# Patient Record
Sex: FEMALE | Race: WHITE | NOT HISPANIC OR LATINO | Employment: FULL TIME | URBAN - METROPOLITAN AREA
[De-identification: names, ages, dates, MRNs, and addresses within clinical notes are randomized per-mention and may not be internally consistent; named-entity substitution may affect disease eponyms.]

---

## 2017-01-28 ENCOUNTER — GENERIC CONVERSION - ENCOUNTER (OUTPATIENT)
Dept: OTHER | Facility: OTHER | Age: 57
End: 2017-01-28

## 2017-03-20 ENCOUNTER — TRANSCRIBE ORDERS (OUTPATIENT)
Dept: ADMINISTRATIVE | Age: 57
End: 2017-03-20

## 2017-03-20 ENCOUNTER — APPOINTMENT (OUTPATIENT)
Dept: LAB | Age: 57
End: 2017-03-20
Attending: PREVENTIVE MEDICINE

## 2017-03-20 DIAGNOSIS — Z00.00 ROUTINE GENERAL MEDICAL EXAMINATION AT A HEALTH CARE FACILITY: ICD-10-CM

## 2017-03-20 DIAGNOSIS — Z00.00 ROUTINE GENERAL MEDICAL EXAMINATION AT A HEALTH CARE FACILITY: Primary | ICD-10-CM

## 2017-03-20 PROCEDURE — 86765 RUBEOLA ANTIBODY: CPT

## 2017-03-20 PROCEDURE — 86735 MUMPS ANTIBODY: CPT

## 2017-03-20 PROCEDURE — 86480 TB TEST CELL IMMUN MEASURE: CPT

## 2017-03-20 PROCEDURE — 36415 COLL VENOUS BLD VENIPUNCTURE: CPT

## 2017-03-20 PROCEDURE — 86762 RUBELLA ANTIBODY: CPT

## 2017-03-20 PROCEDURE — 86787 VARICELLA-ZOSTER ANTIBODY: CPT

## 2017-03-21 LAB
MEV IGG SER QL: NORMAL
MUV IGG SER QL: NORMAL
RUBV IGG SERPL IA-ACNC: >175 IU/ML
VZV IGG SER IA-ACNC: NORMAL

## 2017-03-22 LAB
ANNOTATION COMMENT IMP: NORMAL
GAMMA INTERFERON BACKGROUND BLD IA-ACNC: 0.05 IU/ML
M TB IFN-G BLD-IMP: NEGATIVE
M TB IFN-G CD4+ BCKGRND COR BLD-ACNC: <0.01 IU/ML
M TB IFN-G CD4+ T-CELLS BLD-ACNC: 0.04 IU/ML
MITOGEN IGNF BLD-ACNC: 6.3 IU/ML
QUANTIFERON-TB GOLD IN TUBE: NORMAL
SERVICE CMNT-IMP: NORMAL

## 2018-01-09 NOTE — MISCELLANEOUS
Message  GI Reminder Recall Jj Lowers:   Date: 01/28/2017   Dear Iliana Cobb:     Review of our records shows you are due for the following: Colonoscopy  Please call the following office to schedule your appointment:   [facility], [address] [place] [address], [address] [place] [address] [phone]  We look forward to hearing from you!        Sincerely,         [facility]         Signatures   Electronically signed by : Dennie Bart, ; Jan 28 2017 11:13AM EST                       (Author)

## 2018-01-12 NOTE — RESULT NOTES
Verified Results  (1) URINE CULTURE [id number] 11:33AM Deisy Miller     Test Name Result Flag Reference   Urine Culture,Comprehensive Final report     Result 1      Lactobacillus species   10,000-25,000 colony forming units per mL  Susceptibility not normally performed on this organism

## 2018-01-12 NOTE — RESULT NOTES
Verified Results  (1) URINE CULTURE [id number] 12:00AM Washington Miller     Test Name Result Flag Reference   Urine Culture,Comprehensive Final report     Result 1 Comment     No growth in 36 - 48 hours

## 2018-01-15 NOTE — MISCELLANEOUS
Message  Called patient with recent blood work and MRI results  I informed her everything was normal  No signs of active IBD on MRI  She reports having crampy abdominal pain, so I advised her to take Bentyl 20 mg PO every 6 hours as needed for the pain  She expressed understanding and all questions were answered        Signatures   Electronically signed by : TEQUILA Walden; Feb 2 2016  5:15PM EST                       (Author)

## 2018-01-16 NOTE — MISCELLANEOUS
Message  patient went to emergency room over the weekend for severe abdominal pain  I reviewed his CT scan which showed distal terminal ileum thickening  Her pain is due to Crohn's flare and underlying irritable bowel syndrome /colon spasm  Continue prednisone 40 mg for one week then taper by 10 mg every week  I sent her a prescription for tramadol to be taken for pain  Continue Apriso        Plan  Abdominal pain    · TraMADol HCl - 50 MG Oral Tablet; TAKE 1 TABLET BY MOUTH TWICE DAILY AS  NEEDED    Signatures   Electronically signed by : Lane Metcalf MD; Jun 6 2016  6:30PM EST                       (Author)

## 2018-09-25 ENCOUNTER — TRANSCRIBE ORDERS (OUTPATIENT)
Dept: ADMINISTRATIVE | Facility: HOSPITAL | Age: 58
End: 2018-09-25

## 2018-09-25 DIAGNOSIS — K50.919 CROHN'S DISEASE WITH COMPLICATION, UNSPECIFIED GASTROINTESTINAL TRACT LOCATION (HCC): Primary | ICD-10-CM

## 2018-09-25 DIAGNOSIS — K92.2 ACUTE GASTROINTESTINAL HEMORRHAGE: ICD-10-CM

## 2018-09-25 DIAGNOSIS — R10.13 ABDOMINAL PAIN, EPIGASTRIC: ICD-10-CM

## 2018-12-03 ENCOUNTER — LAB REQUISITION (OUTPATIENT)
Dept: LAB | Facility: HOSPITAL | Age: 58
End: 2018-12-03
Payer: COMMERCIAL

## 2018-12-03 DIAGNOSIS — R10.13 EPIGASTRIC PAIN: ICD-10-CM

## 2018-12-03 DIAGNOSIS — K63.5 POLYP OF COLON: ICD-10-CM

## 2018-12-03 PROCEDURE — 88305 TISSUE EXAM BY PATHOLOGIST: CPT | Performed by: PATHOLOGY

## 2019-04-24 ENCOUNTER — TELEPHONE (OUTPATIENT)
Dept: FAMILY MEDICINE CLINIC | Facility: CLINIC | Age: 59
End: 2019-04-24

## 2019-05-07 ENCOUNTER — OFFICE VISIT (OUTPATIENT)
Dept: URGENT CARE | Facility: CLINIC | Age: 59
End: 2019-05-07
Payer: COMMERCIAL

## 2019-05-07 VITALS
WEIGHT: 233.8 LBS | DIASTOLIC BLOOD PRESSURE: 96 MMHG | TEMPERATURE: 97.9 F | BODY MASS INDEX: 35.43 KG/M2 | HEIGHT: 68 IN | OXYGEN SATURATION: 97 % | RESPIRATION RATE: 20 BRPM | SYSTOLIC BLOOD PRESSURE: 174 MMHG | HEART RATE: 88 BPM

## 2019-05-07 DIAGNOSIS — H57.11 EYE PAIN, RIGHT: Primary | ICD-10-CM

## 2019-05-07 PROCEDURE — 99213 OFFICE O/P EST LOW 20 MIN: CPT | Performed by: PHYSICIAN ASSISTANT

## 2019-05-07 PROCEDURE — S9088 SERVICES PROVIDED IN URGENT: HCPCS | Performed by: PHYSICIAN ASSISTANT

## 2019-05-07 RX ORDER — IBUPROFEN 200 MG
200 TABLET ORAL EVERY 6 HOURS PRN
COMMUNITY
End: 2021-02-28 | Stop reason: HOSPADM

## 2019-12-19 ENCOUNTER — OFFICE VISIT (OUTPATIENT)
Dept: URGENT CARE | Facility: CLINIC | Age: 59
End: 2019-12-19
Payer: COMMERCIAL

## 2019-12-19 VITALS
HEIGHT: 67 IN | OXYGEN SATURATION: 96 % | TEMPERATURE: 97.3 F | BODY MASS INDEX: 32.96 KG/M2 | HEART RATE: 106 BPM | WEIGHT: 210 LBS | SYSTOLIC BLOOD PRESSURE: 143 MMHG | DIASTOLIC BLOOD PRESSURE: 94 MMHG | RESPIRATION RATE: 20 BRPM

## 2019-12-19 DIAGNOSIS — J20.9 ACUTE BRONCHITIS, UNSPECIFIED ORGANISM: Primary | ICD-10-CM

## 2019-12-19 DIAGNOSIS — L02.91 ABSCESS: ICD-10-CM

## 2019-12-19 PROCEDURE — 99213 OFFICE O/P EST LOW 20 MIN: CPT | Performed by: PHYSICIAN ASSISTANT

## 2019-12-19 PROCEDURE — S9088 SERVICES PROVIDED IN URGENT: HCPCS | Performed by: PHYSICIAN ASSISTANT

## 2019-12-19 RX ORDER — PREDNISONE 50 MG/1
50 TABLET ORAL DAILY
Qty: 5 TABLET | Refills: 0 | Status: SHIPPED | OUTPATIENT
Start: 2019-12-19 | End: 2019-12-24

## 2019-12-19 RX ORDER — CLINDAMYCIN HYDROCHLORIDE 300 MG/1
300 CAPSULE ORAL 3 TIMES DAILY
Qty: 21 CAPSULE | Refills: 0 | Status: SHIPPED | OUTPATIENT
Start: 2019-12-19 | End: 2019-12-26

## 2019-12-19 NOTE — PATIENT INSTRUCTIONS
Take medications as directed  Apply warm compresses under the right arm  If symptoms worsen you may need to be re-evaluated for possible incision and drainage  Use inhaler as directed  Increase fluid intake  You should quit smoking  Follow up with your family doctor 3-5 days if symptoms are not improving

## 2019-12-19 NOTE — PROGRESS NOTES
[facility]        NAME: Rogers Rick is a 64 y o  female  : 1960    MRN: [id number]  DATE: 2019  TIME: 1:16 PM    Assessment and Plan   Acute bronchitis, unspecified organism [J20 9]  1  Acute bronchitis, unspecified organism  predniSONE 50 mg tablet   2  Abscess  clindamycin (CLEOCIN) 300 MG capsule     Patient states that when she has these flares of hidradenitis she typically takes an antibiotic which makes the area more fluctuant and drains on its own  She was taking doxycycline but then had a rash so stopped it  With her allergies to penicillins and sulfa as well we discussed using clindamycin which she states she has taken in the past without difficulty  Will start on clindamycin and she will continue warm compresses  If symptoms worsen, she develops fever, more pain or swelling in that area she may have to have the area lanced  Recommend follow up with General surgery he for continued symptoms  Patient is a smoker with a harsh cough and some wheezing in the bases  Instructed patient to start using her albuterol inhaler every 6 hours for the next few days will also start on prednisone  Patient Instructions   Patient Instructions   Take medications as directed  Apply warm compresses under the right arm  If symptoms worsen you may need to be re-evaluated for possible incision and drainage  Use inhaler as directed  Increase fluid intake  You should quit smoking  Follow up with your family doctor 3-5 days if symptoms are not improving  Proceed to  ER if symptoms worsen  Chief Complaint     Chief Complaint   Patient presents with    Mass     Reports lump on right armpit   Cough     Reprts cough, chest congestion, sore throat, and ear pain x 2 days          History of Present Illness       Patient presents for evaluation of 2 complaints today  She has a history of hidradenitis suppurative in reports she has the painful area under her right arm    She states that she was given doxycycline by her family doctor but by day 4 she had a rash so stopped taking it  She reports she has taken doxycycline in the past without an issue  She states that she has allergies to penicillins and sulfa as well  She states the area under her arm is getting larger and is causing more discomfort  She has had surgery for similar condition in the groin area in the past   She denies any fevers or chills  Her 2nd complaint is she has developed postnasal drainage, sinus congestion, and persistent cough for the past 2-3 days  She is a cigarette smoker  She reports that she does have an inhaler, Proventil, at home but is only supposed to use it when she is really sick  She states that she has some pain in her chest with cough and feels a little short of breath and dizzy at times  Review of Systems   Review of Systems   Constitutional: Negative for chills and fever  HENT: Positive for congestion, postnasal drip and sore throat  Eyes: Negative  Respiratory: Positive for cough and shortness of breath  Cardiovascular: Negative  Gastrointestinal: Negative  Musculoskeletal: Negative  Skin: Negative  Allergic/Immunologic: Negative  Neurological: Negative  Current Medications       Current Outpatient Medications:     ibuprofen (MOTRIN) 200 mg tablet, Take 200 mg by mouth every 6 (six) hours as needed, Disp: , Rfl:     BUDESONIDE PO, Take 3 tablets by mouth daily  , Disp: , Rfl:     clindamycin (CLEOCIN) 300 MG capsule, Take 1 capsule (300 mg total) by mouth 3 (three) times a day for 7 days, Disp: 21 capsule, Rfl: 0    Mesalamine (APRISO PO), Take 4 tablets by mouth daily  , Disp: , Rfl:     ondansetron (ZOFRAN-ODT) 4 mg disintegrating tablet, Take 1 tablet (4 mg total) by mouth every 8 (eight) hours as needed for nausea or vomiting   (Patient not taking: Reported on 5/7/2019), Disp: 12 tablet, Rfl: 0    oxyCODONE (OXY-IR) 5 MG capsule, Take 1 capsule (5 mg total) by mouth every 6 (six) hours as needed for moderate pain  Max Daily Amount: 20 mg (Patient not taking: Reported on 5/7/2019), Disp: 10 capsule, Rfl: 0    predniSONE 50 mg tablet, Take 1 tablet (50 mg total) by mouth daily for 5 days, Disp: 5 tablet, Rfl: 0    Current Allergies     Allergies as of 12/19/2019 - Reviewed 12/19/2019   Allergen Reaction Noted    Margo Phlegm [cephalexin]  06/04/2016    Penicillins  06/04/2016            The following portions of the patient's history were reviewed and updated as appropriate: allergies, current medications, past family history, past medical history, past social history, past surgical history and problem list      Past Medical History:   Diagnosis Date    Crohn's colitis ([facility] )     Diabetes mellitus ([facility] )     Migraine        History reviewed  No pertinent surgical history  History reviewed  No pertinent family history  Medications have been verified  Objective   /94 (BP Location: Right arm, Patient Position: Sitting)   Pulse (!) 106   Temp (!) 97 3 °F (36 3 °C) (Tympanic)   Resp 20   Ht 5' 7" (1 702 m)   Wt 95 3 kg (210 lb)   SpO2 96%   BMI 32 89 kg/m²        Physical Exam     Physical Exam   Constitutional: She is oriented to person, place, and time  She appears well-developed  No distress  HENT:   Right Ear: Tympanic membrane, external ear and ear canal normal    Left Ear: Tympanic membrane, external ear and ear canal normal    Nose: Nose normal    Mouth/Throat: Uvula is midline, oropharynx is clear and moist and mucous membranes are normal    Cardiovascular: Normal rate and regular rhythm  Pulmonary/Chest: Effort normal  She has wheezes in the right lower field and the left lower field  Scattered rhonchi that clear with cough   Lymphadenopathy:     She has no cervical adenopathy  Neurological: She is alert and oriented to person, place, and time  Skin: Skin is warm and dry     Area of swelling and induration in right axilla  3 pustules noted with mild erythema  No significant fluctuance

## 2020-01-02 ENCOUNTER — OFFICE VISIT (OUTPATIENT)
Dept: DERMATOLOGY | Facility: CLINIC | Age: 60
End: 2020-01-02
Payer: COMMERCIAL

## 2020-01-02 ENCOUNTER — APPOINTMENT (OUTPATIENT)
Dept: LAB | Facility: CLINIC | Age: 60
End: 2020-01-02
Payer: COMMERCIAL

## 2020-01-02 VITALS — HEIGHT: 67 IN | WEIGHT: 228.5 LBS | TEMPERATURE: 98 F | BODY MASS INDEX: 35.87 KG/M2

## 2020-01-02 DIAGNOSIS — L72.0 EPIDERMAL INCLUSION CYST: Primary | ICD-10-CM

## 2020-01-02 DIAGNOSIS — L73.2 HIDRADENITIS SUPPURATIVA: ICD-10-CM

## 2020-01-02 DIAGNOSIS — D23.9 DERMATOFIBROMA: ICD-10-CM

## 2020-01-02 DIAGNOSIS — H02.60 XANTHELASMA: ICD-10-CM

## 2020-01-02 LAB
CHOLEST SERPL-MCNC: 264 MG/DL (ref 50–200)
HDLC SERPL-MCNC: 36 MG/DL
LDLC SERPL CALC-MCNC: 186 MG/DL (ref 0–100)
NONHDLC SERPL-MCNC: 228 MG/DL
TRIGL SERPL-MCNC: 208 MG/DL

## 2020-01-02 PROCEDURE — 36415 COLL VENOUS BLD VENIPUNCTURE: CPT | Performed by: STUDENT IN AN ORGANIZED HEALTH CARE EDUCATION/TRAINING PROGRAM

## 2020-01-02 PROCEDURE — 17110 DESTRUCTION B9 LES UP TO 14: CPT | Performed by: STUDENT IN AN ORGANIZED HEALTH CARE EDUCATION/TRAINING PROGRAM

## 2020-01-02 PROCEDURE — 80061 LIPID PANEL: CPT | Performed by: STUDENT IN AN ORGANIZED HEALTH CARE EDUCATION/TRAINING PROGRAM

## 2020-01-02 NOTE — PROGRESS NOTES
[facility] Note     Patient Name: Tamra Fields  Encounter Date: 1/2/20    Today's Chief Concerns:  Trev Sheehan Concern #1:  Lesion under right arm    Concern #2:  Lesion on right calf    Past Medical History:  Have you ever had or currently have any of the following medical conditions or treatments? · HIV/AIDS: No  · Hepatitis B: No  · Hepatitis C: No   · Diabetes: YES  · Tuberculosis: No  · Biologic Therapy/Chemotherapy: No  · Organ or Bone Marrow Transplantation: No  · Radiation Treatment: No  · Cancer (If Yes, which types)- No      Have you ever had any of the following skin conditions? · Melanoma? (If Yes, please provide more detail)- No  · Basal Cell Carcinoma: No  · Squamous Cell Carcinoma: No  · Sebaceous Cell Carcinoma: No  · Merkel Cell Carcinoma: No  · Angiosarcoma: No  · Blistering Sunburns: No  · Eczema: No  · Psoriasis: No    Social History:    What is your current Smoking Status? 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 Reading    Family history:  Do any of your "first degree relatives" (parent, brother, sister, or child) have any of the following conditions? · Melanoma? (If Yes, which relatives?) No  · Eczema: No  · Asthma: No  · Hay Fever/Seasonal Allergies: No  · Psoriasis: No  · Arthritis: No  · Thyroid Problems: No  · Lupus/Connective Tissue Disease: No  · Diabetes: No  · Stroke: No  · Blood Clots: No  · IBD/Crohn's/Ulcerative Colitis: No  · Vitiligo: No  · Scarring/Keloids: No  · Severe Acne: No  · Pancreatic Cancer: No  · Other known Skin Condition? If Yes, what condition and which relatives? No    Current Medications:    Current Outpatient Medications:     BUDESONIDE PO, Take 3 tablets by mouth daily  , Disp: , Rfl:     ibuprofen (MOTRIN) 200 mg tablet, Take 200 mg by mouth every 6 (six) hours as needed, Disp: , Rfl:     Mesalamine (APRISO PO), Take 4 tablets by mouth daily  , Disp: , Rfl:     ondansetron (ZOFRAN-ODT) 4 mg disintegrating tablet, Take 1 tablet (4 mg total) by mouth every 8 (eight) hours as needed for nausea or vomiting , Disp: 12 tablet, Rfl: 0    oxyCODONE (OXY-IR) 5 MG capsule, Take 1 capsule (5 mg total) by mouth every 6 (six) hours as needed for moderate pain  Max Daily Amount: 20 mg (Patient not taking: Reported on 5/7/2019), Disp: 10 capsule, Rfl: 0    Specific Alerts:    Have you been seen by a [facility] Dermatologist in the last 3 years? No    Are you pregnant or planning to become pregnant? No    Are you currently or planning to be nursing or breast feeding? No    Allergies   Allergen Reactions    Keflet [Cephalexin]     Penicillins     Sulfa Antibiotics        May we call your Preferred Phone number to discuss your specific medical information? YES    May we leave a detailed message that includes your specific medical information? YES    Have you traveled outside of the [facility] in the past 3 months? No    Do you currently have a pacemaker or defibrillator? No    Do you have any artificial heart valves, joints, plates, screws, rods, stents, pins, etc? No   - If Yes, were any placed within the last 2 years? Do you require any medications prior to a surgical procedure? No   - If Yes, for which procedure? n/a   - If Yes, what medications to you require? n/a    Are you taking any medications that cause you to bleed more easily ("blood thinners") YES    Have you ever experienced a rapid heartbeat with epinephrine? No    Have you ever been treated with "gold" (gold sodium thiomalate) therapy? No    [facility] can help with wrinkles, "laugh lines," facial volume loss, "double chin," "love handles," age spots, and more  Are you interested in learning today about some of the skin enhancement procedures that we offer? (If Yes, please provide more detail) No    Review of Systems:  Have you recently had or currently have any of the following?     · Fever or chills: No  · Night Sweats: YES  · Headaches: YES  · Weight Gain: YES  · Weight Loss: No  · Blurry Vision: No  · Nausea: No  · Vomiting: No  · Diarrhea: YES  · Blood in Stool: No  · Abdominal Pain: No  · Itchy Skin: YES  · Painful Joints: YES  · Swollen Joints: YES  · Muscle Pain: YES  · Irregular Mole: No  · Sun Burn: No  · Dry Skin: YES  · Skin Color Changes: No  · Scar or Keloid: No  · Cold Sores/Fever Blisters: No  · Bacterial Infections/MRSA: No  · Anxiety: No  · Depression: No  · Suicidal or Homicidal Thoughts: No      PHYSICAL EXAM:      Was a chaperone (Derm Clinical Assistant) present for the entirety of the Physical Exam? YES    Did the Dermatology Team specifically ask and  the patient on the importance of a Full Skin Exam to be sure that nothing is missed clinically?  YES    Did the patient request or accept a Full Skin Exam?  No    Did the patient specifically refuse to have the areas "under-the-bra" examined by the Dermatologist? [facility]    Did the patient specifically refuse to have the areas "under-the-underwear" examined by the Dermatologist? YES      CONSTITUTIONAL:   Vitals:    01/02/20 0945   Temp: 98 °F (36 7 °C)   TempSrc: Tympanic   Weight: 104 kg (228 lb 8 oz)   Height: 5' 7" (1 702 m)       PSYCH: Normal mood and affect  EYES: Normal conjunctiva  ENT: Normal lips and oral mucosa  CARDIOVASCULAR: No edema  RESPIRATORY: Normal respirations  HEME/LYMPH/IMMUNO:  No regional lymphadenopathy except as noted below in [address] (SKIN)  Hair, Scalp, Ears, Face Normal except as noted below in Assessment   Neck Normal except as noted below in Assessment   Right Arm/Hand/Fingers Normal except as noted below in Assessment   Left Arm/Hand/Fingers Normal except as noted below in Assessment   Chest/Breasts/Axillae Viewed areas Normal except as noted below in Assessment   Abdomen, Umbilicus Normal except as noted below in Assessment   Back/Spine Normal except as noted below in Assessment    Viewed areas Normal except as noted below in Assessment   Right Leg Normal except as noted below in Assessment   Left Leg Normal except as noted below in Assessment        ASSESSMENT AND PLAN BY DIAGNOSIS:    History of Present Condition:     Duration:  How long has this been an issue for you?    o  Months    Location Affected:  Where on the body is this affecting you?    o  Under right arm   Quality:  Is there any bleeding, pain, itch, burning/irritation, or redness associated with the skin lesion? o  Bleeding, pain, itch, burning, irritation, and redness    Severity:  Describe any bleeding, pain, itch, burning/irritation, or redness on a scale of 1 to 10 (with 10 being the worst)  o  5   Timing:  Does this condition seem to be there pretty constantly or do you notice it more at specific times throughout the day?    o  Constant    Context:  Have you ever noticed that this condition seems to be associated with specific activities you do?    o  Denies    Modifying Factors:    o Anything that seems to make the condition worse?    -  Denies   o What have you tried to do to make the condition better?    -  Clindamycin    Associated Signs and Symptoms:  Does this skin lesion seem to be associated with any of the following:  o  DERM ASSOCIATED SIGNS AND SYMPTOMS: Redness, Itching and Scratching and Bleeding     1  EPIDERMAL INCLUSION CYST    Physical Exam:   Anatomic Location Affected:  Right axilla   Morphological Description:  5 cm x 3 cm nodule with central punctum, draining yellow clear fluid    Pertinent Positives:   Pertinent Negatives: No lymphadenopathy     Additional History of Present Condition:  Located under right arm  Present for months  Patient states burning, pain, itch  Lesion is improving with clindamycin  Pt had doxycycline before; not much improvement and caused itching      Assessment and Plan:  Based on a thorough discussion of this condition and the management approach to it (including a comprehensive discussion of the known risks, side effects and potential benefits of treatment), the patient (family) agrees to implement the following specific plan:   Start Chlorhexidine, wash 3 times a week in shower in inner thighs and armpits    Start Panoxyl 10 %, wash 3-5x/week in shower in inner thighs and armpits; and if cannot get panoxyl 10% wash, can use neutrogena clear pore wash in shower 5x/week   If gets infected again, will consider surgery in future(pt does not want surgery now)     What are epidermal inclusion cysts? Epidermal inclusion cysts are the most common, benign cutaneous cysts  There are many different names for epidermal inclusion cysts, including epidermoid cyst, epidermal cyst, infundibular cyst, inclusion cyst, and keratin cyst  These cysts can occur anywhere on the body and typically present as nodules directly underneath the skin  There is often a visible pore or opening in the center  The cysts are freely moveable and can range from a few millimeters to several centimeters in diameter  The center of epidermoid cysts almost always contains keratin, which has a cheesy appearance, and not sebum  They also do not originate from sebaceous glands  Therefore, epidermal inclusion cysts are not the same as sebaceous cysts  Cysts may remain stable or progressively enlarge over time  There are no reliable predictive factors to tell if an epidermal inclusion cyst will enlarge, become inflamed, or remain quiescent  Infected cysts tend to become larger, turn red, and are more noticeable to the patient  There may be accompanying pain and discomfort  What causes epidermal inclusion cysts? Epidermal inclusion cysts often appear out of the blue and are not contagious  They are due to a proliferation of epidermal cells within the dermis and are more common in men than women  They occur more frequently in patients in their 20s to 45s   Epidermal inclusion cysts by themselves are usually not inherited, but they can be hereditary in rare syndromes such as Rivers syndrome, nodular elastosis with cysts and comedones (Favre-Racouchot syndrome), and basal cell nevus syndrome (Gorlin syndrome)  Elderly patients with chronic sun-damaged skin areas have a higher likelihood of developing epidermoid cysts  They often occur in areas where hair follicles have been inflamed or repeatedly irritated are more frequent in patients with acne vulgaris  In the  period, they are called milia  Patients on BRAF inhibitors such as imiquimod and cyclosporine have a higher incidence of epidermoid cysts of the face  How do we diagnose an epidermal inclusion cyst?  Epidermoid inclusion cysts are often diagnosed by history and physical exam  There is usually no need for biopsy prior to removal   Radiographic and laboratory exams, such as ultrasound studies, are unnecessary and not typically ordered unless the practitioner suspects a genetic condition  What is the treatment for an epidermal inclusion cyst?  Inflamed, uninfected epidermal inclusion cysts rarely resolve spontaneously without therapy or surgical intervention  Treatment is not emergent unless desired by the patient  Definitive treatment is via surgical excision with walls intact  This method will prevent recurrence  This is best done when the cyst is not inflamed, to decrease the probability of rupture during surgery  - A local anesthetic will be injected around the cyst  - A small incision is made in the skin overlying the cyst, and contents are expressed  - The incision is repaired with sutures    Another option is to use a 4mm punch biopsy with cyst extraction through the defect  Incision and drainage is often needed if the cyst is infected or inflamed  If there is surrounding cellulitis, oral antibiotic therapy may be necessary   The common agents used target methicillin sensitive Staphylococcal aureus and methicillin resistant S aureus in areas of high prevalence  2  HIDRADENITIS SUPPURATIVA    Physical Exam:   Anatomic Location Affected:  Right axilla   Morphological Description:  5 cm x 3 cm nodule with central punctum, draining yellow clear fluid    Pertinent Positives:   Pertinent Negatives: no lymphadenopathy     Additional History of Present Condition:  Used to get lesions in inner thigh and armpits but now less frequent, no active disease except for R axilla cyst    Assessment and Plan:  Based on a thorough discussion of this condition and the management approach to it (including a comprehensive discussion of the known risks, side effects and potential benefits of treatment), the patient (family) agrees to implement the following specific plan:   Start Chlorhexidine, wash 3 times a week in shower in inner thighs and armpits    Start Panoxyl 10 %, wash 3-5x/week in shower in inner thighs and armpits; and if cannot get panoxyl 10% wash, can use neutrogena clear pore wash in shower 5x/week           What is hidradenitis suppurativa? Hidradenitis suppurativa is an inflammatory skin disease that affects apocrine gland-bearing skin in the axillae, in the groin, and under the breasts  It is characterised by recurrent boil-like nodules and abscesses that culminate in pus-like discharge, difficult-to-heal open wounds (sinuses) and scarring  Hidradenitis suppurativa also has significant psychological impact and many patients suffer from impairment of body image, depression and anxiety  The term hidradenitis implies it starts as an inflammatory disorder of sweat glands, which is now known to be incorrect  Hidradenitis suppurativa is also known as acne inversa  Who gets hidradenitis suppurativa? Hidradenitis often starts at puberty, and is most active between the ages of 21 and 36 years, and in women, can resolve at menopause   It is three times more common in females than in males  Risk factors include:   Other family members with hidradenitis suppurativa   Obesity and insulin resistance/metabolic syndrome   Cigarette smoking   Follicular occlusion disorders: acne conglobata, dissecting cellulitis, pilonidal sinus   Inflammatory bowel disease (Crohn disease)   Rare autoinflammatory syndromes associated with abnormalities of PSTPIP1 gene  *    * PAPA syndrome (pyogenic arthritis, pyoderma gangrenosum and acne), PASH syndrome (pyoderma gangrenosum, acne, suppurative hidradenitis) and PAPASH syndrome (pyogenic arthritis, pyoderma gangrenosum, acne, suppurative hidradenitis)  What causes hidradenitis suppurativa? Hidradenitis suppurativa is an autoinflammatory disorder  Although the exact cause is not yet understood, contributing factors include:   Friction from clothing and body folds   Aberrant immune response to commensal bacteria   Abnormal cutaneous or follicular microbiome   Follicular occlusion   Release of pro-inflammatory cytokines   Inflammation causing rupture of the follicular wall and destroying apocrine glands and ducts   Secondary bacterial infection   Certain drugs  What are the clinical features of hidradenitis suppurativa? Hidradenitis can affect a single or multiple areas in the armpits, neck, sub mammary area, and inner thighs  Anogenital involvement most commonly affects the groin, mons pubis, vulva (in females), sides of the scrotum (in males), perineum, buttocks and perianal folds  Signs include:   Open and closed comedones   Painful firm papules, larger nodules and pleated ridges   Pustules, fluctuant pseudocysts and abscesses   Pyogenic granulomas   Draining sinuses linking inflammatory lesions   Hypertrophic and atrophic scars  Many patients with hidradenitis suppurativa also suffer from other skin disorders, including acne, hirsutism and psoriasis      The severity and extent of hidradenitis suppurativa is recorded at assessment and when determining the impact of a treatment  The [facility] system describes three distinct clinical stages:  1  Solitary or multiple, isolated abscess formation without scarring or sinus tracts  2  Recurrent abscesses, single or multiple widely  lesions, with sinus tract formation  3  Diffuse or broad involvement, with multiple interconnected sinus tracts and abscesses  Severe hidradenitis (Tracy Stage 3) has been associated with:   Male sex   Axillary and perianal involvement   Obesity   Smoking   Higher risk of stroke, coronary artery disease, heart failure, and peripheral artery disease   Disease duration  What is the treatment for hidradenitis suppurativa? General measures   Weight loss; follow an anti-inflammatory, low-sugar, low-grain, low-dairy diet (mainly plants)   Smoking cessation: this can lead to improvement within a few months   Loose fitting clothing   Daily unfragranced antiperspirants   If prone to secondary infection, wash with antiseptics or take bleach baths   Apply hydrogen peroxide solution or medical grade honey to reduce malodour   Use peeling agents such as resorcinol 15% cream to de-roof nodules   Apply simple dressings to draining sinuses   Analgesics, such as paracetamol (acetaminophen), for pain control   Seek help to manage anxiety and depression  Medical management of hidradenitis suppurativa  Medical management of hidradenitis suppurativa is difficult  Treatment is required long term  Effective options are listed below      Antibiotics   Topical clindamycin, with benzoyl peroxide to reduce bacterial resistance   Short course of oral antibiotics for acute staphylococcal abscesses, eg flucloxacillin   Prolonged courses (minimum 3 months) of tetracycline, metronidazole, trimethoprim + sulphamethoxazole, fluoroquinolones, ertapenem or dapsone for their anti-inflammatory action   6-12 week courses of the combination of clindamycin (or doxycycline) and rifampicin for severe disease  Antiandrogens   Long-term oral contraceptive pill; antiandrogenic progesterones drospirenone or cyproterone acetate may be more effective than standard combined pills  These are more suitable than progesterone-only pills or devices   Spironolactone and finasteride   Response takes 6 months or longer  Immunomodulatory treatments for severe disease   Intralesional corticosteroids into nodules   Systemic corticosteroids short-term for flares   Methotrexate, ciclosporin, and azathioprine   TNF-? inhibitors adalimumab and infliximab, used in higher dose than required for psoriasis, are the most successful treatments to date  Note that paradoxically, they may sometimes induce new-onset hidradenitis suppurativa   Other biologics are under investigation, such as the IL-1? antagonist, canakinumab    Other medical treatments   Metformin in patients with insulin resistance   Acitretin (unsuitable for females of childbearing potential)   Isotretinoin -- effective for acne but appears unhelpful for most cases of hidradenitis   Colchicine   Medical management of anxiety and depression    Surgical management of hidradenitis suppurativa   Incision and drainage of acute abscesses   Curettage and deroofing of nodules, abscesses and sinuses   Laser ablation of nodules, abscesses and sinuses   Wide local excision of persistent nodules   Radical excisional surgery of entire affected area   Nd:YAG laser hair removal    3   DERMATOFIBROMA    Physical Exam:   Anatomic Location Affected:  Right lower leg   Morphological Description:  4 mm x 3 mm brown indurated papule     Assessment and Plan:  Based on a thorough discussion of this condition and the management approach to it (including a comprehensive discussion of the known risks, side effects and potential benefits of treatment), the patient (family) agrees to implement the following specific plan:   Reassurance benign Assessment and Plan:  A dermatofibroma is a common benign fibrous nodule that most often arises on the skin of the lower legs  A dermatofibroma is also called a "cutaneous fibrous histiocytoma "  Dermatofibromas occur at all ages and in people of every ethnicity  They are more common in women than in men  It is not clear if dermatofibroma is a reactive process or if it is a neoplasm  The lesions are made up of proliferating fibroblasts  Histiocytes may also be involved  They are sometimes attributed to an insect bite or ingrownhair or local trauma, but not consistently  They may be more numerous in patients with altered immunity  Dermatofibromas most often occur on the legs and arms, but may also arise on the trunk or any site of the body  Typical clinical features include the following:   People may have 1 or up to 15 lesions   Size varies from 0 5-1 5 cm diameter; most lesions are 7-10 mm diameter   They are firm nodules tethered to the skin surface and mobile over subcutaneous tissue   The skin "dimples" on pinching the lesion   Color may be pink to light brown in white skin, and dark brown to black in dark skin; some appear paler in the center   They do not usually cause symptoms, but they are sometimes painful or itchy   Because they are often raised lesions, they may be traumatized, for example by a razor   Occasionally dozens may erupt within a few months, usually in the setting of immunosuppression (for example autoimmune disease, cancer or certain medications)   Dermatofibroma does not give rise to cancer  However, occasionally, it may be mistaken for dermatofibrosarcoma or desmoplastic melanoma  A dermatofibroma is harmless and seldom causes any symptoms  Usually, only reassurance is needed   If it is nuisance or causing concern, the lesion can be removed surgically, resulting in a scar that is, by definition, usually longer in diameter than the widest portion of the dermatofibroma  Cryotherapy, shave biopsy and laser surgery are rarely completely successful  Skin punch biopsy or incisional biopsy may be undertaken if there is an atypical feature such as recent enlargement, ulceration, or asymmetrical structures and colours on dermatoscopy  4  XANTHELASMA   Physical Exam:   Anatomic Location Affected:  Bilateral lower and upper eyelids   Morphological Description:  Yellow verucose papules       Assessment and Plan:  Based on a thorough discussion of this condition and the management approach to it (including a comprehensive discussion of the known risks, side effects and potential benefits of treatment), the patient (family) agrees to implement the following specific plan:   Get lipid panel done while fasting    LN2 to test spot on left upper eyelid (had retinal tear in right eye)   Pictures taken   Consider laser therapy in the feature     PROCEDURE:  DESTRUCTION OF BENIGN LESIONS  After a thorough discussion of treatment options and risk/benefits/alternatives (including but not limited to local pain, scarring, dyspigmentation, blistering, and possible superinfection), verbal and written consent were obtained and the aforementioned lesions were treated on with cryotherapy using liquid nitrogen x 1 cycle for 5-10 seconds   TOTAL NUMBER of 1 lesions were treated today on the ANATOMIC LOCATION: Left upper eyelid  The patient tolerated the procedure well, and after-care instructions were provided      Scribe Attestation    I,:   Javed Pandey MA am acting as a scribe while in the presence of the attending physician :        I,:   Albina Roberts MD personally performed the services described in this documentation    as scribed in my presence :

## 2020-01-02 NOTE — PATIENT INSTRUCTIONS
Assessment and Plan:  Based on a thorough discussion of this condition and the management approach to it (including a comprehensive discussion of the known risks, side effects and potential benefits of treatment), the patient (family) agrees to implement the following specific plan:   Start Chlorhexidine, wash 3 times a week in shower in inner thighs and armpits    Start Panoxyl 10 %, wash 3-5x/week in shower in inner thighs and armpits; and if cannot get panoxyl 10% wash, can use neutrogena clear pore wash in shower 5x/week   If gets infected again, will consider surgery in future(pt does not want surgery now)     What are epidermal inclusion cysts? Epidermal inclusion cysts are the most common, benign cutaneous cysts  There are many different names for epidermal inclusion cysts, including epidermoid cyst, epidermal cyst, infundibular cyst, inclusion cyst, and keratin cyst  These cysts can occur anywhere on the body and typically present as nodules directly underneath the skin  There is often a visible pore or opening in the center  The cysts are freely moveable and can range from a few millimeters to several centimeters in diameter  The center of epidermoid cysts almost always contains keratin, which has a cheesy appearance, and not sebum  They also do not originate from sebaceous glands  Therefore, epidermal inclusion cysts are not the same as sebaceous cysts  Cysts may remain stable or progressively enlarge over time  There are no reliable predictive factors to tell if an epidermal inclusion cyst will enlarge, become inflamed, or remain quiescent  Infected cysts tend to become larger, turn red, and are more noticeable to the patient  There may be accompanying pain and discomfort  What causes epidermal inclusion cysts? Epidermal inclusion cysts often appear out of the blue and are not contagious   They are due to a proliferation of epidermal cells within the dermis and are more common in men than women  They occur more frequently in patients in their 25s to 45s  Epidermal inclusion cysts by themselves are usually not inherited, but they can be hereditary in rare syndromes such as Rivers syndrome, nodular elastosis with cysts and comedones (Favre-Racouchot syndrome), and basal cell nevus syndrome (Gorlin syndrome)  Elderly patients with chronic sun-damaged skin areas have a higher likelihood of developing epidermoid cysts  They often occur in areas where hair follicles have been inflamed or repeatedly irritated are more frequent in patients with acne vulgaris  In the  period, they are called milia  Patients on BRAF inhibitors such as imiquimod and cyclosporine have a higher incidence of epidermoid cysts of the face  How do we diagnose an epidermal inclusion cyst?  Epidermoid inclusion cysts are often diagnosed by history and physical exam  There is usually no need for biopsy prior to removal   Radiographic and laboratory exams, such as ultrasound studies, are unnecessary and not typically ordered unless the practitioner suspects a genetic condition  What is the treatment for an epidermal inclusion cyst?  Inflamed, uninfected epidermal inclusion cysts rarely resolve spontaneously without therapy or surgical intervention  Treatment is not emergent unless desired by the patient  Definitive treatment is via surgical excision with walls intact  This method will prevent recurrence  This is best done when the cyst is not inflamed, to decrease the probability of rupture during surgery  - A local anesthetic will be injected around the cyst  - A small incision is made in the skin overlying the cyst, and contents are expressed  - The incision is repaired with sutures    Another option is to use a 4mm punch biopsy with cyst extraction through the defect  Incision and drainage is often needed if the cyst is infected or inflamed   If there is surrounding cellulitis, oral antibiotic therapy may be necessary  The common agents used target methicillin sensitive Staphylococcal aureus and methicillin resistant S aureus in areas of high prevalence  Assessment and Plan:  Based on a thorough discussion of this condition and the management approach to it (including a comprehensive discussion of the known risks, side effects and potential benefits of treatment), the patient (family) agrees to implement the following specific plan:   Start Chlorhexidine, wash 3 times a week in shower in inner thighs and armpits    Start Panoxyl 10 %, wash 3-5x/week in shower in inner thighs and armpits; and if cannot get panoxyl 10% wash, can use neutrogena clear pore wash in shower 5x/week           What is hidradenitis suppurativa? Hidradenitis suppurativa is an inflammatory skin disease that affects apocrine gland-bearing skin in the axillae, in the groin, and under the breasts  It is characterised by recurrent boil-like nodules and abscesses that culminate in pus-like discharge, difficult-to-heal open wounds (sinuses) and scarring  Hidradenitis suppurativa also has significant psychological impact and many patients suffer from impairment of body image, depression and anxiety  The term hidradenitis implies it starts as an inflammatory disorder of sweat glands, which is now known to be incorrect  Hidradenitis suppurativa is also known as acne inversa  Who gets hidradenitis suppurativa? Hidradenitis often starts at puberty, and is most active between the ages of 21 and 36 years, and in women, can resolve at menopause  It is three times more common in females than in males   Risk factors include:   Other family members with hidradenitis suppurativa   Obesity and insulin resistance/metabolic syndrome   Cigarette smoking   Follicular occlusion disorders: acne conglobata, dissecting cellulitis, pilonidal sinus   Inflammatory bowel disease (Crohn disease)   Rare autoinflammatory syndromes associated with abnormalities of PSTPIP1 gene  *    * PAPA syndrome (pyogenic arthritis, pyoderma gangrenosum and acne), PASH syndrome (pyoderma gangrenosum, acne, suppurative hidradenitis) and PAPASH syndrome (pyogenic arthritis, pyoderma gangrenosum, acne, suppurative hidradenitis)  What causes hidradenitis suppurativa? Hidradenitis suppurativa is an autoinflammatory disorder  Although the exact cause is not yet understood, contributing factors include:   Friction from clothing and body folds   Aberrant immune response to commensal bacteria   Abnormal cutaneous or follicular microbiome   Follicular occlusion   Release of pro-inflammatory cytokines   Inflammation causing rupture of the follicular wall and destroying apocrine glands and ducts   Secondary bacterial infection   Certain drugs  What are the clinical features of hidradenitis suppurativa? Hidradenitis can affect a single or multiple areas in the armpits, neck, sub mammary area, and inner thighs  Anogenital involvement most commonly affects the groin, mons pubis, vulva (in females), sides of the scrotum (in males), perineum, buttocks and perianal folds  Signs include:   Open and closed comedones   Painful firm papules, larger nodules and pleated ridges   Pustules, fluctuant pseudocysts and abscesses   Pyogenic granulomas   Draining sinuses linking inflammatory lesions   Hypertrophic and atrophic scars  Many patients with hidradenitis suppurativa also suffer from other skin disorders, including acne, hirsutism and psoriasis  The severity and extent of hidradenitis suppurativa is recorded at assessment and when determining the impact of a treatment  The [place] system describes three distinct clinical stages:  1  Solitary or multiple, isolated abscess formation without scarring or sinus tracts  2  Recurrent abscesses, single or multiple widely  lesions, with sinus tract formation  3   Diffuse or broad involvement, with multiple interconnected sinus tracts and abscesses  Severe hidradenitis ([place] Carlita Stage 3) has been associated with:   Male sex   Axillary and perianal involvement   Obesity   Smoking   Higher risk of stroke, coronary artery disease, heart failure, and peripheral artery disease   Disease duration  What is the treatment for hidradenitis suppurativa? General measures   Weight loss; follow an anti-inflammatory, low-sugar, low-grain, low-dairy diet (mainly plants)   Smoking cessation: this can lead to improvement within a few months   Loose fitting clothing   Daily unfragranced antiperspirants   If prone to secondary infection, wash with antiseptics or take bleach baths   Apply hydrogen peroxide solution or medical grade honey to reduce malodour   Use peeling agents such as resorcinol 15% cream to de-roof nodules   Apply simple dressings to draining sinuses   Analgesics, such as paracetamol (acetaminophen), for pain control   Seek help to manage anxiety and depression  Medical management of hidradenitis suppurativa  Medical management of hidradenitis suppurativa is difficult  Treatment is required long term  Effective options are listed below  Antibiotics   Topical clindamycin, with benzoyl peroxide to reduce bacterial resistance   Short course of oral antibiotics for acute staphylococcal abscesses, eg flucloxacillin   Prolonged courses (minimum 3 months) of tetracycline, metronidazole, trimethoprim + sulphamethoxazole, fluoroquinolones, ertapenem or dapsone for their anti-inflammatory action   6-12 week courses of the combination of clindamycin (or doxycycline) and rifampicin for severe disease  Antiandrogens   Long-term oral contraceptive pill; antiandrogenic progesterones drospirenone or cyproterone acetate may be more effective than standard combined pills  These are more suitable than progesterone-only pills or devices   Spironolactone and finasteride   Response takes 6 months or longer      Immunomodulatory treatments for severe disease   Intralesional corticosteroids into nodules   Systemic corticosteroids short-term for flares   Methotrexate, ciclosporin, and azathioprine   TNF-? inhibitors adalimumab and infliximab, used in higher dose than required for psoriasis, are the most successful treatments to date  Note that paradoxically, they may sometimes induce new-onset hidradenitis suppurativa   Other biologics are under investigation, such as the IL-1? antagonist, canakinumab    Other medical treatments   Metformin in patients with insulin resistance   Acitretin (unsuitable for females of childbearing potential)   Isotretinoin -- effective for acne but appears unhelpful for most cases of hidradenitis   Colchicine   Medical management of anxiety and depression    Surgical management of hidradenitis suppurativa   Incision and drainage of acute abscesses   Curettage and deroofing of nodules, abscesses and sinuses   Laser ablation of nodules, abscesses and sinuses   Wide local excision of persistent nodules   Radical excisional surgery of entire affected area   Nd:YAG laser hair removal    Assessment and Plan:  Based on a thorough discussion of this condition and the management approach to it (including a comprehensive discussion of the known risks, side effects and potential benefits of treatment), the patient (family) agrees to implement the following specific plan:   Reassurance benign     Assessment and Plan:  A dermatofibroma is a common benign fibrous nodule that most often arises on the skin of the lower legs  A dermatofibroma is also called a "cutaneous fibrous histiocytoma "  Dermatofibromas occur at all ages and in people of every ethnicity  They are more common in women than in men  It is not clear if dermatofibroma is a reactive process or if it is a neoplasm  The lesions are made up of proliferating fibroblasts  Histiocytes may also be involved    They are sometimes attributed to an insect bite or ingrownhair or local trauma, but not consistently  They may be more numerous in patients with altered immunity  Dermatofibromas most often occur on the legs and arms, but may also arise on the trunk or any site of the body  Typical clinical features include the following:   People may have 1 or up to 15 lesions   Size varies from 0 5-1 5 cm diameter; most lesions are 7-10 mm diameter   They are firm nodules tethered to the skin surface and mobile over subcutaneous tissue   The skin "dimples" on pinching the lesion   Color may be pink to light brown in white skin, and dark brown to black in dark skin; some appear paler in the center   They do not usually cause symptoms, but they are sometimes painful or itchy   Because they are often raised lesions, they may be traumatized, for example by a razor   Occasionally dozens may erupt within a few months, usually in the setting of immunosuppression (for example autoimmune disease, cancer or certain medications)   Dermatofibroma does not give rise to cancer  However, occasionally, it may be mistaken for dermatofibrosarcoma or desmoplastic melanoma  A dermatofibroma is harmless and seldom causes any symptoms  Usually, only reassurance is needed  If it is nuisance or causing concern, the lesion can be removed surgically, resulting in a scar that is, by definition, usually longer in diameter than the widest portion of the dermatofibroma  Cryotherapy, shave biopsy and laser surgery are rarely completely successful  Skin punch biopsy or incisional biopsy may be undertaken if there is an atypical feature such as recent enlargement, ulceration, or asymmetrical structures and colours on dermatoscopy      Assessment and Plan:  Based on a thorough discussion of this condition and the management approach to it (including a comprehensive discussion of the known risks, side effects and potential benefits of treatment), the patient (family) agrees to implement the following specific plan:   Get lipid panel done while fasting    LN2 to test spot on left upper eyelid (had retinal tear in right eye)   Pictures taken   Consider laser therapy in the feature

## 2020-01-06 NOTE — RESULT ENCOUNTER NOTE
Called patient and informed her of elevated lipids  Pt was instructed to visit primary care doctor for recommendations for pharmacologic therapy; pt agrees and will find a PCP and make appt  All questions answered

## 2020-04-15 ENCOUNTER — TELEPHONE (OUTPATIENT)
Dept: DERMATOLOGY | Facility: CLINIC | Age: 60
End: 2020-04-15

## 2020-04-28 ENCOUNTER — TELEPHONE (OUTPATIENT)
Dept: DERMATOLOGY | Facility: CLINIC | Age: 60
End: 2020-04-28

## 2020-11-03 ENCOUNTER — OFFICE VISIT (OUTPATIENT)
Dept: FAMILY MEDICINE CLINIC | Facility: CLINIC | Age: 60
End: 2020-11-03
Payer: COMMERCIAL

## 2020-11-03 ENCOUNTER — TELEPHONE (OUTPATIENT)
Dept: FAMILY MEDICINE CLINIC | Facility: CLINIC | Age: 60
End: 2020-11-03

## 2020-11-03 VITALS
HEIGHT: 67 IN | DIASTOLIC BLOOD PRESSURE: 70 MMHG | WEIGHT: 217.4 LBS | HEART RATE: 74 BPM | TEMPERATURE: 97.7 F | SYSTOLIC BLOOD PRESSURE: 122 MMHG | BODY MASS INDEX: 34.12 KG/M2

## 2020-11-03 DIAGNOSIS — R07.89 LEFT-SIDED CHEST WALL PAIN: ICD-10-CM

## 2020-11-03 DIAGNOSIS — K21.9 GASTROESOPHAGEAL REFLUX DISEASE WITHOUT ESOPHAGITIS: ICD-10-CM

## 2020-11-03 DIAGNOSIS — F17.200 SMOKER: ICD-10-CM

## 2020-11-03 DIAGNOSIS — Z00.00 ROUTINE ADULT HEALTH MAINTENANCE: ICD-10-CM

## 2020-11-03 DIAGNOSIS — R05.9 COUGH: ICD-10-CM

## 2020-11-03 DIAGNOSIS — K50.919 CROHN'S DISEASE WITH COMPLICATION, UNSPECIFIED GASTROINTESTINAL TRACT LOCATION (HCC): ICD-10-CM

## 2020-11-03 DIAGNOSIS — L84 CORN OF FOOT: Primary | ICD-10-CM

## 2020-11-03 DIAGNOSIS — K50.919 CROHN'S DISEASE WITH COMPLICATION, UNSPECIFIED GASTROINTESTINAL TRACT LOCATION (HCC): Primary | ICD-10-CM

## 2020-11-03 DIAGNOSIS — L73.2 HIDRADENITIS SUPPURATIVA: ICD-10-CM

## 2020-11-03 DIAGNOSIS — Z12.31 ENCOUNTER FOR SCREENING MAMMOGRAM FOR MALIGNANT NEOPLASM OF BREAST: ICD-10-CM

## 2020-11-03 DIAGNOSIS — F17.200 SMOKES TOBACCO DAILY: ICD-10-CM

## 2020-11-03 DIAGNOSIS — G43.009 MIGRAINE WITHOUT AURA AND WITHOUT STATUS MIGRAINOSUS, NOT INTRACTABLE: ICD-10-CM

## 2020-11-03 DIAGNOSIS — Z23 NEED FOR VACCINATION: ICD-10-CM

## 2020-11-03 DIAGNOSIS — E78.5 HYPERLIPIDEMIA, UNSPECIFIED HYPERLIPIDEMIA TYPE: ICD-10-CM

## 2020-11-03 DIAGNOSIS — M79.7 FIBROMYALGIA: ICD-10-CM

## 2020-11-03 PROBLEM — E11.9 TYPE 2 DIABETES MELLITUS WITHOUT COMPLICATION, WITHOUT LONG-TERM CURRENT USE OF INSULIN (HCC): Status: ACTIVE | Noted: 2017-08-27

## 2020-11-03 PROBLEM — E11.9 TYPE 2 DIABETES MELLITUS WITHOUT COMPLICATION, WITHOUT LONG-TERM CURRENT USE OF INSULIN (HCC): Status: RESOLVED | Noted: 2017-08-27 | Resolved: 2020-11-03

## 2020-11-03 PROCEDURE — 90471 IMMUNIZATION ADMIN: CPT

## 2020-11-03 PROCEDURE — 99204 OFFICE O/P NEW MOD 45 MIN: CPT | Performed by: PHYSICIAN ASSISTANT

## 2020-11-03 PROCEDURE — 90686 IIV4 VACC NO PRSV 0.5 ML IM: CPT

## 2020-11-03 RX ORDER — PREDNISONE 10 MG/1
TABLET ORAL
COMMUNITY
Start: 2020-05-08 | End: 2020-11-03 | Stop reason: SDUPTHER

## 2020-11-03 RX ORDER — PREDNISONE 10 MG/1
TABLET ORAL
Qty: 30 TABLET | Refills: 0 | Status: SHIPPED | OUTPATIENT
Start: 2020-11-03 | End: 2020-12-08 | Stop reason: ALTCHOICE

## 2020-11-03 RX ORDER — ALBUTEROL SULFATE 90 UG/1
2 AEROSOL, METERED RESPIRATORY (INHALATION) EVERY 6 HOURS PRN
COMMUNITY
Start: 2020-03-31 | End: 2021-03-31

## 2020-11-03 RX ORDER — ONDANSETRON 4 MG/1
4 TABLET, ORALLY DISINTEGRATING ORAL EVERY 8 HOURS PRN
Qty: 12 TABLET | Refills: 0 | Status: SHIPPED | OUTPATIENT
Start: 2020-11-03

## 2020-11-03 RX ORDER — SUMATRIPTAN 100 MG/1
100 TABLET, FILM COATED ORAL ONCE AS NEEDED
Qty: 10 TABLET | Refills: 0 | Status: SHIPPED | OUTPATIENT
Start: 2020-11-03 | End: 2021-03-26 | Stop reason: HOSPADM

## 2020-11-03 RX ORDER — SUMATRIPTAN 100 MG/1
100 TABLET, FILM COATED ORAL
COMMUNITY
Start: 2020-03-31 | End: 2020-11-03 | Stop reason: SDUPTHER

## 2020-11-04 ENCOUNTER — TELEPHONE (OUTPATIENT)
Dept: ADMINISTRATIVE | Facility: OTHER | Age: 60
End: 2020-11-04

## 2020-11-05 ENCOUNTER — TELEPHONE (OUTPATIENT)
Dept: FAMILY MEDICINE CLINIC | Facility: CLINIC | Age: 60
End: 2020-11-05

## 2020-11-13 ENCOUNTER — LAB (OUTPATIENT)
Dept: LAB | Facility: CLINIC | Age: 60
End: 2020-11-13
Payer: COMMERCIAL

## 2020-11-13 ENCOUNTER — APPOINTMENT (OUTPATIENT)
Dept: RADIOLOGY | Facility: CLINIC | Age: 60
End: 2020-11-13
Payer: COMMERCIAL

## 2020-11-13 DIAGNOSIS — Z00.00 ROUTINE ADULT HEALTH MAINTENANCE: ICD-10-CM

## 2020-11-13 DIAGNOSIS — R07.89 LEFT-SIDED CHEST WALL PAIN: ICD-10-CM

## 2020-11-13 DIAGNOSIS — F17.200 SMOKER: ICD-10-CM

## 2020-11-13 DIAGNOSIS — R05.9 COUGH: ICD-10-CM

## 2020-11-13 LAB
ALBUMIN SERPL BCP-MCNC: 3.7 G/DL (ref 3.5–5)
ALP SERPL-CCNC: 136 U/L (ref 46–116)
ALT SERPL W P-5'-P-CCNC: 16 U/L (ref 12–78)
ANION GAP SERPL CALCULATED.3IONS-SCNC: 7 MMOL/L (ref 4–13)
AST SERPL W P-5'-P-CCNC: 10 U/L (ref 5–45)
BASOPHILS # BLD AUTO: 0.09 THOUSANDS/ΜL (ref 0–0.1)
BASOPHILS NFR BLD AUTO: 1 % (ref 0–1)
BILIRUB SERPL-MCNC: 0.4 MG/DL (ref 0.2–1)
BUN SERPL-MCNC: 13 MG/DL (ref 5–25)
CALCIUM SERPL-MCNC: 9.9 MG/DL (ref 8.3–10.1)
CHLORIDE SERPL-SCNC: 103 MMOL/L (ref 100–108)
CHOLEST SERPL-MCNC: 261 MG/DL (ref 50–200)
CO2 SERPL-SCNC: 24 MMOL/L (ref 21–32)
CREAT SERPL-MCNC: 0.91 MG/DL (ref 0.6–1.3)
EOSINOPHIL # BLD AUTO: 0.29 THOUSAND/ΜL (ref 0–0.61)
EOSINOPHIL NFR BLD AUTO: 2 % (ref 0–6)
ERYTHROCYTE [DISTWIDTH] IN BLOOD BY AUTOMATED COUNT: 13.1 % (ref 11.6–15.1)
GFR SERPL CREATININE-BSD FRML MDRD: 70 ML/MIN/1.73SQ M
GLUCOSE P FAST SERPL-MCNC: 355 MG/DL (ref 65–99)
HCT VFR BLD AUTO: 49.8 % (ref 34.8–46.1)
HDLC SERPL-MCNC: 44 MG/DL
HGB BLD-MCNC: 16.6 G/DL (ref 11.5–15.4)
IMM GRANULOCYTES # BLD AUTO: 0.07 THOUSAND/UL (ref 0–0.2)
IMM GRANULOCYTES NFR BLD AUTO: 0 % (ref 0–2)
LDLC SERPL CALC-MCNC: 168 MG/DL (ref 0–100)
LYMPHOCYTES # BLD AUTO: 4.45 THOUSANDS/ΜL (ref 0.6–4.47)
LYMPHOCYTES NFR BLD AUTO: 28 % (ref 14–44)
MCH RBC QN AUTO: 29.3 PG (ref 26.8–34.3)
MCHC RBC AUTO-ENTMCNC: 33.3 G/DL (ref 31.4–37.4)
MCV RBC AUTO: 88 FL (ref 82–98)
MONOCYTES # BLD AUTO: 0.97 THOUSAND/ΜL (ref 0.17–1.22)
MONOCYTES NFR BLD AUTO: 6 % (ref 4–12)
NEUTROPHILS # BLD AUTO: 9.92 THOUSANDS/ΜL (ref 1.85–7.62)
NEUTS SEG NFR BLD AUTO: 63 % (ref 43–75)
NRBC BLD AUTO-RTO: 0 /100 WBCS
PLATELET # BLD AUTO: 363 THOUSANDS/UL (ref 149–390)
PMV BLD AUTO: 10.5 FL (ref 8.9–12.7)
POTASSIUM SERPL-SCNC: 4 MMOL/L (ref 3.5–5.3)
PROT SERPL-MCNC: 7.9 G/DL (ref 6.4–8.2)
RBC # BLD AUTO: 5.66 MILLION/UL (ref 3.81–5.12)
SODIUM SERPL-SCNC: 134 MMOL/L (ref 136–145)
TRIGL SERPL-MCNC: 244 MG/DL
TSH SERPL DL<=0.05 MIU/L-ACNC: 1.62 UIU/ML (ref 0.36–3.74)
WBC # BLD AUTO: 15.79 THOUSAND/UL (ref 4.31–10.16)

## 2020-11-13 PROCEDURE — 36415 COLL VENOUS BLD VENIPUNCTURE: CPT

## 2020-11-13 PROCEDURE — 80061 LIPID PANEL: CPT

## 2020-11-13 PROCEDURE — 71046 X-RAY EXAM CHEST 2 VIEWS: CPT

## 2020-11-13 PROCEDURE — 80053 COMPREHEN METABOLIC PANEL: CPT

## 2020-11-13 PROCEDURE — 85025 COMPLETE CBC W/AUTO DIFF WBC: CPT

## 2020-11-13 PROCEDURE — 84443 ASSAY THYROID STIM HORMONE: CPT

## 2020-11-16 ENCOUNTER — TELEPHONE (OUTPATIENT)
Dept: FAMILY MEDICINE CLINIC | Facility: CLINIC | Age: 60
End: 2020-11-16

## 2020-11-17 ENCOUNTER — OFFICE VISIT (OUTPATIENT)
Dept: PODIATRY | Facility: CLINIC | Age: 60
End: 2020-11-17
Payer: COMMERCIAL

## 2020-11-17 ENCOUNTER — TELEPHONE (OUTPATIENT)
Dept: FAMILY MEDICINE CLINIC | Facility: CLINIC | Age: 60
End: 2020-11-17

## 2020-11-17 VITALS — HEIGHT: 67 IN | BODY MASS INDEX: 33.84 KG/M2 | WEIGHT: 215.6 LBS | TEMPERATURE: 96.6 F

## 2020-11-17 DIAGNOSIS — L84 CALLUS OF FOOT: ICD-10-CM

## 2020-11-17 DIAGNOSIS — G62.9 PERIPHERAL POLYNEUROPATHY: Primary | ICD-10-CM

## 2020-11-17 PROCEDURE — 99243 OFF/OP CNSLTJ NEW/EST LOW 30: CPT | Performed by: PODIATRIST

## 2020-12-08 ENCOUNTER — OFFICE VISIT (OUTPATIENT)
Dept: FAMILY MEDICINE CLINIC | Facility: CLINIC | Age: 60
End: 2020-12-08
Payer: COMMERCIAL

## 2020-12-08 VITALS
HEIGHT: 67 IN | DIASTOLIC BLOOD PRESSURE: 80 MMHG | TEMPERATURE: 98.1 F | BODY MASS INDEX: 33.59 KG/M2 | HEART RATE: 84 BPM | WEIGHT: 214 LBS | SYSTOLIC BLOOD PRESSURE: 136 MMHG

## 2020-12-08 DIAGNOSIS — R53.83 FATIGUE, UNSPECIFIED TYPE: ICD-10-CM

## 2020-12-08 DIAGNOSIS — R73.9 HYPERGLYCEMIA: ICD-10-CM

## 2020-12-08 DIAGNOSIS — E78.5 HYPERLIPIDEMIA, UNSPECIFIED HYPERLIPIDEMIA TYPE: ICD-10-CM

## 2020-12-08 DIAGNOSIS — M79.10 MYALGIA: ICD-10-CM

## 2020-12-08 DIAGNOSIS — Z12.11 ENCOUNTER FOR SCREENING FOR MALIGNANT NEOPLASM OF COLON: Primary | ICD-10-CM

## 2020-12-08 DIAGNOSIS — Z12.31 ENCOUNTER FOR SCREENING MAMMOGRAM FOR MALIGNANT NEOPLASM OF BREAST: ICD-10-CM

## 2020-12-08 DIAGNOSIS — R79.89 ABNORMAL CBC: ICD-10-CM

## 2020-12-08 PROCEDURE — 99213 OFFICE O/P EST LOW 20 MIN: CPT | Performed by: PHYSICIAN ASSISTANT

## 2020-12-29 ENCOUNTER — TELEPHONE (OUTPATIENT)
Dept: FAMILY MEDICINE CLINIC | Facility: CLINIC | Age: 60
End: 2020-12-29

## 2020-12-29 NOTE — TELEPHONE ENCOUNTER
Patient lives with her mom and she has someone coming in to help take care of her mom  The healthcare worker came down with typhoid fever  Patient is asking should she continue to allow the healthcare worker to come in her home to take care of her mom?

## 2020-12-29 NOTE — TELEPHONE ENCOUNTER
No until the care giver has been seen, evaluated and treated appropriately by her PCP  When the care givers PCP has cleared her to return to work then she can come help again

## 2021-01-22 ENCOUNTER — TELEPHONE (OUTPATIENT)
Dept: FAMILY MEDICINE CLINIC | Facility: CLINIC | Age: 61
End: 2021-01-22

## 2021-01-22 NOTE — TELEPHONE ENCOUNTER
Yes it is safe  The only contraindication is if she has had a previous reaction to an mRNA type vaccine which the flu is not

## 2021-01-22 NOTE — TELEPHONE ENCOUNTER
Pt would like to know if it's safe for her to have a covid vaccine  She has a history of allergic reaction to flu vaccine  She also has many other allergies (documented in chart)  Therefore she wants to know if it's OK to get the vaccine as she would like to get one

## 2021-02-14 ENCOUNTER — IMMUNIZATIONS (OUTPATIENT)
Dept: FAMILY MEDICINE CLINIC | Facility: HOSPITAL | Age: 61
End: 2021-02-14

## 2021-02-14 DIAGNOSIS — Z23 ENCOUNTER FOR IMMUNIZATION: Primary | ICD-10-CM

## 2021-02-14 PROCEDURE — 91300 SARS-COV-2 / COVID-19 MRNA VACCINE (PFIZER-BIONTECH) 30 MCG: CPT

## 2021-02-14 PROCEDURE — 0001A SARS-COV-2 / COVID-19 MRNA VACCINE (PFIZER-BIONTECH) 30 MCG: CPT

## 2021-02-26 ENCOUNTER — APPOINTMENT (INPATIENT)
Dept: NON INVASIVE DIAGNOSTICS | Facility: HOSPITAL | Age: 61
DRG: 247 | End: 2021-02-26
Payer: COMMERCIAL

## 2021-02-26 ENCOUNTER — TELEPHONE (OUTPATIENT)
Dept: RADIOLOGY | Facility: HOSPITAL | Age: 61
End: 2021-02-26

## 2021-02-26 ENCOUNTER — APPOINTMENT (EMERGENCY)
Dept: NON INVASIVE DIAGNOSTICS | Facility: HOSPITAL | Age: 61
DRG: 247 | End: 2021-02-26
Attending: INTERNAL MEDICINE
Payer: COMMERCIAL

## 2021-02-26 ENCOUNTER — APPOINTMENT (EMERGENCY)
Dept: RADIOLOGY | Facility: HOSPITAL | Age: 61
DRG: 247 | End: 2021-02-26
Payer: COMMERCIAL

## 2021-02-26 ENCOUNTER — HOSPITAL ENCOUNTER (INPATIENT)
Facility: HOSPITAL | Age: 61
LOS: 2 days | Discharge: HOME | DRG: 247 | End: 2021-02-28
Attending: EMERGENCY MEDICINE | Admitting: INTERNAL MEDICINE
Payer: COMMERCIAL

## 2021-02-26 DIAGNOSIS — E55.9 VITAMIN D DEFICIENCY: ICD-10-CM

## 2021-02-26 DIAGNOSIS — I21.9 MI (MYOCARDIAL INFARCTION) (HCC): ICD-10-CM

## 2021-02-26 DIAGNOSIS — I25.10 CORONARY ARTERY DISEASE: ICD-10-CM

## 2021-02-26 DIAGNOSIS — E11.65 TYPE 2 DIABETES MELLITUS WITH HYPERGLYCEMIA, WITHOUT LONG-TERM CURRENT USE OF INSULIN (HCC): ICD-10-CM

## 2021-02-26 DIAGNOSIS — E11.9 NEWLY DIAGNOSED DIABETES (HCC): ICD-10-CM

## 2021-02-26 DIAGNOSIS — I10 ESSENTIAL HYPERTENSION: ICD-10-CM

## 2021-02-26 DIAGNOSIS — I21.11 ACUTE ST ELEVATION MYOCARDIAL INFARCTION (STEMI) DUE TO OCCLUSION OF DISTAL PORTION OF RIGHT CORONARY ARTERY (HCC): Primary | ICD-10-CM

## 2021-02-26 DIAGNOSIS — E78.2 MODERATE MIXED HYPERLIPIDEMIA NOT REQUIRING STATIN THERAPY: ICD-10-CM

## 2021-02-26 DIAGNOSIS — I21.11 ACUTE ST ELEVATION MYOCARDIAL INFARCTION (STEMI) DUE TO OCCLUSION OF MID PORTION OF RIGHT CORONARY ARTERY (HCC): ICD-10-CM

## 2021-02-26 DIAGNOSIS — I21.19 INFERIOR MI (HCC): ICD-10-CM

## 2021-02-26 LAB
ALBUMIN SERPL BCP-MCNC: 3.8 G/DL (ref 3.5–5)
ALP SERPL-CCNC: 134 U/L (ref 46–116)
ALT SERPL W P-5'-P-CCNC: 15 U/L (ref 12–78)
ANION GAP SERPL CALCULATED.3IONS-SCNC: 8 MMOL/L (ref 4–13)
APTT PPP: 29 SECONDS (ref 23–37)
AST SERPL W P-5'-P-CCNC: 12 U/L (ref 5–45)
ATRIAL RATE: 46 BPM
ATRIAL RATE: 53 BPM
BASOPHILS # BLD AUTO: 0.05 THOUSANDS/ΜL (ref 0–0.1)
BASOPHILS NFR BLD AUTO: 0 % (ref 0–1)
BILIRUB SERPL-MCNC: 0.62 MG/DL (ref 0.2–1)
BUN SERPL-MCNC: 11 MG/DL (ref 5–25)
CALCIUM SERPL-MCNC: 10 MG/DL (ref 8.3–10.1)
CHLORIDE SERPL-SCNC: 104 MMOL/L (ref 100–108)
CHOLEST SERPL-MCNC: 271 MG/DL (ref 50–200)
CO2 SERPL-SCNC: 23 MMOL/L (ref 21–32)
CREAT SERPL-MCNC: 0.96 MG/DL (ref 0.6–1.3)
CREAT UR-MCNC: 45.8 MG/DL
EOSINOPHIL # BLD AUTO: 0.23 THOUSAND/ΜL (ref 0–0.61)
EOSINOPHIL NFR BLD AUTO: 2 % (ref 0–6)
ERYTHROCYTE [DISTWIDTH] IN BLOOD BY AUTOMATED COUNT: 13.8 % (ref 11.6–15.1)
EST. AVERAGE GLUCOSE BLD GHB EST-MCNC: 306 MG/DL
GFR SERPL CREATININE-BSD FRML MDRD: 65 ML/MIN/1.73SQ M
GLUCOSE SERPL-MCNC: 103 MG/DL (ref 65–140)
GLUCOSE SERPL-MCNC: 256 MG/DL (ref 65–140)
GLUCOSE SERPL-MCNC: 299 MG/DL (ref 65–140)
GLUCOSE SERPL-MCNC: 335 MG/DL (ref 65–140)
HBA1C MFR BLD: 12.3 %
HCT VFR BLD AUTO: 48.5 % (ref 34.8–46.1)
HDLC SERPL-MCNC: 40 MG/DL
HGB BLD-MCNC: 16.2 G/DL (ref 11.5–15.4)
IMM GRANULOCYTES # BLD AUTO: 0.04 THOUSAND/UL (ref 0–0.2)
IMM GRANULOCYTES NFR BLD AUTO: 0 % (ref 0–2)
INR PPP: 0.98 (ref 0.84–1.19)
KCT BLD-ACNC: 249 SEC (ref 89–137)
LDLC SERPL CALC-MCNC: 181 MG/DL (ref 0–100)
LDLC SERPL DIRECT ASSAY-MCNC: 188 MG/DL (ref 0–100)
LYMPHOCYTES # BLD AUTO: 3.01 THOUSANDS/ΜL (ref 0.6–4.47)
LYMPHOCYTES NFR BLD AUTO: 27 % (ref 14–44)
MAGNESIUM SERPL-MCNC: 1.9 MG/DL (ref 1.6–2.6)
MCH RBC QN AUTO: 28.9 PG (ref 26.8–34.3)
MCHC RBC AUTO-ENTMCNC: 33.4 G/DL (ref 31.4–37.4)
MCV RBC AUTO: 87 FL (ref 82–98)
MICROALBUMIN UR-MCNC: 16.9 MG/L (ref 0–20)
MICROALBUMIN/CREAT 24H UR: 37 MG/G CREATININE (ref 0–30)
MONOCYTES # BLD AUTO: 0.65 THOUSAND/ΜL (ref 0.17–1.22)
MONOCYTES NFR BLD AUTO: 6 % (ref 4–12)
NEUTROPHILS # BLD AUTO: 7.31 THOUSANDS/ΜL (ref 1.85–7.62)
NEUTS SEG NFR BLD AUTO: 65 % (ref 43–75)
NONHDLC SERPL-MCNC: 231 MG/DL
NRBC BLD AUTO-RTO: 0 /100 WBCS
P AXIS: 41 DEGREES
P AXIS: 56 DEGREES
PLATELET # BLD AUTO: 284 THOUSANDS/UL (ref 149–390)
PMV BLD AUTO: 10.3 FL (ref 8.9–12.7)
POTASSIUM SERPL-SCNC: 3.9 MMOL/L (ref 3.5–5.3)
PR INTERVAL: 130 MS
PR INTERVAL: 154 MS
PROT SERPL-MCNC: 7.8 G/DL (ref 6.4–8.2)
PROTHROMBIN TIME: 13 SECONDS (ref 11.6–14.5)
QRS AXIS: 43 DEGREES
QRS AXIS: 51 DEGREES
QRSD INTERVAL: 86 MS
QRSD INTERVAL: 88 MS
QT INTERVAL: 450 MS
QT INTERVAL: 490 MS
QTC INTERVAL: 423 MS
QTC INTERVAL: 428 MS
RBC # BLD AUTO: 5.61 MILLION/UL (ref 3.81–5.12)
SODIUM SERPL-SCNC: 135 MMOL/L (ref 136–145)
SPECIMEN SOURCE: ABNORMAL
T WAVE AXIS: 57 DEGREES
T WAVE AXIS: 80 DEGREES
TRIGL SERPL-MCNC: 250 MG/DL
TROPONIN I SERPL-MCNC: 0.76 NG/ML
TROPONIN I SERPL-MCNC: 1.28 NG/ML
TROPONIN I SERPL-MCNC: 2.45 NG/ML
TROPONIN I SERPL-MCNC: <0.02 NG/ML
VENTRICULAR RATE: 46 BPM
VENTRICULAR RATE: 53 BPM
WBC # BLD AUTO: 11.29 THOUSAND/UL (ref 4.31–10.16)

## 2021-02-26 PROCEDURE — 93010 ELECTROCARDIOGRAM REPORT: CPT | Performed by: INTERNAL MEDICINE

## 2021-02-26 PROCEDURE — C1769 GUIDE WIRE: HCPCS | Performed by: INTERNAL MEDICINE

## 2021-02-26 PROCEDURE — 82948 REAGENT STRIP/BLOOD GLUCOSE: CPT

## 2021-02-26 PROCEDURE — 82570 ASSAY OF URINE CREATININE: CPT | Performed by: INTERNAL MEDICINE

## 2021-02-26 PROCEDURE — 80061 LIPID PANEL: CPT | Performed by: EMERGENCY MEDICINE

## 2021-02-26 PROCEDURE — 85610 PROTHROMBIN TIME: CPT | Performed by: EMERGENCY MEDICINE

## 2021-02-26 PROCEDURE — 99291 CRITICAL CARE FIRST HOUR: CPT

## 2021-02-26 PROCEDURE — 99285 EMERGENCY DEPT VISIT HI MDM: CPT | Performed by: EMERGENCY MEDICINE

## 2021-02-26 PROCEDURE — C1874 STENT, COATED/COV W/DEL SYS: HCPCS

## 2021-02-26 PROCEDURE — C1894 INTRO/SHEATH, NON-LASER: HCPCS | Performed by: INTERNAL MEDICINE

## 2021-02-26 PROCEDURE — 36415 COLL VENOUS BLD VENIPUNCTURE: CPT | Performed by: EMERGENCY MEDICINE

## 2021-02-26 PROCEDURE — C9606 PERC D-E COR REVASC W AMI S: HCPCS | Performed by: INTERNAL MEDICINE

## 2021-02-26 PROCEDURE — C1760 CLOSURE DEV, VASC: HCPCS | Performed by: INTERNAL MEDICINE

## 2021-02-26 PROCEDURE — 93306 TTE W/DOPPLER COMPLETE: CPT | Performed by: INTERNAL MEDICINE

## 2021-02-26 PROCEDURE — 83721 ASSAY OF BLOOD LIPOPROTEIN: CPT | Performed by: STUDENT IN AN ORGANIZED HEALTH CARE EDUCATION/TRAINING PROGRAM

## 2021-02-26 PROCEDURE — 99152 MOD SED SAME PHYS/QHP 5/>YRS: CPT | Performed by: INTERNAL MEDICINE

## 2021-02-26 PROCEDURE — 85025 COMPLETE CBC W/AUTO DIFF WBC: CPT | Performed by: EMERGENCY MEDICINE

## 2021-02-26 PROCEDURE — C1725 CATH, TRANSLUMIN NON-LASER: HCPCS | Performed by: INTERNAL MEDICINE

## 2021-02-26 PROCEDURE — 84484 ASSAY OF TROPONIN QUANT: CPT | Performed by: INTERNAL MEDICINE

## 2021-02-26 PROCEDURE — 82043 UR ALBUMIN QUANTITATIVE: CPT | Performed by: INTERNAL MEDICINE

## 2021-02-26 PROCEDURE — 99255 IP/OBS CONSLTJ NEW/EST HI 80: CPT | Performed by: INTERNAL MEDICINE

## 2021-02-26 PROCEDURE — NC001 PR NO CHARGE: Performed by: INTERNAL MEDICINE

## 2021-02-26 PROCEDURE — 83735 ASSAY OF MAGNESIUM: CPT | Performed by: EMERGENCY MEDICINE

## 2021-02-26 PROCEDURE — 80053 COMPREHEN METABOLIC PANEL: CPT | Performed by: EMERGENCY MEDICINE

## 2021-02-26 PROCEDURE — 84484 ASSAY OF TROPONIN QUANT: CPT | Performed by: EMERGENCY MEDICINE

## 2021-02-26 PROCEDURE — B2111ZZ FLUOROSCOPY OF MULTIPLE CORONARY ARTERIES USING LOW OSMOLAR CONTRAST: ICD-10-PCS | Performed by: INTERNAL MEDICINE

## 2021-02-26 PROCEDURE — 96375 TX/PRO/DX INJ NEW DRUG ADDON: CPT

## 2021-02-26 PROCEDURE — 93306 TTE W/DOPPLER COMPLETE: CPT

## 2021-02-26 PROCEDURE — 99153 MOD SED SAME PHYS/QHP EA: CPT | Performed by: INTERNAL MEDICINE

## 2021-02-26 PROCEDURE — 93454 CORONARY ARTERY ANGIO S&I: CPT | Performed by: INTERNAL MEDICINE

## 2021-02-26 PROCEDURE — 93005 ELECTROCARDIOGRAM TRACING: CPT

## 2021-02-26 PROCEDURE — 92941 PRQ TRLML REVSC TOT OCCL AMI: CPT | Performed by: INTERNAL MEDICINE

## 2021-02-26 PROCEDURE — 027035Z DILATION OF CORONARY ARTERY, ONE ARTERY WITH TWO DRUG-ELUTING INTRALUMINAL DEVICES, PERCUTANEOUS APPROACH: ICD-10-PCS | Performed by: INTERNAL MEDICINE

## 2021-02-26 PROCEDURE — C1887 CATHETER, GUIDING: HCPCS | Performed by: INTERNAL MEDICINE

## 2021-02-26 PROCEDURE — 83036 HEMOGLOBIN GLYCOSYLATED A1C: CPT | Performed by: STUDENT IN AN ORGANIZED HEALTH CARE EDUCATION/TRAINING PROGRAM

## 2021-02-26 PROCEDURE — 84484 ASSAY OF TROPONIN QUANT: CPT | Performed by: STUDENT IN AN ORGANIZED HEALTH CARE EDUCATION/TRAINING PROGRAM

## 2021-02-26 PROCEDURE — 85347 COAGULATION TIME ACTIVATED: CPT

## 2021-02-26 PROCEDURE — 85730 THROMBOPLASTIN TIME PARTIAL: CPT | Performed by: EMERGENCY MEDICINE

## 2021-02-26 PROCEDURE — 99221 1ST HOSP IP/OBS SF/LOW 40: CPT | Performed by: INTERNAL MEDICINE

## 2021-02-26 PROCEDURE — 96374 THER/PROPH/DIAG INJ IV PUSH: CPT

## 2021-02-26 RX ORDER — ACETAMINOPHEN 325 MG/1
650 TABLET ORAL EVERY 4 HOURS PRN
Status: DISCONTINUED | OUTPATIENT
Start: 2021-02-26 | End: 2021-02-28 | Stop reason: HOSPADM

## 2021-02-26 RX ORDER — FENTANYL CITRATE 50 UG/ML
INJECTION, SOLUTION INTRAMUSCULAR; INTRAVENOUS CODE/TRAUMA/SEDATION MEDICATION
Status: COMPLETED | OUTPATIENT
Start: 2021-02-26 | End: 2021-02-26

## 2021-02-26 RX ORDER — PANTOPRAZOLE SODIUM 40 MG/1
40 TABLET, DELAYED RELEASE ORAL
Status: DISCONTINUED | OUTPATIENT
Start: 2021-02-26 | End: 2021-02-28 | Stop reason: HOSPADM

## 2021-02-26 RX ORDER — ALBUTEROL SULFATE 90 UG/1
2 AEROSOL, METERED RESPIRATORY (INHALATION) EVERY 6 HOURS PRN
Status: DISCONTINUED | OUTPATIENT
Start: 2021-02-26 | End: 2021-02-28 | Stop reason: HOSPADM

## 2021-02-26 RX ORDER — LIDOCAINE HYDROCHLORIDE 10 MG/ML
INJECTION, SOLUTION EPIDURAL; INFILTRATION; INTRACAUDAL; PERINEURAL CODE/TRAUMA/SEDATION MEDICATION
Status: COMPLETED | OUTPATIENT
Start: 2021-02-26 | End: 2021-02-26

## 2021-02-26 RX ORDER — AMLODIPINE BESYLATE 5 MG/1
5 TABLET ORAL ONCE
Status: COMPLETED | OUTPATIENT
Start: 2021-02-26 | End: 2021-02-26

## 2021-02-26 RX ORDER — SODIUM CHLORIDE 9 MG/ML
3 INJECTION INTRAVENOUS
Status: DISCONTINUED | OUTPATIENT
Start: 2021-02-26 | End: 2021-02-28 | Stop reason: HOSPADM

## 2021-02-26 RX ORDER — AMLODIPINE BESYLATE 5 MG/1
5 TABLET ORAL DAILY
Status: DISCONTINUED | OUTPATIENT
Start: 2021-02-26 | End: 2021-02-26

## 2021-02-26 RX ORDER — NITROGLYCERIN 20 MG/100ML
INJECTION INTRAVENOUS CODE/TRAUMA/SEDATION MEDICATION
Status: COMPLETED | OUTPATIENT
Start: 2021-02-26 | End: 2021-02-26

## 2021-02-26 RX ORDER — MIDAZOLAM HYDROCHLORIDE 2 MG/2ML
INJECTION, SOLUTION INTRAMUSCULAR; INTRAVENOUS CODE/TRAUMA/SEDATION MEDICATION
Status: COMPLETED | OUTPATIENT
Start: 2021-02-26 | End: 2021-02-26

## 2021-02-26 RX ORDER — ATORVASTATIN CALCIUM 40 MG/1
40 TABLET, FILM COATED ORAL EVERY EVENING
Status: DISCONTINUED | OUTPATIENT
Start: 2021-02-26 | End: 2021-02-28 | Stop reason: HOSPADM

## 2021-02-26 RX ORDER — CALCIUM CARBONATE 200(500)MG
500 TABLET,CHEWABLE ORAL 2 TIMES DAILY PRN
Status: DISCONTINUED | OUTPATIENT
Start: 2021-02-26 | End: 2021-02-28 | Stop reason: HOSPADM

## 2021-02-26 RX ORDER — ASPIRIN 81 MG/1
81 TABLET, CHEWABLE ORAL DAILY
Status: DISCONTINUED | OUTPATIENT
Start: 2021-02-27 | End: 2021-02-28 | Stop reason: HOSPADM

## 2021-02-26 RX ORDER — MORPHINE SULFATE 4 MG/ML
4 INJECTION, SOLUTION INTRAMUSCULAR; INTRAVENOUS ONCE
Status: DISCONTINUED | OUTPATIENT
Start: 2021-02-26 | End: 2021-02-26

## 2021-02-26 RX ORDER — CARVEDILOL 12.5 MG/1
12.5 TABLET ORAL 2 TIMES DAILY WITH MEALS
Status: DISCONTINUED | OUTPATIENT
Start: 2021-02-27 | End: 2021-02-26

## 2021-02-26 RX ORDER — SODIUM CHLORIDE 9 MG/ML
200 INJECTION, SOLUTION INTRAVENOUS CONTINUOUS
Status: DISPENSED | OUTPATIENT
Start: 2021-02-26 | End: 2021-02-26

## 2021-02-26 RX ORDER — ONDANSETRON 2 MG/ML
4 INJECTION INTRAMUSCULAR; INTRAVENOUS ONCE
Status: COMPLETED | OUTPATIENT
Start: 2021-02-26 | End: 2021-02-26

## 2021-02-26 RX ORDER — ONDANSETRON 2 MG/ML
4 INJECTION INTRAMUSCULAR; INTRAVENOUS EVERY 6 HOURS PRN
Status: DISCONTINUED | OUTPATIENT
Start: 2021-02-26 | End: 2021-02-28 | Stop reason: HOSPADM

## 2021-02-26 RX ORDER — INSULIN GLARGINE 100 [IU]/ML
30 INJECTION, SOLUTION SUBCUTANEOUS
Status: DISCONTINUED | OUTPATIENT
Start: 2021-02-26 | End: 2021-02-28 | Stop reason: HOSPADM

## 2021-02-26 RX ORDER — SODIUM CHLORIDE, SODIUM GLUCONATE, SODIUM ACETATE, POTASSIUM CHLORIDE, MAGNESIUM CHLORIDE, SODIUM PHOSPHATE, DIBASIC, AND POTASSIUM PHOSPHATE .53; .5; .37; .037; .03; .012; .00082 G/100ML; G/100ML; G/100ML; G/100ML; G/100ML; G/100ML; G/100ML
1000 INJECTION, SOLUTION INTRAVENOUS CONTINUOUS
Status: DISCONTINUED | OUTPATIENT
Start: 2021-02-26 | End: 2021-02-26

## 2021-02-26 RX ORDER — HEPARIN SODIUM 1000 [USP'U]/ML
INJECTION, SOLUTION INTRAVENOUS; SUBCUTANEOUS CODE/TRAUMA/SEDATION MEDICATION
Status: COMPLETED | OUTPATIENT
Start: 2021-02-26 | End: 2021-02-26

## 2021-02-26 RX ORDER — CARVEDILOL 12.5 MG/1
12.5 TABLET ORAL 2 TIMES DAILY WITH MEALS
Status: DISCONTINUED | OUTPATIENT
Start: 2021-02-26 | End: 2021-02-27

## 2021-02-26 RX ADMIN — FENTANYL CITRATE 50 MCG: 50 INJECTION INTRAMUSCULAR; INTRAVENOUS at 11:54

## 2021-02-26 RX ADMIN — INSULIN LISPRO 10 UNITS: 100 INJECTION, SOLUTION INTRAVENOUS; SUBCUTANEOUS at 16:22

## 2021-02-26 RX ADMIN — INSULIN GLARGINE 25 UNITS: 100 INJECTION, SOLUTION SUBCUTANEOUS at 22:23

## 2021-02-26 RX ADMIN — HEPARIN SODIUM 6000 UNITS: 1000 INJECTION INTRAVENOUS; SUBCUTANEOUS at 11:58

## 2021-02-26 RX ADMIN — ANTACID TABLETS 500 MG: 500 TABLET, CHEWABLE ORAL at 14:28

## 2021-02-26 RX ADMIN — SODIUM CHLORIDE 200 ML/HR: 0.9 INJECTION, SOLUTION INTRAVENOUS at 16:49

## 2021-02-26 RX ADMIN — TICAGRELOR 180 MG: 90 TABLET ORAL at 11:38

## 2021-02-26 RX ADMIN — FENTANYL CITRATE 50 MCG: 50 INJECTION INTRAMUSCULAR; INTRAVENOUS at 12:28

## 2021-02-26 RX ADMIN — INSULIN LISPRO 4 UNITS: 100 INJECTION, SOLUTION INTRAVENOUS; SUBCUTANEOUS at 15:21

## 2021-02-26 RX ADMIN — INSULIN LISPRO 6 UNITS: 100 INJECTION, SOLUTION INTRAVENOUS; SUBCUTANEOUS at 16:21

## 2021-02-26 RX ADMIN — MIDAZOLAM 2 MG: 1 INJECTION INTRAMUSCULAR; INTRAVENOUS at 11:54

## 2021-02-26 RX ADMIN — TICAGRELOR 90 MG: 90 TABLET ORAL at 21:10

## 2021-02-26 RX ADMIN — CARVEDILOL 12.5 MG: 12.5 TABLET, FILM COATED ORAL at 17:08

## 2021-02-26 RX ADMIN — LIDOCAINE HYDROCHLORIDE 1 ML: 10 INJECTION, SOLUTION EPIDURAL; INFILTRATION; INTRACAUDAL; PERINEURAL at 11:56

## 2021-02-26 RX ADMIN — Medication 200 MCG: at 12:16

## 2021-02-26 RX ADMIN — HEPARIN SODIUM 4000 UNITS: 1000 INJECTION INTRAVENOUS; SUBCUTANEOUS at 12:17

## 2021-02-26 RX ADMIN — MIDAZOLAM 1 MG: 1 INJECTION INTRAMUSCULAR; INTRAVENOUS at 12:29

## 2021-02-26 RX ADMIN — IOHEXOL 80 ML: 350 INJECTION, SOLUTION INTRAVENOUS at 12:40

## 2021-02-26 RX ADMIN — ATORVASTATIN CALCIUM 40 MG: 20 TABLET, FILM COATED ORAL at 17:07

## 2021-02-26 RX ADMIN — AMLODIPINE BESYLATE 5 MG: 5 TABLET ORAL at 13:31

## 2021-02-26 RX ADMIN — SODIUM CHLORIDE 200 ML/HR: 0.9 INJECTION, SOLUTION INTRAVENOUS at 13:23

## 2021-02-26 RX ADMIN — Medication 200 MCG: at 11:58

## 2021-02-26 RX ADMIN — ACETAMINOPHEN 650 MG: 325 TABLET, FILM COATED ORAL at 21:09

## 2021-02-26 RX ADMIN — PANTOPRAZOLE SODIUM 40 MG: 40 TABLET, DELAYED RELEASE ORAL at 17:10

## 2021-02-26 RX ADMIN — LIDOCAINE HYDROCHLORIDE 10 ML: 10 INJECTION, SOLUTION EPIDURAL; INFILTRATION; INTRACAUDAL; PERINEURAL at 12:07

## 2021-02-26 RX ADMIN — HEPARIN SODIUM 4000 UNITS: 1000 INJECTION INTRAVENOUS; SUBCUTANEOUS at 11:53

## 2021-02-26 RX ADMIN — ONDANSETRON 4 MG: 2 INJECTION INTRAMUSCULAR; INTRAVENOUS at 11:37

## 2021-02-26 RX ADMIN — FENTANYL CITRATE 50 MCG: 50 INJECTION INTRAMUSCULAR; INTRAVENOUS at 12:08

## 2021-02-26 NOTE — H&P
Cardiology Inpatient - History & Physical  Alvaro Davis 63 y o  female MRN: [id number]  Unit/Bed#: ED 24 Encounter: [id number]      PCP: Hayes Mcallister PA-C    Outpatient Cardiologist:  None    Risk factors:  -HTN  -HLD  -hyperglycemia/?DM2  -active tobacco use    History of Present Illness     HPI:  Chasity Willis is a 63 y o  female with history of limited medical follow-up, dyslipidemia, class 1 obesity, active tobacco use disorder, irritable bowel syndrome, Crohn's colitis and fibromyalgia  She presented to [facility] ED with 1 day history of worsening midsternal chest pressure  She describes her symptoms as midsternal, pressure-like, nonradiating, with associated diaphoresis, dyspnea, nausea but no vomiting and occurring at rest today  This is a new development for her but she has been experiencing midsternal discomfort for the past 3 days with exertion  She attributed them to gas/heartburn until today when they were unremitting  As a result, she contacted EMS who brought her to the hospital   On route, she received 4 baby aspirins  Her pre-hospital ECG revealed inferior ST elevations with reciprocal changes in the high lateral limb leads  A pre-hospital MI alert was triggered  On arrival, patient was hypertensive 178/91, bradycardic 49, breathing and saturating 99% on 2 L NC  Lab work revealed troponin negative x1, hyperglycemia 335, normal electrolytes, mild leukocytosis 11 3, polycythemia 16 normal coagulation profile  Patient was administered ticagrelor load and started on IV fluids  ECG was concerning for type 1 inferior STEMI and confirming pre-hospital study  At the time of my assessment, patient was still endorsing midsternal chest discomfort 8-9/10 which is mildly improved from 10/10 on presentation  She was still nauseous but never vomited  She only had coffee and crackers for breakfast about an hour prior to presentation  Zofran was administered in the ED    She relates the story as highlighted above with no changes  Patient was immediately taken to the cardiac catheterization lab for invasive evaluation  Index procedure was initiated via right radial access however was switched to right femoral arterial puncture due to subclavian/innominate artery obstruction  Historical Information   Past Medical History:   Diagnosis Date    [facility] colitis ([facility] [address] [facility]     [facility]     Fibromyalgia     Migraine      Past Surgical History:   Procedure Laterality Date    CHOLECYSTECTOMY      DENTAL SURGERY      Nenzel teeth extraction    HYSTERECTOMY      TUBAL LIGATION      Bilateral     Social History   Social History     Substance and Sexual Activity   Alcohol Use Yes    Comment: Rarely     Social History     Substance and Sexual Activity   Drug Use No     Social History     Tobacco Use   Smoking Status Current Every Day Smoker   Smokeless Tobacco Never Used     Family History:   Family History   Problem Relation Age of Onset    Coronary artery disease Mother     Dementia Mother     Heart disease Brother     Alcohol abuse Neg Hx     Substance Abuse Neg Hx        Meds/Allergies   PTA meds:   Prior to Admission Medications   Prescriptions Last Dose Informant Patient Reported? Taking?    SUMAtriptan (IMITREX) 100 mg tablet   No No   Sig: Take 1 tablet (100 mg total) by mouth once as needed for migraine for up to 1 dose   albuterol (PROVENTIL HFA,VENTOLIN HFA) 90 mcg/act inhaler   Yes No   Sig: Inhale 2 puffs every 6 (six) hours as needed   ibuprofen (MOTRIN) 200 mg tablet   Yes No   Sig: Take 200 mg by mouth every 6 (six) hours as needed   ondansetron (ZOFRAN-ODT) 4 mg disintegrating tablet   No No   Sig: Take 1 tablet (4 mg total) by mouth every 8 (eight) hours as needed for nausea or vomiting      Facility-Administered Medications: None     Allergies   Allergen Reactions    Penicillins Anaphylaxis    Cefuroxime     Metaxalone     Influenza Vaccines Rash  Keflet [Cephalexin] Rash    Nuts Itching    Sulfa Antibiotics Rash    Sulfamethoxazole-Trimethoprim Hives and Rash    Topiramate Rash       Objective   Vitals: Blood pressure (!) 178/91, pulse (!) 49, resp  rate 20, SpO2 99 %, not currently breastfeeding  No intake or output data in the 24 hours ending 02/26/21 1146    Invasive Devices     None                 Review of Systems:  Review of Systems    Physical Exam    GEN: Kendra Low appears uncomfortable in moderate distress, alert and oriented x 3, cooperative   HEENT:  Normocephalic, atraumatic, anicteric, significant bilateral xanthelasma around medial palpebral commissures  NECK: No JVD or carotid bruits   HEART: Regular rhythm, bradycardic rate, normal S1 and S2, no murmurs, clicks, gallops or rubs   LUNGS: Clear to auscultation bilaterally; respiration mildly labored and satting 99% on 2 L NC  ABDOMEN:  Normoactive bowel sounds, soft, no tenderness, no distention  EXTREMITIES: peripheral pulses palpable; no edema  NEURO: no gross focal findings; cranial nerves grossly intact   SKIN:  Dry, intact, warm to touch      Lab Results: I have personally reviewed pertinent lab results      No results found for: CKTOTAL, CKMB, CKMBINDEX, TROPONINI    No results found for: CHOL  Lab Results   Component Value Date    HDL 44 11/13/2020     Lab Results   Component Value Date    LDLCALC 168 (H) 11/13/2020     Lab Results   Component Value Date    TRIG 244 (H) 11/13/2020       Lab Results   Component Value Date    GLUCOSE 78 01/22/2016    CALCIUM 9 9 11/13/2020     01/22/2016    K 4 0 11/13/2020    CO2 24 11/13/2020     11/13/2020    BUN 13 11/13/2020    CREATININE 0 91 11/13/2020     Lab Results   Component Value Date    ALT 16 11/13/2020    AST 10 11/13/2020     Lab Results   Component Value Date    WBC 15 79 (H) 11/13/2020    HGB 16 6 (H) 11/13/2020    HCT 49 8 (H) 11/13/2020    MCV 88 11/13/2020     11/13/2020           Imaging: No cardiac images to be reviewed      ECHO: No results found for this or any previous visit  Stress Test:   No results found for this or any previous visit  EKG:   Date:  02/26/2021  10:54 a m  pre-Hospital      11:36 a m  in ED      Cardiac catheterization:   Ongoing              Tele:  Sinus bradycardia    Code Status:  Full code      Assessment/Plan     Assessment:    1  Type 1 inferior STEMI  -initial troponin < 0 02  -2/26 index LHC:  100% distal RCA culprit lesion tx w/3 0 x 26 mm resolute jose, 80-90% mid RCA lesion tx w/ 3 0 x 18 mm resolute jose; residual 80-90% mid LAD, diffuse nonobstructive D1 disease, 50% mid LCX  -2/26 lipids: Total 271, , HDL 40, calculated  (direct pending);   -s/p aspirin/ticagrelor load  - s/p 06084 units of heparin, additional 4000 units given post results    2  Newly diagnosed diabetes mellitus type 2  -historical hyperglycemia on BMPs  -A1c measured at 12 3% today    3  Mild leukocytosis  -WBC 11 3  -likely leukemoid reaction in setting of STEMI  -CXR pending    4  Possible newly diagnosed hypertension  -elevated 178/91 on presentation  -193/104 at the end of the case     5  Bradycardia  -likely RCA lesion related  -70's at the end of the case      Plan:  1  Continue aspirin and ticagrelor for at least a year then aspirin indefinitely  2  Initiate high-intensity atorvastatin  3  Newly diagnosed diabetes wanting endocrinology consultation with was placed  Started on correctional scale algorithm 3     4  Given x1 dose of amlodipine for elevated BP  May be switched to ACE inhibitor, ARB or ARNi (dependent on LVEF)  Echo ordered  5  Would initiate beta-blockers in 12-24 hours unless significant runs of ectopy  6  Plan for staged PCI to the LAD with interrogation of other lesions possibly in 2-4 weeks per Dr Bee Robledo  7  Admitted as level 1 step-down  Sign-out given to Cardiology team 2           Case discussed and reviewed with Dr Bee Robledo who agrees with my assessment and plan  Epic/ [employer]/Care Everywhere records reviewed:  Yes    ** Please Note: Fluency DirectDictation voice to text software may have been used in the creation of this document   **

## 2021-02-26 NOTE — LETTER
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

## 2021-02-26 NOTE — ED ATTENDING ATTESTATION
Amelia Crisostomo DO, saw and evaluated the patient  I have discussed the patient with the resident/non-physician practitioner and agree with the resident's/non-physician practitioner's findings, Plan of Care, and MDM as documented in the resident's/non-physician practitioner's note, except where noted  All available labs and Radiology studies were reviewed  At this point I agree with the current assessment done in the Emergency Department  I have conducted an independent evaluation of this patient including a focused history and a physical exam     ED Note - Sugey Davis 63 y o  female MRN: [id number]  Unit/Bed#: ED 24 Encounter: [id number]    History of Present Illness   HPI  Kendra Low is a 63 y o  female who presents for evaluation of chest pressure, indigestion and EKG findings concerning for an inferior STEMI  Patient has had symptoms for approximately 24 to 48 hours  Patient's self medicates daily with ibuprofen for chronic pain  Patient history of Crohn's disease  Denies any acute or active bleeding  Mild nausea without vomiting  Subjective dyspnea  No lower extremity edema or swelling  Patient feels lightheaded/dizzy  No ripping or tearing type pain  REVIEW OF SYSTEMS  See HPI for further details  12 systems reviewed and otherwise negative except as noted     Historical Information     PAST MEDICAL HISTORY  Past Medical History:   Diagnosis Date    [facility]     Diabetes mellitus [facility]     Fibromyalgia     Migraine        FAMILY HISTORY  Family History   Problem Relation Age of Onset    Coronary artery disease Mother     Dementia Mother     Heart disease Brother     Alcohol abuse Neg Hx     Substance Abuse Neg Hx        SOCIAL HISTORY  Social History     Socioeconomic History    Marital status: /Civil Union     Spouse name: None    Number of children: None    Years of education: None    Highest education level: None   Occupational History    None Social Needs    Financial resource strain: None    Food insecurity     Worry: None     Inability: None    Transportation needs     Medical: None     Non-medical: None   Tobacco Use    Smoking status: Current Every Day Smoker    Smokeless tobacco: Never Used   Substance and Sexual Activity    Alcohol use: Yes     Comment: Rarely    Drug use: No    Sexual activity: None   Lifestyle    Physical activity     Days per week: None     Minutes per session: None    Stress: None   Relationships    Social connections     Talks on phone: None     Gets together: None     Attends Restorationism service: None     Active member of club or organization: None     Attends meetings of clubs or organizations: None     Relationship status: None    Intimate partner violence     Fear of current or ex partner: None     Emotionally abused: None     Physically abused: None     Forced sexual activity: None   Other Topics Concern    None   Social History Narrative    None       SURGICAL HISTORY  Past Surgical History:   Procedure Laterality Date    CHOLECYSTECTOMY      DENTAL SURGERY      [place] teeth extraction    HYSTERECTOMY      TUBAL LIGATION      Bilateral     Meds/Allergies     CURRENT MEDICATIONS    Current Facility-Administered Medications:     alteplase (ACTIVASE) 100 mg injection **ADS Override Pull**, , , ,     morphine (PF) 4 mg/mL injection 4 mg, 4 mg, Intravenous, Once, Yasir Jeong MD    multi-electrolyte (PLASMALYTE-A/ISOLYTE-S PH 7 4) IV solution 1,000 mL, 1,000 mL, Intravenous, Continuous, [facility] Trager, DO    sodium chloride (PF) 0 9 % injection 3 mL, 3 mL, Intravenous, Q1H PRN, Yasir Jeong MD    Current Outpatient Medications:     albuterol (PROVENTIL HFA,VENTOLIN HFA) 90 mcg/act inhaler, Inhale 2 puffs every 6 (six) hours as needed, Disp: , Rfl:     ibuprofen (MOTRIN) 200 mg tablet, Take 200 mg by mouth every 6 (six) hours as needed, Disp: , Rfl:     ondansetron (ZOFRAN-ODT) 4 mg disintegrating tablet, Take 1 tablet (4 mg total) by mouth every 8 (eight) hours as needed for nausea or vomiting, Disp: 12 tablet, Rfl: 0    SUMAtriptan (IMITREX) 100 mg tablet, Take 1 tablet (100 mg total) by mouth once as needed for migraine for up to 1 dose, Disp: 10 tablet, Rfl: 0  (Not in a hospital admission)      ALLERGIES  Allergies   Allergen Reactions    Penicillins Anaphylaxis    Cefuroxime     Metaxalone     Influenza Vaccines Rash    Keflet [Cephalexin] Rash    Nuts Itching    Sulfa Antibiotics Rash    Sulfamethoxazole-Trimethoprim Hives and Rash    Topiramate Rash     Objective     PHYSICAL EXAM    VITAL SIGNS: Blood pressure (!) 178/91, pulse (!) 49, resp  rate 20, SpO2 99 %, not currently breastfeeding  Constitutional:  Appears well developed and well nourished, anxious, non-toxic appearance   Eyes:  PERRL, EOMI, conjunctivae pink, sclerae non-icteric    HENT:  Normocephalic/Atraumatic, no rhinorrhea, mucous membranes moist   Neck: normal range of motion, no tenderness, supple   Respiratory:  No respiratory distress, normal breath sounds   Cardiovascular:  Bradycardia, normal rhythm, peripheral pulses intact all extremities    GI:  Soft, non-tender, non-distended  :  No CVAT, no flank ecchymosis  Musculoskeletal:  No swelling or edema, no tenderness, no deformities  Integument:  Pink, warm, dry, Well hydrated, no rash, no erythema, no bullae   Lymphatic:  No cervical/ tonsillar/ submandibular lymphadenopathy noted   Neurologic:  Awake, Alert & oriented x 3, CN 2-12 intact, no focal neurological deficits, motor function intact  Psychiatric:  Speech and behavior appropriate       ED COURSE and MDM:    Assessment/Plan   Assessment:  Jacinto Mercer is a 63 y o  female presents for evaluation of symptoms and EKG findings concerning for inferior STEMI  Plan:  STEMI Alert: Labs, EKG, CXR, Heparin bolus 60 mg/kg (max  4000U), Brilinta 180 mg, aspirin 324 mg, Symptom management  Cath lab        CRITICAL CARE TIME: 0 minutes      Portions of the record may have been created with voice recognition software  Occasional wrong word or "sound a like" substitutions may have occurred due to the inherent limitations of voice recognition software       ED Provider  Electronically Signed by

## 2021-02-26 NOTE — ED PROVIDER NOTES
History  Chief Complaint   Patient presents with    Chest Pain     Chest pain started yesterday, MI alert called prehospital, 324mg ASA given  HPI  Patient is a 63year old female with Crohn's and Diabetes presenting with chest pain  States that the symptom started 3 days ago with a feeling of mild indigestion  Slowly evolved to left sided chest pain that radiates down to her left arm  She felt nauseas but had no episode of vomiting  The pain is intermittent and it appears to be exertional/positional  Arrived to the ED as a prehospital STEMI alert with EKG showing inferior lead ST elevation  Rest of ROS negative       Prior to Admission Medications   Prescriptions Last Dose Informant Patient Reported? Taking? SUMAtriptan (IMITREX) 100 mg tablet   No No   Sig: Take 1 tablet (100 mg total) by mouth once as needed for migraine for up to 1 dose   albuterol (PROVENTIL HFA,VENTOLIN HFA) 90 mcg/act inhaler   Yes No   Sig: Inhale 2 puffs every 6 (six) hours as needed   ibuprofen (MOTRIN) 200 mg tablet   Yes No   Sig: Take 200 mg by mouth every 6 (six) hours as needed   ondansetron (ZOFRAN-ODT) 4 mg disintegrating tablet   No No   Sig: Take 1 tablet (4 mg total) by mouth every 8 (eight) hours as needed for nausea or vomiting      Facility-Administered Medications: None       Past Medical History:   Diagnosis Date    [facility]     [facility]     Fibromyalgia     Migraine        Past Surgical History:   Procedure Laterality Date    CHOLECYSTECTOMY      DENTAL SURGERY      [place] teeth extraction    HYSTERECTOMY      TUBAL LIGATION      Bilateral       Family History   Problem Relation Age of Onset    Coronary artery disease Mother     Dementia Mother     Heart disease Brother     Alcohol abuse Neg Hx     Substance Abuse Neg Hx      I have reviewed and agree with the history as documented      E-Cigarette/Vaping    E-Cigarette Use Never User      E-Cigarette/Vaping Substances Social History     Tobacco Use    Smoking status: Current Every Day Smoker     Packs/day: 1 00    Smokeless tobacco: Never Used   Substance Use Topics    Alcohol use: Yes     Frequency: Monthly or less     Drinks per session: 1 or 2     Comment: Rarely    Drug use: No        Review of Systems   Constitutional: Negative for chills, diaphoresis, fever and unexpected weight change  HENT: Negative for ear pain and sore throat  Eyes: Negative for visual disturbance  Respiratory: Negative for cough, chest tightness and shortness of breath  Cardiovascular: Positive for chest pain  Negative for leg swelling  Gastrointestinal: Positive for nausea  Negative for abdominal distention, abdominal pain, constipation, diarrhea and vomiting  Endocrine: Negative  Genitourinary: Negative for difficulty urinating and dysuria  Musculoskeletal: Negative  Skin: Negative  Allergic/Immunologic: Negative  Neurological: Negative  Hematological: Negative  Psychiatric/Behavioral: Negative  All other systems reviewed and are negative  Physical Exam  ED Triage Vitals   Temperature Pulse Respirations Blood Pressure SpO2   02/26/21 1330 02/26/21 1132 02/26/21 1132 02/26/21 1132 02/26/21 1132   98 °F (36 7 °C) (!) 49 20 (!) 178/91 99 %      Temp Source Heart Rate Source Patient Position - Orthostatic VS BP Location FiO2 (%)   02/26/21 1330 -- -- -- --   Oral          Pain Score       02/26/21 1132       8             Orthostatic Vital Signs  Vitals:    02/26/21 1415 02/26/21 1430 02/26/21 1708 02/26/21 1715   BP: (!) 171/82 161/82 140/74 140/74   Pulse: 58 62 60 60       Physical Exam  Vitals signs and nursing note reviewed  Constitutional:       General: She is not in acute distress  Appearance: Normal appearance  She is not ill-appearing  HENT:      Head: Normocephalic and atraumatic        Right Ear: External ear normal       Left Ear: External ear normal       Nose: Nose normal  Mouth/Throat:      Mouth: Mucous membranes are moist       Pharynx: Oropharynx is clear  Eyes:      General: No scleral icterus  Right eye: No discharge  Left eye: No discharge  Extraocular Movements: Extraocular movements intact  Conjunctiva/sclera: Conjunctivae normal       Pupils: Pupils are equal, round, and reactive to light  Neck:      Musculoskeletal: Normal range of motion and neck supple  Cardiovascular:      Rate and Rhythm: Normal rate and regular rhythm  Pulses: Normal pulses  Heart sounds: Normal heart sounds  Pulmonary:      Effort: Pulmonary effort is normal       Breath sounds: Normal breath sounds  Abdominal:      General: Abdomen is flat  Bowel sounds are normal  There is no distension  Palpations: Abdomen is soft  Tenderness: There is no abdominal tenderness  There is no guarding or rebound  Musculoskeletal: Normal range of motion  Skin:     General: Skin is warm and dry  Capillary Refill: Capillary refill takes less than 2 seconds  Neurological:      General: No focal deficit present  Mental Status: She is alert and oriented to person, place, and time  Mental status is at baseline  Psychiatric:         Mood and Affect: Mood normal          Behavior: Behavior normal          Thought Content:  Thought content normal          Judgment: Judgment normal          ED Medications  Medications   sodium chloride (PF) 0 9 % injection 3 mL (has no administration in time range)   sodium chloride 0 9 % infusion (200 mL/hr Intravenous New Bag 2/26/21 1649)   ondansetron (ZOFRAN) injection 4 mg (has no administration in time range)   acetaminophen (TYLENOL) tablet 650 mg (has no administration in time range)   aspirin chewable tablet 81 mg (has no administration in time range)   ticagrelor (BRILINTA) tablet 90 mg (has no administration in time range)   atorvastatin (LIPITOR) tablet 40 mg (40 mg Oral Given 2/26/21 1707)   albuterol (PROVENTIL HFA,VENTOLIN HFA) inhaler 2 puff (has no administration in time range)   calcium carbonate (TUMS) chewable tablet 500 mg (500 mg Oral Given 2/26/21 1428)   insulin lispro (HumaLOG) 100 units/mL subcutaneous injection 2-12 Units (6 Units Subcutaneous Given 2/26/21 1621)   insulin lispro (HumaLOG) 100 units/mL subcutaneous injection 2-12 Units (has no administration in time range)   insulin glargine (LANTUS) subcutaneous injection 30 Units 0 3 mL (has no administration in time range)   insulin lispro (HumaLOG) 100 units/mL subcutaneous injection 10 Units (10 Units Subcutaneous Given 2/26/21 1622)   carvedilol (COREG) tablet 12 5 mg (12 5 mg Oral Given 2/26/21 1708)   enoxaparin (LOVENOX) subcutaneous injection 40 mg (has no administration in time range)   pantoprazole (PROTONIX) EC tablet 40 mg (40 mg Oral Given 2/26/21 1710)   ondansetron (ZOFRAN) injection 4 mg (4 mg Intravenous Given 2/26/21 1137)   ticagrelor (BRILINTA) tablet 180 mg (180 mg Oral Given 2/26/21 1138)   fentanyl citrate (PF) 100 MCG/2ML (50 mcg Intravenous Given 2/26/21 1228)   midazolam (VERSED) injection (1 mg Intravenous Given 2/26/21 1229)   heparin (porcine) injection (4,000 Units Intravenous Given 2/26/21 1217)   lidocaine (PF) (XYLOCAINE-MPF) 1 % injection (10 mL Infiltration Given 2/26/21 1207)   nitroGLYcerin (TRIDIL) 50 mg in 250 mL infusion (premix) (200 mcg Intra-arterial Given 2/26/21 1158)   nitroGLYcerin (TRIDIL) 50 mg in 250 mL infusion (premix) (200 mcg Other Given 2/26/21 1216)   iohexol (OMNIPAQUE) 350 MG/ML injection (SINGLE-DOSE) (80 mL Intravenous Given 2/26/21 1240)   amLODIPine (NORVASC) tablet 5 mg (5 mg Oral Given 2/26/21 1331)       Diagnostic Studies  Results Reviewed     Procedure Component Value Units Date/Time    Troponin I [[id number]]  (Abnormal) Collected: 02/26/21 1714    Lab Status: Final result Specimen: Blood from Arm, Right Updated: 02/26/21 1806     Troponin I 1 28 ng/mL     Troponin I [[id number]]  (Abnormal) Collected: 02/26/21 1409    Lab Status: Final result Specimen: Blood from Arm, Left Updated: 02/26/21 1446     Troponin I 0 76 ng/mL     LDL cholesterol, direct [[id number]]  (Abnormal) Collected: 02/26/21 1139    Lab Status: Final result Specimen: Blood from Arm, Right Updated: 02/26/21 1408     LDL Direct 188 mg/dl     Narrative:      LDL Cholesterol:        Optimal           <100 mg/dl      Near Optimal      100-129 mg/dl      Above Optimal       Borderline High  130-159 mg/dl       High             160-189 mg/dl       Very High        >189 mg/dl    POCT activated clotting time [[id number]]  (Abnormal) Collected: 02/26/21 1213    Lab Status: Final result Specimen: Venous Updated: 02/26/21 1242     Activated Clotting Time, i-STAT 249 sec      Specimen Type VENOUS    Hemoglobin A1C w/ EAG Estimation [[id number]]  (Abnormal) Collected: 02/26/21 1139    Lab Status: Final result Specimen: Blood from Arm, Right Updated: 02/26/21 1238     Hemoglobin A1C 12 3 %       mg/dl     Comprehensive metabolic panel [[id number]]  (Abnormal) Collected: 02/26/21 1139    Lab Status: Final result Specimen: Blood from Arm, Right Updated: 02/26/21 1226     Sodium 135 mmol/L      Potassium 3 9 mmol/L      Chloride 104 mmol/L      CO2 23 mmol/L      ANION GAP 8 mmol/L      BUN 11 mg/dL      Creatinine 0 96 mg/dL      Glucose 335 mg/dL      Calcium 10 0 mg/dL      AST 12 U/L      ALT 15 U/L      Alkaline Phosphatase 134 U/L      Total Protein 7 8 g/dL      Albumin 3 8 g/dL      Total Bilirubin 0 62 mg/dL      eGFR 65 ml/min/1 73sq m     Narrative:      Teresita guidelines for Chronic Kidney Disease (CKD):     Stage 1 with normal or high GFR (GFR > 90 mL/min/1 73 square meters)    Stage 2 Mild CKD (GFR = 60-89 mL/min/1 73 square meters)    Stage 3A Moderate CKD (GFR = 45-59 mL/min/1 73 square meters)    Stage 3B Moderate CKD (GFR = 30-44 mL/min/1 73 square meters)    Stage 4 Severe CKD (GFR = 15-29 mL/min/1 73 square meters)    Stage 5 End Stage CKD (GFR <15 mL/min/1 73 square meters)  Note: GFR calculation is accurate only with a steady state creatinine    Troponin I [[id number]]  (Normal) Collected: 02/26/21 1139    Lab Status: Final result Specimen: Blood from Arm, Right Updated: 02/26/21 1219     Troponin I <0 02 ng/mL     Lipid panel [[id number]]  (Abnormal) Collected: 02/26/21 1138    Lab Status: Final result Specimen: Blood from Arm, Right Updated: 02/26/21 1219     Cholesterol 271 mg/dL      Triglycerides 250 mg/dL      HDL, Direct 40 mg/dL      LDL Calculated 181 mg/dL      Non-HDL-Chol (CHOL-HDL) 231 mg/dl     Magnesium [[id number]]  (Normal) Collected: 02/26/21 1138    Lab Status: Final result Specimen: Blood from Arm, Right Updated: 02/26/21 1219     Magnesium 1 9 mg/dL     Protime-INR [[id number]]  (Normal) Collected: 02/26/21 1139    Lab Status: Final result Specimen: Blood from Arm, Right Updated: 02/26/21 1216     Protime 13 0 seconds      INR 0 98    APTT [[id number]]  (Normal) Collected: 02/26/21 1139    Lab Status: Final result Specimen: Blood from Arm, Right Updated: 02/26/21 1216     PTT 29 seconds     CBC and differential [[id number]]  (Abnormal) Collected: 02/26/21 1139    Lab Status: Final result Specimen: Blood from Arm, Right Updated: 02/26/21 1150     WBC 11 29 Thousand/uL      RBC 5 61 Million/uL      Hemoglobin 16 2 g/dL      Hematocrit 48 5 %      MCV 87 fL      MCH 28 9 pg      MCHC 33 4 g/dL      RDW 13 8 %      MPV 10 3 fL      Platelets 309 Thousands/uL      nRBC 0 /100 WBCs      Neutrophils Relative 65 %      Immat GRANS % 0 %      Lymphocytes Relative 27 %      Monocytes Relative 6 %      Eosinophils Relative 2 %      Basophils Relative 0 %      Neutrophils Absolute 7 31 Thousands/µL      Immature Grans Absolute 0 04 Thousand/uL      Lymphocytes Absolute 3 01 Thousands/µL      Monocytes Absolute 0 65 Thousand/µL      Eosinophils Absolute 0 23 Thousand/µL      Basophils Absolute 0 05 Thousands/µL                  No orders to display         Procedures  Procedures      ED Course        Procedure Note: EKG  Date/Time: 02/26/21 7:10 PM   Interpreted by: Luis Guillaume  Indications / Diagnosis: CP  ECG reviewed by me, the ED Provider: yes   The EKG demonstrates:  Rhythm: normal sinus  Intervals: normal intervals  Axis: normal axis  QRS/Blocks: normal QRS  ST Changes: GEORGE                                 MDM  Number of Diagnoses or Management Options  Inferior MI [facility]):   Diagnosis management comments:    Patient 63year old female presenting to ED as prehospital STEMi alert  EKG with ST segment elevation in the inferior leads  Patient was given aspirin enroute to the ED  In the ED will order cardiac cath workup along with ticagrelor   Zofran given for nausea      Disposition  Final diagnoses:   Inferior [facility] ([facility] )     Time reflects when diagnosis was documented in both MDM as applicable and the Disposition within this note     Time User Action Codes Description Comment    2/26/2021 11:32 AM Roberto Munoz Add [I21 9] MI (myocardial infarction) ([facility] )     2/26/2021 12:41 PM Vester Everts Add [E11 9] Newly diagnosed diabetes ([facility] )     2/26/2021  1:26 PM Vester Everts Modify [E11 9] Newly diagnosed diabetes ([facility] )     2/26/2021  1:26 PM Vester Everts Add [I21 11] Acute ST elevation myocardial infarction (STEMI) due to occlusion of distal portion of right coronary artery ([facility] )     2/26/2021  1:26 PM Vester Everts Add [I25 10] Coronary artery disease     2/26/2021  1:26 PM Vester Everts Add [E78 2] Moderate mixed hyperlipidemia not requiring statin therapy     2/26/2021  3:50 PM Margie Cockayne [E11 65] Type 2 diabetes mellitus with hyperglycemia, without long-term current use of insulin ([facility] )     2/26/2021  7:07 PM Jacques Khan Add [I21 19] Inferior MI [facility])       ED Disposition     None      Follow-up Information    None         Current Discharge Medication List      START taking these medications Details   ticagrelor (BRILINTA) 90 MG Take 1 tablet (90 mg total) by mouth every 12 (twelve) hours  Qty: 60 tablet, Refills: 5    Associated Diagnoses: Acute ST elevation myocardial infarction (STEMI) due to occlusion of distal portion of right coronary artery ([address] ); Coronary artery disease; Moderate mixed hyperlipidemia not requiring statin therapy         CONTINUE these medications which have NOT CHANGED    Details   albuterol (PROVENTIL HFA,VENTOLIN HFA) 90 mcg/act inhaler Inhale 2 puffs every 6 (six) hours as needed    Comments: Substitution to a formulary equivalent within the same pharmaceutical class is authorized  ibuprofen (MOTRIN) 200 mg tablet Take 200 mg by mouth every 6 (six) hours as needed      ondansetron (ZOFRAN-ODT) 4 mg disintegrating tablet Take 1 tablet (4 mg total) by mouth every 8 (eight) hours as needed for nausea or vomiting  Qty: 12 tablet, Refills: 0    Associated Diagnoses: Migraine without aura and without status migrainosus, not intractable; Routine adult health maintenance      SUMAtriptan (IMITREX) 100 mg tablet Take 1 tablet (100 mg total) by mouth once as needed for migraine for up to 1 dose  Qty: 10 tablet, Refills: 0    Associated Diagnoses: Migraine without aura and without status migrainosus, not intractable; Routine adult health maintenance           No discharge procedures on file  PDMP Review     None           ED Provider  Attending physically available and evaluated Tao Carrizales I managed the patient along with the ED Attending      Electronically Signed by         Nereida Lorenzo MD  02/26/21 7491

## 2021-02-26 NOTE — DISCHARGE INSTRUCTIONS
1  Please see the post cardiac catheterization dishcarge instructions  No heavy lifting, greater than 10 lbs  or strenuous  activity for 48 hrs  2 Remove band aid tomorrow  Shower and wash area- wrist and groin gently with soap and water- beginning tomorrow  Rinse and pat dry  Apply new water seal band aid  Repeat this process for 5 days  No powders, creams lotions or antibiotic ointments  for 5 days  No tub baths, hot tubs or swimming for 5 days  3  Please call our office ([phone]) if you have any fever, redness, swelling, discharge from your wrist and groin access site  4 No driving for 2 days    5  Angioseal Booklet & stent booklets      Coronary Intravascular Stent Placement   WHAT YOU SHOULD KNOW:   Coronary intravascular stent placement is a procedure to place a stent in an artery of your heart that has plaque buildup  Plaque is a mixture of fat and cholesterol  A stent is a small mesh tube made of metal that helps keep your artery open  Your caregiver may place a bare metal stent or a drug-eluting stent (ZACHARY) in your artery  A ZACHARY is coated with medicine that is slowly released and helps prevent more plaque buildup in the area where the stent is placed  The stent remains in your artery for life  You may need more than one stent  AFTER YOU LEAVE:   Medicines: You will be given any of the following:  · Antiplatelets  prevent blood clots from forming  You will need to take aspirin and another type of platelet medicine  Take this medicine daily as directed  Do not stop taking aspirin or other type of antiplatelet medicine  · Nitrates , such as nitroglycerin, relax the arteries of your heart so it gets more oxygen  This medicine helps to relieve chest pain  · Cholesterol medicine  helps decrease the amount of cholesterol in your blood  · Blood pressure medicine  lowers your blood pressure  · Take your medicine as directed    Call your healthcare provider if you think your medicine is not helping or if you have side effects  Tell him if you are allergic to any medicine  Keep a list of the medicines, vitamins, and herbs you take  Include the amounts, and when and why you take them  Bring the list or the pill bottles to follow-up visits  Carry your medicine list with you in case of an emergency  Follow up with your cardiologist as directed:  Write down your questions so you remember to ask them during your visits  Activity:   · Avoid unnecessary stair climbing for 48 hours, if a catheter was put in your groin  · Do not place pressure on your arm, hand, or wrist, if the catheter was placed in your wrist  Avoid pushing, pulling, or heavy lifting with that arm  · If you need to cough, support the area where the catheter was inserted with your hand  · Ask your cardiologist how long you need to limit movement and avoid certain activities  · You may feel like resting more after your procedure  Slowly start to do more each day  Rest when you feel it is needed  Wound care:  Ask your cardiologist about how to care for your incision wound  Ask when you can get into a tub, shower, or pool  Do not smoke: If you smoke, it is never too late to quit  Smoking increases your risk for heart disease and stroke  Ask your cardiologist for information if you need help quitting  Cardiac rehab:  Your cardiologist may recommend that you attend cardiac rehabilitation (rehab)  This is a program run by specialists who will help you safely strengthen your heart and reduce the risk of more heart disease  The plan includes exercise, relaxation, stress management, and heart-healthy nutrition  Caregivers will also check to make sure any medicines you are taking are working  Contact your cardiologist if:   · You have a fever or chills  · You have questions or concerns about your condition or care    Seek care immediately or call 911 if:   · Your leg or arm, used for the procedure, becomes numb or turns white or blue  · The area where the catheter was placed is swollen, red, or has pus or foul-smelling fluid coming from it  · Your arm or leg feels warm, tender, and painful  It may look swollen and red  · You start to bleed from your catheter site again  · You have any of the following signs of a heart attack:     ¨ Squeezing, pressure, fullness, or pain in your chest that lasts longer than a few minutes or returns     ¨ Discomfort or pain in your back, neck, jaw, stomach, or arm    ¨ Shortness of breath or breathing problems    ¨ A sudden cold sweat, lightheadedness, dizziness, or nausea, especially with chest pain or trouble breathing    · You have any of the following signs of a stroke:     ¨ Part of your face droops or is numb    ¨ Weakness in an arm or leg    ¨ Confusion or difficulty speaking    ¨ Dizziness, a severe headache, or vision loss  © 2014 [address] is for End User's use only and may not be sold, redistributed or otherwise used for commercial purposes  All illustrations and images included in CareNotes® are the copyrighted property of A D A M [employer] Inc  or Manny Shah  The above information is an  only  It is not intended as medical advice for individual conditions or treatments  Talk to your doctor, nurse or pharmacist before following any medical regimen to see if it is safe and effective for you  Hypoglycemia instructions   Luis Florian [facility]Maria [facility]  2/28/2021  [id number]    Low Blood Sugar    Steps to treat low blood sugar  1  Test blood sugar if you have symptoms of low blood sugar:   Low Blood Sugar Symptoms:  o Sweaty  o Dizzy  o Rapid heartbeat  o Shaky    o Bad mood  o Hungry      2   Treat blood sugar less than 70 with 15 grams of fast-acting carbohydrate:   Examples of 15 grams Fast-Acting Carbohydrate:  o 4 oz juice  o 4 oz regular soda  o 3-4 glucose tablets (chew)  o 3-4 hard candies (chew)              3    Wait 15 minutes and test your blood sugar again           4   If blood sugar is less than 100, repeat steps 2-3       5  When your blood sugar is 100 or more, eat a snack if it will be longer than one hour until your next meal  The snack should be 15 grams of carbohydrate and a protein:   Examples of snacks:  o ½ sandwich  o 6 crackers with cheese  o Piece of fruit with cheese or peanut butter  o 6 crackers with peanut butter

## 2021-02-26 NOTE — CONSULTS
Consultation - Amy Davis 63 y o  female MRN: [id number]    Unit/Bed#: [facility] 515-01 Encounter: [id number]      Assessment/Plan   1  Type 2 diabetes mellitus with hyperglycemia - complicated by CAD  Hgb A1c of 12 3  No outpatient endocrinologist or diabetic home regimen    -Will initiate Lantus 30u qHS, Lispro 10u TIDAC + correctional insulin regimen (algorithm 4)  Monitor for hypoglycemic episodes  Follow blood glucose levels and adjust as necessary    -Inpatient consult for nutritional services placed for appropriate dietary education  Will also require insulin administration and blood glucose monitoring education prior to discharge as she will require insulin regimen on discharge    -Already on high intensity statin given recent MI    -Ordered urine albumin/Cr ratio  Follow results  -Recommend outpatient ophthalmology and appropriate foot examination  Patient verbalized understanding  2  Chron's disease - 25 Vit D/B12/Folate levels ordered, follow results  Consider gastroenterology evaluation if diarrhea or other symptoms consistent with active Chron's develops  Requires outpatient DEXA scan  3  Suspected obstructive sleep apnea - daytime sleepiness and snoring  Recommend outpatient polysomnography testing  Verbalized understanding  4  STEMI s/p PCI with stent placement (2/26/21) - management as per primary service    CC: Diabetes Consult    History of Present Illness     HPI: Jered Huizar is a 63y o  year old female with a history of type 2 DM presenting with signs and symptoms consistent with a STEMI earlier today s/p cardiac catheterization with stent placement  On admission, her hemoglobin A1c was noted to be at 12 3 for which endocrinology has been consulted    Upon further review, she states she had been diagnosed with type 2 diabetes mellitus about 4 years ago and was referred to an endocrinologist but was never started on therapy according to her; she did have a hemoglobin A1c measured in November 2019 which is 8 2 at the time  Patient does not complain of polyuria, polydipsia, nocturia or polyphagia but does mention a history of pins and needle sensation which are intermittent and mainly occur in both of her feet as well as poor vision but no blurriness  Her past medical history is also significant for Crohn's disease which appears to be uncontrolled, she does not follow-up with Gastroenterology or tale maintenance therapy; states she has been having episodes nonbloody diarrhea over the past several weeks  Does not follow a specific diet  Denies history of hypoglycemic events  Does admit to snoring and feeling tired most of her days  Review of systems was also significant for intermittent heartburn during past 2 days  Inpatient consult to Endocrinology     Performed by  Rad Zamudio MD     Authorized by Damari Quiñones MD              Review of Systems   Constitutional: Positive for fatigue  Negative for activity change, appetite change, diaphoresis, fever and unexpected weight change  Eyes: Negative for visual disturbance  Worsening vision over years   Respiratory: Negative for chest tightness and shortness of breath  Cardiovascular: Positive for chest pain  Negative for palpitations and leg swelling  Gastrointestinal: Positive for diarrhea  Negative for abdominal distention, abdominal pain, blood in stool, constipation, nausea and vomiting  Endocrine: Negative for cold intolerance, heat intolerance, polydipsia, polyphagia and polyuria  Genitourinary: Negative for difficulty urinating, dysuria and frequency  Musculoskeletal: Negative for myalgias  Skin: Negative for rash  Neurological: Positive for headaches  Negative for dizziness, tremors, syncope, weakness, light-headedness and numbness  Psychiatric/Behavioral: Positive for sleep disturbance  All other systems reviewed and are negative        Historical Information   Past Medical History: Diagnosis Date    Crohn's colitis ([facility] [address] )     Diabetes mellitus ([facility] )     Fibromyalgia     Migraine      Past Surgical History:   Procedure Laterality Date    CHOLECYSTECTOMY      DENTAL SURGERY      [place] teeth extraction    HYSTERECTOMY      TUBAL LIGATION      Bilateral     Social History   Social History     Substance and Sexual Activity   Alcohol Use Yes    Frequency: Monthly or less    Drinks per session: 1 or 2    Comment: Rarely     Social History     Substance and Sexual Activity   Drug Use No     Social History     Tobacco Use   Smoking Status Current Every Day Smoker    Packs/day: 1 00   Smokeless Tobacco Never Used     Family History:   Family History   Problem Relation Age of Onset    Coronary artery disease Mother     Dementia Mother     Heart disease Brother     Alcohol abuse Neg Hx     Substance Abuse Neg Hx        Meds/Allergies   Current Facility-Administered Medications   Medication Dose Route Frequency Provider Last Rate Last Admin    acetaminophen (TYLENOL) tablet 650 mg  650 mg Oral Q4H PRN Melonie Hayden MD        albuterol (PROVENTIL HFA,VENTOLIN HFA) inhaler 2 puff  2 puff Inhalation Q6H PRN Melonie Hayden MD        [START ON 2/27/2021] aspirin chewable tablet 81 mg  81 mg Oral Daily Melonie Hayden MD        atorvastatin (LIPITOR) tablet 40 mg  40 mg Oral QPM Melonie Hayden MD        calcium carbonate (TUMS) chewable tablet 500 mg  500 mg Oral BID PRN Melonie Hayden MD        insulin lispro (HumaLOG) 100 units/mL subcutaneous injection 1-6 Units  1-6 Units Subcutaneous TID AC Melonie Hayden MD        insulin lispro (HumaLOG) 100 units/mL subcutaneous injection 1-6 Units  1-6 Units Subcutaneous HS Melonie Hayden MD        morphine (PF) 4 mg/mL injection 4 mg  4 mg Intravenous Once Tom Asher MD        ondansetron [facility]) injection 4 mg  4 mg Intravenous Q6H PRN Melonie Hayden MD        sodium chloride (PF) 0 9 % injection 3 mL  3 mL Intravenous Q1H PRN Nereida Lorenzo MD        sodium chloride 0 9 % infusion  200 mL/hr Intravenous Continuous Doris Badillo  mL/hr at 02/26/21 1323 200 mL/hr at 02/26/21 1323    ticagrelor (BRILINTA) tablet 90 mg  90 mg Oral Q12H [address] [facility], MD         Allergies   Allergen Reactions    Penicillins Anaphylaxis    Cefuroxime     Metaxalone     Influenza Vaccines Rash    Keflet [Cephalexin] Rash    Nuts Itching    Sulfa Antibiotics Rash    Sulfamethoxazole-Trimethoprim Hives and Rash    Topiramate Rash       Objective   Vitals: Blood pressure 161/81, pulse 56, temperature 98 °F (36 7 °C), temperature source Oral, resp  rate 16, SpO2 94 %, not currently breastfeeding  No intake or output data in the 24 hours ending 02/26/21 1400  Invasive Devices     Peripheral Intravenous Line            Peripheral IV Right Antecubital -- days    Peripheral IV 02/26/21 Left Antecubital less than 1 day                Physical Exam  Vitals signs reviewed  Constitutional:       General: She is not in acute distress  Appearance: She is obese  She is not ill-appearing or diaphoretic  HENT:      Head: Normocephalic and atraumatic  Mouth/Throat:      Mouth: Mucous membranes are dry  Pharynx: Oropharynx is clear  Eyes:      General: No scleral icterus  Pupils: Pupils are equal, round, and reactive to light  Neck:      Musculoskeletal: Neck supple  Thyroid: No thyroid mass, thyromegaly or thyroid tenderness  Vascular: No carotid bruit  Trachea: No tracheal deviation  Cardiovascular:      Rate and Rhythm: Normal rate and regular rhythm  Heart sounds: Normal heart sounds  Pulmonary:      Breath sounds: Normal breath sounds  Abdominal:      General: Abdomen is flat  Bowel sounds are normal       Palpations: Abdomen is soft  Tenderness: There is no abdominal tenderness  Musculoskeletal:         General: No swelling  Skin:     General: Skin is warm and dry     Neurological:      Mental Status: She is alert and oriented to person, place, and time  Comments: Drowsy at times    Psychiatric:         Mood and Affect: Mood normal          The history was obtained from the review of the chart, patient  Lab Results:   Results from last 7 days   Lab Units 02/26/21  1139   HEMOGLOBIN A1C % 12 3*     Lab Results   Component Value Date    WBC 11 29 (H) 02/26/2021    HGB 16 2 (H) 02/26/2021    HCT 48 5 (H) 02/26/2021    MCV 87 02/26/2021     02/26/2021     Lab Results   Component Value Date/Time    BUN 11 02/26/2021 11:39 AM    BUN 17 01/22/2016 08:00 AM     01/22/2016 08:00 AM    K 3 9 02/26/2021 11:39 AM    K 4 0 01/22/2016 08:00 AM     02/26/2021 11:39 AM     01/22/2016 08:00 AM    CO2 23 02/26/2021 11:39 AM    CO2 21 01/22/2016 08:00 AM    CREATININE 0 96 02/26/2021 11:39 AM    CREATININE 0 82 01/22/2016 08:00 AM    AST 12 02/26/2021 11:39 AM    AST 22 01/22/2016 08:00 AM    ALT 15 02/26/2021 11:39 AM    ALT 18 01/22/2016 08:00 AM    ALB 3 8 02/26/2021 11:39 AM    ALB 4 3 01/22/2016 08:00 AM     Recent Labs     02/26/21  1138   HDL 40   TRIG 250*     No results found for: Caitalexi Yanickar  POC Glucose (mg/dl)   Date Value   02/26/2021 299 (H)       Imaging Studies: I have personally reviewed pertinent reports  Portions of the record may have been created with voice recognition software

## 2021-02-26 NOTE — TELEPHONE ENCOUNTER
Xray ordered in ED, called the RN and she is checking with Dr if still needed  They will call if needed or Dr  will cancel

## 2021-02-27 PROBLEM — IMO0002 UNCONTROLLED TYPE 2 DIABETES MELLITUS: Chronic | Status: ACTIVE | Noted: 2021-02-27

## 2021-02-27 PROBLEM — F17.209 TOBACCO USE DISORDER, CONTINUOUS: Chronic | Status: ACTIVE | Noted: 2020-11-03

## 2021-02-27 LAB
25(OH)D3 SERPL-MCNC: 12.5 NG/ML (ref 30–100)
ALBUMIN SERPL BCP-MCNC: 2.9 G/DL (ref 3.5–5)
ALP SERPL-CCNC: 107 U/L (ref 46–116)
ALT SERPL W P-5'-P-CCNC: 11 U/L (ref 12–78)
ANION GAP SERPL CALCULATED.3IONS-SCNC: 5 MMOL/L (ref 4–13)
AST SERPL W P-5'-P-CCNC: 18 U/L (ref 5–45)
ATRIAL RATE: 56 BPM
BILIRUB DIRECT SERPL-MCNC: 0.13 MG/DL (ref 0–0.2)
BILIRUB SERPL-MCNC: 0.61 MG/DL (ref 0.2–1)
BUN SERPL-MCNC: 7 MG/DL (ref 5–25)
CALCIUM SERPL-MCNC: 9.1 MG/DL (ref 8.3–10.1)
CHLORIDE SERPL-SCNC: 113 MMOL/L (ref 100–108)
CO2 SERPL-SCNC: 25 MMOL/L (ref 21–32)
CREAT SERPL-MCNC: 0.67 MG/DL (ref 0.6–1.3)
CREAT UR-MCNC: 143 MG/DL
ERYTHROCYTE [DISTWIDTH] IN BLOOD BY AUTOMATED COUNT: 14 % (ref 11.6–15.1)
FOLATE SERPL-MCNC: 18.2 NG/ML (ref 3.1–17.5)
GFR SERPL CREATININE-BSD FRML MDRD: 97 ML/MIN/1.73SQ M
GLUCOSE SERPL-MCNC: 102 MG/DL (ref 65–140)
GLUCOSE SERPL-MCNC: 119 MG/DL (ref 65–140)
GLUCOSE SERPL-MCNC: 127 MG/DL (ref 65–140)
GLUCOSE SERPL-MCNC: 129 MG/DL (ref 65–140)
GLUCOSE SERPL-MCNC: 149 MG/DL (ref 65–140)
GLUCOSE SERPL-MCNC: 202 MG/DL (ref 65–140)
HCT VFR BLD AUTO: 40.9 % (ref 34.8–46.1)
HGB BLD-MCNC: 13.4 G/DL (ref 11.5–15.4)
MAGNESIUM SERPL-MCNC: 1.7 MG/DL (ref 1.6–2.6)
MCH RBC QN AUTO: 29 PG (ref 26.8–34.3)
MCHC RBC AUTO-ENTMCNC: 32.8 G/DL (ref 31.4–37.4)
MCV RBC AUTO: 89 FL (ref 82–98)
MICROALBUMIN UR-MCNC: 10.7 MG/L (ref 0–20)
MICROALBUMIN/CREAT 24H UR: 7 MG/G CREATININE (ref 0–30)
P AXIS: 68 DEGREES
PLATELET # BLD AUTO: 232 THOUSANDS/UL (ref 149–390)
PMV BLD AUTO: 10.5 FL (ref 8.9–12.7)
POTASSIUM SERPL-SCNC: 3.6 MMOL/L (ref 3.5–5.3)
PR INTERVAL: 150 MS
PROT SERPL-MCNC: 6 G/DL (ref 6.4–8.2)
QRS AXIS: 50 DEGREES
QRSD INTERVAL: 86 MS
QT INTERVAL: 456 MS
QTC INTERVAL: 440 MS
RBC # BLD AUTO: 4.62 MILLION/UL (ref 3.81–5.12)
SODIUM SERPL-SCNC: 143 MMOL/L (ref 136–145)
T WAVE AXIS: -12 DEGREES
TROPONIN I SERPL-MCNC: 2.46 NG/ML
TROPONIN I SERPL-MCNC: 2.47 NG/ML
VENTRICULAR RATE: 56 BPM
VIT B12 SERPL-MCNC: 253 PG/ML (ref 100–900)
WBC # BLD AUTO: 10.08 THOUSAND/UL (ref 4.31–10.16)

## 2021-02-27 PROCEDURE — 82043 UR ALBUMIN QUANTITATIVE: CPT | Performed by: STUDENT IN AN ORGANIZED HEALTH CARE EDUCATION/TRAINING PROGRAM

## 2021-02-27 PROCEDURE — 80048 BASIC METABOLIC PNL TOTAL CA: CPT | Performed by: INTERNAL MEDICINE

## 2021-02-27 PROCEDURE — 83735 ASSAY OF MAGNESIUM: CPT | Performed by: INTERNAL MEDICINE

## 2021-02-27 PROCEDURE — 80076 HEPATIC FUNCTION PANEL: CPT | Performed by: STUDENT IN AN ORGANIZED HEALTH CARE EDUCATION/TRAINING PROGRAM

## 2021-02-27 PROCEDURE — 85027 COMPLETE CBC AUTOMATED: CPT | Performed by: INTERNAL MEDICINE

## 2021-02-27 PROCEDURE — 84484 ASSAY OF TROPONIN QUANT: CPT | Performed by: INTERNAL MEDICINE

## 2021-02-27 PROCEDURE — 93010 ELECTROCARDIOGRAM REPORT: CPT | Performed by: INTERNAL MEDICINE

## 2021-02-27 PROCEDURE — 82570 ASSAY OF URINE CREATININE: CPT | Performed by: STUDENT IN AN ORGANIZED HEALTH CARE EDUCATION/TRAINING PROGRAM

## 2021-02-27 PROCEDURE — 82746 ASSAY OF FOLIC ACID SERUM: CPT | Performed by: INTERNAL MEDICINE

## 2021-02-27 PROCEDURE — 82948 REAGENT STRIP/BLOOD GLUCOSE: CPT

## 2021-02-27 PROCEDURE — 99232 SBSQ HOSP IP/OBS MODERATE 35: CPT | Performed by: INTERNAL MEDICINE

## 2021-02-27 PROCEDURE — 82306 VITAMIN D 25 HYDROXY: CPT | Performed by: INTERNAL MEDICINE

## 2021-02-27 PROCEDURE — 93005 ELECTROCARDIOGRAM TRACING: CPT

## 2021-02-27 PROCEDURE — 82607 VITAMIN B-12: CPT | Performed by: INTERNAL MEDICINE

## 2021-02-27 RX ORDER — NICOTINE 21 MG/24HR
1 PATCH, TRANSDERMAL 24 HOURS TRANSDERMAL DAILY
Qty: 28 PATCH | Refills: 3 | Status: SHIPPED | OUTPATIENT
Start: 2021-02-28 | End: 2021-04-28 | Stop reason: ALTCHOICE

## 2021-02-27 RX ORDER — ROSUVASTATIN CALCIUM 20 MG/1
20 TABLET, COATED ORAL DAILY
Qty: 90 TABLET | Refills: 3 | Status: SHIPPED | OUTPATIENT
Start: 2021-02-27 | End: 2021-03-26

## 2021-02-27 RX ORDER — METOPROLOL SUCCINATE 25 MG/1
12.5 TABLET, EXTENDED RELEASE ORAL DAILY
Status: DISCONTINUED | OUTPATIENT
Start: 2021-02-27 | End: 2021-02-27

## 2021-02-27 RX ORDER — LOSARTAN POTASSIUM 25 MG/1
25 TABLET ORAL DAILY
Qty: 30 TABLET | Refills: 5 | Status: SHIPPED | OUTPATIENT
Start: 2021-02-28 | End: 2021-03-21 | Stop reason: SDUPTHER

## 2021-02-27 RX ORDER — CARVEDILOL 12.5 MG/1
12.5 TABLET ORAL EVERY 12 HOURS SCHEDULED
Status: DISCONTINUED | OUTPATIENT
Start: 2021-02-27 | End: 2021-02-28 | Stop reason: HOSPADM

## 2021-02-27 RX ORDER — LOSARTAN POTASSIUM 25 MG/1
25 TABLET ORAL DAILY
Status: DISCONTINUED | OUTPATIENT
Start: 2021-02-27 | End: 2021-02-28 | Stop reason: HOSPADM

## 2021-02-27 RX ORDER — ASPIRIN 81 MG/1
81 TABLET, CHEWABLE ORAL DAILY
Qty: 90 TABLET | Refills: 3 | Status: SHIPPED | OUTPATIENT
Start: 2021-02-28 | End: 2021-08-13 | Stop reason: SDUPTHER

## 2021-02-27 RX ORDER — CARVEDILOL 12.5 MG/1
12.5 TABLET ORAL EVERY 12 HOURS SCHEDULED
Qty: 30 TABLET | Refills: 5 | Status: SHIPPED | OUTPATIENT
Start: 2021-02-27 | End: 2021-06-02 | Stop reason: SDUPTHER

## 2021-02-27 RX ORDER — NICOTINE 21 MG/24HR
21 PATCH, TRANSDERMAL 24 HOURS TRANSDERMAL DAILY
Status: DISCONTINUED | OUTPATIENT
Start: 2021-02-27 | End: 2021-02-28 | Stop reason: HOSPADM

## 2021-02-27 RX ADMIN — ENOXAPARIN SODIUM 40 MG: 40 INJECTION SUBCUTANEOUS at 09:40

## 2021-02-27 RX ADMIN — INSULIN GLARGINE 30 UNITS: 100 INJECTION, SOLUTION SUBCUTANEOUS at 21:02

## 2021-02-27 RX ADMIN — PANTOPRAZOLE SODIUM 40 MG: 40 TABLET, DELAYED RELEASE ORAL at 06:12

## 2021-02-27 RX ADMIN — INSULIN LISPRO 4 UNITS: 100 INJECTION, SOLUTION INTRAVENOUS; SUBCUTANEOUS at 13:27

## 2021-02-27 RX ADMIN — LOSARTAN POTASSIUM 25 MG: 25 TABLET, FILM COATED ORAL at 18:10

## 2021-02-27 RX ADMIN — INSULIN LISPRO 10 UNITS: 100 INJECTION, SOLUTION INTRAVENOUS; SUBCUTANEOUS at 13:28

## 2021-02-27 RX ADMIN — NICOTINE 21 MG: 21 PATCH, EXTENDED RELEASE TRANSDERMAL at 18:10

## 2021-02-27 RX ADMIN — TICAGRELOR 90 MG: 90 TABLET ORAL at 09:40

## 2021-02-27 RX ADMIN — ACETAMINOPHEN 650 MG: 325 TABLET, FILM COATED ORAL at 18:10

## 2021-02-27 RX ADMIN — TICAGRELOR 90 MG: 90 TABLET ORAL at 20:55

## 2021-02-27 RX ADMIN — CARVEDILOL 12.5 MG: 12.5 TABLET, FILM COATED ORAL at 20:55

## 2021-02-27 RX ADMIN — CARVEDILOL 12.5 MG: 12.5 TABLET, FILM COATED ORAL at 09:40

## 2021-02-27 RX ADMIN — ATORVASTATIN CALCIUM 40 MG: 20 TABLET, FILM COATED ORAL at 18:10

## 2021-02-27 RX ADMIN — ASPIRIN 81 MG: 81 TABLET, CHEWABLE ORAL at 09:40

## 2021-02-27 RX ADMIN — INSULIN LISPRO 10 UNITS: 100 INJECTION, SOLUTION INTRAVENOUS; SUBCUTANEOUS at 18:10

## 2021-02-27 RX ADMIN — ACETAMINOPHEN 650 MG: 325 TABLET, FILM COATED ORAL at 01:32

## 2021-02-27 NOTE — NURSING NOTE
Hematoma fomring on pt's R wrist radial access site  Dr Ricky Vasquez aware and at bedside  Armboard applied

## 2021-02-27 NOTE — PROGRESS NOTES
Cardiology Team 2 - STEMI Progress Note - Sriram Davis 63 y o  female MRN: [id number]    Unit/Bed#: [facility] 402-01 Encounter: [id number]      Subjective:   Patient seen and examined  No significant telemetry events overnight  Patient with no further symptoms  She is comfortable and chest pain-free  She has some ecchymosis in the right radial access site with associated soreness/tenderness but no paresthesias or loss of sensation distally  Hospital Course:   Doug Santizo is a 63y o  year old female with a history of limited medical follow-up, dyslipidemia, diabetes mellitus type 2, class 1 obesity, active tobacco use disorder, irritable bowel syndrome, Crohn's colitis and fibromyalgia who initially presented to [facility] in [place] from home as a pre-hospital MI with 3 days of escalating chest discomfort  ECGs were concerning for type 1 inferior STEMI  Patient was emergently take for invasive coronary evaluation  Left heart catheterization was initiated via right radial access however was switched to right femoral arterial puncture due to subclavian/innominate artery obstruction  A 99% distal RCA culprit lesion with FABRICE 1 flow was identified and treated with 3 0 x 26 mm resolute jose ZACHARY; 90% mid RCA lesion was concurrently addressed with 3 0 x 18 mm resolute jose ZACHARY  There was residual 80% mid LAD, 40% D1, 60% D2 and 50% OM1 disease  Transthoracic echo showed preserved biventricular function with LVEF 60% and akinesis of the basal-mid inferior wall  Patient found to have significantly uncontrolled diabetes mellitus with A1c of 12 3% for which endocrinology recommendations were appreciated  Vitals: Blood pressure 110/70, pulse 64, temperature 97 7 °F (36 5 °C), temperature source Oral, resp   rate 15, height 5' 8" (1 727 m), weight 97 6 kg (215 lb 2 7 oz), SpO2 94 %, not currently breastfeeding , Body mass index is 32 72 kg/m² ,   Orthostatic Blood Pressures      Most Recent Value   Blood Pressure 110/70 filed at 02/27/2021 7088   Patient Position - Orthostatic VS  Lying filed at 02/27/2021 0251            Intake/Output Summary (Last 24 hours) at 2/27/2021 6328  Last data filed at 2/27/2021 0123  Gross per 24 hour   Intake 1270 01 ml   Output 1500 ml   Net -229 99 ml         Physical Exam:    GEN: Anirudh Ramirez appears uncomfortable in moderate distress, alert and oriented x 3, cooperative   HEENT:  Normocephalic, atraumatic, anicteric, significant bilateral xanthelasma around medial palpebral commissures  NECK: No JVD or carotid bruits   HEART: Regular rhythm, normal rate, normal S1 and S2, no murmurs, clicks, gallops or rubs   LUNGS: Clear to auscultation bilaterally; respiration mildly labored and satting 99% on room air  ABDOMEN:  Normoactive bowel sounds, soft, no tenderness, no distention  EXTREMITIES: peripheral pulses palpable; no edema  NEURO: no gross focal findings; cranial nerves grossly intact   SKIN:  Dry, intact, warm to touch    ACCESS:  -  Right Radial artery has  + 2 pulse with brisk capillary refill  Neurovascular intact to  Right hand  There is mildly tender ecchymosis surrounding the puncture site but no hematoma  Pressure dressing in place; TR band removed  -Right groin no hematoma, ecchymosis or bruit  Dorsalis pedis and posterior tibialis intact distally          Current Facility-Administered Medications:     acetaminophen (TYLENOL) tablet 650 mg, 650 mg, Oral, Q4H PRN, Meredith Miller MD, 650 mg at 02/27/21 0132    albuterol (PROVENTIL HFA,VENTOLIN HFA) inhaler 2 puff, 2 puff, Inhalation, Q6H PRN, Meredith Miller MD    aspirin chewable tablet 81 mg, 81 mg, Oral, Daily, Meredith Miller MD    atorvastatin (LIPITOR) tablet 40 mg, 40 mg, Oral, QPM, Meredith Miller MD, 40 mg at 02/26/21 1707    calcium carbonate (TUMS) chewable tablet 500 mg, 500 mg, Oral, BID PRN, Meredith Miller MD, 500 mg at 02/26/21 1428    carvedilol (COREG) tablet 12 5 mg, 12 5 mg, Oral, BID With MealsCecy Law Munoz MD, 12 5 mg at 02/26/21 1708    enoxaparin (LOVENOX) subcutaneous injection 40 mg, 40 mg, Subcutaneous, Daily, Soraya Fabian MD    insulin glargine (LANTUS) subcutaneous injection 30 Units 0 3 mL, 30 Units, Subcutaneous, HS, Soraya Fabian MD, 25 Units at 02/26/21 2223    insulin lispro (HumaLOG) 100 units/mL subcutaneous injection 10 Units, 10 Units, Subcutaneous, TID With Meals, Soraya Fabian MD, 10 Units at 02/26/21 1622    insulin lispro (HumaLOG) 100 units/mL subcutaneous injection 2-12 Units, 2-12 Units, Subcutaneous, TID AC, 6 Units at 02/26/21 1621 **AND** Fingerstick Glucose (POCT), , , TID AC, Cecy Quinonez MD    insulin lispro (HumaLOG) 100 units/mL subcutaneous injection 2-12 Units, 2-12 Units, Subcutaneous, HS, Cecy Quinonez MD    ondansetron (ZOFRAN) injection 4 mg, 4 mg, Intravenous, Q6H PRN, Soraya Fabian MD    pantoprazole (PROTONIX) EC tablet 40 mg, 40 mg, Oral, Early Morning, Cecy Quinonez MD, 40 mg at 02/27/21 0612    sodium chloride (PF) 0 9 % injection 3 mL, 3 mL, Intravenous, Q1H PRN, Soraya Fabian MD    ticagrelor (BRILINTA) tablet 90 mg, 90 mg, Oral, Q12H [address], Soraya Fabian MD, 90 mg at 02/26/21 2110    Labs & Results:  Lab Results   Component Value Date    TROPONINI 2 47 (H) 02/26/2021    TROPONINI 2 45 (H) 02/26/2021    TROPONINI 1 28 (H) 02/26/2021     Lab Results   Component Value Date    TRIG 250 (H) 02/26/2021    HDL 40 02/26/2021    LDLDIRECT 188 (H) 02/26/2021     Lab Results   Component Value Date     01/22/2016    K 3 9 02/26/2021    CO2 23 02/26/2021     02/26/2021    BUN 11 02/26/2021    BUN 13 11/13/2020    BUN 15 06/04/2016    CREATININE 0 96 02/26/2021    CREATININE 0 91 11/13/2020    CREATININE 0 92 06/04/2016    ALT 15 02/26/2021    AST 12 02/26/2021     Lab Results   Component Value Date    WBC 11 29 (H) 02/26/2021    HGB 16 2 (H) 02/26/2021    HGB 16 6 (H) 11/13/2020    HGB 15 3 06/04/2016    HCT 48 5 (H) 02/26/2021    HCT 49 8 (H) 11/13/2020 HCT 44 5 2016     2021     2020     2016       EC hours post intervention      Telemetry:   Personally reviewed by Jada Stoner MD:  Sinus rhythm with heart rate ranging mid 50s to 70s with no ectopy    Imaging:  Cath films and transthoracic echo cardiogram films reviewed personally    VTE Prophylaxis: Enoxaparin (Lovenox) prophylaxis      Assessment:  Principal Problem:    Acute ST elevation myocardial infarction (STEMI) due to occlusion of mid portion of right coronary artery (HCC)  Active Problems:    Tobacco use disorder, continuous    Uncontrolled type 2 diabetes mellitus ([facility] [address] )    Mixed hyperlipidemia      1  Type 1 inferior STEMI  -initial troponin < 0   - index LHC:  100% distal RCA culprit lesion tx w/3 0 x 26 mm resolute jose, 80-90% mid RCA lesion tx w/ 3 0 x 18 mm resolute jose; residual 80-90% mid LAD, diffuse nonobstructive D1 disease, 50% mid LCX  -Syntax I 20, Syntax II 40 3 vs 25   - TTE: LVEF 60%, basal-mid inferior RWMA, normal RV, no significant valve disease  - lipids: Total 271, , HDL 40, direct ; A1c 12 3%  -aspirin, ticagrelor ($5), carvedilol, atorvastatin    2  Uncontrolled diabetes mellitus type 2  -initially presumed new but A1c of 8 2% in care everywhere as of 2019  -current A1c 12 3%  -glargine 30u HS & lispro 10u AC per endocrinology    3  Elevated BP  -currently 122/73  -amlodipine 5 mg x1 on presentation  -carvedilol 12 5 mg b i d     4  Bradycardia  -likely RCA lesion related  -sinus rhythm with heart rate 56-70s on telemetry overnight      Plan:  1  Continue dual antiplatelet therapy with aspirin and ticagrelor which is affordable for at least 1 year  Then aspirin indefinitely  2  Given patient's diabetes, would add low-dose losartan 25 mg q d  to her regimen      3  If no significant issues, patient possibly appropriate for discharge tomorrow plan for staged PCI to the LAD per Dr Simona Izquierdo in 2-4 weeks  Case discussed and reviewed with Dr Yaw Stubbs who agrees with my assessment and plan  Karoline Ha MD  Cardiology Fellow PGY-5      Epic/ Allscripts/Care Everywhere records reviewed:  Yes    ** Please Note: Fluency DirectDictation voice to text software may have been used in the creation of this document   **

## 2021-02-27 NOTE — UTILIZATION REVIEW
Initial Clinical Review    Admission: Date/Time/Statement:   Admission Orders (From admission, onward)     Ordered        02/26/21 1216  [address] [place]  Once                   Orders Placed This Encounter   Procedures    INPATIENT ADMISSION     Standing Status:   Standing     Number of Occurrences:   1     Order Specific Question:   Level of Care     Answer:   Level 1 Stepdown [13]     Order Specific Question:   Estimated length of stay     Answer:   More than 2 Midnights     Order Specific Question:   Certification     Answer:   I certify that inpatient services are medically necessary for this patient for a duration of greater than two midnights  See H&P and MD Progress Notes for additional information about the patient's course of treatment  ED Arrival Information     Expected Arrival Acuity Means of Arrival Escorted By Service Admission Type    - 2/26/2021 11:27 Immediate Ambulance SLETS DEPARTMENT [facility] [place]) Cardiology Emergency    Arrival Complaint    MI Alert        Chief Complaint   Patient presents with    Chest Pain     Chest pain started yesterday, MI alert called prehospital, 324mg ASA given  Assessment/Plan: 62 yo female PMHX  limited medical follow-up, dyslipidemia, class 1 obesity, active tobacco use disorder, irritable bowel syndrome, Crohn's colitis and fibromyalgia to ED by EMS admitted ICU stepdown Inpatient due to STEMI  Reports 1 day worsening midsternal chest pressure, non radiating w diaphoresis, dyspnea and nausea occurring at rest  Reports mid sternal discomfort for past 3 days with exertion  Today gas/heartburn unremitting & she summoned EMS  IN EMS EKG reveals inferior ST elevation w reciprocal changes in high at limbs  She received 4 baby ASA  In ED: Hypertensive, bradycardia elevated trops, EKG concerning for type 1 inferior STEMI  She received Ticagrelor load & IVF  Immediately taken to Cardiac cath w right femoral access due to subclavian/innominate artery obstruction   Cont ASA & ticagrelor for 1 yr then ASA indefinitely, high intensity statin, newly DX diabetes ; consult endocrinology  Obtain echo; received x1 amlodipine for elevated BP, may switch to ACEi, ARB or ARNi ( dep on LVEF onecho)  Initiate B Blockers in 12- 24 HR unless significant runs of ectopy  PLAN for staged PCI to the LAD w interrogation of other lesions in 2-3 weeks  2/26/2021 Endocrinology  1  Type 2 diabetes with hyperglycemia-her diabetes is uncontrolled based on hemoglobin A1c  Start Lantus 30 units at bedtime and Humalog 10 units with meals  Check urine for microalbumin  She will require insulin at discharge, At discharge, the patient should be given a prescription for jardiance 25 mg per day in order to decrease the cardiovascular mortality by 40%  req OP Opthalmology recs  2/26/2021 Critical care RN  Pt bedtime blood sugar was 103  Spoke with  Endocrine Fellow  Per MD give only 25u of Lantus and check BG at 0200  2/26/2021 Cardiology  Inferior STEMI s/p PCI of RCA- EF 60% still w residual 80% LAD stenosis, DM  Cont current meds, will need LAD PCI in 2-4 weeks ater DC  Follow endocrine recs for DM       ED Triage Vitals   Temperature Pulse Respirations Blood Pressure SpO2   02/26/21 1330 02/26/21 1132 02/26/21 1132 02/26/21 1132 02/26/21 1132   98 °F (36 7 °C) (!) 49 20 (!) 178/91 99 %      Temp Source Heart Rate Source Patient Position - Orthostatic VS BP Location FiO2 (%)   02/26/21 1330 02/26/21 1915 02/26/21 1915 02/26/21 1915 --   Oral Monitor Lying Right arm       Pain Score       02/26/21 1132       8          Wt Readings from Last 1 Encounters:   02/26/21 97 6 kg (215 lb 2 7 oz)     Additional Vital Signs:   Date/Time  Temp  Pulse  Resp  BP  MAP (mmHg)  SpO2  Calculated FIO2 (%) - Nasal Cannula  Nasal Cannula O2 Flow Rate (L/min)  O2 Device  Patient Position - Orthostatic VS   02/27/21 1500  98 2 °F (36 8 °C)  --  17  99/54  71  --  --  --  None (Room air)  Lying   02/27/21 0940  98 4 °F (36 9 °C)  73  17  102/59  76  96 %  --  --  None (Room air)  Lying   02/27/21 0800  --  --  --  --  --  --  --  --  None (Room air)  --   02/27/21 0700  98 2 °F (36 8 °C)  65  17  122/73  92  94 %  --  --  None (Room air)  Lying   02/27/21 0251  97 7 °F (36 5 °C)  64  15  110/70  85  94 %  --  --  None (Room air)  Lying   02/27/21 0000  --  --  --  --  --  --  --  --  None (Room air)  --   02/26/21 2300  --  56  15  109/64  82  93 %  --  --  --  Lying   02/26/21 2239  98 1 °F (36 7 °C)  --  --  --  --  --  --  --  --  --   02/26/21 2200  --  72  25Abnormal   --  --  93 %  --  --  --  --   02/26/21 1915  --  70  18  118/62  86  93 %  --  --  --  Lying   02/26/21 1715  --  60  24Abnormal   140/74  105  97 %  --  --  --  --   02/26/21 1708  --  60  --  140/74  --  --  --  --  --  --   02/26/21 1500  --  66  --  161/80  125  --  --  --  --  --   02/26/21 1430  --  62  17  161/82  108  94 %  --  --  --  --   02/26/21 1415  --  58  18  171/82Abnormal   120  95 %  --  --  --  --   02/26/21 1400  --  62  22  173/85Abnormal   111  96 %  --  --  --  --   02/26/21 1345  --  60  16  174/79Abnormal   109  95 %  --  --  --  --   02/26/21 1331  --  --  --  161/81  --  --  --  --  --  --   02/26/21 1330  98 °F (36 7 °C)  56  16  161/81  119  94 %  --  --  --  --   02/26/21 1315  --  64  16  172/82Abnormal   120  94 %  --  --  --  --   02/26/21 1132  --  49Abnormal   20  178/91Abnormal   --  99 %  28  2 L/min  Nasal cannula  --      Weights (last 14 days)    Date/Time  Weight  Weight Method  Height   02/26/21 2300  97 6 kg (215 lb 2 7 oz)  Bed scale  5' 8" (1 727 m)          Pertinent Labs/Diagnostic Test Results:       Results from last 7 days   Lab Units 02/27/21  0558 02/26/21  1139   WBC Thousand/uL 10 08 11 29*   HEMOGLOBIN g/dL 13 4 16 2*   HEMATOCRIT % 40 9 48 5*   PLATELETS Thousands/uL 232 284   NEUTROS ABS Thousands/µL  --  7 31         Results from last 7 days   Lab Units 02/27/21  0558 02/26/21  1139 02/26/21  1138   SODIUM mmol/L 143 135*  --    POTASSIUM mmol/L 3 6 3 9  --    CHLORIDE mmol/L 113* 104  --    CO2 mmol/L 25 23  --    ANION GAP mmol/L 5 8  --    BUN mg/dL 7 11  --    CREATININE mg/dL 0 67 0 96  --    EGFR ml/min/1 73sq m 97 65  --    CALCIUM mg/dL 9 1 10 0  --    MAGNESIUM mg/dL 1 7  --  1 9     Results from last 7 days   Lab Units 02/27/21  0558 02/26/21  1139   AST U/L 18 12   ALT U/L 11* 15   ALK PHOS U/L 107 134*   TOTAL PROTEIN g/dL 6 0* 7 8   ALBUMIN g/dL 2 9* 3 8   TOTAL BILIRUBIN mg/dL 0 61 0 62   BILIRUBIN DIRECT mg/dL 0 13  --      Results from last 7 days   Lab Units 02/27/21  1612 02/27/21  1026 02/27/21  0555 02/27/21  0204 02/26/21  2037 02/26/21  1620 02/26/21  1323   POC GLUCOSE mg/dl 149* 202* 129 119 103 256* 299*     Results from last 7 days   Lab Units 02/27/21  0558 02/26/21  1139   GLUCOSE RANDOM mg/dL 127 335*         Results from last 7 days   Lab Units 02/26/21  1139   HEMOGLOBIN A1C % 12 3*   EAG mg/dl 306     Results from last 7 days   Lab Units 02/27/21  0558 02/26/21  2359 02/26/21  2043 02/26/21  1718 02/26/21  1409 02/26/21  1139   TROPONIN I ng/mL 2 46* 2 47* 2 45* 1 28* 0 76* <0 02         Results from last 7 days   Lab Units 02/26/21  1139   PROTIME seconds 13 0   INR  0 98   PTT seconds 29           Results from last 7 days   Lab Units 02/27/21  1235 02/26/21  1627   CREATININE UR mg/dL 143 0 45 8        2/26 ekg Marked sinus bradycardia  Nonspecific ST abnormality  Abnormal ECG  2/26 Tele:  Sinus bradycardia  2/26 Cardiac cath   CORONARY VESSELS:   --  The coronary circulation is right dominant  --  Left main: The vessel was medium sized  Angiography showed mild atherosclerosis  --  Mid LAD: There was a discrete 80 % stenosis  It appears amenable to percutaneous intervention  --  1st diagonal: There was a 40 % stenosis  --  2nd diagonal: There was a tubular 60 % stenosis  --  1st obtuse marginal: There was a tubular 50 % stenosis    --  RCA: The vessel was medium sized and moderately tortuous  Angiography showed moderate atherosclerosis  --  Mid RCA: There was a discrete 90 % stenosis  An intervention was performed  --  Distal RCA: There was a tubular 99 % stenosis  This lesion is a likely culprit for the patient's recent myocardial infarction  An intervention was performed  IMPRESSIONS:  Angulated innominate/aorta angle , unable to access ascending aorta from right RA  There is significant triple vessel coronary artery disease  RECOMMENDATIONS  Patient management should include adding lipid lowering therapy  Patient management to include dual antiplatelet therapy  Patient management should include aggressive medical therapy, smoking cessation, and weight reduction  Patient instructed to return for staged percutaneous intervention in three weeks  2/26 echo  SUMMARY  LEFT VENTRICLE:  Systolic function was normal  Ejection fraction was estimated to be 60 %  There was akinesis of the basal-mid inferior wall(s)    RIGHT VENTRICLE:  The size was normal   Systolic function was normal  ED Treatment:   Medication Administration from 02/26/2021 1127 to 02/26/2021 1305       Date/Time Order Dose Route Action     02/26/2021 1137 ondansetron (ZOFRAN) injection 4 mg 4 mg Intravenous Given     02/26/2021 1138 ticagrelor (BRILINTA) tablet 180 mg 180 mg Oral Given     02/26/2021 1228 fentanyl citrate (PF) 100 MCG/2ML 50 mcg Intravenous Given     02/26/2021 1208 fentanyl citrate (PF) 100 MCG/2ML 50 mcg Intravenous Given     02/26/2021 1154 fentanyl citrate (PF) 100 MCG/2ML 50 mcg Intravenous Given     02/26/2021 1229 midazolam (VERSED) injection 1 mg Intravenous Given     02/26/2021 1154 midazolam (VERSED) injection 2 mg Intravenous Given     02/26/2021 1217 heparin (porcine) injection 4,000 Units Intravenous Given     02/26/2021 1158 heparin (porcine) injection 6,000 Units Intravenous Given     02/26/2021 1153 heparin (porcine) injection 4,000 Units Intravenous Given     02/26/2021 1207 lidocaine (PF) (XYLOCAINE-MPF) 1 % injection 10 mL Infiltration Given     02/26/2021 1156 lidocaine (PF) (XYLOCAINE-MPF) 1 % injection 1 mL Infiltration Given     02/26/2021 1158 nitroGLYcerin (TRIDIL) 50 mg in 250 mL infusion (premix) 200 mcg Intra-arterial Given     02/26/2021 1216 nitroGLYcerin (TRIDIL) 50 mg in 250 mL infusion (premix) 200 mcg Other Given        Past Medical History:   Diagnosis Date    Crohn's colitis ([place] [address] )     Diabetes mellitus ([place] [address] )     Fibromyalgia     Migraine      Present on Admission:   Acute ST elevation myocardial infarction (STEMI) due to occlusion of mid portion of right coronary artery (HCC)   Mixed hyperlipidemia   Tobacco use disorder, continuous    Admitting Diagnosis: Chest pain [R07 9]  MI (myocardial infarction) ([place] [address] ) [I21 9]  Newly diagnosed diabetes ([place] [address] ) [E11 9]  Age/Sex: 63 y o  female  Admission Orders:  Telemetry  Neurovascular checks q4hr  nc  Echo  scd  Scheduled Medications:  aspirin, 81 mg, Oral, Daily  atorvastatin, 40 mg, Oral, QPM  carvedilol, 12 5 mg, Oral, BID With Meals  enoxaparin, 40 mg, Subcutaneous, Daily  insulin glargine, 30 Units, Subcutaneous, HS  insulin lispro, 10 Units, Subcutaneous, TID With Meals  insulin lispro, 2-12 Units, Subcutaneous, TID AC  insulin lispro, 2-12 Units, Subcutaneous, HS  nicotine, 21 mg, Transdermal, Daily  pantoprazole, 40 mg, Oral, Early Morning  ticagrelor, 90 mg, Oral, Q12H [address]    Continuous IV Infusions:     PRN Meds:  acetaminophen, 650 mg, Oral, Q4H PRN  albuterol, 2 puff, Inhalation, Q6H PRN  calcium carbonate, 500 mg, Oral, BID PRN  ondansetron, 4 mg, Intravenous, Q6H PRN  sodium chloride (PF), 3 mL, Intravenous, Q1H PRN        IP CONSULT TO CARDIOLOGY  IP CONSULT TO ENDOCRINOLOGY  IP CONSULT TO CASE MANAGEMENT  IP CONSULT TO NUTRITION SERVICES    Network Utilization Review Department  ATTENTION: Please call with any questions or concerns to [phone] and carefully listen to the prompts so that you are directed to the right person  All voicemails are confidential   Inna Matt all requests for admission clinical reviews, approved or denied determinations and any other requests to dedicated fax number below belonging to the campus where the patient is receiving treatment   List of dedicated fax numbers for the Facilities:  [address] [place] [address] DENIALS (Administrative/Medical Necessity) [phone]   [address] (Maternity/NICU/Pediatrics) [phone] [address] Donna Garner 8344 (  Janusz Cottrell "N[address]ira" 103) [address] [place] [address] [address] [place] [address] [phone]

## 2021-02-27 NOTE — NURSING NOTE
Pt bedtime blood sugar was 103  Spoke with Rafael Soriano Endocrine Fellow  Per MD give only 25u of Lantus and check BG at 0200

## 2021-02-27 NOTE — PLAN OF CARE
Problem: Potential for Falls  Goal: Patient will remain free of falls  Description: INTERVENTIONS:  - Assess patient frequently for physical needs  -  Identify cognitive and physical deficits and behaviors that affect risk of falls    -  [place] fall precautions as indicated by assessment   - Educate patient/family on patient safety including physical limitations  - Instruct patient to call for assistance with activity based on assessment  - Modify environment to reduce risk of injury  - Consider OT/PT consult to assist with strengthening/mobility  Outcome: Progressing     Problem: PAIN - ADULT  Goal: Verbalizes/displays adequate comfort level or baseline comfort level  Description: Interventions:  - Encourage patient to monitor pain and request assistance  - Assess pain using appropriate pain scale  - Administer analgesics based on type and severity of pain and evaluate response  - Implement non-pharmacological measures as appropriate and evaluate response  - Consider cultural and social influences on pain and pain management  - Notify physician/advanced practitioner if interventions unsuccessful or patient reports new pain  Outcome: Progressing     Problem: INFECTION - ADULT  Goal: Absence or prevention of progression during hospitalization  Description: INTERVENTIONS:  - Assess and monitor for signs and symptoms of infection  - Monitor lab/diagnostic results  - Monitor all insertion sites, i e  indwelling lines, tubes, and drains  - Monitor endotracheal if appropriate and nasal secretions for changes in amount and color  - [place] appropriate cooling/warming therapies per order  - Administer medications as ordered  - Instruct and encourage patient and family to use good hand hygiene technique  - Identify and instruct in appropriate isolation precautions for identified infection/condition  Outcome: Progressing  Goal: Absence of fever/infection during neutropenic period  Description: INTERVENTIONS:  - Monitor WBC    Outcome: Progressing     Problem: SAFETY ADULT  Goal: Patient will remain free of falls  Description: INTERVENTIONS:  - Assess patient frequently for physical needs  -  Identify cognitive and physical deficits and behaviors that affect risk of falls    -  [place] fall precautions as indicated by assessment   - Educate patient/family on patient safety including physical limitations  - Instruct patient to call for assistance with activity based on assessment  - Modify environment to reduce risk of injury  - Consider OT/PT consult to assist with strengthening/mobility  Outcome: Progressing  Goal: Maintain or return to baseline ADL function  Description: INTERVENTIONS:  -  Assess patient's ability to carry out ADLs; assess patient's baseline for ADL function and identify physical deficits which impact ability to perform ADLs (bathing, care of mouth/teeth, toileting, grooming, dressing, etc )  - Assess/evaluate cause of self-care deficits   - Assess range of motion  - Assess patient's mobility; develop plan if impaired  - Assess patient's need for assistive devices and provide as appropriate  - Encourage maximum independence but intervene and supervise when necessary  - Involve family in performance of ADLs  - Assess for home care needs following discharge   - Consider OT consult to assist with ADL evaluation and planning for discharge  - Provide patient education as appropriate  Outcome: Progressing  Goal: Maintain or return mobility status to optimal level  Description: INTERVENTIONS:  - Assess patient's baseline mobility status (ambulation, transfers, stairs, etc )    - Identify cognitive and physical deficits and behaviors that affect mobility  - Identify mobility aids required to assist with transfers and/or ambulation (gait belt, sit-to-stand, lift, walker, cane, etc )  - [place] fall precautions as indicated by assessment  - Record patient progress and toleration of activity level on Mobility SBAR; progress patient to next Phase/Stage  - Instruct patient to call for assistance with activity based on assessment  - Consider rehabilitation consult to assist with strengthening/weightbearing, etc   Outcome: Progressing     Problem: DISCHARGE PLANNING  Goal: Discharge to home or other facility with appropriate resources  Description: INTERVENTIONS:  - Identify barriers to discharge w/patient and caregiver  - Arrange for needed discharge resources and transportation as appropriate  - Identify discharge learning needs (meds, wound care, etc )  - Arrange for interpretive services to assist at discharge as needed  - Refer to Case Management Department for coordinating discharge planning if the patient needs post-hospital services based on physician/advanced practitioner order or complex needs related to functional status, cognitive ability, or social support system  Outcome: Progressing     Problem: Knowledge Deficit  Goal: Patient/family/caregiver demonstrates understanding of disease process, treatment plan, medications, and discharge instructions  Description: Complete learning assessment and assess knowledge base    Interventions:  - Provide teaching at level of understanding  - Provide teaching via preferred learning methods  Outcome: Progressing

## 2021-02-28 VITALS
HEIGHT: 68 IN | BODY MASS INDEX: 32.61 KG/M2 | TEMPERATURE: 97.3 F | RESPIRATION RATE: 18 BRPM | OXYGEN SATURATION: 97 % | DIASTOLIC BLOOD PRESSURE: 76 MMHG | WEIGHT: 215.17 LBS | HEART RATE: 63 BPM | SYSTOLIC BLOOD PRESSURE: 147 MMHG

## 2021-02-28 PROBLEM — I10 ESSENTIAL HYPERTENSION: Status: ACTIVE | Noted: 2021-02-28

## 2021-02-28 LAB
ALBUMIN SERPL BCP-MCNC: 3.1 G/DL (ref 3.5–5)
ALP SERPL-CCNC: 119 U/L (ref 46–116)
ALT SERPL W P-5'-P-CCNC: 12 U/L (ref 12–78)
ANION GAP SERPL CALCULATED.3IONS-SCNC: 5 MMOL/L (ref 4–13)
AST SERPL W P-5'-P-CCNC: 19 U/L (ref 5–45)
BASOPHILS # BLD AUTO: 0.04 THOUSANDS/ΜL (ref 0–0.1)
BASOPHILS NFR BLD AUTO: 0 % (ref 0–1)
BILIRUB SERPL-MCNC: 0.64 MG/DL (ref 0.2–1)
BUN SERPL-MCNC: 9 MG/DL (ref 5–25)
CALCIUM ALBUM COR SERPL-MCNC: 10.1 MG/DL (ref 8.3–10.1)
CALCIUM SERPL-MCNC: 9.4 MG/DL (ref 8.3–10.1)
CHLORIDE SERPL-SCNC: 112 MMOL/L (ref 100–108)
CO2 SERPL-SCNC: 25 MMOL/L (ref 21–32)
CREAT SERPL-MCNC: 0.7 MG/DL (ref 0.6–1.3)
EOSINOPHIL # BLD AUTO: 0.21 THOUSAND/ΜL (ref 0–0.61)
EOSINOPHIL NFR BLD AUTO: 2 % (ref 0–6)
ERYTHROCYTE [DISTWIDTH] IN BLOOD BY AUTOMATED COUNT: 14 % (ref 11.6–15.1)
GFR SERPL CREATININE-BSD FRML MDRD: 95 ML/MIN/1.73SQ M
GLUCOSE SERPL-MCNC: 101 MG/DL (ref 65–140)
GLUCOSE SERPL-MCNC: 118 MG/DL (ref 65–140)
GLUCOSE SERPL-MCNC: 98 MG/DL (ref 65–140)
HCT VFR BLD AUTO: 42 % (ref 34.8–46.1)
HGB BLD-MCNC: 13.8 G/DL (ref 11.5–15.4)
IMM GRANULOCYTES # BLD AUTO: 0.03 THOUSAND/UL (ref 0–0.2)
IMM GRANULOCYTES NFR BLD AUTO: 0 % (ref 0–2)
LYMPHOCYTES # BLD AUTO: 2.74 THOUSANDS/ΜL (ref 0.6–4.47)
LYMPHOCYTES NFR BLD AUTO: 30 % (ref 14–44)
MAGNESIUM SERPL-MCNC: 1.8 MG/DL (ref 1.6–2.6)
MCH RBC QN AUTO: 29.2 PG (ref 26.8–34.3)
MCHC RBC AUTO-ENTMCNC: 32.9 G/DL (ref 31.4–37.4)
MCV RBC AUTO: 89 FL (ref 82–98)
MONOCYTES # BLD AUTO: 0.57 THOUSAND/ΜL (ref 0.17–1.22)
MONOCYTES NFR BLD AUTO: 6 % (ref 4–12)
NEUTROPHILS # BLD AUTO: 5.63 THOUSANDS/ΜL (ref 1.85–7.62)
NEUTS SEG NFR BLD AUTO: 62 % (ref 43–75)
NRBC BLD AUTO-RTO: 0 /100 WBCS
PLATELET # BLD AUTO: 218 THOUSANDS/UL (ref 149–390)
PMV BLD AUTO: 10.3 FL (ref 8.9–12.7)
POTASSIUM SERPL-SCNC: 3.5 MMOL/L (ref 3.5–5.3)
PROT SERPL-MCNC: 6.7 G/DL (ref 6.4–8.2)
RBC # BLD AUTO: 4.72 MILLION/UL (ref 3.81–5.12)
SODIUM SERPL-SCNC: 142 MMOL/L (ref 136–145)
WBC # BLD AUTO: 9.22 THOUSAND/UL (ref 4.31–10.16)

## 2021-02-28 PROCEDURE — 80053 COMPREHEN METABOLIC PANEL: CPT | Performed by: STUDENT IN AN ORGANIZED HEALTH CARE EDUCATION/TRAINING PROGRAM

## 2021-02-28 PROCEDURE — 85025 COMPLETE CBC W/AUTO DIFF WBC: CPT | Performed by: STUDENT IN AN ORGANIZED HEALTH CARE EDUCATION/TRAINING PROGRAM

## 2021-02-28 PROCEDURE — 83735 ASSAY OF MAGNESIUM: CPT | Performed by: STUDENT IN AN ORGANIZED HEALTH CARE EDUCATION/TRAINING PROGRAM

## 2021-02-28 PROCEDURE — 82948 REAGENT STRIP/BLOOD GLUCOSE: CPT

## 2021-02-28 PROCEDURE — 99232 SBSQ HOSP IP/OBS MODERATE 35: CPT | Performed by: INTERNAL MEDICINE

## 2021-02-28 PROCEDURE — 99238 HOSP IP/OBS DSCHRG MGMT 30/<: CPT | Performed by: INTERNAL MEDICINE

## 2021-02-28 PROCEDURE — NC001 PR NO CHARGE: Performed by: INTERNAL MEDICINE

## 2021-02-28 RX ORDER — MELATONIN
1000 DAILY
Qty: 90 TABLET | Refills: 3 | Status: SHIPPED | OUTPATIENT
Start: 2021-02-28

## 2021-02-28 RX ORDER — INSULIN GLARGINE 100 [IU]/ML
30 INJECTION, SOLUTION SUBCUTANEOUS
Qty: 10 ML | Refills: 0 | Status: SHIPPED | OUTPATIENT
Start: 2021-02-28 | End: 2021-03-05 | Stop reason: SDUPTHER

## 2021-02-28 RX ADMIN — NICOTINE 21 MG: 21 PATCH, EXTENDED RELEASE TRANSDERMAL at 09:06

## 2021-02-28 RX ADMIN — TICAGRELOR 90 MG: 90 TABLET ORAL at 09:05

## 2021-02-28 RX ADMIN — INSULIN LISPRO 10 UNITS: 100 INJECTION, SOLUTION INTRAVENOUS; SUBCUTANEOUS at 09:03

## 2021-02-28 RX ADMIN — LOSARTAN POTASSIUM 25 MG: 25 TABLET, FILM COATED ORAL at 09:03

## 2021-02-28 RX ADMIN — ASPIRIN 81 MG: 81 TABLET, CHEWABLE ORAL at 09:03

## 2021-02-28 RX ADMIN — PANTOPRAZOLE SODIUM 40 MG: 40 TABLET, DELAYED RELEASE ORAL at 06:12

## 2021-02-28 RX ADMIN — INSULIN LISPRO 10 UNITS: 100 INJECTION, SOLUTION INTRAVENOUS; SUBCUTANEOUS at 12:30

## 2021-02-28 RX ADMIN — CARVEDILOL 12.5 MG: 12.5 TABLET, FILM COATED ORAL at 09:03

## 2021-02-28 NOTE — PROGRESS NOTES
Cardiology Progress Note - Arcelia Davis 63 y o  female MRN: [id number]    Unit/Bed#: [facility] 402-01 Encounter: [id number]        Subjective:    No significant events overnight  Review of Systems   Cardiovascular: Negative for chest pain, leg swelling and palpitations  Respiratory: Negative for shortness of breath  Objective:   Vitals: Blood pressure 149/72, pulse 63, temperature 98 °F (36 7 °C), temperature source Oral, resp  rate 18, height 5' 8" (1 727 m), weight 97 6 kg (215 lb 2 7 oz), SpO2 98 %, not currently breastfeeding , Body mass index is 32 72 kg/m² ,   Orthostatic Blood Pressures      Most Recent Value   Blood Pressure  149/72 filed at 02/28/2021 0841   Patient Position - Orthostatic VS  Lying filed at 02/28/2021 1980         Systolic (11WXX), RVV:734 , Min:99 , RPD:150     Diastolic (17NZW), JAYME:54, Min:54, Max:74      Intake/Output Summary (Last 24 hours) at 2/28/2021 0852  Last data filed at 2/28/2021 0842  Gross per 24 hour   Intake 2100 ml   Output 3000 ml   Net -900 ml     Weight (last 2 days)     Date/Time   Weight    02/26/21 2300   97 6 (215 17)                  Telemetry Review: NSR    Physical Exam  Cardiovascular:      Rate and Rhythm: Normal rate and regular rhythm  Heart sounds: Normal heart sounds  No murmur  No friction rub  No gallop  Pulmonary:      Breath sounds: Normal breath sounds  No wheezing or rales             Laboratory Results:  Results from last 7 days   Lab Units 02/27/21  0558 02/26/21  2359 02/26/21  2043   TROPONIN I ng/mL 2 46* 2 47* 2 45*       CBC with diff:   Results from last 7 days   Lab Units 02/28/21  0459 02/27/21  0558 02/26/21  1139   WBC Thousand/uL 9 22 10 08 11 29*   HEMOGLOBIN g/dL 13 8 13 4 16 2*   HEMATOCRIT % 42 0 40 9 48 5*   MCV fL 89 89 87   PLATELETS Thousands/uL 218 232 284   MCH pg 29 2 29 0 28 9   MCHC g/dL 32 9 32 8 33 4   RDW % 14 0 14 0 13 8   MPV fL 10 3 10 5 10 3   NRBC AUTO /100 WBCs 0  --  0         CMP:  Results from last 7 days   Lab Units 21  0459 21  0558 21  1139   POTASSIUM mmol/L 3 5 3 6 3 9   CHLORIDE mmol/L 112* 113* 104   CO2 mmol/L 25 25 23   BUN mg/dL 9 7 11   CREATININE mg/dL 0 70 0 67 0 96   CALCIUM mg/dL 9 4 9 1 10 0   AST U/L 19 18 12   ALT U/L 12 11* 15   ALK PHOS U/L 119* 107 134*   EGFR ml/min/1 73sq m 95 97 65         BMP:  Results from last 7 days   Lab Units 21  0459 21  0558 21  1139   POTASSIUM mmol/L 3 5 3 6 3 9   CHLORIDE mmol/L 112* 113* 104   CO2 mmol/L 25 25 23   BUN mg/dL 9 7 11   CREATININE mg/dL 0 70 0 67 0 96   CALCIUM mg/dL 9 4 9 1 10 0       BNP:     Magnesium:   Results from last 7 days   Lab Units 21  0459 21  0558 21  1138   MAGNESIUM mg/dL 1 8 1 7 1 9       Coags:   Results from last 7 days   Lab Units 21  1139   PTT seconds 29   INR  0 98       TSH:       Lipid Profile:   Results from last 7 days   Lab Units 21  1138   TRIGLYCERIDES mg/dL 250*   HDL mg/dL 40           Cardiac testing:   Results for orders placed during the hospital encounter of 21   Echo Complete with Contrast if Indicated    Narrative [facility]  [address]  [phone]    Transthoracic Echocardiogram  2D, M-mode, Doppler, and Color Doppler    Study date:  2021    Patient: Barby Mccord  MR number: [id number]  Account number: [de-identified]  : 1960  Age: 63 years  Gender: Female  Status: Inpatient  Location: Bedside  Height: 66 in  Weight: 213 6 lb  BP: 161/ 81 mmHg    Indications: Assess left ventricular function  Diagnoses: I21 3 - ST elevation (STEMI) myocardial infarction of unspecified site    Sonographer:  TODD Harris  Primary Physician:  John Salazar [facility]  Referring Physician:  Ras Armas MD  Group:  [facility]  Interpreting Physician:  Jason Wu MD    SUMMARY    LEFT VENTRICLE:  Systolic function was normal  Ejection fraction was estimated to be 60 %  There was akinesis of the basal-mid inferior wall(s)  RIGHT VENTRICLE:  The size was normal   Systolic function was normal     HISTORY: PRIOR HISTORY: Myocardial infarction, Dyslipidemia, Current everyday smoker, Obesity    PROCEDURE: The procedure was performed at the bedside  This was a routine study  The transthoracic approach was used  The study included complete 2D imaging, M-mode, complete spectral Doppler, and color Doppler  The heart rate was 55 bpm,  at the start of the study  Images were obtained from the parasternal, apical, subcostal, and suprasternal notch acoustic windows  Image quality was adequate  LEFT VENTRICLE: Size was normal  Systolic function was normal  Ejection fraction was estimated to be 60 %  There was akinesis of the basal-mid inferior wall(s)  Wall thickness was normal  DOPPLER: Left ventricular diastolic function  parameters were normal     RIGHT VENTRICLE: The size was normal  Systolic function was normal  Wall thickness was normal     LEFT ATRIUM: Size was normal     RIGHT ATRIUM: Size was normal     MITRAL VALVE: Valve structure was normal  There was normal leaflet separation  DOPPLER: The transmitral velocity was within the normal range  There was no evidence for stenosis  There was trace regurgitation  AORTIC VALVE: The valve was trileaflet  Leaflets exhibited normal thickness and normal cuspal separation  DOPPLER: Transaortic velocity was within the normal range  There was no evidence for stenosis  There was no significant  regurgitation  TRICUSPID VALVE: The valve structure was normal  There was normal leaflet separation  DOPPLER: The transtricuspid velocity was within the normal range  There was no evidence for stenosis  There was trace regurgitation  PULMONIC VALVE: Leaflets exhibited normal thickness, no calcification, and normal cuspal separation  DOPPLER: The transpulmonic velocity was within the normal range   There was trace regurgitation  PERICARDIUM: There was no pericardial effusion  The pericardium was normal in appearance  AORTA: The root exhibited normal size  SYSTEMIC VEINS: IVC: The inferior vena cava was normal in size  Respirophasic changes were normal     SYSTEM MEASUREMENT TABLES    2D  %FS: 35 61 %  Ao Diam: 3 35 cm  EDV(Teich): 111 59 ml  EF Biplane: 54 33 %  EF(Teich): 64 94 %  ESV(Teich): 39 13 ml  IVSd: 0 99 cm  LA Area: 16 16 cm2  LA Diam: 3 69 cm  LVEDV MOD A2C: 97 35 ml  LVEDV MOD A4C: 86 07 ml  LVEDV MOD BP: 94 47 ml  LVEF MOD A2C: 49 73 %  LVEF MOD A4C: 59 76 %  LVESV MOD A2C: 48 93 ml  LVESV MOD A4C: 34 63 ml  LVESV MOD BP: 43 14 ml  LVIDd: 4 88 cm  LVIDs: 3 14 cm  LVLd A2C: 8 07 cm  LVLd A4C: 7 51 cm  LVLs A2C: 7 03 cm  LVLs A4C: 6 32 cm  LVPWd: 0 93 cm  RA Area: 11 08 cm2  RVIDd: 3 16 cm  SV MOD A2C: 48 42 ml  SV MOD A4C: 51 43 ml  SV(Teich): 72 46 ml    MM  TAPSE: 1 72 cm    PW  E' Sept: 0 06 m/s  E/E' Sept: 11 78  MV A Adis: 0 82 m/s  MV Dec [place]: 2 94 m/s2  MV DecT: 260 15 ms  MV E Adis: 0 77 m/s  MV E/A Ratio: 0 93  MV PHT: 75 44 ms  MVA By PHT: 2 92 cm2    [facility] Accredited Echocardiography Laboratory    Prepared and electronically signed by    Petey Burton MD  Signed [id number] 96:75:43       No results found for this or any previous visit  No results found for this or any previous visit  No results found for this or any previous visit      Meds/Allergies   Current Facility-Administered Medications   Medication Dose Route Frequency Provider Last Rate    acetaminophen  650 mg Oral Q4H PRN Elizabeth Rousseau MD      albuterol  2 puff Inhalation Q6H PRN Elizabeth Rousseau MD      aspirin  81 mg Oral Daily Elizabeth Rousseau MD      atorvastatin  40 mg Oral QPM Elizabeth Rousseau MD      calcium carbonate  500 mg Oral BID PRN Elizabeth Rousseau MD      carvedilol  12 5 mg Oral Q12H [facility], MD      enoxaparin  40 mg Subcutaneous Daily Elizabeth Rousseau MD      insulin glargine  30 Units Subcutaneous HS Home Borrero MD      insulin lispro  10 Units Subcutaneous TID With Meals Home Borrero MD      insulin lispro  2-12 Units Subcutaneous TID AC Home Borrero MD      insulin lispro  2-12 Units Subcutaneous HS Home Borrero MD      losartan  25 mg Oral Daily Rashmi Guevara MD      nicotine  21 mg Transdermal Daily Rashmi Guevara MD      ondansetron  4 mg Intravenous Q6H PRN Home Borrero MD      pantoprazole  40 mg Oral Early Morning Home Borrero MD      sodium chloride (PF)  3 mL Intravenous Q1H PRN Home Borrero MD      ticagrelor  90 mg Oral Q12H [address] Home Borrero MD          Medications Prior to Admission   Medication    albuterol (PROVENTIL HFA,VENTOLIN HFA) 90 mcg/act inhaler    ibuprofen (MOTRIN) 200 mg tablet    ondansetron (ZOFRAN-ODT) 4 mg disintegrating tablet    SUMAtriptan (IMITREX) 100 mg tablet       Assessment:  Principal Problem:    Acute ST elevation myocardial infarction (STEMI) due to occlusion of mid portion of right coronary artery (HCC)  Active Problems:    Tobacco use disorder, continuous    Mixed hyperlipidemia    Uncontrolled type 2 diabetes mellitus ([facility] [address] )    Essential hypertension      Impression:  1  Inferior STEMI s/p PCI of RCA - doing well  EF 60%  Still with residual 80% LAD stenosis  2  DM - new start on insulin       Recommendations:  1  Continue current medications on discharge  2  Appreciate endocrine input for DM management  3  Stable for discharge on current regimen  4  Will need LAD PCI in 2-4 weeks after discharge

## 2021-02-28 NOTE — PROGRESS NOTES
Cardiology Team 2 - STEMI Progress Note - Amy Davis 63 y o  female MRN: [id number]    Unit/Bed#: [facility] 402-01 Encounter: [id number]      Subjective:   Patient seen and examined  No significant telemetry events overnight  She remains in sinus rhythm with no ventricular or atrial ectopy nor evidence of high-grade AV block  She has some soreness at the right radial and right femoral access site but no significant issues  She has been ambulating around the unit without any recurrence of her symptoms  She has received some education regarding insulin Pen utilization by her bedside nurse  She inquired about continuous glucose monitoring with Uriel Weaver but I recommended discussion with either PCP or endocrinology in the outpatient setting  Hospital Course:   Jered Huizar is a 63y o  year old female with a history of limited medical follow-up, dyslipidemia, diabetes mellitus type 2, class 1 obesity, active tobacco use disorder, irritable bowel syndrome, Crohn's colitis and fibromyalgia who initially presented to [facility] from home as a pre-hospital MI with 3 days of escalating chest discomfort  ECGs were concerning for type 1 inferior STEMI  Index left heart catheterization was initiated via right radial access however was switched to right femoral arterial puncture due to subclavian/innominate artery obstruction  A 99% distal RCA culprit lesion with FABRICE 1 flow was identified and treated with 3 0 x 26 mm resolute jose ZACHARY; 90% mid RCA lesion was concurrently addressed with 3 0 x 18 mm resolute jose ZACHARY  There was residual 80% mid LAD, 40% D1, 60% D2 and 50% OM1 disease  Transthoracic echocardiogram showed preserved biventricular function with LVEF 60% and akinesis of the basal-mid inferior wall  Patient found to have significantly uncontrolled diabetes mellitus with A1c of 12 3% for which endocrinology recommendations were appreciated   Antihypertensive regiment was adjusted to include losartan given patient's uncontrolled diabetes  Vitals: Blood pressure 149/72, pulse 63, temperature 98 °F (36 7 °C), temperature source Oral, resp  rate 18, height 5' 8" (1 727 m), weight 97 6 kg (215 lb 2 7 oz), SpO2 98 %, not currently breastfeeding , Body mass index is 32 72 kg/m² ,   Orthostatic Blood Pressures      Most Recent Value   Blood Pressure  149/72 filed at 02/28/2021 3498   Patient Position - Orthostatic VS  Lying filed at 02/28/2021 9819            Intake/Output Summary (Last 24 hours) at 2/28/2021 0834  Last data filed at 2/28/2021 0458  Gross per 24 hour   Intake 2100 ml   Output 3000 ml   Net -900 ml         Physical Exam:    GEN: Indra Walsh appears uncomfortable in moderate distress, alert and oriented x 3, cooperative   HEENT:  Normocephalic, atraumatic, anicteric, significant bilateral xanthelasma around medial palpebral commissures  NECK: No JVD or carotid bruits   HEART: Regular rhythm, bradycardic rate, normal S1 and S2, no murmurs, clicks, gallops or rubs   LUNGS: Clear to auscultation bilaterally; respiration mildly labored and satting 99% on 2 L NC  ABDOMEN:  Normoactive bowel sounds, soft, no tenderness, no distention  EXTREMITIES: peripheral pulses palpable; no edema  NEURO: no gross focal findings; cranial nerves grossly intact   SKIN:  Dry, intact, warm to touch    ACCESS:   - Right Radial artery has  + 2 pulse with brisk capillary refill  Neurovascular intact to  Right and  Left  hand  There is slight tender ecchymosis but no hematoma  -Right groin no hematoma or bruit  There is ecchymosis along inguinal crease medially that is slightly tender to palpation        Current Facility-Administered Medications:     acetaminophen (TYLENOL) tablet 650 mg, 650 mg, Oral, Q4H PRN, Baby Goltz, MD, 650 mg at 02/27/21 1810    albuterol (PROVENTIL HFA,VENTOLIN HFA) inhaler 2 puff, 2 puff, Inhalation, Q6H PRN, Baby Goltz, MD    aspirin chewable tablet 81 mg, 81 mg, Oral, Daily, Cecy Erika Pulliam MD, 81 mg at 02/27/21 0940    atorvastatin (LIPITOR) tablet 40 mg, 40 mg, Oral, QPM, Lazaro Espinoza MD, 40 mg at 02/27/21 1810    calcium carbonate (TUMS) chewable tablet 500 mg, 500 mg, Oral, BID PRN, Lazaro Espinoza MD, 500 mg at 02/26/21 1428    carvedilol (COREG) tablet 12 5 mg, 12 5 mg, Oral, Q12H [address], Enma Greenfield MD, 12 5 mg at 02/27/21 2055    enoxaparin (LOVENOX) subcutaneous injection 40 mg, 40 mg, Subcutaneous, Daily, Lazaro Espinoza MD, 40 mg at 02/27/21 0940    insulin glargine (LANTUS) subcutaneous injection 30 Units 0 3 mL, 30 Units, Subcutaneous, HS, Lazaro Espinoza MD, 30 Units at 02/27/21 2102    insulin lispro (HumaLOG) 100 units/mL subcutaneous injection 10 Units, 10 Units, Subcutaneous, TID With Meals, Lazaro Espinoza MD, 10 Units at 02/27/21 1810    insulin lispro (HumaLOG) 100 units/mL subcutaneous injection 2-12 Units, 2-12 Units, Subcutaneous, TID AC, 4 Units at 02/27/21 1327 **AND** Fingerstick Glucose (POCT), , , TID AC, Lazaro Espinoza MD    insulin lispro (HumaLOG) 100 units/mL subcutaneous injection 2-12 Units, 2-12 Units, Subcutaneous, HS, Lazaro Espinoza MD    losartan (COZAAR) tablet 25 mg, 25 mg, Oral, Daily, Enma Greenfield MD, 25 mg at 02/27/21 1810    nicotine (NICODERM CQ) 21 mg/24 hr TD 24 hr patch 21 mg, 21 mg, Transdermal, Daily, Enma Greenfield MD, 21 mg at 02/27/21 1810    ondansetron (ZOFRAN) injection 4 mg, 4 mg, Intravenous, Q6H PRN, Lazaro Espinoza MD    pantoprazole (PROTONIX) EC tablet 40 mg, 40 mg, Oral, Early Morning, Cecy Quinonez MD, 40 mg at 02/28/21 0612    sodium chloride (PF) 0 9 % injection 3 mL, 3 mL, Intravenous, Q1H PRN, Lazaro Espinoza MD    ticagrelor (BRILINTA) tablet 90 mg, 90 mg, Oral, Q12H [address], Lazaro Espinoza MD, 90 mg at 02/27/21 2055    Labs & Results:  Lab Results   Component Value Date    TROPONINI 2 46 (H) 02/27/2021    TROPONINI 2 47 (H) 02/26/2021    TROPONINI 2 45 (H) 02/26/2021     Lab Results   Component Value Date    TRIG 250 (H) 02/26/2021    HDL 40 02/26/2021    LDLDIRECT 188 (H) 02/26/2021     Lab Results   Component Value Date     01/22/2016    K 3 5 02/28/2021    CO2 25 02/28/2021     (H) 02/28/2021    BUN 9 02/28/2021    BUN 7 02/27/2021    BUN 11 02/26/2021    CREATININE 0 70 02/28/2021    CREATININE 0 67 02/27/2021    CREATININE 0 96 02/26/2021    ALT 12 02/28/2021    AST 19 02/28/2021     Lab Results   Component Value Date    WBC 9 22 02/28/2021    HGB 13 8 02/28/2021    HGB 13 4 02/27/2021    HGB 16 2 (H) 02/26/2021    HCT 42 0 02/28/2021    HCT 40 9 02/27/2021    HCT 48 5 (H) 02/26/2021     02/28/2021     02/27/2021     02/26/2021     Telemetry:   Personally reviewed by Carson Walker MD:  Sinus rhythm heart rate ranging mid 50s to 80s following a diurnal pattern with no evidence of high-grade AV block or atrial/ventricular ectopy    Imaging: Cath films and transthoracic echo cardiogram films reviewed personally    VTE Prophylaxis: Enoxaparin (Lovenox) prophylaxis      Assessment:  Principal Problem:    Acute ST elevation myocardial infarction (STEMI) due to occlusion of mid portion of right coronary artery (HCC)  Active Problems:    Tobacco use disorder, continuous    Uncontrolled type 2 diabetes mellitus ([address] )    Essential hypertension    Mixed hyperlipidemia    1  Type 1 inferior STEMI  -initial troponin < 0 02, peak 2 47  -2/26 index LHC:  100% distal RCA culprit lesion tx w/3 0 x 26 mm resolute jose, 80-90% mid RCA lesion tx w/ 3 0 x 18 mm resolute jose; residual 80-90% mid LAD, diffuse nonobstructive D1 disease, 50% mid LCX  -Syntax I 20, Syntax II 40 3 vs 25 6  -2/26 TTE: LVEF 60%, basal-mid inferior RWMA, normal RV, no significant valve disease  -2/26 lipids:  Total 271, , HDL 40, direct ; A1c 12 3%  -aspirin, ticagrelor ($5), carvedilol, losartan, atorvastatin     2   Uncontrolled diabetes mellitus type 2  -initially presumed new but A1c of 8 2% in care everywhere as of 11/2019  -current A1c 12 3%  -urine microalbumin/creatinine ratio 7  -glargine 30u HS & lispro 10u AC per endocrinology     3  Elevated BP  -currently  149/72  -carvedilol 12 5 mg b i d , losartan 25 mg q d      4  Bradycardia  -likely RCA lesion related, now resolved  -sinus rhythm with heart rate mid 50s-80      Plan:  1  Patient doing well overall with no recurrence of her symptoms  Reviewed medication regiment with her this morning in great detail and she expressed understanding  2  She understand there is planned staged PCI to the LAD in about 2-4 weeks time with Dr Mally Crisostomo  3  Endocrinology recommendations appreciated regarding subcutaneous insulin regimen to which patient's blood glucose levels I responded appropriately  Would appreciate further assistance regarding appropriate discharge order set  Patient inquired about continuous glucose monitoring with Aislinn Haney but I deferred further education/instruction to either PCP or Endocrinology  4  Given her underlying comorbidities of diabetes and multivessel coronary artery disease, patient would likely be an appropriate candidate for SGLT 2 inhibitor once A1c permits transition to oral regiment  5  She is otherwise appropriate for discharge  Orders to be placed once insulin equipment/prescriptions are arranged  6  Patient is to follow-up with Nakul Lenz in about a week's time and eventually myself in 2 months at her request   A staff message was sent to the Cardiology [place] clerical pool and patient will be contacted with the date and times of her appointments  7  One week of rest at home is recommended until the assessment in the cardiology office  A work letter was provided along with cardiac rehabilitation referral        Case discussed and reviewed with Dr Klaudia Patton who agrees with my assessment and plan        Yenny Tarango MD  Cardiology Fellow PGY-5      Epic/ Allscripts/Care Everywhere records reviewed:  Yes    ** Please Note: Fluency DirectDictation voice to text software may have been used in the creation of this document   **

## 2021-02-28 NOTE — DISCHARGE SUMMARY
Discharge Summary - Charly Davis 63 y o  female MRN: [id number]    Unit/Bed#: [facility] 402-01 Encounter: [id number]    Admission Date: 2/26/2021   Discharge Date: 2/28/2021    Disposition: Home      PCP: Benjamín Carlson PA-C  OP Cardiologist: Jada Stoner MD (at patient's request, pending appointment)  Interventional cardiologist:  Naseem Farmer MD    Discharge Diagnosis:  Type 1 inferior STEMI  Secondary Diagnoses:  3-vessel coronary artery disease, uncontrolled diabetes mellitus type 2, essential hypertension, mixed dyslipidemia, resolved STEMI related leukemoid reaction, vitamin-D deficiency    Condition at Discharge: good   Consultants:  Endocrinology, Case Management  Procedures:  Left heart catheterization, transthoracic echocardiogram     /74 (BP Location: Right arm)   Pulse 62   Temp 98 °F (36 7 °C) (Oral)   Resp 17   Ht 5' 8" (1 727 m)   Wt 97 6 kg (215 lb 2 7 oz)   SpO2 97%   BMI 32 72 kg/m²     ROS  Physical Exam    See review of system and physical exam from 02/28/21 progress note    HPI and Hospital Course:   Martita Rollins is a 63y o  year old female with a history of limited medical follow-up, dyslipidemia, diabetes mellitus type 2, class 1 obesity, active tobacco use disorder, irritable bowel syndrome, Crohn's colitis and fibromyalgia who initially presented to [facility] from home as a pre-hospital MI with 3 days of escalating chest discomfort  ECGs were concerning for type 1 inferior STEMI  Index left heart catheterization was initiated via right radial access however was switched to right femoral arterial puncture due to subclavian/innominate artery obstruction  A 99% distal RCA culprit lesion with FABRICE 1 flow was identified and treated with 3 0 x 26 mm resolute jose ZACHARY; 90% mid RCA lesion was concurrently addressed with 3 0 x 18 mm resolute jose ZACHARY  There was residual 80% mid LAD, 40% D1, 60% D2 and 50% OM1 disease   Transthoracic echocardiogram showed preserved biventricular function with LVEF 60% and akinesis of the basal-mid inferior wall  Patient found to have significantly uncontrolled diabetes mellitus with A1c of 12 3% for which endocrinology recommendations were appreciated; she was started on prandial + bedtime subcutaneous insulin  Antihypertensive regiment was adjusted to include losartan given patient's uncontrolled diabetes  Smoking cessation and lipid suppression was counseled along with significant life style modifications  Cardiac rehabilitation was recommended and a referral was placed  Patient was deemed appropriate for discharge with plan for staged PCI to the LAD by Dr Berna Cedillo in 2-4 weeks  Follow-up appointment with Shena Cheatham in a week and eventually myself Jessica Dodd MD) in 2 months time; clerical pool message sent to the Cardiology office at [facility]  Patient to be contacted with date and time of appointments  Discharge Medications:  See after visit summary for reconciled discharge medications provided to patient and family        Pertinent Labs/diagnostics:  Results from last 7 days   Lab Units 02/27/21  0558 02/26/21  2359 02/26/21  2043   TROPONIN I ng/mL 2 46* 2 47* 2 45*     CBC with diff:   Results from last 7 days   Lab Units 02/28/21  0459 02/27/21  0558 02/26/21  1139   WBC Thousand/uL 9 22 10 08 11 29*   HEMOGLOBIN g/dL 13 8 13 4 16 2*   HEMATOCRIT % 42 0 40 9 48 5*   MCV fL 89 89 87   PLATELETS Thousands/uL 218 232 284   MCH pg 29 2 29 0 28 9   MCHC g/dL 32 9 32 8 33 4   RDW % 14 0 14 0 13 8   MPV fL 10 3 10 5 10 3   NRBC AUTO /100 WBCs 0  --  0     CMP:  Results from last 7 days   Lab Units 02/28/21  0459 02/27/21  0558 02/26/21  1139   POTASSIUM mmol/L 3 5 3 6 3 9   CHLORIDE mmol/L 112* 113* 104   CO2 mmol/L 25 25 23   BUN mg/dL 9 7 11   CREATININE mg/dL 0 70 0 67 0 96   CALCIUM mg/dL 9 4 9 1 10 0   AST U/L 19 18 12   ALT U/L 12 11* 15   ALK PHOS U/L 119* 107 134*   EGFR ml/min/1 73sq m 95 97 65     Lipid Profile:   Results from last 7 days   Lab Units 21  1139 21  1138   TRIGLYCERIDES mg/dL  --  250*   LDL CALC mg/dL  --  181*   LDL DIRECT mg/dl 188*  --      Cardiac testing:   Results for orders placed during the hospital encounter of 21   Echo Complete with Contrast if Indicated    N[address]ative [address]  [address], [address]  [phone]    Transthoracic Echocardiogram  2D, M-mode, Doppler, and Color Doppler    Study date:  2021    Patient: Akash Larsen  MR number: [id number]  Account number: [de-identified]  : 1960  Age: 63 years  Gender: Female  Status: Inpatient  Location: Bedside  Height: 66 in  Weight: 213 6 lb  BP: 161/ 81 mmHg    Indications: Assess left ventricular function  Diagnoses: I21 3 - ST elevation (STEMI) myocardial infarction of unspecified site    Sonographer:  [address]  [facility]  [facility]  Referring Physician:  Robert Bird MD  Group:  [id number]  Interpreting Physician:  Daphne Sweet MD    SUMMARY    LEFT VENTRICLE:  Systolic function was normal  Ejection fraction was estimated to be 60 %  There was akinesis of the basal-mid inferior wall(s)  RIGHT VENTRICLE:  The size was normal   Systolic function was normal     HISTORY: PRIOR HISTORY: Myocardial infarction, Dyslipidemia, Current everyday smoker, Obesity    PROCEDURE: The procedure was performed at the bedside  This was a routine study  The transthoracic approach was used  The study included complete 2D imaging, M-mode, complete spectral Doppler, and color Doppler  The heart rate was 55 bpm,  at the start of the study  Images were obtained from the parasternal, apical, subcostal, and suprasternal notch acoustic windows  Image quality was adequate  LEFT VENTRICLE: Size was normal  Systolic function was normal  Ejection fraction was estimated to be 60 %   There was akinesis of the basal-mid inferior wall(s)  Wall thickness was normal  DOPPLER: Left ventricular diastolic function  parameters were normal     RIGHT VENTRICLE: The size was normal  Systolic function was normal  Wall thickness was normal     LEFT ATRIUM: Size was normal     RIGHT ATRIUM: Size was normal     MITRAL VALVE: Valve structure was normal  There was normal leaflet separation  DOPPLER: The transmitral velocity was within the normal range  There was no evidence for stenosis  There was trace regurgitation  AORTIC VALVE: The valve was trileaflet  Leaflets exhibited normal thickness and normal cuspal separation  DOPPLER: Transaortic velocity was within the normal range  There was no evidence for stenosis  There was no significant  regurgitation  TRICUSPID VALVE: The valve structure was normal  There was normal leaflet separation  DOPPLER: The transtricuspid velocity was within the normal range  There was no evidence for stenosis  There was trace regurgitation  PULMONIC VALVE: Leaflets exhibited normal thickness, no calcification, and normal cuspal separation  DOPPLER: The transpulmonic velocity was within the normal range  There was trace regurgitation  PERICARDIUM: There was no pericardial effusion  The pericardium was normal in appearance  AORTA: The root exhibited normal size  SYSTEMIC VEINS: IVC: The inferior vena cava was normal in size   Respirophasic changes were normal     [facility]    Prepared and electronically signed by  Luisa Jesus MD  Signed [id number] 76:43:17       [address]  [phone]     Invasive Cardiovascular Lab Complete Report     Patient: Abraham Rivera  MR number: [id number]  Account number: [de-identified]  Study date: 2021  Gender: Female  : 1960  Height: 66 9 in  Weight: 213 6 lb  BSA: 2 08 mï¾²     Diagnostic Cardiologist:  Debra Joseph MD  Interventional Cardiologist:  Mario Reinoso MD  Primary Physician:  Amrita Bahena [facility]     SUMMARY   CORONARY VESSELS:   --  The coronary circulation is right dominant  --  Left main: The vessel was medium sized  Angiography showed mild atherosclerosis  --  Mid LAD: There was a discrete 80 % stenosis  It appears amenable to percutaneous intervention  --  1st diagonal: There was a 40 % stenosis  --  2nd diagonal: There was a tubular 60 % stenosis  --  1st obtuse marginal: There was a tubular 50 % stenosis  --  RCA: The vessel was medium sized and moderately tortuous  Angiography showed moderate atherosclerosis  --  Mid RCA: There was a discrete 90 % stenosis  An intervention was performed  --  Distal RCA: There was a tubular 99 % stenosis  This lesion is a likely culprit for the patient's recent myocardial infarction  An intervention was performed  1ST LESION INTERVENTIONS:  A successful balloon angioplasty with stent procedure was performed on the 99 % lesion in the distal RCA  Following intervention there was an excellent angiographic appearance with a 0 % residual stenosis  A Resolute Ismael Rx 3 0 x 26mm drug-eluting stent was placed across the lesion and deployed at a maximum inflation pressure of 14 zeferino      2ND LESION INTERVENTIONS:  A successful balloon angioplasty with stent procedure was performed on the 90 % lesion in the mid RCA  Following intervention there was an excellent angiographic appearance with a 0 % residual stenosis  A Resolute [place] Rx 3 0 x 18mm drug-eluting stent was placed across the lesion and deployed at a maximum inflation pressure of 16 zeferino      IMPRESSIONS:  Angulated innominate/aorta angle , unable to access ascending aorta from right RA  There is significant triple vessel coronary artery disease      RECOMMENDATIONS  Patient management should include adding lipid lowering therapy  Patient management to include dual antiplatelet therapy    Patient management should include aggressive medical therapy, smoking cessation, and weight reduction  Patient instructed to return for staged percutaneous intervention in three weeks      DISPOSITION:  The patient left the catheterization laboratory in stable condition      Prepared and signed by  Anand Campos MD  Signed 02/26/2021 12:51:36      Discharge instructions/Information to patient and family:   See after visit summary for information provided to patient and family  Provisions for Follow-Up Care:  See after visit summary for information related to follow-up care and any pertinent home health orders  Planned Readmission: Yes for planned PCI in 2-4 weeks with Dr Ti Rene  Staff message was sent to [place] scheduling office staff  Discharge Statement   I spent 30 minutes discharging the patient  This time was spent on the day of discharge  I had direct contact with the patient on the day of discharge  Additional documentation is required if more than 30 minutes were spent on discharge  ** Please Note: Dragon 360 Dictation voice to text software may have been used in the creation of this document   **

## 2021-02-28 NOTE — CASE MANAGEMENT
Chart reviewed  Met with pt  Pt reports she resides  with her spouse Ameya Olivas ([phone]) in a bi level home  Pt has been also caring for her mother  PTA, pt has been independent with all adls  No hx of DME or HHC  She is new to insulin  Discussed HHC follow up; pt declines @ this time  I spoke with nursing and pt has injected herself with insulin  Scripts have been sent down to [facility]  Awaiting outcome re: smith  CM reviewed d/c planning process including the following: identifying help at home, patient preference for d/c planning needs, Discharge Lounge, [facility] Meds to Bed program, availability of treatment team to discuss questions or concerns patient and/or family may have regarding understanding medications and recognizing signs and symptoms once discharged  CM also encouraged patient to follow up with all recommended appointments after discharge  Patient advised of importance for patient and family to participate in managing patients medical well being

## 2021-02-28 NOTE — CASE MANAGEMENT
Spoke with [facility]  She indicated Humalog not covered  I have informed [facility]era, Cardiology CRNP

## 2021-03-01 ENCOUNTER — TELEPHONE (OUTPATIENT)
Dept: ENDOCRINOLOGY | Facility: CLINIC | Age: 61
End: 2021-03-01

## 2021-03-01 ENCOUNTER — TRANSITIONAL CARE MANAGEMENT (OUTPATIENT)
Dept: FAMILY MEDICINE CLINIC | Facility: CLINIC | Age: 61
End: 2021-03-01

## 2021-03-01 DIAGNOSIS — Z71.89 COMPLEX CARE COORDINATION: Primary | ICD-10-CM

## 2021-03-01 NOTE — TELEPHONE ENCOUNTER
Patient stating was seen in hospital   I do not see a note    Did you see her in the hospital?  Thank you

## 2021-03-01 NOTE — UTILIZATION REVIEW
Notification of Inpatient Admission/Inpatient Authorization Request   This is a Notification of Inpatient Admission for [address]  Be advised that this patient was admitted to our facility under Inpatient Status  Contact Josey Escalona at [phone] for additional admission information  [place] Form UR DEPT  DEDICATED [phone]  Patient Name:   Estefany Graves   YOB: 1960       [address]   [address]:   Enedina Gallegos  Tax ID: [id number]  NPI: [id number] Attending Provider/NPI:  Phone:  Address: Logan Jones [[id number]]  [phone]  Same as KAYA/Shereen Paz 1106 of Service Code: 24 Place of Service Name:  [address] [place] [address]   Start Date: 2/26/21 1216 Discharge Date & Time: 2/28/2021  2:40 PM    Type of Admission: Inpatient Status Discharge Disposition (if discharged): Home/Self Care   Patient Diagnoses: Chest pain [R07 9]  MI (myocardial infarction) ([facility] [address] ) [I21 9]  Newly diagnosed diabetes ([facility] [address] ) [E11 9]     Orders: Admission Orders (From admission, onward)     Ordered        02/26/21 1216  INPATIENT ADMISSION  Once                    Assigned Utilization Review Contact: Josey Escalona  Utilization ,   Network Utilization Review Department  Phone: [phone]; Fax [phone]   Email: [email or web address]   ATTENTION PAYERS: Please call the assigned Utilization  directly with any questions or concerns ALL voicemails in the department are confidential  Send all requests for admission clinical reviews, approved or denied determinations and any other requests to dedicated fax number belonging to the campus where the patient is receiving treatment    Initial Clinical Review    Admission: Date/Time/Statement:   Admission Orders (From admission, onward)     Ordered        02/26/21 1216  1 [facility],5[facility] [place]  Once                   Orders Placed This Encounter   Procedures    INPATIENT ADMISSION     Standing Status:   Standing     Number of Occurrences:   1     Order Specific Question:   Level of Care     Answer:   Level 1 Stepdown [13]     Order Specific Question:   Estimated length of stay     Answer:   More than 2 Midnights     Order Specific Question:   Certification     Answer:   I certify that inpatient services are medically necessary for this patient for a duration of greater than two midnights  See H&P and MD Progress Notes for additional information about the patient's course of treatment  ED Arrival Information     Expected Arrival Acuity Means of Arrival Escorted By Service Admission Type    - 2/26/2021 11:27 Immediate Ambulance SLETS DEPARTMENT [facility] Cardiology Emergency    Arrival Complaint    MI Alert        Chief Complaint   Patient presents with    Chest Pain     Chest pain started yesterday, MI alert called prehospital, 324mg ASA given  Assessment/Plan: 62 yo female PMHX  limited medical follow-up, dyslipidemia, class 1 obesity, active tobacco use disorder, irritable bowel syndrome, Crohn's colitis and fibromyalgia to ED by EMS admitted ICU stepdown Inpatient due to STEMI  Reports 1 day worsening midsternal chest pressure, non radiating w diaphoresis, dyspnea and nausea occurring at rest  Reports mid sternal discomfort for past 3 days with exertion  Today gas/heartburn unremitting & she summoned EMS  IN EMS EKG reveals inferior ST elevation w reciprocal changes in high at limbs  She received 4 baby ASA  In ED: Hypertensive, bradycardia elevated trops, EKG concerning for type 1 inferior STEMI  She received Ticagrelor load & IVF  Immediately taken to Cardiac cath w right femoral access due to subclavian/innominate artery obstruction  Cont ASA & ticagrelor for 1 yr then ASA indefinitely, high intensity statin, newly DX diabetes ; consult endocrinology  Obtain echo; received x1 amlodipine for elevated BP, may switch to ACEi, ARB or ARNi ( dep on LVEF onecho)  Initiate B Blockers in 12- 24 HR unless significant runs of ectopy  PLAN for staged PCI to the LAD w interrogation of other lesions in 2-3 weeks  2/26/2021 Endocrinology  1  Type 2 diabetes with hyperglycemia-her diabetes is uncontrolled based on hemoglobin A1c  Start Lantus 30 units at bedtime and Humalog 10 units with meals  Check urine for microalbumin  She will require insulin at discharge, At discharge, the patient should be given a prescription for jardiance 25 mg per day in order to decrease the cardiovascular mortality by 40%  req OP Opthalmology recs  2/26/2021 Critical care RN  Pt bedtime blood sugar was 103  Spoke with  Endocrine Fellow  Per MD give only 25u of Lantus and check BG at 0200  2/26/2021 Cardiology  Inferior STEMI s/p PCI of RCA- EF 60% still w residual 80% LAD stenosis, DM  Cont current meds, will need LAD PCI in 2-4 weeks ater DC  Follow endocrine recs for DM       ED Triage Vitals   Temperature Pulse Respirations Blood Pressure SpO2   02/26/21 1330 02/26/21 1132 02/26/21 1132 02/26/21 1132 02/26/21 1132   98 °F (36 7 °C) (!) 49 20 (!) 178/91 99 %      Temp Source Heart Rate Source Patient Position - Orthostatic VS BP Location FiO2 (%)   02/26/21 1330 02/26/21 1915 02/26/21 1915 02/26/21 1915 --   Oral Monitor Lying Right arm       Pain Score       02/26/21 1132       8          Wt Readings from Last 1 Encounters:   02/26/21 97 6 kg (215 lb 2 7 oz)     Additional Vital Signs:   Date/Time  Temp  Pulse  Resp  BP  MAP (mmHg)  SpO2  Calculated FIO2 (%) - Nasal Cannula  Nasal Cannula O2 Flow Rate (L/min)  O2 Device  Patient Position - Orthostatic VS   02/27/21 1500  98 2 °F (36 8 °C)  --  17  99/54  71  --  --  --  None (Room air)  Lying   02/27/21 0940  98 4 °F (36 9 °C)  73  17  102/59  76  96 %  --  --  None (Room air)  Lying   02/27/21 0800  --  --  --  --  --  --  --  --  None (Room air)  --   02/27/21 0700  98 2 °F (36 8 °C)  65  17  122/73  92  94 %  --  --  None (Room air)  Lying 02/27/21 0251  97 7 °F (36 5 °C)  64  15  110/70  85  94 %  --  --  None (Room air)  Lying   02/27/21 0000  --  --  --  --  --  --  --  --  None (Room air)  --   02/26/21 2300  --  56  15  109/64  82  93 %  --  --  --  Lying   02/26/21 2239  98 1 °F (36 7 °C)  --  --  --  --  --  --  --  --  --   02/26/21 2200  --  72  25Abnormal   --  --  93 %  --  --  --  --   02/26/21 1915  --  70  18  118/62  86  93 %  --  --  --  Lying   02/26/21 1715  --  60  24Abnormal   140/74  105  97 %  --  --  --  --   02/26/21 1708  --  60  --  140/74  --  --  --  --  --  --   02/26/21 1500  --  66  --  161/80  125  --  --  --  --  --   02/26/21 1430  --  62  17  161/82  108  94 %  --  --  --  --   02/26/21 1415  --  58  18  171/82Abnormal   120  95 %  --  --  --  --   02/26/21 1400  --  62  22  173/85Abnormal   111  96 %  --  --  --  --   02/26/21 1345  --  60  16  174/79Abnormal   109  95 %  --  --  --  --   02/26/21 1331  --  --  --  161/81  --  --  --  --  --  --   02/26/21 1330  98 °F (36 7 °C)  56  16  161/81  119  94 %  --  --  --  --   02/26/21 1315  --  64  16  172/82Abnormal   120  94 %  --  --  --  --   02/26/21 1132  --  49Abnormal   20  178/91Abnormal   --  99 %  28  2 L/min  Nasal cannula  --      Weights (last 14 days)    Date/Time  Weight  Weight Method  Height   02/26/21 2300  97 6 kg (215 lb 2 7 oz)  Bed scale  5' 8" (1 727 m)          Pertinent Labs/Diagnostic Test Results:       Results from last 7 days   Lab Units 02/28/21  0459 02/27/21  0558 02/26/21  1139   WBC Thousand/uL 9 22 10 08 11 29*   HEMOGLOBIN g/dL 13 8 13 4 16 2*   HEMATOCRIT % 42 0 40 9 48 5*   PLATELETS Thousands/uL 218 232 284   NEUTROS ABS Thousands/µL 5 63  --  7 31         Results from last 7 days   Lab Units 02/28/21  0459 02/27/21  0558 02/26/21  1139 02/26/21  1138   SODIUM mmol/L 142 143 135*  --    POTASSIUM mmol/L 3 5 3 6 3 9  --    CHLORIDE mmol/L 112* 113* 104  --    CO2 mmol/L 25 25 23  --    ANION GAP mmol/L 5 5 8  --    BUN mg/dL 9 7 11  --    CREATININE mg/dL 0 70 0 67 0 96  --    EGFR ml/min/1 73sq m 95 97 65  --    CALCIUM mg/dL 9 4 9 1 10 0  --    MAGNESIUM mg/dL 1 8 1 7  --  1 9     Results from last 7 days   Lab Units 02/28/21  0459 02/27/21  0558 02/26/21  1139   AST U/L 19 18 12   ALT U/L 12 11* 15   ALK PHOS U/L 119* 107 134*   TOTAL PROTEIN g/dL 6 7 6 0* 7 8   ALBUMIN g/dL 3 1* 2 9* 3 8   TOTAL BILIRUBIN mg/dL 0 64 0 61 0 62   BILIRUBIN DIRECT mg/dL  --  0 13  --      Results from last 7 days   Lab Units 02/28/21  1107 02/28/21  0543 02/27/21  2102 02/27/21  1612 02/27/21  1026 02/27/21  0555 02/27/21  0204 02/26/21  2037 02/26/21  1620 02/26/21  1323   POC GLUCOSE mg/dl 118 98 102 149* 202* 129 119 103 256* 299*     Results from last 7 days   Lab Units 02/28/21  0459 02/27/21  0558 02/26/21  1139   GLUCOSE RANDOM mg/dL 101 127 335*         Results from last 7 days   Lab Units 02/26/21  1139   HEMOGLOBIN A1C % 12 3*   EAG mg/dl 306     Results from last 7 days   Lab Units 02/27/21  0558 02/26/21  2359 02/26/21  2043 02/26/21  1718 02/26/21  1409 02/26/21  1139   TROPONIN I ng/mL 2 46* 2 47* 2 45* 1 28* 0 76* <0 02         Results from last 7 days   Lab Units 02/26/21  1139   PROTIME seconds 13 0   INR  0 98   PTT seconds 29           Results from last 7 days   Lab Units 02/27/21  1235 02/26/21  1627   CREATININE UR mg/dL 143 0 45 8        2/26 ekg Marked sinus bradycardia  Nonspecific ST abnormality  Abnormal ECG  2/26 Tele:  Sinus bradycardia  2/26 Cardiac cath   CORONARY VESSELS:   --  The coronary circulation is right dominant  --  Left main: The vessel was medium sized  Angiography showed mild atherosclerosis  --  Mid LAD: There was a discrete 80 % stenosis  It appears amenable to percutaneous intervention  --  1st diagonal: There was a 40 % stenosis  --  2nd diagonal: There was a tubular 60 % stenosis  --  1st obtuse marginal: There was a tubular 50 % stenosis  --  RCA: The vessel was medium sized and moderately tortuous  Angiography showed moderate atherosclerosis  --  Mid RCA: There was a discrete 90 % stenosis  An intervention was performed  --  Distal RCA: There was a tubular 99 % stenosis  This lesion is a likely culprit for the patient's recent myocardial infarction  An intervention was performed  IMPRESSIONS:  Angulated innominate/aorta angle , unable to access ascending aorta from right RA  There is significant triple vessel coronary artery disease  RECOMMENDATIONS  Patient management should include adding lipid lowering therapy  Patient management to include dual antiplatelet therapy  Patient management should include aggressive medical therapy, smoking cessation, and weight reduction  Patient instructed to return for staged percutaneous intervention in three weeks  2/26 echo  SUMMARY  LEFT VENTRICLE:  Systolic function was normal  Ejection fraction was estimated to be 60 %  There was akinesis of the basal-mid inferior wall(s)    RIGHT VENTRICLE:  The size was normal   Systolic function was normal  ED Treatment:   Medication Administration from 02/26/2021 1127 to 02/26/2021 1305       Date/Time Order Dose Route Action     02/26/2021 1137 ondansetron (ZOFRAN) injection 4 mg 4 mg Intravenous Given     02/26/2021 1138 ticagrelor (BRILINTA) tablet 180 mg 180 mg Oral Given     02/26/2021 1228 fentanyl citrate (PF) 100 MCG/2ML 50 mcg Intravenous Given     02/26/2021 1208 fentanyl citrate (PF) 100 MCG/2ML 50 mcg Intravenous Given     02/26/2021 1154 fentanyl citrate (PF) 100 MCG/2ML 50 mcg Intravenous Given     02/26/2021 1229 midazolam (VERSED) injection 1 mg Intravenous Given     02/26/2021 1154 midazolam (VERSED) injection 2 mg Intravenous Given     02/26/2021 1217 heparin (porcine) injection 4,000 Units Intravenous Given     02/26/2021 1158 heparin (porcine) injection 6,000 Units Intravenous Given     02/26/2021 1153 heparin (porcine) injection 4,000 Units Intravenous Given     02/26/2021 1207 lidocaine (PF) (XYLOCAINE-MPF) 1 % injection 10 mL Infiltration Given     02/26/2021 1156 lidocaine (PF) (XYLOCAINE-MPF) 1 % injection 1 mL Infiltration Given     02/26/2021 1158 nitroGLYcerin (TRIDIL) 50 mg in 250 mL infusion (premix) 200 mcg Intra-arterial Given     02/26/2021 1216 nitroGLYcerin (TRIDIL) 50 mg in 250 mL infusion (premix) 200 mcg Other Given        Past Medical History:   Diagnosis Date    Crohn's colitis ([place] [address] )     Diabetes mellitus ([place] [address] )     Fibromyalgia     Migraine      Present on Admission:   Acute ST elevation myocardial infarction (STEMI) due to occlusion of mid portion of right coronary artery (HCC)   Mixed hyperlipidemia   Tobacco use disorder, continuous    Admitting Diagnosis: Chest pain [R07 9]  MI (myocardial infarction) ([place] [address] ) [I21 9]  Newly diagnosed diabetes ([place] [address] ) [E11 9]  Age/Sex: 63 y o  female  Admission Orders:  Telemetry  Neurovascular checks q4hr  nc  Echo  scd  Scheduled Medications:  aspirin, 81 mg, Oral, Daily  atorvastatin, 40 mg, Oral, QPM  carvedilol, 12 5 mg, Oral, BID With Meals  enoxaparin, 40 mg, Subcutaneous, Daily  insulin glargine, 30 Units, Subcutaneous, HS  insulin lispro, 10 Units, Subcutaneous, TID With Meals  insulin lispro, 2-12 Units, Subcutaneous, TID AC  insulin lispro, 2-12 Units, Subcutaneous, HS  nicotine, 21 mg, Transdermal, Daily  pantoprazole, 40 mg, Oral, Early Morning  ticagrelor, 90 mg, Oral, Q12H [address]    Continuous IV Infusions:  No current facility-administered medications for this encounter       PRN Meds:  acetaminophen, 650 mg, Oral, Q4H PRN  albuterol, 2 puff, Inhalation, Q6H PRN  calcium carbonate, 500 mg, Oral, BID PRN  ondansetron, 4 mg, Intravenous, Q6H PRN  sodium chloride (PF), 3 mL, Intravenous, Q1H PRN        IP CONSULT TO CARDIOLOGY  IP CONSULT TO ENDOCRINOLOGY  IP CONSULT TO CASE MANAGEMENT  IP CONSULT TO NUTRITION SERVICES    Network Utilization Review Department  ATTENTION: Please call with any questions or concerns to [phone] and carefully listen to the prompts so that you are directed to the right person  All voicemails are confidential   Wily Moore all requests for admission clinical reviews, approved or denied determinations and any other requests to dedicated fax number below belonging to the campus where the patient is receiving treatment   List of dedicated fax numbers for the Facilities:  [address] DENIALS (Administrative/Medical Necessity) [phone]   [address] (Maternity/NICU/Pediatrics) [phone]   [address] [place] [address] Dr Donna Garner 6250 (  Janusz Cottrell "Nadira" 103) [phone] [place] [address] Laney Ar[address] [place] [address] [phone]

## 2021-03-01 NOTE — UTILIZATION REVIEW
Notification of Discharge  This is a Notification of Discharge from our facility [address]  Please be advised that this patient has been discharge from our facility  Below you will find the admission and discharge date and time including the patients disposition  PRESENTATION DATE: 2/26/2021 11:27 AM  OBS ADMISSION DATE:   IP ADMISSION DATE: 2/26/21 1216   DISCHARGE DATE: 2/28/2021  2:40 PM  DISPOSITION: Home/Self Care Home/Self Care   Admission Orders listed below:  Admission Orders (From admission, onward)     Ordered        02/26/21 1216  1 [facility]  [facility]                   Please contact the UR Department if additional information is required to close this patient's authorization/case  [address] Utilization Review Department  Main: [phone] x carefully listen to the prompts  All voicemails are confidential   [email or web address]  Send all requests for admission clinical reviews, approved or denied determinations and any other requests to dedicated fax number below belonging to the campus where the patient is receiving treatment   List of dedicated fax numbers:  [address] DENIALS (Administrative/Medical Necessity) [phone]   [address] (Maternity/NICU/Pediatrics) [phone]   [facility] [phone]   Senthil Hoang [phone]   Maribel Washington [phone]   [facility] [address] [place] [address] [phone]   [facility]  [phone]   220[address] [facility]  2401 [facility] 1000 [address] [facility] [phone]

## 2021-03-02 ENCOUNTER — TELEPHONE (OUTPATIENT)
Dept: CARDIOLOGY CLINIC | Facility: CLINIC | Age: 61
End: 2021-03-02

## 2021-03-02 ENCOUNTER — PATIENT OUTREACH (OUTPATIENT)
Dept: FAMILY MEDICINE CLINIC | Facility: CLINIC | Age: 61
End: 2021-03-02

## 2021-03-02 DIAGNOSIS — I21.11 ACUTE ST ELEVATION MYOCARDIAL INFARCTION (STEMI) DUE TO OCCLUSION OF MID PORTION OF RIGHT CORONARY ARTERY (HCC): ICD-10-CM

## 2021-03-02 DIAGNOSIS — I10 ESSENTIAL HYPERTENSION: Primary | ICD-10-CM

## 2021-03-02 NOTE — PROGRESS NOTES
Spoke with Sharla mitchell  She is doing okay  Follow up appointments made and reviewed  No trouble obtaining her mediations or getting to appointments  No care management needs identified

## 2021-03-02 NOTE — TELEPHONE ENCOUNTER
PT is scheduled on 03/25/21 for a PCI at [facility] with Dr Luis Plunkett  PT is aware of instructions  Spoke with the pt over the phone  Can I have preauth please

## 2021-03-04 ENCOUNTER — OFFICE VISIT (OUTPATIENT)
Dept: FAMILY MEDICINE CLINIC | Facility: CLINIC | Age: 61
End: 2021-03-04
Payer: COMMERCIAL

## 2021-03-04 VITALS
DIASTOLIC BLOOD PRESSURE: 70 MMHG | SYSTOLIC BLOOD PRESSURE: 120 MMHG | HEART RATE: 78 BPM | HEIGHT: 67 IN | TEMPERATURE: 98.2 F | BODY MASS INDEX: 32.41 KG/M2 | RESPIRATION RATE: 16 BRPM | WEIGHT: 206.5 LBS

## 2021-03-04 DIAGNOSIS — Z12.11 ENCOUNTER FOR SCREENING FOR MALIGNANT NEOPLASM OF COLON: ICD-10-CM

## 2021-03-04 DIAGNOSIS — F17.209 TOBACCO USE DISORDER, CONTINUOUS: Chronic | ICD-10-CM

## 2021-03-04 DIAGNOSIS — G62.9 PERIPHERAL POLYNEUROPATHY: ICD-10-CM

## 2021-03-04 DIAGNOSIS — I10 ESSENTIAL HYPERTENSION: ICD-10-CM

## 2021-03-04 DIAGNOSIS — I21.11 ACUTE ST ELEVATION MYOCARDIAL INFARCTION (STEMI) DUE TO OCCLUSION OF MID PORTION OF RIGHT CORONARY ARTERY (HCC): Primary | ICD-10-CM

## 2021-03-04 DIAGNOSIS — E11.65 UNCONTROLLED TYPE 2 DIABETES MELLITUS WITH HYPERGLYCEMIA (HCC): Chronic | ICD-10-CM

## 2021-03-04 DIAGNOSIS — K50.919 CROHN'S DISEASE WITH COMPLICATION, UNSPECIFIED GASTROINTESTINAL TRACT LOCATION (HCC): ICD-10-CM

## 2021-03-04 DIAGNOSIS — E78.2 MIXED HYPERLIPIDEMIA: ICD-10-CM

## 2021-03-04 DIAGNOSIS — Z12.31 ENCOUNTER FOR SCREENING MAMMOGRAM FOR MALIGNANT NEOPLASM OF BREAST: ICD-10-CM

## 2021-03-04 PROCEDURE — 99496 TRANSJ CARE MGMT HIGH F2F 7D: CPT | Performed by: PHYSICIAN ASSISTANT

## 2021-03-04 NOTE — PROGRESS NOTES
TCM Call (since 2/1/2021)     Date and time call was made  3/1/2021  4:01 PM    Hospital care reviewed  Records reviewed    Patient was hospitialized at  [facility]        Date of Admission  02/26/21    Date of discharge  02/28/21    Diagnosis  Acute ST elevation myocardial infarction (STEMI) due to occlusion of mid portion of right coronary artery     Disposition  Home    Were the patients medications reviewed and updated  Yes    Current Symptoms  None      TCM Call (since 2/1/2021)     Post hospital issues  None    Should patient be enrolled in anticoag monitoring? No    Scheduled for follow up? Yes    Did you obtain your prescribed medications  Yes    Do you need help managing your prescriptions or medications  No    Is transportation to your appointment needed  No    I have advised the patient to call PCP with any new or worsening symptoms  Lynda Sy MA, 03/01/2021            Assessment/Plan:    1  Acute ST elevation myocardial infarction (STEMI) due to occlusion of mid portion of right coronary artery (HCC)    - STEMI s/p PCI of RCA - doing well   EF 60%   Still with residual 80% LAD stenosis  2  Uncontrolled type 2 diabetes mellitus with hyperglycemia (HCC)    - now under care [facility], on insulin, has cleaned diet up, will follow up with them, need for nutrition eval    3  Cro[facility]'s disease with complication, unspecified gastrointestinal tract location ([facility] )    - c/w GI    4  Mixed hyperlipidemia    - now on crestor 20 mg, will repeat lipids in 3 months with LFT    5  Tobacco use disorder, continuous    - has quit for now, on patch    6  Peripheral polyneuropathy    - not on medication for now    7  Essential hypertension    - currently on coreg, losartan  - will follow up with cardio and us in 3 months    F/u 3 months or sooner if needed      Subjective:   Chief Complaint   Patient presents with    Transition of Care Management      Patient ID: Roselyn Pinto is a 63 y o  female  Patient is in here in follow up from her inpatient stay for STEMI and diabetes  She initially presented to [facility] from home as a pre-hospital MI with 3 days of escalating chest discomfort  ECGs were concerning for type 1 inferior STEMI  Patient was taken for cardiac cath which showed  99% distal RCA and 90% mid RCA lesion both addressed  Patient found to have significantly uncontrolled diabetes mellitus with A1c of 12 3% for which endocrinology recommendations were appreciated; she was started on prandial + bedtime subcutaneous insulin  Antihypertensive regiment was adjusted to include losartan given patient's uncontrolled diabetes  Smoking cessation and lipid suppression was counseled along with significant life style modifications  Cardiac rehabilitation was recommended and a referral was placed  Patient was deemed appropriate for discharge with plan for staged PCI to the LAD by Dr Maciej Livingston in 2-4 weeks, will see endo outpatient also  Since being home patient only complaint is fatigue  Some soreness and bruising at site of cardiac cath attempt right radius but improving  She is struggling with insulin  Has cleaned up her diet tremendously, but finds her sugar dropping down a lot due to diet changes  Has appt with endo tomorrow to discuss        The following portions of the patient's history were reviewed and updated as appropriate: allergies, current medications, past family history, past medical history, past social history, past surgical history and problem list     Past Medical History:   Diagnosis Date    Coronary artery disease     Crohn's colitis ([facility] )     Diabetes mellitus ([facility] )     Fibromyalgia     HTN (hypertension)     Hyperlipidemia     Migraine     Myocardial infarction [facility])      Past Surgical History:   Procedure Laterality Date    CARDIAC CATHETERIZATION      CHOLECYSTECTOMY      CORONARY STENT PLACEMENT      DENTAL SURGERY      [place] teeth extraction    HYSTERECTOMY      TUBAL LIGATION      Bilateral     Family History   Problem Relation Age of Onset    Coronary artery disease Mother     Dementia Mother     Heart disease Brother     Alcohol abuse Neg Hx     Substance Abuse Neg Hx      Social History     Socioeconomic History    Marital status: /Civil Union     Spouse name: Not on file    Number of children: Not on file    Years of education: Not on file    Highest education level: Not on file   Occupational History    Not on file   Social Needs    Financial resource strain: Not on file    Food insecurity     Worry: Not on file     Inability: Not on file   [employer] needs     Medical: Not on file     Non-medical: Not on file   Tobacco Use    Smoking status: Former Smoker     Packs/day: 1 00     Quit date: 2021     Years since quittin 0    Smokeless tobacco: Never Used   Substance and Sexual Activity    Alcohol use: Yes     Frequency: Monthly or less     Drinks per session: 1 or 2     Comment: Rarely    Drug use: No    Sexual activity: Not on file   Lifestyle    Physical activity     Days per week: Not on file     Minutes per session: Not on file    Stress: Not on file   Relationships    Social connections     Talks on phone: Not on file     Gets together: Not on file     Attends [employer] service: Not on file     Active member of club or organization: Not on file     Attends meetings of clubs or organizations: Not on file     Relationship status: Not on file    Intimate partner violence     Fear of current or ex partner: Not on file     Emotionally abused: Not on file     Physically abused: Not on file     Forced sexual activity: Not on file   Other Topics Concern    Not on file   Social History Narrative    Not on file       Current Outpatient Medications:     albuterol (PROVENTIL HFA,VENTOLIN HFA) 90 mcg/act inhaler, Inhale 2 puffs every 6 (six) hours as needed, Disp: , Rfl:     aspirin 81 mg chewable tablet, Chew 1 tablet (81 mg total) daily, Disp: 90 tablet, Rfl: 3    carvedilol (COREG) 12 5 mg tablet, Take 1 tablet (12 5 mg total) by mouth every 12 (twelve) hours, Disp: 30 tablet, Rfl: 5    cholecalciferol (VITAMIN D3) 1,000 units tablet, Take 1 tablet (1,000 Units total) by mouth daily, Disp: 90 tablet, Rfl: 3    insulin glargine (LANTUS) 100 units/mL subcutaneous injection, Inject 30 Units under the skin daily at bedtime, Disp: 10 mL, Rfl: 0    insulin lispro (HumaLOG) 100 units/mL injection, Inject 10 Units under the skin 3 (three) times a day with meals, Disp: 10 mL, Rfl: 0    losartan (COZAAR) 25 mg tablet, Take 1 tablet (25 mg total) by mouth daily, Disp: 30 tablet, Rfl: 5    nicotine (NICODERM CQ) 21 mg/24 hr TD 24 hr patch, Place 1 patch on the skin daily, Disp: 28 patch, Rfl: 3    rosuvastatin (CRESTOR) 20 MG tablet, Take 1 tablet (20 mg total) by mouth daily, Disp: 90 tablet, Rfl: 3    SUMAtriptan (IMITREX) 100 mg tablet, Take 1 tablet (100 mg total) by mouth once as needed for migraine for up to 1 dose, Disp: 10 tablet, Rfl: 0    ticagrelor (BRILINTA) 90 MG, Take 1 tablet (90 mg total) by mouth every 12 (twelve) hours, Disp: 60 tablet, Rfl: 5    ondansetron (ZOFRAN-ODT) 4 mg disintegrating tablet, Take 1 tablet (4 mg total) by mouth every 8 (eight) hours as needed for nausea or vomiting (Patient not taking: Reported on 3/4/2021), Disp: 12 tablet, Rfl: 0    Review of Systems   Constitutional: Positive for fatigue  Negative for chills and fever  HENT: Negative for ear pain and sore throat  Eyes: Negative for pain and visual disturbance  Respiratory: Negative for cough and shortness of breath  Cardiovascular: Negative for chest pain and palpitations  Gastrointestinal: Negative for abdominal pain and vomiting  Genitourinary: Negative for dysuria and hematuria  Musculoskeletal: Negative for arthralgias and back pain  Skin: Negative for color change and rash     Neurological: Negative for seizures and syncope  All other systems reviewed and are negative  Objective:    Vitals:    03/04/21 1036   BP: 120/70   BP Location: Left arm   Patient Position: Sitting   Cuff Size: Standard   Pulse: 78   Resp: 16   Temp: 98 2 °F (36 8 °C)   TempSrc: Oral   Weight: 93 7 kg (206 lb 8 oz)   Height: 5' 7" (1 702 m)        Physical Exam  Constitutional:       Appearance: Normal appearance  She is well-developed  Neck:      Vascular: No carotid bruit  Cardiovascular:      Rate and Rhythm: Normal rate and regular rhythm  Pulses: Normal pulses  Heart sounds: Normal heart sounds  Pulmonary:      Effort: Pulmonary effort is normal       Breath sounds: Normal breath sounds  Musculoskeletal:      Right lower leg: No edema  Left lower leg: No edema  Skin:     General: Skin is warm  Neurological:      General: No focal deficit present  Mental Status: She is alert and oriented to person, place, and time  Psychiatric:         Mood and Affect: Mood normal          Behavior: Behavior normal          Thought Content: Thought content normal          Judgment: Judgment normal        BMI Counseling: Body mass index is 32 34 kg/m²  The BMI is above normal  Nutrition recommendations include reducing portion sizes

## 2021-03-05 ENCOUNTER — OFFICE VISIT (OUTPATIENT)
Dept: ENDOCRINOLOGY | Facility: CLINIC | Age: 61
End: 2021-03-05
Payer: COMMERCIAL

## 2021-03-05 VITALS
DIASTOLIC BLOOD PRESSURE: 60 MMHG | HEART RATE: 62 BPM | WEIGHT: 208.8 LBS | HEIGHT: 67 IN | SYSTOLIC BLOOD PRESSURE: 112 MMHG | BODY MASS INDEX: 32.77 KG/M2

## 2021-03-05 DIAGNOSIS — E78.2 MIXED HYPERLIPIDEMIA: ICD-10-CM

## 2021-03-05 DIAGNOSIS — I21.11 ACUTE ST ELEVATION MYOCARDIAL INFARCTION (STEMI) DUE TO OCCLUSION OF MID PORTION OF RIGHT CORONARY ARTERY (HCC): ICD-10-CM

## 2021-03-05 DIAGNOSIS — E11.65 UNCONTROLLED TYPE 2 DIABETES MELLITUS WITH HYPERGLYCEMIA (HCC): Primary | Chronic | ICD-10-CM

## 2021-03-05 DIAGNOSIS — I10 ESSENTIAL HYPERTENSION: ICD-10-CM

## 2021-03-05 DIAGNOSIS — E11.65 TYPE 2 DIABETES MELLITUS WITH HYPERGLYCEMIA, WITHOUT LONG-TERM CURRENT USE OF INSULIN (HCC): ICD-10-CM

## 2021-03-05 PROCEDURE — 99215 OFFICE O/P EST HI 40 MIN: CPT | Performed by: PHYSICIAN ASSISTANT

## 2021-03-05 RX ORDER — INSULIN GLARGINE 100 [IU]/ML
25 INJECTION, SOLUTION SUBCUTANEOUS
Qty: 10 ML | Refills: 0
Start: 2021-03-05 | End: 2021-09-01 | Stop reason: ALTCHOICE

## 2021-03-05 RX ORDER — BLOOD-GLUCOSE SENSOR
EACH MISCELLANEOUS
Qty: 1 EACH | Refills: 1 | Status: SHIPPED | OUTPATIENT
Start: 2021-03-05 | End: 2021-04-12 | Stop reason: SDUPTHER

## 2021-03-05 RX ORDER — BLOOD-GLUCOSE,RECEIVER,CONT
EACH MISCELLANEOUS
Qty: 1 DEVICE | Refills: 1 | Status: SHIPPED | OUTPATIENT
Start: 2021-03-05

## 2021-03-05 RX ORDER — INSULIN ASPART 100 [IU]/ML
INJECTION, SOLUTION INTRAVENOUS; SUBCUTANEOUS
Start: 2021-03-05 | End: 2021-04-27 | Stop reason: SDUPTHER

## 2021-03-05 RX ORDER — EMPAGLIFLOZIN 25 MG/1
25 TABLET, FILM COATED ORAL EVERY MORNING
Qty: 30 TABLET | Refills: 5 | Status: SHIPPED | OUTPATIENT
Start: 2021-03-05 | End: 2021-03-30 | Stop reason: SDUPTHER

## 2021-03-05 RX ORDER — BLOOD-GLUCOSE TRANSMITTER
EACH MISCELLANEOUS
Qty: 1 EACH | Refills: 1 | Status: SHIPPED | OUTPATIENT
Start: 2021-03-05 | End: 2021-07-27 | Stop reason: SDUPTHER

## 2021-03-05 NOTE — PATIENT INSTRUCTIONS
Hypoglycemia instructions   Amie Davis  3/5/2021  [id number]    Low Blood Sugar    Steps to treat low blood sugar  1  Test blood sugar if you have symptoms of low blood sugar:   Low Blood Sugar Symptoms:  o Sweaty  o Dizzy  o Rapid heartbeat  o Shaky    o Bad mood  o Hungry      2  Treat blood sugar less than 70 with 15 grams of fast-acting carbohydrate:   Examples of 15 grams Fast-Acting Carbohydrate:  o 4 oz juice  o 4 oz regular soda  o 3-4 glucose tablets (chew)  o 3-4 hard candies (chew)              3    Wait 15 minutes and test your blood sugar again           4   If blood sugar is less than 100, repeat steps 2-3       5  When your blood sugar is 100 or more, eat a snack if it will be longer than one hour until your next meal  The snack should be 15 grams of carbohydrate and a protein:   Examples of snacks:  o ½ sandwich  o 6 crackers with cheese  o Piece of fruit with cheese or peanut butter  o 6 crackers with peanut butter

## 2021-03-05 NOTE — PROGRESS NOTES
Established Patient Progress Note      Chief Complaint   Patient presents with    Diabetes Type 2        History of Present Illness:   Jj Ludwig is a 63 y o  female with a history of type 2 diabetes with long term use of insulin since 2017  Reports complications of neuropathy  She was recently admitted to hospital with inferior STEMMI and required stenting of RCA  While admitted she was found to have hyperglycemia with A1C of 12 4  She was seen by endocrinology and she was started on Basal/Bolus insulin regimen  She has been checking blood sugars  About 4x per day since hospital discharge and taking insulin as directed  She did see dietician in hospital but not sure what to eat  A lot of foods such as raw vegetables and whole grains aggravate her Crohns  She is currently out of work and pain/swelling in her right hand would make it difficult to type  Home blood glucose readings:   Before breakfast:   Before lunch: 105-138  Before dinner:   Bedtime:   (the 82 was after eating)     Current regimen:   Lantus 30 units at bedtime  Humalog 10-10-10 before meals    Last Eye Exam: Needs exam  Last Foot Exam: today at visit  Has hypertension: Taking carvedilol and losartan  Has hyperlipidemia: Taking rosuvastatin      Quit smoking, using nicotine patch  Needs DEXA Scan- discuss next visit       Patient Active Problem List   Diagnosis    B-complex deficiency    Fibromyalgia    GERD (gastroesophageal reflux disease)    Inflammatory bowel disease ([facility]'s disease) ([facility] [address] [facility]    Migraine without aura and without status migrainosus, not intractable    Peripheral neuropathy    Tobacco use disorder, continuous    Mixed hyperlipidemia    Hidradenitis suppurativa    Acute ST elevation myocardial infarction (STEMI) due to occlusion of mid portion of right coronary artery ([address] )    Uncontrolled type 2 diabetes mellitus ([facility] [address] )    Essential hypertension      Past Medical History: Diagnosis Date    Coronary artery disease     Cro[facility]'s colitis ([facility] [address] [facility]     Diabetes mellitus ([facility] )     Fibromyalgia     HTN (hypertension)     Hyperlipidemia     Migraine     Myocardial infarction [facility])       Past Surgical History:   Procedure Laterality Date    CARDIAC CATHETERIZATION      CHOLECYSTECTOMY      CORONARY STENT PLACEMENT      DENTAL SURGERY      [place] teeth extraction    HYSTERECTOMY      TUBAL LIGATION      Bilateral      Family History   Problem Relation Age of Onset    Coronary artery disease Mother     Dementia Mother     Heart disease Brother     Alcohol abuse Neg Hx     Substance Abuse Neg Hx      Social History     Tobacco Use    Smoking status: Former Smoker     Packs/day: 1 00     Types: Cigarettes     Quit date: 2021     Years since quittin 0    Smokeless tobacco: Never Used   Substance Use Topics    Alcohol use: Yes     Frequency: Monthly or less     Drinks per session: 1 or 2     Binge frequency: Never     Comment: Rarely     Allergies   Allergen Reactions    Penicillins Anaphylaxis    Cefuroxime     Metaxalone     Influenza Vaccines Rash    Keflet [Cephalexin] Rash    Nuts Itching    Sulfa Antibiotics Rash    Sulfamethoxazole-Trimethoprim Hives and Rash    Topiramate Rash         Current Outpatient Medications:     albuterol (PROVENTIL HFA,VENTOLIN HFA) 90 mcg/act inhaler, Inhale 2 puffs every 6 (six) hours as needed, Disp: , Rfl:     aspirin 81 mg chewable tablet, Chew 1 tablet (81 mg total) daily, Disp: 90 tablet, Rfl: 3    carvedilol (COREG) 12 5 mg tablet, Take 1 tablet (12 5 mg total) by mouth every 12 (twelve) hours, Disp: 30 tablet, Rfl: 5    cholecalciferol (VITAMIN D3) 1,000 units tablet, Take 1 tablet (1,000 Units total) by mouth daily, Disp: 90 tablet, Rfl: 3    insulin glargine (LANTUS) 100 units/mL subcutaneous injection, Inject 25 Units under the skin daily at bedtime, Disp: 10 mL, Rfl: 0    insulin lispro (HumaLOG) 100 units/mL injection, Inject 6 Units under the skin 3 (three) times a day with meals, Disp: 10 mL, Rfl: 0    losartan (COZAAR) 25 mg tablet, Take 1 tablet (25 mg total) by mouth daily, Disp: 30 tablet, Rfl: 5    nicotine (NICODERM CQ) 21 mg/24 hr TD 24 hr patch, Place 1 patch on the skin daily, Disp: 28 patch, Rfl: 3    ondansetron (ZOFRAN-ODT) 4 mg disintegrating tablet, Take 1 tablet (4 mg total) by mouth every 8 (eight) hours as needed for nausea or vomiting, Disp: 12 tablet, Rfl: 0    rosuvastatin (CRESTOR) 20 MG tablet, Take 1 tablet (20 mg total) by mouth daily, Disp: 90 tablet, Rfl: 3    SUMAtriptan (IMITREX) 100 mg tablet, Take 1 tablet (100 mg total) by mouth once as needed for migraine for up to 1 dose, Disp: 10 tablet, Rfl: 0    ticagrelor (BRILINTA) 90 MG, Take 1 tablet (90 mg total) by mouth every 12 (twelve) hours, Disp: 60 tablet, Rfl: 5    Continuous Blood Gluc  (Dexcom G6 ) CRISTINA, Disp 1 , Disp: 1 Device, Rfl: 1    Continuous Blood Gluc Sensor (Dexcom G6 Sensor) MISC, Use 1 every 10 days, Disp: 1 each, Rfl: 1    Continuous Blood Gluc Transmit (Dexcom G6 Transmitter) MISC, Use 1 every 3 months, Disp: 1 each, Rfl: 1    Empagliflozin (Jardiance) 25 MG TABS, Take 1 tablet (25 mg total) by mouth every morning, Disp: 30 tablet, Rfl: 5    insulin aspart (NovoLOG FlexPen) 100 UNIT/ML injection pen, 6 unit before, Disp: , Rfl:     Review of Systems   Constitutional: Negative for activity change, appetite change, chills, diaphoresis, fatigue, fever and unexpected weight change  HENT: Negative for trouble swallowing and voice change  Eyes: Negative for visual disturbance  Respiratory: Positive for shortness of breath  Cardiovascular: Negative for chest pain and palpitations  Gastrointestinal: Negative for abdominal pain, constipation and diarrhea  Endocrine: Negative for cold intolerance, heat intolerance, polydipsia, polyphagia and polyuria     Genitourinary: Negative for frequency and menstrual problem  Musculoskeletal: Negative for arthralgias and myalgias  Skin: Negative for rash  Allergic/Immunologic: Negative for food allergies  Neurological: Negative for dizziness and tremors  Hematological: Negative for adenopathy  Psychiatric/Behavioral: Negative for sleep disturbance  All other systems reviewed and are negative  Physical Exam:  Body mass index is 32 7 kg/m²  /60 (BP Location: Left arm, Patient Position: Sitting, Cuff Size: Large)   Pulse 62   Ht 5' 7" (1 702 m)   Wt 94 7 kg (208 lb 12 8 oz)   BMI 32 70 kg/m²    Wt Readings from Last 3 Encounters:   03/05/21 94 7 kg (208 lb 12 8 oz)   03/04/21 93 7 kg (206 lb 8 oz)   02/26/21 97 6 kg (215 lb 2 7 oz)       Physical Exam  Vitals signs reviewed  Constitutional:       General: She is not in acute distress  Appearance: She is well-developed  HENT:      Head: Normocephalic and atraumatic  Eyes:      Conjunctiva/sclera: Conjunctivae normal       Pupils: Pupils are equal, round, and reactive to light  Neck:      Musculoskeletal: Normal range of motion and neck supple  Thyroid: No thyromegaly  Cardiovascular:      Rate and Rhythm: Normal rate and regular rhythm  Pulses: no weak pulses          Dorsalis pedis pulses are 2+ on the right side and 2+ on the left side  Posterior tibial pulses are 2+ on the right side and 2+ on the left side  Heart sounds: Normal heart sounds  Pulmonary:      Effort: Pulmonary effort is normal  No respiratory distress  Breath sounds: Normal breath sounds  No wheezing or rales  Abdominal:      General: Bowel sounds are normal  There is no distension  Palpations: Abdomen is soft  Tenderness: There is no abdominal tenderness  Musculoskeletal: Normal range of motion  Feet:      Right foot:      Skin integrity: Callus present  No ulcer, skin breakdown, erythema, warmth or dry skin        Left foot:      Skin integrity: Callus present  No ulcer, skin breakdown, erythema, warmth or dry skin  Skin:     General: Skin is warm and dry  Comments: Right write/hand swelling/bruising  Neurological:      Mental Status: She is alert and oriented to person, place, and time  Patient's shoes and socks removed  Right Foot/Ankle   Right Foot Inspection  Skin Exam: skin normal, skin intact, callus and callus no dry skin, no warmth, no erythema, no maceration, no abnormal color, no pre-ulcer and no ulcer                          Toe Exam: ROM and strength within normal limitsno swelling, no tenderness, erythema and  no right toe deformity  Sensory       Monofilament testing: intact  Vascular  Capillary refills: < 3 seconds  The right DP pulse is 2+  The right PT pulse is 2+  Left Foot/Ankle  Left Foot Inspection  Skin Exam: skin normal, skin intact and callusno dry skin, no warmth, no erythema, no maceration, normal color, no pre-ulcer and no ulcer                         Toe Exam: ROM and strength within normal limitsno swelling, no tenderness, no erythema and no left toe deformity                   Sensory       Monofilament: intact  Vascular  Capillary refills: < 3 seconds  The left DP pulse is 2+  The left PT pulse is 2+  Assign Risk Category:  No deformity present; No loss of protective sensation;  No weak pulses       Risk: 0      Labs:   Lab Results   Component Value Date    HGBA1C 12 3 (H) 02/26/2021     Lab Results   Component Value Date    CREATININE 0 70 02/28/2021    CREATININE 0 67 02/27/2021    CREATININE 0 96 02/26/2021    BUN 9 02/28/2021     01/22/2016    K 3 5 02/28/2021     (H) 02/28/2021    CO2 25 02/28/2021     eGFR   Date Value Ref Range Status   02/28/2021 95 ml/min/1 73sq m Final     Lab Results   Component Value Date    HDL 40 02/26/2021    TRIG 250 (H) 02/26/2021     Lab Results   Component Value Date    ALT 12 02/28/2021    AST 19 02/28/2021    ALKPHOS 119 (H) 02/28/2021    BILITOT 0 4 01/22/2016 Lab Results   Component Value Date    RKO1TUTZEXYC 1 620 11/13/2020     No results found for: FREET4, TSI    Impression & Plan:    Problem List Items Addressed This Visit        Endocrine    Uncontrolled type 2 diabetes mellitus ([facility] [address] ) - Primary (Chronic)     Diabetes poorly controlled based on A1C of 12 3, though control improve is improving since discharge from hospital on Basal/Bolus regimen  She is asking about CGM as an alternative to checking her bG 4x per day  Rx for Dexcom G6 Sent to pharmacy  She will plan to download the Dexcom Juliana and schedule Training with Diabetes educator  I have also placed referral for medical nutrition therapy and Diabetes education Classes  Will start Jardiance 25mg daily  Discussed risks/side effects/cardiovascular benefits and need to keep well hydrated  Send BG readings to office for review and med adjustment if needed  If she starts cardiac rehab may need further reduction in insulin dosing  For now will reduce Lantus to 25 units daily and Reduce Novolog to 6 units before meals  Discussed importance of improving control of Diabetes   Encouraged her to schedule Diabetic eye exam    Lab Results   Component Value Date    HGBA1C 12 3 (H) 02/26/2021            Relevant Medications    Continuous Blood Gluc Transmit (Dexcom G6 Transmitter) MISC    Continuous Blood Gluc Sensor (Dexcom G6 Sensor) MISC    Continuous Blood Gluc  (Dexcom G6 ) CRISTINA    Empagliflozin (Jardiance) 25 MG TABS    insulin glargine (LANTUS) 100 units/mL subcutaneous injection    insulin lispro (HumaLOG) 100 units/mL injection    insulin aspart (NovoLOG FlexPen) 100 UNIT/ML injection pen    Other Relevant Orders    Ambulatory referral to Diabetic Education       Cardiovascular and Mediastinum    Acute ST elevation myocardial infarction (STEMI) due to occlusion of mid portion of right coronary artery (HCC)    Relevant Medications    insulin glargine (LANTUS) 100 units/mL subcutaneous injection    insulin lispro (HumaLOG) 100 units/mL injection    Essential hypertension     Well controlled  Other    Mixed hyperlipidemia     Continue Rosuvastatin  Other Visit Diagnoses     Type 2 diabetes mellitus with hyperglycemia, without long-term current use of insulin (HCC)        Relevant Medications    Empagliflozin (Jardiance) 25 MG TABS    insulin glargine (LANTUS) 100 units/mL subcutaneous injection    insulin lispro (HumaLOG) 100 units/mL injection    insulin aspart (NovoLOG FlexPen) 100 UNIT/ML injection pen          Orders Placed This Encounter   Procedures    Ambulatory referral to Diabetic Education     Standing Status:   Future     Standing Expiration Date:   3/5/2022     Referral Priority:   Routine     Referral Type:   Consult - AMB     Referral Reason:   Specialty Services Required     Requested Specialty:   Diabetes Services     Number of Visits Requested:   1     Expiration Date:   3/5/2022       Patient Instructions     Hypoglycemia instructions   Molly Davis  3/5/2021  [id number]    Low Blood Sugar    Steps to treat low blood sugar  1  Test blood sugar if you have symptoms of low blood sugar:   Low Blood Sugar Symptoms:  o Sweaty  o Dizzy  o Rapid heartbeat  o Shaky    o Bad mood  o Hungry      2  Treat blood sugar less than 70 with 15 grams of fast-acting carbohydrate:   Examples of 15 grams Fast-Acting Carbohydrate:  o 4 oz juice  o 4 oz regular soda  o 3-4 glucose tablets (chew)  o 3-4 hard candies (chew)              3    Wait 15 minutes and test your blood sugar again           4   If blood sugar is less than 100, repeat steps 2-3       5  When your blood sugar is 100 or more, eat a snack if it will be longer than one hour until your next meal  The snack should be 15 grams of carbohydrate and a protein:   Examples of snacks:  o ½ sandwich  o 6 crackers with cheese  o Piece of fruit with cheese or peanut butter  o 6 crackers with peanut inderjti          Discussed with the patient and all questioned fully answered  She will call me if any problems arise  Follow-up appointment in 3 months       Counseled patient on diagnostic results, prognosis, risk and benefit of treatment options, instruction for management, importance of treatment compliance, Risk  factor reduction and impressions    Kiersten Sheridan PA-C

## 2021-03-05 NOTE — ASSESSMENT & PLAN NOTE
Diabetes poorly controlled based on A1C of 12 3, though control improve is improving since discharge from hospital on Basal/Bolus regimen  She is asking about CGM as an alternative to checking her bG 4x per day  Rx for Dexcom G6 Sent to pharmacy  She will plan to download the Dexcom Juliana and schedule Training with Diabetes educator  I have also placed referral for medical nutrition therapy and Diabetes education Classes  Will start Jardiance 25mg daily  Discussed risks/side effects/cardiovascular benefits and need to keep well hydrated  Send BG readings to office for review and med adjustment if needed  If she starts cardiac rehab may need further reduction in insulin dosing  For now will reduce Lantus to 25 units daily and Reduce Novolog to 6 units before meals  Discussed importance of improving control of Diabetes   Encouraged her to schedule Diabetic eye exam    Lab Results   Component Value Date    HGBA1C 12 3 (H) 02/26/2021

## 2021-03-08 ENCOUNTER — TELEPHONE (OUTPATIENT)
Dept: ENDOCRINOLOGY | Facility: CLINIC | Age: 61
End: 2021-03-08

## 2021-03-08 ENCOUNTER — TELEPHONE (OUTPATIENT)
Dept: ADMINISTRATIVE | Facility: OTHER | Age: 61
End: 2021-03-08

## 2021-03-08 ENCOUNTER — OFFICE VISIT (OUTPATIENT)
Dept: CARDIOLOGY CLINIC | Facility: CLINIC | Age: 61
End: 2021-03-08
Payer: COMMERCIAL

## 2021-03-08 VITALS
HEART RATE: 81 BPM | OXYGEN SATURATION: 98 % | WEIGHT: 208.7 LBS | DIASTOLIC BLOOD PRESSURE: 60 MMHG | HEIGHT: 68 IN | SYSTOLIC BLOOD PRESSURE: 96 MMHG | BODY MASS INDEX: 31.63 KG/M2

## 2021-03-08 DIAGNOSIS — F17.209 TOBACCO USE DISORDER, CONTINUOUS: Chronic | ICD-10-CM

## 2021-03-08 DIAGNOSIS — I25.119 CORONARY ARTERY DISEASE INVOLVING NATIVE HEART WITH ANGINA PECTORIS, UNSPECIFIED VESSEL OR LESION TYPE (HCC): ICD-10-CM

## 2021-03-08 DIAGNOSIS — Z95.5 S/P CORONARY ARTERY STENT PLACEMENT: Primary | ICD-10-CM

## 2021-03-08 DIAGNOSIS — E11.65 UNCONTROLLED TYPE 2 DIABETES MELLITUS WITH HYPERGLYCEMIA (HCC): Chronic | ICD-10-CM

## 2021-03-08 DIAGNOSIS — E66.9 OBESITY (BMI 30-39.9): ICD-10-CM

## 2021-03-08 DIAGNOSIS — E78.00 HYPERCHOLESTEROLEMIA: ICD-10-CM

## 2021-03-08 PROCEDURE — 99215 OFFICE O/P EST HI 40 MIN: CPT | Performed by: NURSE PRACTITIONER

## 2021-03-08 RX ORDER — NITROGLYCERIN 0.4 MG/1
0.4 TABLET SUBLINGUAL
Qty: 30 TABLET | Refills: 3 | Status: SHIPPED | OUTPATIENT
Start: 2021-03-08 | End: 2021-04-28 | Stop reason: ALTCHOICE

## 2021-03-08 NOTE — TELEPHONE ENCOUNTER
Upon review of the In Basket request and the patient's chart, initial outreach has been made via fax, please see Contacts section for details       Thank you  Chio Bejarano

## 2021-03-08 NOTE — PROGRESS NOTES
Cardiology Follow Up    Mickie Davis  1960  [id number]  [facility] [address]  [phone]    1  Acute ST elevation myocardial infarction (STEMI) due to occlusion of mid portion of right coronary artery [facility])  Ambulatory referral to Cardiology       Interval History:   Ms Adina Rudolph was admitted to [facility] on 2/26 - 2/28/21 with acute STEMI  And dizzy [facility] emergency room with an MI alert  She was experiencing a 3 day history of progressive chest discomfort  EKG was concerning for inferior STEMI    2/26/21 She was taken for urgent cardiac catheterization which showed  Mid LAD discrete 80% stenosis, 1st diagonal 40% stenosis, 2nd diagonal tubular 60% stenosis, 1st obtuse marginal 50% stenosis  Mid RCA 90% stenosis  Distal RCA tubular 99% stenosis  Successful balloon angioplasty with stent procedure performed on the 90% lesion in the mid RCA  Resolute jose Rx drug-eluting stent  A successful balloon angioplasty with stent procedure was performed on the 90% lesion in the mid RCA  Resolute jose Rx drug-eluting  , , HDL 40,   TTE showed LVEF 60%,   Akinesis of the basal mid inferior wall  Right ventricle size systolic function normal   Trace MR, no evidence of aortic stenosis or regurgitation, trace TR  HgbA1C 12  3  Ms Howard Salazar is to   Undergo staged PCI to the LAD by Dr Hayden in 2-4 weeks  Follow up with Dr Christiano Gotti in 2 months  Ms Adina Rudolph admits to shortness of breath with exertion and blurred vision since discharge  Zandra Fang is no longer smoking  She admits to occasional left sided chest pain with lying down at night, watching TV and after a walk, pressure 5/10, lasting 5-10 minutes         HPI:  Dyslipidemia   DM2 HgbA1C  Tobacco abuse  Obesity  Patient Active Problem List   Diagnosis    B-complex deficiency    Fibromyalgia    GERD (gastroesophageal reflux disease)    Inflammatory bowel disease (Crohn's disease) ([place] [address] )    Migraine without aura and without status migrainosus, not intractable    Peripheral neuropathy    Tobacco use disorder, continuous    Mixed hyperlipidemia    Hidradenitis suppurativa    Acute ST elevation myocardial infarction (STEMI) due to occlusion of mid portion of right coronary artery ([place] [address] )    Uncontrolled type 2 diabetes mellitus ([place] [address] )    Essential hypertension     Past Medical History:   Diagnosis Date    Coronary artery disease     Crohn's colitis ([place] [address] )     Diabetes mellitus ([place] [address] )     Fibromyalgia     HTN (hypertension)     Hyperlipidemia     Migraine     Myocardial infarction ([place] [address] )      Social History     Socioeconomic History    Marital status: /Civil Union     Spouse name: Not on file    Number of children: Not on file    Years of education: Not on file    Highest education level: Not on file   Occupational History    Not on file   Social Needs    Financial resource strain: Not on file    Food insecurity     Worry: Not on file     Inability: Not on file    Transportation needs     Medical: Not on file     Non-medical: Not on file   Tobacco Use    Smoking status: Former Smoker     Packs/day: 1 00     Types: Cigarettes     Quit date: 2021     Years since quittin 0    Smokeless tobacco: Never Used   Substance and Sexual Activity    Alcohol use: Yes     Frequency: Monthly or less     Drinks per session: 1 or 2     Binge frequency: Never     Comment: Rarely    Drug use: Never    Sexual activity: Not on file   Lifestyle    Physical activity     Days per week: Not on file     Minutes per session: Not on file    Stress: Not on file   Relationships    Social connections     Talks on phone: Not on file     Gets together: Not on file     Attends Orthodoxy service: Not on file     Active member of club or organization: Not on file     Attends meetings of clubs or organizations: Not on file     Relationship status: Not on file    Intimate partner violence     Fear of current or ex partner: Not on file     Emotionally abused: Not on file     Physically abused: Not on file     Forced sexual activity: Not on file   Other Topics Concern    Not on file   Social History Narrative    Not on file      Family History   Problem Relation Age of Onset    Coronary artery disease Mother     Dementia Mother     Heart disease Brother     Alcohol abuse Neg Hx     Substance Abuse Neg Hx      Past Surgical History:   Procedure Laterality Date    CARDIAC CATHETERIZATION      CHOLECYSTECTOMY      CORONARY STENT PLACEMENT      DENTAL SURGERY      [place] teeth extraction    HYSTERECTOMY      TUBAL LIGATION      Bilateral       Current Outpatient Medications:     albuterol (PROVENTIL HFA,VENTOLIN HFA) 90 mcg/act inhaler, Inhale 2 puffs every 6 (six) hours as needed, Disp: , Rfl:     aspirin 81 mg chewable tablet, Chew 1 tablet (81 mg total) daily, Disp: 90 tablet, Rfl: 3    carvedilol (COREG) 12 5 mg tablet, Take 1 tablet (12 5 mg total) by mouth every 12 (twelve) hours, Disp: 30 tablet, Rfl: 5    cholecalciferol (VITAMIN D3) 1,000 units tablet, Take 1 tablet (1,000 Units total) by mouth daily, Disp: 90 tablet, Rfl: 3    Continuous Blood Gluc  (Dexcom G6 ) CRISTINA, Disp 1 , Disp: 1 Device, Rfl: 1    Continuous Blood Gluc Sensor (Dexcom G6 Sensor) MISC, Use 1 every 10 days, Disp: 1 each, Rfl: 1    Continuous Blood Gluc Transmit (Dexcom G6 Transmitter) MISC, Use 1 every 3 months, Disp: 1 each, Rfl: 1    Empagliflozin (Jardiance) 25 MG TABS, Take 1 tablet (25 mg total) by mouth every morning, Disp: 30 tablet, Rfl: 5    insulin aspart (NovoLOG FlexPen) 100 UNIT/ML injection pen, 6 unit before, Disp: , Rfl:     insulin glargine (LANTUS) 100 units/mL subcutaneous injection, Inject 25 Units under the skin daily at bedtime, Disp: 10 mL, Rfl: 0   losartan (COZAAR) 25 mg tablet, Take 1 tablet (25 mg total) by mouth daily, Disp: 30 tablet, Rfl: 5    nicotine (NICODERM CQ) 21 mg/24 hr TD 24 hr patch, Place 1 patch on the skin daily, Disp: 28 patch, Rfl: 3    ondansetron (ZOFRAN-ODT) 4 mg disintegrating tablet, Take 1 tablet (4 mg total) by mouth every 8 (eight) hours as needed for nausea or vomiting, Disp: 12 tablet, Rfl: 0    rosuvastatin (CRESTOR) 20 MG tablet, Take 1 tablet (20 mg total) by mouth daily, Disp: 90 tablet, Rfl: 3    SUMAtriptan (IMITREX) 100 mg tablet, Take 1 tablet (100 mg total) by mouth once as needed for migraine for up to 1 dose, Disp: 10 tablet, Rfl: 0    ticagrelor (BRILINTA) 90 MG, Take 1 tablet (90 mg total) by mouth every 12 (twelve) hours, Disp: 60 tablet, Rfl: 5  Allergies   Allergen Reactions    Penicillins Anaphylaxis    Cefuroxime     Metaxalone     Influenza Vaccines Rash    Keflet [Cephalexin] Rash    Nuts Itching    Sulfa Antibiotics Rash    Sulfamethoxazole-Trimethoprim Hives and Rash    Topiramate Rash       Labs:  Admission on 02/26/2021, Discharged on 02/28/2021   Component Date Value    WBC 02/26/2021 11 29*    RBC 02/26/2021 5 61*    Hemoglobin 02/26/2021 16 2*    Hematocrit 02/26/2021 48 5*    MCV 02/26/2021 87     MCH 02/26/2021 28 9     MCHC 02/26/2021 33 4     RDW 02/26/2021 13 8     MPV 02/26/2021 10 3     Platelets 53/05/2987 284     nRBC 02/26/2021 0     Neutrophils Relative 02/26/2021 65     Immat GRANS % 02/26/2021 0     Lymphocytes Relative 02/26/2021 27     Monocytes Relative 02/26/2021 6     Eosinophils Relative 02/26/2021 2     Basophils Relative 02/26/2021 0     Neutrophils Absolute 02/26/2021 7 31     Immature Grans Absolute 02/26/2021 0 04     Lymphocytes Absolute 02/26/2021 3 01     Monocytes Absolute 02/26/2021 0 65     Eosinophils Absolute 02/26/2021 0 23     Basophils Absolute 02/26/2021 0 05     Sodium 02/26/2021 135*    Potassium 02/26/2021 3 9     Chloride 02/26/2021 104     CO2 02/26/2021 23     ANION GAP 02/26/2021 8     BUN 02/26/2021 11     Creatinine 02/26/2021 0 96     Glucose 02/26/2021 335*    Calcium 02/26/2021 10 0     AST 02/26/2021 12     ALT 02/26/2021 15     Alkaline Phosphatase 02/26/2021 134*    Total Protein 02/26/2021 7 8     Albumin 02/26/2021 3 8     Total Bilirubin 02/26/2021 0 62     eGFR 02/26/2021 65     Troponin I 02/26/2021 <0 02     Cholesterol 02/26/2021 271*    Triglycerides 02/26/2021 250*    HDL, Direct 02/26/2021 40     LDL Calculated 02/26/2021 181*    Non-HDL-Chol (CHOL-HDL) 02/26/2021 231     Magnesium 02/26/2021 1 9     Protime 02/26/2021 13 0     INR 02/26/2021 0 98     PTT 02/26/2021 29     Hemoglobin A1C 02/26/2021 12 3*    EAG 02/26/2021 306     LDL Direct 02/26/2021 188*    Activated Clotting Time,* 02/26/2021 249*    Specimen Type 02/26/2021 VENOUS     Troponin I 02/26/2021 0 76*    POC Glucose 02/26/2021 299*    Troponin I 02/26/2021 1 28*    Creatinine, Ur 02/26/2021 45 8     Microalbum  ,U,Random 02/26/2021 16 9     Microalb Creat Ratio 02/26/2021 37*    Ventricular Rate 02/26/2021 53     Atrial Rate 02/26/2021 53     VA Interval 02/26/2021 154     QRSD Interval 02/26/2021 88     QT Interval 02/26/2021 450     QTC Interval 02/26/2021 423     P Axis 02/26/2021 56     QRS Axis 02/26/2021 51     T Wave Axis 02/26/2021 57     Ventricular Rate 02/26/2021 46     Atrial Rate 02/26/2021 46     VA Interval 02/26/2021 130     QRSD Interval 02/26/2021 86     QT Interval 02/26/2021 490     QTC Interval 02/26/2021 428     P Axis 02/26/2021 41     QRS Axis 02/26/2021 43     T Wave Axis 02/26/2021 80     POC Glucose 02/26/2021 256*    Troponin I 02/26/2021 2 45*    POC Glucose 02/26/2021 103     Troponin I 02/26/2021 2 47*    POC Glucose 02/27/2021 119     WBC 02/27/2021 10 08     RBC 02/27/2021 4 62     Hemoglobin 02/27/2021 13 4     Hematocrit 02/27/2021 40 9     MCV 02/27/2021 89     MCH 02/27/2021 29 0     MCHC 02/27/2021 32 8     RDW 02/27/2021 14 0     Platelets 77/66/1443 232     MPV 02/27/2021 10 5     Sodium 02/27/2021 143     Potassium 02/27/2021 3 6     Chloride 02/27/2021 113*    CO2 02/27/2021 25     ANION GAP 02/27/2021 5     BUN 02/27/2021 7     Creatinine 02/27/2021 0 67     Glucose 02/27/2021 127     Calcium 02/27/2021 9 1     eGFR 02/27/2021 97     Magnesium 02/27/2021 1 7     Troponin I 02/27/2021 2 46*    Vit D, 25-Hydroxy 02/27/2021 12 5*    Vitamin B-12 02/27/2021 253     Folate 02/27/2021 18 2*    Total Bilirubin 02/27/2021 0 61     Bilirubin, Direct 02/27/2021 0 13     Alkaline Phosphatase 02/27/2021 107     AST 02/27/2021 18     ALT 02/27/2021 11*    Total Protein 02/27/2021 6 0*    Albumin 02/27/2021 2 9*    POC Glucose 02/27/2021 129     Creatinine, Ur 02/27/2021 143 0     Microalbum  ,U,Random 02/27/2021 10 7     Microalb Creat Ratio 02/27/2021 7     POC Glucose 02/27/2021 202*    POC Glucose 02/27/2021 149*    Ventricular Rate 02/27/2021 56     Atrial Rate 02/27/2021 56     AR Interval 02/27/2021 150     QRSD Interval 02/27/2021 86     QT Interval 02/27/2021 456     QTC Interval 02/27/2021 440     P Axis 02/27/2021 68     QRS Axis 02/27/2021 50     T Wave Axis 02/27/2021 -12     POC Glucose 02/27/2021 102     WBC 02/28/2021 9 22     RBC 02/28/2021 4 72     Hemoglobin 02/28/2021 13 8     Hematocrit 02/28/2021 42 0     MCV 02/28/2021 89     MCH 02/28/2021 29 2     MCHC 02/28/2021 32 9     RDW 02/28/2021 14 0     MPV 02/28/2021 10 3     Platelets 21/35/6812 218     nRBC 02/28/2021 0     Neutrophils Relative 02/28/2021 62     Immat GRANS % 02/28/2021 0     Lymphocytes Relative 02/28/2021 30     Monocytes Relative 02/28/2021 6     Eosinophils Relative 02/28/2021 2     Basophils Relative 02/28/2021 0     Neutrophils Absolute 02/28/2021 5 63     Immature Grans Absolute 02/28/2021 0 03     Lymphocytes Absolute 02/28/2021 2 74     Monocytes Absolute 02/28/2021 0 57     Eosinophils Absolute 02/28/2021 0 21     Basophils Absolute 02/28/2021 0 04     Sodium 02/28/2021 142     Potassium 02/28/2021 3 5     Chloride 02/28/2021 112*    CO2 02/28/2021 25     ANION GAP 02/28/2021 5     BUN 02/28/2021 9     Creatinine 02/28/2021 0 70     Glucose 02/28/2021 101     Calcium 02/28/2021 9 4     Corrected Calcium 02/28/2021 10 1     AST 02/28/2021 19     ALT 02/28/2021 12     Alkaline Phosphatase 02/28/2021 119*    Total Protein 02/28/2021 6 7     Albumin 02/28/2021 3 1*    Total Bilirubin 02/28/2021 0 64     eGFR 02/28/2021 95     Magnesium 02/28/2021 1 8     POC Glucose 02/28/2021 98     POC Glucose 02/28/2021 118      Imaging: No results found  Review of Systems:  Review of Systems    Physical Exam:  Physical Exam  Vitals signs reviewed  Constitutional:       Appearance: She is obese  HENT:      Head: Normocephalic  Eyes:      Pupils: Pupils are equal, round, and reactive to light  Neck:      Musculoskeletal: Normal range of motion and neck supple  Cardiovascular:      Rate and Rhythm: Normal rate and regular rhythm  Pulses: Normal pulses  Heart sounds: Normal heart sounds  Pulmonary:      Effort: Pulmonary effort is normal       Breath sounds: Normal breath sounds  Abdominal:      General: Bowel sounds are normal       Palpations: Abdomen is soft  Musculoskeletal: Normal range of motion  Skin:     General: Skin is warm and dry  Capillary Refill: Capillary refill takes less than 2 seconds  Comments: Right radial cath site with ecchymosis    Neurological:      General: No focal deficit present  Mental Status: She is alert and oriented to person, place, and time     Psychiatric:         Mood and Affect: Mood normal          Behavior: Behavior normal          Discussion/Summary:  1  2/26/21 sp Successful balloon angioplasty with stent procedure performed on the 90% lesion in the mid RCA  Resolute jose Rx drug-eluting stent  A successful balloon angioplasty with stent procedure was performed on the 90% lesion in the mid RCA  Resolute jose Rx drug-eluting  Continue on aspirin 81 mg daily, Brilinta 90 mg b i d  Alba Blood is aware not stop ASA or Brilinta for any reason  Continue on  Coreg 12 5 mg b i d ,  Losartan 25 mg daily, Crestor 20mg daily  Dyspnea possibly due to CAD Mid LAD 80% and or Brilinta  Evaluate after follow up intervention, heart healthy diet   2  CAD, Mid LAD discrete 80% stenosis, plan staged PCI to mid LAD on 3/25/21  CBC, BMP ordered to be done 5 days prior to procedure  NTG 0 4mg SL ordered with instructions on the proper use for CP  If CP worsens presents to the ED  3  Hypercholesterolemia 2/26/21 , , HDL 40, , continue on Crestor 20mg daily, heart healthy diet  4  DM2 HgbA1C 12 3 on 2/26/21, continues on Lantus 25 units at bedtime, Novolog 6 units and Jardiance 25mg daily  Follow up with Endocrinology   5  Tobacco abuse- continues with smoking cessation  6   Obesity BMI 31 73, calorie control

## 2021-03-08 NOTE — PATIENT INSTRUCTIONS
2gm sodium low fat low cholesterol, low carbohydrate, no concentrated sweets diet, eating fresh is best, fresh fruit, fresh vegetables, lean protein, avoid red meat      CBC BMP to be done prior to [facility]

## 2021-03-08 NOTE — LETTER
Diabetic Eye Exam Form    Date Requested: 21  Patient: Rich Garcia  Patient : 1960   Referring Provider: Pablo Canela PA-C    Dilated Retinal Exam, Optomap-Iris Exam, or Fundus Photography Done         Yes (Tangirnaq one above)         No     Date of Diabetic Eye Exam ______________________________  Left Eye      Exam did show retinopathy    Exam did not show retinopathy         Mild       Moderate       None       Proliferative       Severe     Right Eye     Exam did show retinopathy    Exam did not show retinopathy         Mild       Moderate       None       Proliferative       Severe     Comments __________________________________________________________    Practice Providing Exam ______________________________________________    Exam Performed By (print name) _______________________________________      Provider Signature ___________________________________________________      These reports are needed for  compliance    Please fax this completed form and a copy of the Diabetic Eye Exam report to our office located at [place] [address] as soon as possible to [phone] sierra Child Jeff: Phone [phone]    We thank you for your assistance in treating our mutual patient

## 2021-03-08 NOTE — H&P (VIEW-ONLY)
Cardiology Follow Up    Patricia Davis  1960  [id number]  [facility] ADRIANE [address]  [phone]    1  Acute ST elevation myocardial infarction (STEMI) due to occlusion of mid portion of right coronary artery [facility])  Ambulatory referral to Cardiology       Interval History:   Ms Karyle Form was admitted to [facility] on 2/26 - 2/28/21 with acute STEMI  And dizzy [facility] emergency room with an MI alert  She was experiencing a 3 day history of progressive chest discomfort  EKG was concerning for inferior STEMI    2/26/21 She was taken for urgent cardiac catheterization which showed  Mid LAD discrete 80% stenosis, 1st diagonal 40% stenosis, 2nd diagonal tubular 60% stenosis, 1st obtuse marginal 50% stenosis  Mid RCA 90% stenosis  Distal RCA tubular 99% stenosis  Successful balloon angioplasty with stent procedure performed on the 90% lesion in the mid RCA  Resolute jose Rx drug-eluting stent  A successful balloon angioplasty with stent procedure was performed on the 90% lesion in the mid RCA  Resolute jose Rx drug-eluting  , , HDL 40,   TTE showed LVEF 60%,   Akinesis of the basal mid inferior wall  Right ventricle size systolic function normal   Trace MR, no evidence of aortic stenosis or regurgitation, trace TR  HgbA1C 12  3  Ms Maggy Tapia is to   Undergo staged PCI to the LAD by Dr Hayden in 2-4 weeks  Follow up with Dr Donato Buitrago in 2 months  Ms Karyle Form admits to shortness of breath with exertion and blurred vision since discharge  Cinebar Fulton is no longer smoking  She admits to occasional left sided chest pain with lying down at night, watching TV and after a walk, pressure 5/10, lasting 5-10 minutes         HPI:  Dyslipidemia   DM2 HgbA1C  Tobacco abuse  Obesity  Patient Active Problem List   Diagnosis    B-complex deficiency    Fibromyalgia    GERD (gastroesophageal reflux disease)    Inflammatory bowel disease (Crohn's disease) ([place] [address] )    Migraine without aura and without status migrainosus, not intractable    Peripheral neuropathy    Tobacco use disorder, continuous    Mixed hyperlipidemia    Hidradenitis suppurativa    Acute ST elevation myocardial infarction (STEMI) due to occlusion of mid portion of right coronary artery ([place] [address] )    Uncontrolled type 2 diabetes mellitus ([place] [address] )    Essential hypertension     Past Medical History:   Diagnosis Date    Coronary artery disease     Crohn's colitis ([place] [address] )     Diabetes mellitus ([place] [address] )     Fibromyalgia     HTN (hypertension)     Hyperlipidemia     Migraine     Myocardial infarction ([place] [address] )      Social History     Socioeconomic History    Marital status: /Civil Union     Spouse name: Not on file    Number of children: Not on file    Years of education: Not on file    Highest education level: Not on file   Occupational History    Not on file   Social Needs    Financial resource strain: Not on file    Food insecurity     Worry: Not on file     Inability: Not on file    Transportation needs     Medical: Not on file     Non-medical: Not on file   Tobacco Use    Smoking status: Former Smoker     Packs/day: 1 00     Types: Cigarettes     Quit date: 2021     Years since quittin 0    Smokeless tobacco: Never Used   Substance and Sexual Activity    Alcohol use: Yes     Frequency: Monthly or less     Drinks per session: 1 or 2     Binge frequency: Never     Comment: Rarely    Drug use: Never    Sexual activity: Not on file   Lifestyle    Physical activity     Days per week: Not on file     Minutes per session: Not on file    Stress: Not on file   Relationships    Social connections     Talks on phone: Not on file     Gets together: Not on file     Attends [employer] service: Not on file     Active member of club or organization: Not on file     Attends meetings of clubs or organizations: Not on file     Relationship status: Not on file    Intimate partner violence     Fear of current or ex partner: Not on file     Emotionally abused: Not on file     Physically abused: Not on file     Forced sexual activity: Not on file   Other Topics Concern    Not on file   Social History Narrative    Not on file      Family History   Problem Relation Age of Onset    Coronary artery disease Mother     Dementia Mother     Heart disease Brother     Alcohol abuse Neg Hx     Substance Abuse Neg Hx      Past Surgical History:   Procedure Laterality Date    CARDIAC CATHETERIZATION      CHOLECYSTECTOMY      CORONARY STENT PLACEMENT      DENTAL SURGERY      [place] teeth extraction    HYSTERECTOMY      TUBAL LIGATION      Bilateral       Current Outpatient Medications:     albuterol (PROVENTIL HFA,VENTOLIN HFA) 90 mcg/act inhaler, Inhale 2 puffs every 6 (six) hours as needed, Disp: , Rfl:     aspirin 81 mg chewable tablet, Chew 1 tablet (81 mg total) daily, Disp: 90 tablet, Rfl: 3    carvedilol (COREG) 12 5 mg tablet, Take 1 tablet (12 5 mg total) by mouth every 12 (twelve) hours, Disp: 30 tablet, Rfl: 5    cholecalciferol (VITAMIN D3) 1,000 units tablet, Take 1 tablet (1,000 Units total) by mouth daily, Disp: 90 tablet, Rfl: 3    Continuous Blood Gluc  (Dexcom G6 ) CRISTINA, Disp 1 , Disp: 1 Device, Rfl: 1    Continuous Blood Gluc Sensor (Dexcom G6 Sensor) MISC, Use 1 every 10 days, Disp: 1 each, Rfl: 1    Continuous Blood Gluc Transmit (Dexcom G6 Transmitter) MISC, Use 1 every 3 months, Disp: 1 each, Rfl: 1    Empagliflozin (Jardiance) 25 MG TABS, Take 1 tablet (25 mg total) by mouth every morning, Disp: 30 tablet, Rfl: 5    insulin aspart (NovoLOG FlexPen) 100 UNIT/ML injection pen, 6 unit before, Disp: , Rfl:     insulin glargine (LANTUS) 100 units/mL subcutaneous injection, Inject 25 Units under the skin daily at bedtime, Disp: 10 mL, Rfl: 0   losartan (COZAAR) 25 mg tablet, Take 1 tablet (25 mg total) by mouth daily, Disp: 30 tablet, Rfl: 5    nicotine (NICODERM CQ) 21 mg/24 hr TD 24 hr patch, Place 1 patch on the skin daily, Disp: 28 patch, Rfl: 3    ondansetron (ZOFRAN-ODT) 4 mg disintegrating tablet, Take 1 tablet (4 mg total) by mouth every 8 (eight) hours as needed for nausea or vomiting, Disp: 12 tablet, Rfl: 0    rosuvastatin (CRESTOR) 20 MG tablet, Take 1 tablet (20 mg total) by mouth daily, Disp: 90 tablet, Rfl: 3    SUMAtriptan (IMITREX) 100 mg tablet, Take 1 tablet (100 mg total) by mouth once as needed for migraine for up to 1 dose, Disp: 10 tablet, Rfl: 0    ticagrelor (BRILINTA) 90 MG, Take 1 tablet (90 mg total) by mouth every 12 (twelve) hours, Disp: 60 tablet, Rfl: 5  Allergies   Allergen Reactions    Penicillins Anaphylaxis    Cefuroxime     Metaxalone     Influenza Vaccines Rash    Keflet [Cephalexin] Rash    Nuts Itching    Sulfa Antibiotics Rash    Sulfamethoxazole-Trimethoprim Hives and Rash    Topiramate Rash       Labs:  Admission on 02/26/2021, Discharged on 02/28/2021   Component Date Value    WBC 02/26/2021 11 29*    RBC 02/26/2021 5 61*    Hemoglobin 02/26/2021 16 2*    Hematocrit 02/26/2021 48 5*    MCV 02/26/2021 87     MCH 02/26/2021 28 9     MCHC 02/26/2021 33 4     RDW 02/26/2021 13 8     MPV 02/26/2021 10 3     Platelets 44/03/2427 284     nRBC 02/26/2021 0     Neutrophils Relative 02/26/2021 65     Immat GRANS % 02/26/2021 0     Lymphocytes Relative 02/26/2021 27     Monocytes Relative 02/26/2021 6     Eosinophils Relative 02/26/2021 2     Basophils Relative 02/26/2021 0     Neutrophils Absolute 02/26/2021 7 31     Immature Grans Absolute 02/26/2021 0 04     Lymphocytes Absolute 02/26/2021 3 01     Monocytes Absolute 02/26/2021 0 65     Eosinophils Absolute 02/26/2021 0 23     Basophils Absolute 02/26/2021 0 05     Sodium 02/26/2021 135*    Potassium 02/26/2021 3 9     Chloride 02/26/2021 104     CO2 02/26/2021 23     ANION GAP 02/26/2021 8     BUN 02/26/2021 11     Creatinine 02/26/2021 0 96     Glucose 02/26/2021 335*    Calcium 02/26/2021 10 0     AST 02/26/2021 12     ALT 02/26/2021 15     Alkaline Phosphatase 02/26/2021 134*    Total Protein 02/26/2021 7 8     Albumin 02/26/2021 3 8     Total Bilirubin 02/26/2021 0 62     eGFR 02/26/2021 65     Troponin I 02/26/2021 <0 02     Cholesterol 02/26/2021 271*    Triglycerides 02/26/2021 250*    HDL, Direct 02/26/2021 40     LDL Calculated 02/26/2021 181*    Non-HDL-Chol (CHOL-HDL) 02/26/2021 231     Magnesium 02/26/2021 1 9     Protime 02/26/2021 13 0     INR 02/26/2021 0 98     PTT 02/26/2021 29     Hemoglobin A1C 02/26/2021 12 3*    EAG 02/26/2021 306     LDL Direct 02/26/2021 188*    Activated Clotting Time,* 02/26/2021 249*    Specimen Type 02/26/2021 VENOUS     Troponin I 02/26/2021 0 76*    POC Glucose 02/26/2021 299*    Troponin I 02/26/2021 1 28*    Creatinine, Ur 02/26/2021 45 8     Microalbum  ,U,Random 02/26/2021 16 9     Microalb Creat Ratio 02/26/2021 37*    Ventricular Rate 02/26/2021 53     Atrial Rate 02/26/2021 53     SC Interval 02/26/2021 154     QRSD Interval 02/26/2021 88     QT Interval 02/26/2021 450     QTC Interval 02/26/2021 423     P Axis 02/26/2021 56     QRS Axis 02/26/2021 51     T Wave Axis 02/26/2021 57     Ventricular Rate 02/26/2021 46     Atrial Rate 02/26/2021 46     SC Interval 02/26/2021 130     QRSD Interval 02/26/2021 86     QT Interval 02/26/2021 490     QTC Interval 02/26/2021 428     P Axis 02/26/2021 41     QRS Axis 02/26/2021 43     T Wave Axis 02/26/2021 80     POC Glucose 02/26/2021 256*    Troponin I 02/26/2021 2 45*    POC Glucose 02/26/2021 103     Troponin I 02/26/2021 2 47*    POC Glucose 02/27/2021 119     WBC 02/27/2021 10 08     RBC 02/27/2021 4 62     Hemoglobin 02/27/2021 13 4     Hematocrit 02/27/2021 40 9     MCV 02/27/2021 89     MCH 02/27/2021 29 0     MCHC 02/27/2021 32 8     RDW 02/27/2021 14 0     Platelets 80/64/7584 232     MPV 02/27/2021 10 5     Sodium 02/27/2021 143     Potassium 02/27/2021 3 6     Chloride 02/27/2021 113*    CO2 02/27/2021 25     ANION GAP 02/27/2021 5     BUN 02/27/2021 7     Creatinine 02/27/2021 0 67     Glucose 02/27/2021 127     Calcium 02/27/2021 9 1     eGFR 02/27/2021 97     Magnesium 02/27/2021 1 7     Troponin I 02/27/2021 2 46*    Vit D, 25-Hydroxy 02/27/2021 12 5*    Vitamin B-12 02/27/2021 253     Folate 02/27/2021 18 2*    Total Bilirubin 02/27/2021 0 61     Bilirubin, Direct 02/27/2021 0 13     Alkaline Phosphatase 02/27/2021 107     AST 02/27/2021 18     ALT 02/27/2021 11*    Total Protein 02/27/2021 6 0*    Albumin 02/27/2021 2 9*    POC Glucose 02/27/2021 129     Creatinine, Ur 02/27/2021 143 0     Microalbum  ,U,Random 02/27/2021 10 7     Microalb Creat Ratio 02/27/2021 7     POC Glucose 02/27/2021 202*    POC Glucose 02/27/2021 149*    Ventricular Rate 02/27/2021 56     Atrial Rate 02/27/2021 56     MO Interval 02/27/2021 150     QRSD Interval 02/27/2021 86     QT Interval 02/27/2021 456     QTC Interval 02/27/2021 440     P Axis 02/27/2021 68     QRS Axis 02/27/2021 50     T Wave Axis 02/27/2021 -12     POC Glucose 02/27/2021 102     WBC 02/28/2021 9 22     RBC 02/28/2021 4 72     Hemoglobin 02/28/2021 13 8     Hematocrit 02/28/2021 42 0     MCV 02/28/2021 89     MCH 02/28/2021 29 2     MCHC 02/28/2021 32 9     RDW 02/28/2021 14 0     MPV 02/28/2021 10 3     Platelets 55/68/2263 218     nRBC 02/28/2021 0     Neutrophils Relative 02/28/2021 62     Immat GRANS % 02/28/2021 0     Lymphocytes Relative 02/28/2021 30     Monocytes Relative 02/28/2021 6     Eosinophils Relative 02/28/2021 2     Basophils Relative 02/28/2021 0     Neutrophils Absolute 02/28/2021 5 63     Immature Grans Absolute 02/28/2021 0 03     Lymphocytes Absolute 02/28/2021 2 74     Monocytes Absolute 02/28/2021 0 57     Eosinophils Absolute 02/28/2021 0 21     Basophils Absolute 02/28/2021 0 04     Sodium 02/28/2021 142     Potassium 02/28/2021 3 5     Chloride 02/28/2021 112*    CO2 02/28/2021 25     ANION GAP 02/28/2021 5     BUN 02/28/2021 9     Creatinine 02/28/2021 0 70     Glucose 02/28/2021 101     Calcium 02/28/2021 9 4     Corrected Calcium 02/28/2021 10 1     AST 02/28/2021 19     ALT 02/28/2021 12     Alkaline Phosphatase 02/28/2021 119*    Total Protein 02/28/2021 6 7     Albumin 02/28/2021 3 1*    Total Bilirubin 02/28/2021 0 64     eGFR 02/28/2021 95     Magnesium 02/28/2021 1 8     POC Glucose 02/28/2021 98     POC Glucose 02/28/2021 118      Imaging: No results found  Review of Systems:  Review of Systems    Physical Exam:  Physical Exam  Vitals signs reviewed  Constitutional:       Appearance: She is obese  HENT:      Head: Normocephalic  Eyes:      Pupils: Pupils are equal, round, and reactive to light  Neck:      Musculoskeletal: Normal range of motion and neck supple  Cardiovascular:      Rate and Rhythm: Normal rate and regular rhythm  Pulses: Normal pulses  Heart sounds: Normal heart sounds  Pulmonary:      Effort: Pulmonary effort is normal       Breath sounds: Normal breath sounds  Abdominal:      General: Bowel sounds are normal       Palpations: Abdomen is soft  Musculoskeletal: Normal range of motion  Skin:     General: Skin is warm and dry  Capillary Refill: Capillary refill takes less than 2 seconds  Comments: Right radial cath site with ecchymosis    Neurological:      General: No focal deficit present  Mental Status: She is alert and oriented to person, place, and time     Psychiatric:         Mood and Affect: Mood normal          Behavior: Behavior normal          Discussion/Summary:  1  2/26/21 sp Successful balloon angioplasty with stent procedure performed on the 90% lesion in the mid RCA  Resolute jose Rx drug-eluting stent  A successful balloon angioplasty with stent procedure was performed on the 90% lesion in the mid RCA  Resolute jose Rx drug-eluting  Continue on aspirin 81 mg daily, Brilinta 90 mg b i d  Kwame Hardwick is aware not stop ASA or Brilinta for any reason  Continue on  Coreg 12 5 mg b i d ,  Losartan 25 mg daily, Crestor 20mg daily  Dyspnea possibly due to CAD Mid LAD 80% and or Brilinta  Evaluate after follow up intervention, heart healthy diet   2  CAD, Mid LAD discrete 80% stenosis, plan staged PCI to mid LAD on 3/25/21  CBC, BMP ordered to be done 5 days prior to procedure  NTG 0 4mg SL ordered with instructions on the proper use for CP  If CP worsens presents to the ED  3  Hypercholesterolemia 2/26/21 , , HDL 40, , continue on Crestor 20mg daily, heart healthy diet  4  DM2 HgbA1C 12 3 on 2/26/21, continues on Lantus 25 units at bedtime, Novolog 6 units and Jardiance 25mg daily  Follow up with Endocrinology   5  Tobacco abuse- continues with smoking cessation  6   Obesity BMI 31 73, calorie control

## 2021-03-08 NOTE — LETTER
Diabetic Eye Exam Form    Date Requested: 21  Patient: Justyna Juárez  Patient : 1960   Referring Provider: Perla Chavis PA-C    Dilated Retinal Exam, Optomap-Iris Exam, or Fundus Photography Done         Yes ([place] one above)         No     Date of Diabetic Eye Exam ______________________________  Left Eye      Exam did show retinopathy    Exam did not show retinopathy         Mild       Moderate       None       Proliferative       Severe     Right Eye     Exam did show retinopathy    Exam did not show retinopathy         Mild       Moderate       None       Proliferative       Severe     Comments __________________________________________________________    Practice Providing Exam ______________________________________________    Exam Performed By (print name) _______________________________________      Provider Signature ___________________________________________________      These reports are needed for  compliance    Please fax this completed form and a copy of the Diabetic Eye Exam report to our office located at [place] [address] as soon as possible to [phone] attention Justyn Yoon: Phone [phone]    We thank you for your assistance in treating our mutual patient

## 2021-03-08 NOTE — TELEPHONE ENCOUNTER
----- Message from Nila Samaniego sent at 3/5/2021 11:14 AM EST -----  Regarding: DM eye exam  03/05/21 11:14 AM    Hello, our patient Charlie Santos has had Diabetic Eye Exam completed/performed  Please assist in updating the patient chart by making an External outreach to [facility] located in [place], [place]  The date of service is 2020      Thank you,  Cat Hoang  [facility]

## 2021-03-09 ENCOUNTER — IMMUNIZATIONS (OUTPATIENT)
Dept: FAMILY MEDICINE CLINIC | Facility: HOSPITAL | Age: 61
End: 2021-03-09

## 2021-03-09 DIAGNOSIS — Z23 ENCOUNTER FOR IMMUNIZATION: Primary | ICD-10-CM

## 2021-03-09 PROCEDURE — 0002A SARS-COV-2 / COVID-19 MRNA VACCINE (PFIZER-BIONTECH) 30 MCG: CPT

## 2021-03-09 PROCEDURE — 91300 SARS-COV-2 / COVID-19 MRNA VACCINE (PFIZER-BIONTECH) 30 MCG: CPT

## 2021-03-10 ENCOUNTER — TELEPHONE (OUTPATIENT)
Dept: CARDIOLOGY CLINIC | Facility: CLINIC | Age: 61
End: 2021-03-10

## 2021-03-10 DIAGNOSIS — I21.11 ACUTE ST ELEVATION MYOCARDIAL INFARCTION (STEMI) DUE TO OCCLUSION OF MID PORTION OF RIGHT CORONARY ARTERY (HCC): ICD-10-CM

## 2021-03-10 DIAGNOSIS — I10 ESSENTIAL HYPERTENSION: Primary | ICD-10-CM

## 2021-03-10 NOTE — TELEPHONE ENCOUNTER
No, she cannot hold Brilinta for the dental procedure  This must be continued    Thank you Amauri Corona

## 2021-03-10 NOTE — TELEPHONE ENCOUNTER
Pt called asking if she would need to hold [address] for the dental correction of a cracked tooth  This just occurred today, she is being proactive as she just contacted the dentist and is waiting for an appt date  I did review your last OV note with her  I did also explain the dentist would need to evaluate before giving hold instructions  Pt feels the tooth may need to be pulled

## 2021-03-10 NOTE — TELEPHONE ENCOUNTER
He does not need auth for his PCI [id number] on 3/25/21 at [facility] per Jordana Riggs at [facility]  Ref# [id number]

## 2021-03-11 ENCOUNTER — TELEPHONE (OUTPATIENT)
Dept: ENDOCRINOLOGY | Facility: CLINIC | Age: 61
End: 2021-03-11

## 2021-03-11 NOTE — TELEPHONE ENCOUNTER
Filed out prior [facility] form for [employer] for [employer] and faxed

## 2021-03-12 NOTE — TELEPHONE ENCOUNTER
As a follow-up, a second attempt has been made for outreach via fax, please see Contacts section for details      Thank you  Sofiya Hall

## 2021-03-16 ENCOUNTER — TELEPHONE (OUTPATIENT)
Dept: FAMILY MEDICINE CLINIC | Facility: CLINIC | Age: 61
End: 2021-03-16

## 2021-03-16 ENCOUNTER — TELEPHONE (OUTPATIENT)
Dept: CARDIOLOGY CLINIC | Facility: CLINIC | Age: 61
End: 2021-03-16

## 2021-03-16 DIAGNOSIS — M25.552 PAIN OF BOTH HIP JOINTS: Primary | ICD-10-CM

## 2021-03-16 DIAGNOSIS — Z78.9 STATIN NOT TOLERATED: ICD-10-CM

## 2021-03-16 DIAGNOSIS — M25.551 PAIN OF BOTH HIP JOINTS: Primary | ICD-10-CM

## 2021-03-16 NOTE — TELEPHONE ENCOUNTER
Patient contacted office with complaints of 1 week history of bilateral hip, shoulder and knee pain  She has minimal symptoms at baseline but over the past week she has been experiencing postural muscle weakness; she is unable to get up from the bathroom without using props  She has fibromyalgia at baseline but this is a far cry from her usual symptoms      PLAN:  -stop rosuvastatin for the next month  -ordered for CPK/LFTs ASAP, if elevated will repeat in about a month as long as her symptoms improves  -if symptoms resolve or return to baseline, would resume at 5 mg q48H or trial another hydrophilic lipid-lowering agent  -failure of multiple statin and alternate agent would be required prior to PCSK9 consideration

## 2021-03-16 NOTE — TELEPHONE ENCOUNTER
Patient is calling she had an apt with you for a follow up from hospital and there was a discussion regarding her joint pain  She is asking if you can prescribe her something for her joint pain?     Please send to [facility]

## 2021-03-16 NOTE — TELEPHONE ENCOUNTER
After she got out of the hospital it really got bad like 100% can not stand up by herself has to push up

## 2021-03-16 NOTE — TELEPHONE ENCOUNTER
P/c'd states she saw her PCP who advised to call us  She is having a lot of joint pain from Crestor        Please advise

## 2021-03-16 NOTE — TELEPHONE ENCOUNTER
I remembering discussing her right wrist pain  Otherwise I am not sure what exactly she is talking about  Did this joint pain start after she began the crestor?

## 2021-03-17 ENCOUNTER — APPOINTMENT (OUTPATIENT)
Dept: LAB | Facility: CLINIC | Age: 61
End: 2021-03-17
Payer: COMMERCIAL

## 2021-03-17 DIAGNOSIS — M25.551 PAIN OF BOTH HIP JOINTS: ICD-10-CM

## 2021-03-17 DIAGNOSIS — Z78.9 STATIN NOT TOLERATED: ICD-10-CM

## 2021-03-17 DIAGNOSIS — I10 ESSENTIAL HYPERTENSION: ICD-10-CM

## 2021-03-17 DIAGNOSIS — M25.552 PAIN OF BOTH HIP JOINTS: ICD-10-CM

## 2021-03-17 DIAGNOSIS — I21.11 ACUTE ST ELEVATION MYOCARDIAL INFARCTION (STEMI) DUE TO OCCLUSION OF MID PORTION OF RIGHT CORONARY ARTERY (HCC): ICD-10-CM

## 2021-03-17 LAB
ALBUMIN SERPL BCP-MCNC: 3.7 G/DL (ref 3.5–5)
ALP SERPL-CCNC: 108 U/L (ref 46–116)
ALT SERPL W P-5'-P-CCNC: 12 U/L (ref 12–78)
ANION GAP SERPL CALCULATED.3IONS-SCNC: 6 MMOL/L (ref 4–13)
AST SERPL W P-5'-P-CCNC: 10 U/L (ref 5–45)
BASOPHILS # BLD AUTO: 0.06 THOUSANDS/ΜL (ref 0–0.1)
BASOPHILS NFR BLD AUTO: 1 % (ref 0–1)
BILIRUB DIRECT SERPL-MCNC: 0.12 MG/DL (ref 0–0.2)
BILIRUB SERPL-MCNC: 0.42 MG/DL (ref 0.2–1)
BUN SERPL-MCNC: 19 MG/DL (ref 5–25)
CALCIUM SERPL-MCNC: 9.5 MG/DL (ref 8.3–10.1)
CHLORIDE SERPL-SCNC: 110 MMOL/L (ref 100–108)
CK SERPL-CCNC: 33 U/L (ref 26–192)
CO2 SERPL-SCNC: 24 MMOL/L (ref 21–32)
CREAT SERPL-MCNC: 0.81 MG/DL (ref 0.6–1.3)
EOSINOPHIL # BLD AUTO: 0.44 THOUSAND/ΜL (ref 0–0.61)
EOSINOPHIL NFR BLD AUTO: 5 % (ref 0–6)
ERYTHROCYTE [DISTWIDTH] IN BLOOD BY AUTOMATED COUNT: 14.6 % (ref 11.6–15.1)
GFR SERPL CREATININE-BSD FRML MDRD: 80 ML/MIN/1.73SQ M
GLUCOSE P FAST SERPL-MCNC: 102 MG/DL (ref 65–99)
HCT VFR BLD AUTO: 43.3 % (ref 34.8–46.1)
HGB BLD-MCNC: 13.9 G/DL (ref 11.5–15.4)
IMM GRANULOCYTES # BLD AUTO: 0.03 THOUSAND/UL (ref 0–0.2)
IMM GRANULOCYTES NFR BLD AUTO: 0 % (ref 0–2)
LYMPHOCYTES # BLD AUTO: 2.81 THOUSANDS/ΜL (ref 0.6–4.47)
LYMPHOCYTES NFR BLD AUTO: 31 % (ref 14–44)
MCH RBC QN AUTO: 29.3 PG (ref 26.8–34.3)
MCHC RBC AUTO-ENTMCNC: 32.1 G/DL (ref 31.4–37.4)
MCV RBC AUTO: 91 FL (ref 82–98)
MONOCYTES # BLD AUTO: 0.67 THOUSAND/ΜL (ref 0.17–1.22)
MONOCYTES NFR BLD AUTO: 7 % (ref 4–12)
NEUTROPHILS # BLD AUTO: 5.07 THOUSANDS/ΜL (ref 1.85–7.62)
NEUTS SEG NFR BLD AUTO: 56 % (ref 43–75)
NRBC BLD AUTO-RTO: 0 /100 WBCS
PLATELET # BLD AUTO: 310 THOUSANDS/UL (ref 149–390)
PMV BLD AUTO: 9.3 FL (ref 8.9–12.7)
POTASSIUM SERPL-SCNC: 3.9 MMOL/L (ref 3.5–5.3)
PROT SERPL-MCNC: 7.6 G/DL (ref 6.4–8.2)
RBC # BLD AUTO: 4.74 MILLION/UL (ref 3.81–5.12)
SODIUM SERPL-SCNC: 140 MMOL/L (ref 136–145)
WBC # BLD AUTO: 9.08 THOUSAND/UL (ref 4.31–10.16)

## 2021-03-17 PROCEDURE — 82550 ASSAY OF CK (CPK): CPT

## 2021-03-17 PROCEDURE — 82248 BILIRUBIN DIRECT: CPT

## 2021-03-17 PROCEDURE — 85025 COMPLETE CBC W/AUTO DIFF WBC: CPT | Performed by: NURSE PRACTITIONER

## 2021-03-17 PROCEDURE — 36415 COLL VENOUS BLD VENIPUNCTURE: CPT | Performed by: NURSE PRACTITIONER

## 2021-03-17 PROCEDURE — 80053 COMPREHEN METABOLIC PANEL: CPT

## 2021-03-18 ENCOUNTER — TELEPHONE (OUTPATIENT)
Dept: CARDIOLOGY CLINIC | Facility: CLINIC | Age: 61
End: 2021-03-18

## 2021-03-18 ENCOUNTER — NURSE TRIAGE (OUTPATIENT)
Dept: OTHER | Facility: OTHER | Age: 61
End: 2021-03-18

## 2021-03-18 DIAGNOSIS — M25.50 ARTHRALGIA, UNSPECIFIED JOINT: Primary | ICD-10-CM

## 2021-03-18 RX ORDER — TRAMADOL HYDROCHLORIDE 50 MG/1
50 TABLET ORAL EVERY 8 HOURS PRN
Qty: 10 TABLET | Refills: 0 | Status: SHIPPED | OUTPATIENT
Start: 2021-03-18 | End: 2021-04-28 | Stop reason: ALTCHOICE

## 2021-03-18 NOTE — TELEPHONE ENCOUNTER
Answer Assessment - Initial Assessment Questions  1  NAME of MEDICATION: "What medicine are you calling about?"      Tramadol    2  QUESTION: "What is your question?"      "My cardiologist told me to contact my PCP for a Tramadol rx because he doesn't want me taking motrin because I am on a blood thinner  This has been going on for 3 days now that I have joint pain from the crestor they had me on    I stopped it 2 days ago but the pain is still there "    Protocols used: MEDICATION QUESTION CALL-ADULT-

## 2021-03-18 NOTE — PROGRESS NOTES
Patient started on Crestor by Cardiology   Started with multiple joint complaints   Called Cardiology in asked if she could take Motrin along with Tylenol   They told her no because she was on anticoagulant and told him to call us for tramadol     Patient actually is not on tramadol only baby aspirin  However she should not have nonsteroidal secondary to hypertension, diabetes, and GERD   I did call in tramadol 1   Tablet 2 to 3 times a day if needed, only 10 pill Called in  If her joint pain does not go away in the next couple days with stopping Crestor than a probably is not the Crestor

## 2021-03-18 NOTE — TELEPHONE ENCOUNTER
As a final attempt, a third outreach has been made via telephone call  Please see Contacts section for details  This encounter will be closed and completed by end of day  Should we receive the requested information because of previous outreach attempts, the requested patient's chart will be updated appropriately       Thank you  Damian Lucero

## 2021-03-18 NOTE — TELEPHONE ENCOUNTER
Regarding: joint pain   ----- Message from Napoleon Gibbons sent at 3/18/2021  5:07 PM EDT -----  " I am having bad joint pain, I was told to call cardiology and cardiology just called me back was told to contact my PCP again for the pain "

## 2021-03-18 NOTE — TELEPHONE ENCOUNTER
Reason for Disposition   [1] Request for URGENT new prescription or refill of "essential" medication (i e , likelihood of harm to patient if not taken) AND [2] triager unable to fill per unit policy    Protocols used: MEDICATION QUESTION CALL-ADULT-

## 2021-03-18 NOTE — TELEPHONE ENCOUNTER
Pt is in a lot of joint pain  Tylenol extra strength is not working  She is having a hard time walking and standing  Would she be able to try Ibuprofen? PCP told her to call us  She did said she had booster Covid vaccine last week        Please advise

## 2021-03-21 DIAGNOSIS — E11.65 TYPE 2 DIABETES MELLITUS WITH HYPERGLYCEMIA, WITHOUT LONG-TERM CURRENT USE OF INSULIN (HCC): ICD-10-CM

## 2021-03-21 DIAGNOSIS — I25.10 CORONARY ARTERY DISEASE: ICD-10-CM

## 2021-03-21 DIAGNOSIS — E78.2 MODERATE MIXED HYPERLIPIDEMIA NOT REQUIRING STATIN THERAPY: ICD-10-CM

## 2021-03-21 DIAGNOSIS — I21.19 INFERIOR MI (HCC): ICD-10-CM

## 2021-03-21 DIAGNOSIS — I21.11 ACUTE ST ELEVATION MYOCARDIAL INFARCTION (STEMI) DUE TO OCCLUSION OF MID PORTION OF RIGHT CORONARY ARTERY (HCC): ICD-10-CM

## 2021-03-21 DIAGNOSIS — I21.11 ACUTE ST ELEVATION MYOCARDIAL INFARCTION (STEMI) DUE TO OCCLUSION OF DISTAL PORTION OF RIGHT CORONARY ARTERY (HCC): ICD-10-CM

## 2021-03-22 RX ORDER — LOSARTAN POTASSIUM 25 MG/1
25 TABLET ORAL DAILY
Qty: 90 TABLET | Refills: 3 | Status: SHIPPED | OUTPATIENT
Start: 2021-03-22 | End: 2021-03-28 | Stop reason: SDUPTHER

## 2021-03-24 ENCOUNTER — TELEPHONE (OUTPATIENT)
Dept: SURGERY | Facility: HOSPITAL | Age: 61
End: 2021-03-24

## 2021-03-24 PROBLEM — I25.10 CORONARY ARTERY DISEASE INVOLVING NATIVE CORONARY ARTERY: Chronic | Status: ACTIVE | Noted: 2021-03-24

## 2021-03-24 RX ORDER — SODIUM CHLORIDE 9 MG/ML
125 INJECTION, SOLUTION INTRAVENOUS CONTINUOUS
Status: CANCELLED | OUTPATIENT
Start: 2021-03-24

## 2021-03-24 RX ORDER — ASPIRIN 81 MG/1
324 TABLET, CHEWABLE ORAL ONCE
Status: CANCELLED | OUTPATIENT
Start: 2021-03-24 | End: 2021-03-24

## 2021-03-25 ENCOUNTER — HOSPITAL ENCOUNTER (OUTPATIENT)
Dept: NON INVASIVE DIAGNOSTICS | Facility: HOSPITAL | Age: 61
Discharge: HOME | End: 2021-03-26
Attending: INTERNAL MEDICINE | Admitting: INTERNAL MEDICINE
Payer: COMMERCIAL

## 2021-03-25 DIAGNOSIS — I21.11 ACUTE ST ELEVATION MYOCARDIAL INFARCTION (STEMI) DUE TO OCCLUSION OF MID PORTION OF RIGHT CORONARY ARTERY (HCC): ICD-10-CM

## 2021-03-25 DIAGNOSIS — I10 ESSENTIAL HYPERTENSION: ICD-10-CM

## 2021-03-25 DIAGNOSIS — I25.10 CAD S/P PERCUTANEOUS CORONARY ANGIOPLASTY: Primary | ICD-10-CM

## 2021-03-25 DIAGNOSIS — Z98.61 CAD S/P PERCUTANEOUS CORONARY ANGIOPLASTY: Primary | ICD-10-CM

## 2021-03-25 LAB
ATRIAL RATE: 52 BPM
ATRIAL RATE: 61 BPM
GLUCOSE SERPL-MCNC: 141 MG/DL (ref 65–140)
KCT BLD-ACNC: 416 SEC (ref 89–137)
P AXIS: 48 DEGREES
P AXIS: 50 DEGREES
PR INTERVAL: 150 MS
PR INTERVAL: 160 MS
QRS AXIS: 24 DEGREES
QRS AXIS: 45 DEGREES
QRSD INTERVAL: 90 MS
QRSD INTERVAL: 96 MS
QT INTERVAL: 428 MS
QT INTERVAL: 482 MS
QTC INTERVAL: 430 MS
QTC INTERVAL: 448 MS
SPECIMEN SOURCE: ABNORMAL
T WAVE AXIS: 49 DEGREES
T WAVE AXIS: 55 DEGREES
VENTRICULAR RATE: 52 BPM
VENTRICULAR RATE: 61 BPM

## 2021-03-25 PROCEDURE — 93571 IV DOP VEL&/PRESS C FLO 1ST: CPT | Performed by: INTERNAL MEDICINE

## 2021-03-25 PROCEDURE — 85347 COAGULATION TIME ACTIVATED: CPT

## 2021-03-25 PROCEDURE — C1769 GUIDE WIRE: HCPCS | Performed by: INTERNAL MEDICINE

## 2021-03-25 PROCEDURE — C1894 INTRO/SHEATH, NON-LASER: HCPCS | Performed by: INTERNAL MEDICINE

## 2021-03-25 PROCEDURE — 93010 ELECTROCARDIOGRAM REPORT: CPT | Performed by: INTERNAL MEDICINE

## 2021-03-25 PROCEDURE — C1874 STENT, COATED/COV W/DEL SYS: HCPCS

## 2021-03-25 PROCEDURE — NC001 PR NO CHARGE: Performed by: INTERNAL MEDICINE

## 2021-03-25 PROCEDURE — 82948 REAGENT STRIP/BLOOD GLUCOSE: CPT

## 2021-03-25 PROCEDURE — C1887 CATHETER, GUIDING: HCPCS | Performed by: INTERNAL MEDICINE

## 2021-03-25 PROCEDURE — C9600 PERC DRUG-EL COR STENT SING: HCPCS | Performed by: INTERNAL MEDICINE

## 2021-03-25 PROCEDURE — 93454 CORONARY ARTERY ANGIO S&I: CPT | Performed by: INTERNAL MEDICINE

## 2021-03-25 PROCEDURE — 99152 MOD SED SAME PHYS/QHP 5/>YRS: CPT | Performed by: INTERNAL MEDICINE

## 2021-03-25 PROCEDURE — 93005 ELECTROCARDIOGRAM TRACING: CPT

## 2021-03-25 PROCEDURE — 92928 PRQ TCAT PLMT NTRAC ST 1 LES: CPT | Performed by: INTERNAL MEDICINE

## 2021-03-25 PROCEDURE — C1760 CLOSURE DEV, VASC: HCPCS | Performed by: INTERNAL MEDICINE

## 2021-03-25 RX ORDER — INSULIN GLARGINE 100 [IU]/ML
25 INJECTION, SOLUTION SUBCUTANEOUS
Status: DISCONTINUED | OUTPATIENT
Start: 2021-03-25 | End: 2021-03-26 | Stop reason: HOSPADM

## 2021-03-25 RX ORDER — NITROGLYCERIN 0.4 MG/1
0.4 TABLET SUBLINGUAL
Status: DISCONTINUED | OUTPATIENT
Start: 2021-03-25 | End: 2021-03-26 | Stop reason: HOSPADM

## 2021-03-25 RX ORDER — ASPIRIN 81 MG/1
81 TABLET, CHEWABLE ORAL DAILY
Status: DISCONTINUED | OUTPATIENT
Start: 2021-03-26 | End: 2021-03-26 | Stop reason: HOSPADM

## 2021-03-25 RX ORDER — ACETAMINOPHEN 325 MG/1
650 TABLET ORAL EVERY 4 HOURS PRN
Status: DISCONTINUED | OUTPATIENT
Start: 2021-03-25 | End: 2021-03-26 | Stop reason: HOSPADM

## 2021-03-25 RX ORDER — ALBUTEROL SULFATE 90 UG/1
2 AEROSOL, METERED RESPIRATORY (INHALATION) EVERY 6 HOURS PRN
Status: DISCONTINUED | OUTPATIENT
Start: 2021-03-25 | End: 2021-03-26 | Stop reason: HOSPADM

## 2021-03-25 RX ORDER — CARVEDILOL 12.5 MG/1
12.5 TABLET ORAL EVERY 12 HOURS SCHEDULED
Status: DISCONTINUED | OUTPATIENT
Start: 2021-03-25 | End: 2021-03-26 | Stop reason: HOSPADM

## 2021-03-25 RX ORDER — NICOTINE 21 MG/24HR
1 PATCH, TRANSDERMAL 24 HOURS TRANSDERMAL DAILY
Status: DISCONTINUED | OUTPATIENT
Start: 2021-03-25 | End: 2021-03-26 | Stop reason: HOSPADM

## 2021-03-25 RX ORDER — LOSARTAN POTASSIUM 25 MG/1
25 TABLET ORAL DAILY
Status: DISCONTINUED | OUTPATIENT
Start: 2021-03-26 | End: 2021-03-26 | Stop reason: HOSPADM

## 2021-03-25 RX ORDER — ASPIRIN 81 MG/1
324 TABLET, CHEWABLE ORAL ONCE
Status: COMPLETED | OUTPATIENT
Start: 2021-03-25 | End: 2021-03-25

## 2021-03-25 RX ORDER — OXYCODONE HYDROCHLORIDE AND ACETAMINOPHEN 5; 325 MG/1; MG/1
1 TABLET ORAL EVERY 6 HOURS PRN
Status: DISCONTINUED | OUTPATIENT
Start: 2021-03-25 | End: 2021-03-26 | Stop reason: HOSPADM

## 2021-03-25 RX ORDER — HEPARIN SODIUM 1000 [USP'U]/ML
INJECTION, SOLUTION INTRAVENOUS; SUBCUTANEOUS CODE/TRAUMA/SEDATION MEDICATION
Status: COMPLETED | OUTPATIENT
Start: 2021-03-25 | End: 2021-03-25

## 2021-03-25 RX ORDER — FENTANYL CITRATE 50 UG/ML
INJECTION, SOLUTION INTRAMUSCULAR; INTRAVENOUS CODE/TRAUMA/SEDATION MEDICATION
Status: COMPLETED | OUTPATIENT
Start: 2021-03-25 | End: 2021-03-25

## 2021-03-25 RX ORDER — TRAMADOL HYDROCHLORIDE 50 MG/1
50 TABLET ORAL EVERY 8 HOURS PRN
Status: DISCONTINUED | OUTPATIENT
Start: 2021-03-25 | End: 2021-03-26 | Stop reason: HOSPADM

## 2021-03-25 RX ORDER — MIDAZOLAM HYDROCHLORIDE 2 MG/2ML
INJECTION, SOLUTION INTRAMUSCULAR; INTRAVENOUS CODE/TRAUMA/SEDATION MEDICATION
Status: COMPLETED | OUTPATIENT
Start: 2021-03-25 | End: 2021-03-25

## 2021-03-25 RX ORDER — ACETAMINOPHEN 500 MG
1000 TABLET ORAL EVERY 6 HOURS PRN
COMMUNITY

## 2021-03-25 RX ORDER — SODIUM CHLORIDE 9 MG/ML
125 INJECTION, SOLUTION INTRAVENOUS CONTINUOUS
Status: DISCONTINUED | OUTPATIENT
Start: 2021-03-25 | End: 2021-03-26 | Stop reason: HOSPADM

## 2021-03-25 RX ORDER — SODIUM CHLORIDE 9 MG/ML
200 INJECTION, SOLUTION INTRAVENOUS CONTINUOUS
Status: DISPENSED | OUTPATIENT
Start: 2021-03-25 | End: 2021-03-25

## 2021-03-25 RX ORDER — ONDANSETRON 2 MG/ML
4 INJECTION INTRAMUSCULAR; INTRAVENOUS EVERY 6 HOURS PRN
Status: DISCONTINUED | OUTPATIENT
Start: 2021-03-25 | End: 2021-03-26 | Stop reason: HOSPADM

## 2021-03-25 RX ORDER — FENTANYL CITRATE 50 UG/ML
25 INJECTION, SOLUTION INTRAMUSCULAR; INTRAVENOUS ONCE
Status: COMPLETED | OUTPATIENT
Start: 2021-03-25 | End: 2021-03-25

## 2021-03-25 RX ORDER — NITROGLYCERIN 20 MG/100ML
INJECTION INTRAVENOUS CODE/TRAUMA/SEDATION MEDICATION
Status: COMPLETED | OUTPATIENT
Start: 2021-03-25 | End: 2021-03-25

## 2021-03-25 RX ORDER — LIDOCAINE HYDROCHLORIDE 10 MG/ML
INJECTION, SOLUTION EPIDURAL; INFILTRATION; INTRACAUDAL; PERINEURAL CODE/TRAUMA/SEDATION MEDICATION
Status: COMPLETED | OUTPATIENT
Start: 2021-03-25 | End: 2021-03-25

## 2021-03-25 RX ADMIN — LIDOCAINE HYDROCHLORIDE 10 ML: 10 INJECTION, SOLUTION EPIDURAL; INFILTRATION; INTRACAUDAL; PERINEURAL at 08:35

## 2021-03-25 RX ADMIN — ACETAMINOPHEN 650 MG: 325 TABLET, FILM COATED ORAL at 16:50

## 2021-03-25 RX ADMIN — CARVEDILOL 12.5 MG: 12.5 TABLET, FILM COATED ORAL at 16:48

## 2021-03-25 RX ADMIN — FENTANYL CITRATE 50 MCG: 50 INJECTION INTRAMUSCULAR; INTRAVENOUS at 09:05

## 2021-03-25 RX ADMIN — MIDAZOLAM 2 MG: 1 INJECTION INTRAMUSCULAR; INTRAVENOUS at 08:34

## 2021-03-25 RX ADMIN — SODIUM CHLORIDE 125 ML/HR: 0.9 INJECTION, SOLUTION INTRAVENOUS at 08:18

## 2021-03-25 RX ADMIN — FENTANYL CITRATE 50 MCG: 50 INJECTION INTRAMUSCULAR; INTRAVENOUS at 08:54

## 2021-03-25 RX ADMIN — CARVEDILOL 12.5 MG: 12.5 TABLET, FILM COATED ORAL at 21:57

## 2021-03-25 RX ADMIN — HEPARIN SODIUM 10000 UNITS: 1000 INJECTION INTRAVENOUS; SUBCUTANEOUS at 08:40

## 2021-03-25 RX ADMIN — TICAGRELOR 90 MG: 90 TABLET ORAL at 21:56

## 2021-03-25 RX ADMIN — ASPIRIN 81 MG 243 MG: 81 TABLET ORAL at 08:19

## 2021-03-25 RX ADMIN — IOHEXOL 55 ML: 350 INJECTION, SOLUTION INTRAVENOUS at 09:13

## 2021-03-25 RX ADMIN — INSULIN GLARGINE 25 UNITS: 100 INJECTION, SOLUTION SUBCUTANEOUS at 21:57

## 2021-03-25 RX ADMIN — FENTANYL CITRATE 25 MCG: 50 INJECTION INTRAMUSCULAR; INTRAVENOUS at 13:34

## 2021-03-25 RX ADMIN — FENTANYL CITRATE 50 MCG: 50 INJECTION INTRAMUSCULAR; INTRAVENOUS at 08:38

## 2021-03-25 RX ADMIN — Medication 200 MCG: at 09:00

## 2021-03-25 RX ADMIN — FENTANYL CITRATE 50 MCG: 50 INJECTION INTRAMUSCULAR; INTRAVENOUS at 08:33

## 2021-03-25 RX ADMIN — Medication 200 MCG: at 08:58

## 2021-03-25 RX ADMIN — MIDAZOLAM 1 MG: 1 INJECTION INTRAMUSCULAR; INTRAVENOUS at 08:54

## 2021-03-25 RX ADMIN — OXYCODONE HYDROCHLORIDE AND ACETAMINOPHEN 1 TABLET: 5; 325 TABLET ORAL at 19:05

## 2021-03-25 NOTE — INTERVAL H&P NOTE
H&P reviewed  After examining the patient, I find no changed to the H&P since it had been written  Patient for staged PCI to LAD; risks/benefits explained, patient wishes to proceed  Rest as per procedure  /62 (BP Location: Right arm)   Pulse 66   Temp 98 1 °F (36 7 °C) (Oral)   Resp 18   Ht 5' 7" (1 702 m)   Wt 94 3 kg (208 lb)   SpO2 96%   BMI 32 58 kg/m²     Patient re-evaluated   Accept as history and physical     Edgar Jenkins MD

## 2021-03-25 NOTE — DISCHARGE INSTRUCTIONS
1  Please see the post cardiac catheterization dishcarge instructions  No heavy lifting, greater than 10 lbs  or strenuous  activity for 48 hrs  2 Remove band aid tomorrow  Shower and wash area- groin gently with soap and water- beginning tomorrow  Rinse and pat dry  Apply new water seal band aid  Repeat this process for 5 days  No powders, creams lotions or antibiotic ointments  for 5 days  No tub baths, hot tubs or swimming for 5 days  3  Please call our office ([phone]) if you have any fever, redness, swelling, discharge from your groin access site  4 No driving for 3 days    5  Perclose Booklet     6  Stent Booklet    7  Call your cardiologist for any questions or concerns  Coronary Intravascular Stent Placement   WHAT YOU SHOULD KNOW:   Coronary intravascular stent placement is a procedure to place a stent in an artery of your heart that has plaque buildup  Plaque is a mixture of fat and cholesterol  A stent is a small mesh tube made of metal that helps keep your artery open  Your caregiver may place a bare metal stent or a drug-eluting stent (ZACHARY) in your artery  A ZACHARY is coated with medicine that is slowly released and helps prevent more plaque buildup in the area where the stent is placed  The stent remains in your artery for life  You may need more than one stent  CARE AGREEMENT:   You have the right to help plan your care  Learn about your health condition and how it may be treated  Discuss treatment options with your caregivers to decide what care you want to receive  You always have the right to refuse treatment  RISKS:   · You may develop a hematoma (swelling caused by collection of blood) or bleed more than expected from your catheter site  The dye used during this procedure may cause an allergic reaction or kidney problems  You may develop an infection  · Your artery may be injured when the catheter is inserted   During or after surgery, blood clots may form, or plaque may break off  The blood clot or plaque may block your artery and cause a heart attack or stroke  The stent could collapse, or a clot could form on the stent  This could cause the artery to become blocked again  You may need another procedure to open your artery  If you do not have this procedure, plaque may continue to build up in your arteries  This can cause a heart attack or stroke  WHILE YOU ARE HERE:   Before the procedure:   · Informed consent  is a legal document that explains the tests, treatments, or procedures that you may need  Informed consent means you understand what will be done and can make decisions about what you want  You give your permission when you sign the consent form  You can have someone sign this form for you if you are not able to sign it  You have the right to understand your medical care in words you know  Before you sign the consent form, understand the risks and benefits of what will be done  Make sure all your questions are answered  · Blood tests  are done to give caregivers information about how your body is working  The blood may be taken from your hand, arm, or IV  · Heart monitoring  is also called an ECG or EKG  Sticky pads placed on your skin record your heart's electrical activity  · An IV  is a small tube placed in your vein that is used to give you medicine or liquids  · A sedative  will be given to you right before the procedure  This medicine may make you feel sleepy and more relaxed  · Blood thinner medicine  will be given to prevent clots from forming during and after the procedure  During the procedure:   · You will receive local anesthesia that will numb the area where the catheter will be placed  You will be awake during the procedure so that your caregivers can give you instructions  You may be asked to cough or hold your breath during the procedure  · A catheter (long, thin tube) is put into an artery, usually in your groin   The catheter is gently guided through this artery to your heart and into the narrowed or blocked artery  Caregivers will use x-rays and dye to find the area where the stent needs to be placed  You may feel warm as the dye is put into the catheter  A guidewire is then placed into the catheter  The balloon catheter is guided into the narrow or blocked artery using the guidewire  Caregivers inflate the balloon several times for short periods  The inflated balloon pushes the plaque against the artery walls  This opens them and allows more blood flow to your heart  Another balloon catheter with a stent is then inserted into the artery  The balloon is inflated  This expands the stent and pushes it into place against the artery wall  The stent will be left in your artery to help keep it open  After the procedure: The catheter and guidewire will be taken out of the artery and a pressure bandage will be put on the area  Your caregiver may apply a collagen plug or other closure device to stop the bleeding  Caregivers will put pressure on the bandaged area to help stop bleeding  They may use their fingers or a mechanical device to apply the pressure  You will be taken to a room to rest  Caregivers will monitor you closely for any problems  When your caregiver sees that you are okay, you will be taken to your hospital room  You may need to lie still and flat for 3 to 6 hours after the procedure to prevent bleeding  Do not get out of bed  until your caregiver says it is okay  © 2014 [address] is for End User's use only and may not be sold, redistributed or otherwise used for commercial purposes  All illustrations and images included in CareNotes® are the copyrighted property of A D A M , Inc  or Manny Shah  The above information is an  only  It is not intended as medical advice for individual conditions or treatments   Talk to your doctor, nurse or pharmacist before following any medical regimen to see if it is safe and effective for you

## 2021-03-25 NOTE — PLAN OF CARE
Problem: Potential for Falls  Goal: Patient will remain free of falls  Description: INTERVENTIONS:  - Assess patient frequently for physical needs  -  Identify cognitive and physical deficits and behaviors that affect risk of falls    -  [place] fall precautions as indicated by assessment   - Educate patient/family on patient safety including physical limitations  - Instruct patient to call for assistance with activity based on assessment  - Modify environment to reduce risk of injury  - Consider OT/PT consult to assist with strengthening/mobility  3/25/2021 1833 by Xiomara Nicole RN  Outcome: Progressing  3/25/2021 Gloria Saunders 1 by Xiomara Nicole RN  Outcome: Progressing     Problem: CARDIOVASCULAR - ADULT  Goal: Maintains optimal cardiac output and hemodynamic stability  Description: INTERVENTIONS:  - Monitor I/O, vital signs and rhythm  - Monitor for S/S and trends of decreased cardiac output  - Administer and titrate ordered vasoactive medications to optimize hemodynamic stability  - Assess quality of pulses, skin color and temperature  - Assess for signs of decreased coronary artery perfusion  - Instruct patient to report change in severity of symptoms  3/25/2021 1833 by Xiomara Nicole RN  Outcome: Progressing  3/25/2021 Gloria Saunders 1 by Xiomara Nicole RN  Outcome: Progressing     Problem: CARDIOVASCULAR - ADULT  Goal: Absence of cardiac dysrhythmias or at baseline rhythm  Description: INTERVENTIONS:  - Continuous cardiac monitoring, vital signs, obtain 12 lead EKG if ordered  - Administer antiarrhythmic and heart rate control medications as ordered  - Monitor electrolytes and administer replacement therapy as ordered  3/25/2021 1833 by Xiomara Nicole RN  Outcome: Progressing  3/25/2021 1829 by Xiomara Nicole RN  Outcome: Progressing

## 2021-03-25 NOTE — PROGRESS NOTES
Called by nursing staff for continual right groin ooze  Manual pressure had been held 15 minutes multiple times  Right groin injected with 20 ml of Lidocaine 1% with 100,000 units epinephrine  Manual pressure held and then pressure dressing applied  No hematoma, or ecchyomsis  Bedrest extended for 2 hours  Will cancel discharge

## 2021-03-25 NOTE — PLAN OF CARE
Problem: CARDIOVASCULAR - ADULT  Goal: Maintains optimal cardiac output and hemodynamic stability  Description: INTERVENTIONS:  - Monitor I/O, vital signs and rhythm  - Monitor for S/S and trends of decreased cardiac output  - Administer and titrate ordered vasoactive medications to optimize hemodynamic stability  - Assess quality of pulses, skin color and temperature  - Assess for signs of decreased coronary artery perfusion  - Instruct patient to report change in severity of symptoms  Outcome: Progressing  Goal: Absence of cardiac dysrhythmias or at baseline rhythm  Description: INTERVENTIONS:  - Continuous cardiac monitoring, vital signs, obtain 12 lead EKG if ordered  - Administer antiarrhythmic and heart rate control medications as ordered  - Monitor electrolytes and administer replacement therapy as ordered  Outcome: Progressing     Problem: Potential for Falls  Goal: Patient will remain free of falls  Description: INTERVENTIONS:  - Assess patient frequently for physical needs  -  Identify cognitive and physical deficits and behaviors that affect risk of falls    -  [place] fall precautions as indicated by assessment   - Educate patient/family on patient safety including physical limitations  - Instruct patient to call for assistance with activity based on assessment  - Modify environment to reduce risk of injury  - Consider OT/PT consult to assist with strengthening/mobility  Outcome: Progressing

## 2021-03-26 VITALS
HEIGHT: 67 IN | SYSTOLIC BLOOD PRESSURE: 121 MMHG | DIASTOLIC BLOOD PRESSURE: 44 MMHG | HEART RATE: 55 BPM | BODY MASS INDEX: 32.65 KG/M2 | OXYGEN SATURATION: 100 % | RESPIRATION RATE: 16 BRPM | TEMPERATURE: 100.8 F | WEIGHT: 208 LBS

## 2021-03-26 LAB
ANION GAP SERPL CALCULATED.3IONS-SCNC: 8 MMOL/L (ref 4–13)
ATRIAL RATE: 0 BPM
BUN SERPL-MCNC: 15 MG/DL (ref 5–25)
CALCIUM SERPL-MCNC: 9 MG/DL (ref 8.3–10.1)
CHLORIDE SERPL-SCNC: 110 MMOL/L (ref 100–108)
CO2 SERPL-SCNC: 22 MMOL/L (ref 21–32)
CREAT SERPL-MCNC: 0.65 MG/DL (ref 0.6–1.3)
GFR SERPL CREATININE-BSD FRML MDRD: 97 ML/MIN/1.73SQ M
GLUCOSE SERPL-MCNC: 109 MG/DL (ref 65–140)
GLUCOSE SERPL-MCNC: 86 MG/DL (ref 65–140)
GLUCOSE SERPL-MCNC: 99 MG/DL (ref 65–140)
POTASSIUM SERPL-SCNC: 3.9 MMOL/L (ref 3.5–5.3)
QRS AXIS: 0 DEGREES
QRSD INTERVAL: 0 MS
QT INTERVAL: 0 MS
QTC INTERVAL: 0 MS
SODIUM SERPL-SCNC: 140 MMOL/L (ref 136–145)
T WAVE AXIS: 0 DEGREES
VENTRICULAR RATE: 0 BPM

## 2021-03-26 PROCEDURE — 80048 BASIC METABOLIC PNL TOTAL CA: CPT | Performed by: INTERNAL MEDICINE

## 2021-03-26 PROCEDURE — 82948 REAGENT STRIP/BLOOD GLUCOSE: CPT

## 2021-03-26 PROCEDURE — 99213 OFFICE O/P EST LOW 20 MIN: CPT | Performed by: INTERNAL MEDICINE

## 2021-03-26 RX ADMIN — CARVEDILOL 12.5 MG: 12.5 TABLET, FILM COATED ORAL at 08:55

## 2021-03-26 RX ADMIN — TICAGRELOR 90 MG: 90 TABLET ORAL at 08:55

## 2021-03-26 RX ADMIN — ASPIRIN 81 MG: 81 TABLET, CHEWABLE ORAL at 08:55

## 2021-03-26 RX ADMIN — LOSARTAN POTASSIUM 25 MG: 25 TABLET, FILM COATED ORAL at 08:56

## 2021-03-26 RX ADMIN — OXYCODONE HYDROCHLORIDE AND ACETAMINOPHEN 1 TABLET: 5; 325 TABLET ORAL at 01:10

## 2021-03-26 NOTE — DISCHARGE SUMMARY
Discharge Summary - Angelito Davis 63 y o  female MRN: [id number]    Unit/Bed#: [facility] 510-01 Encounter: [id number]    Admission Date: 3/25/2021   Discharge Date:  03/26/2021    Disposition: Home    Condition at Discharge: elver Richards PA-C      OP Cardiologist:  Dr Pilo Mirza    Interventional cardiologist:  Dr Juliane Hoskins    Admitting Diagnosis:  Known CAD, staged PCI      Secondary Diagnoses:   Inferior wall MI on 03/08/2021 status post PCI and drug-eluting stent to mid RCA (resolute jose)  Residual disease in LAD  GERD  Crohn's disease  History of Tobacco use - patient is a patient is down to 1-2 cigarettes per day from a pack a day since her discharge from her MI  Mixed Dyslipidemia   Diabetes type 2  Hypertension  Obesity- BMI 32 5      Discharge Diagnosis:  CAD status post PCI and drug-eluting stent to  85% lesion of the mid LAD  A resolute jose Rx 2 5 x 18 mm drug-eluting stent was placed across the lesion  /76   Pulse 55   Temp (!) 97 4 °F (36 3 °C) (Oral)   Resp 16   Ht 5' 7" (1 702 m)   Wt 94 3 kg (208 lb)   SpO2 96%   BMI 32 58 kg/m²       Review of Systems   All other systems reviewed and are negative  Physical Exam   Constitutional: She is oriented to person, place, and time  She appears well-developed and well-nourished  HENT:   Head: Normocephalic and atraumatic  Neck: Neck supple  Cardiovascular: Normal rate, regular rhythm, normal heart sounds and intact distal pulses  Pulmonary/Chest: Breath sounds normal    Abdominal: Soft  Bowel sounds are normal    Musculoskeletal:         General: Edema present  Neurological: She is alert and oriented to person, place, and time  Skin: Skin is warm and dry  Psychiatric: She has a normal mood and affect  Thought content normal    Right groin no hematoma, ecchymosis or bruit        HPI and Hospital Course:   62-year-old female with history of inferior wall MI on 03/08/2021 at that time she underwent a PCI and drug-eluting stent to her mid RCA  Patient had residual 80% mid LAD disease  Patient was electively admitted for staged PCI of the remaining lesion  Patient has a history of hyperlipidemia, hypertension, diabetes type 2, GERD, Crohn's disease and obesity  Patient is down to 1-2 cigarettes per day since MI in March of 2021  She will continue to wean herself off completely  Patient underwent a successful PCI and drug-eluting stent to an 85% mid LAD lesion  A Resolute Ismael 2 25 by 18 mm drug-eluting stent was implanted  Patient was originally planned to be a 1 day PCI discharge  However due to bleeding from her groin site, her discharge was canceled and she was kept overnight  Patient will require DAPT (aspirin 81 mg daily and Brilinta 90 mg b i d ) x1 year  Patient will be referred to cardiac rehab now that she has completed her revascularization  Patient has been off her Crestor due to increased myalgia which is greater that her fibromyalgia discomfort  She was removed from her statin on 03/16/2021 by Dr Marvin Arevalo her office cardiologist   See his plan below  This will be re-evaluated at her next office visit    PLAN:  -stop rosuvastatin for the next month  -ordered for CPK/LFTs ASAP, if elevated will repeat in about a month as long as her symptoms improves  -if symptoms resolve or return to baseline, would resume at 5 mg q48H or trial another hydrophilic lipid-lowering agent  -failure of multiple statin and alternate agent would be required prior to PCSK9 consideration      Electronically signed by Lo Moore MD at 3/16/2021  2:12 PM          Patient counseled again to continue her her smoking cessation which I have she has accomplished so far  Patient will follow-up in the office in 4 weeks with Dr Marvin Arevalo on 04/22/2021        Current Facility-Administered Medications   Medication Dose Route Frequency    acetaminophen (TYLENOL) tablet 650 mg  650 mg Oral Q4H PRN    albuterol (PROVENTIL HFA,VENTOLIN HFA) inhaler 2 puff  2 puff Inhalation Q6H PRN    aspirin chewable tablet 81 mg  81 mg Oral Daily    carvedilol (COREG) tablet 12 5 mg  12 5 mg Oral Q12H [address]    insulin glargine (LANTUS) subcutaneous injection 25 Units 0 25 mL  25 Units Subcutaneous HS    insulin lispro (HumaLOG) 100 units/mL subcutaneous injection 1-5 Units  1-5 Units Subcutaneous TID AC    losartan (COZAAR) tablet 25 mg  25 mg Oral Daily    nicotine (NICODERM CQ) 21 mg/24 hr TD 24 hr patch 1 patch  1 patch Transdermal Daily    nitroglycerin (NITROSTAT) SL tablet 0 4 mg  0 4 mg Sublingual Q5 Min PRN    ondansetron (ZOFRAN) injection 4 mg  4 mg Intravenous Q6H PRN    oxyCODONE-acetaminophen (PERCOCET) 5-325 mg per tablet 1 tablet  1 tablet Oral Q6H PRN    sodium chloride 0 9 % infusion  125 mL/hr Intravenous Continuous    ticagrelor (BRILINTA) tablet 90 mg  90 mg Oral Q12H LOPEZ    traMADol (ULTRAM) tablet 50 mg  50 mg Oral Q8H PRN       Pertinent Labs/diagnostics:      Lab Results:       CMP:  Results from last 7 days   Lab Units 03/26/21  0501   POTASSIUM mmol/L 3 9   CHLORIDE mmol/L 110*   CO2 mmol/L 22   BUN mg/dL 15   CREATININE mg/dL 0 65   CALCIUM mg/dL 9 0   EGFR ml/min/1 73sq m 97           Tele:sinus rhythm      Discharge instructions/Information to patient and family:   See after visit summary for information provided to patient and family  Provisions for Follow-Up Care:  See after visit summary for information related to follow-up care and any pertinent home health orders  Planned Readmission: No    Discharge Statement:  I spent 45 minutes minutes discharging the patient  This time was spent on the day of discharge  I had direct contact with the patient on the day of discharge  Additional documentation is required if more than 30 minutes were spent on discharge  ** Please Note: Fluency Direct Dictation voice to text software may have been used in the creation of this document   **

## 2021-03-26 NOTE — NURSING NOTE
Patient discharged to home with paperwork reviewed and sent with patient  IV removed  R groin site CDI  Patient wanted to walk out with RN escort  Belongings with patient

## 2021-03-26 NOTE — UTILIZATION REVIEW
Initial Clinical Review  Elective Outpatient Cardiac Procedure 3/25/21 - CARDIAC PCI - OP [id number] - No [facility] Administrators Auth required - Reference # [id number] per [facility]    Age/Sex: 63year old female    Procedure Date: 3/25/21 Thursday    Interval History:   Ms Joshua Roland was admitted to [facility] on 2/26 - 2/28/21 with acute STEMI  And dizzy [facility] emergency room with an MI alert  She was experiencing a 3 day history of progressive chest discomfort  EKG was concerning for inferior STEMI    2/26/21 She was taken for urgent cardiac catheterization which showed  Mid LAD discrete 80% stenosis, 1st diagonal 40% stenosis, 2nd diagonal tubular 60% stenosis, 1st obtuse marginal 50% stenosis  Mid RCA 90% stenosis  Distal RCA tubular 99% stenosis  Successful balloon angioplasty with stent procedure performed on the 90% lesion in the mid RCA  Resolute jose Rx drug-eluting stent  A successful balloon angioplasty with stent procedure was performed on the 90% lesion in the mid RCA  Resolute jose Rx drug-eluting  , , HDL 40,   TTE showed LVEF 60%,   Akinesis of the basal mid inferior wall  Right ventricle size systolic function normal   Trace MR, no evidence of aortic stenosis or regurgitation, trace TR  HgbA1C 12  3  Ms Cristina Persaud is to   Undergo staged PCI to the LAD by Dr Hayden in 2-4 weeks  Follow up with Dr Jp Hamm in 2 months        Ms Joshua Roland admits to shortness of breath with exertion and blurred vision since discharge  Kimberly Galvan is no longer smoking  She admits to occasional left sided chest pain with lying down at night, watching TV and after a walk, pressure 5/10, lasting 5-10 minutes  CARDIAC PCI + ZACHARY 3/25/21 SUMMARY   CORONARY CIRCULATION:  Proximal LAD: There was a 60 % stenosis at the origin of D1  Negative DFR 0 93  Mid LAD: There was a tubular 85 % stenosis  An intervention was performed  Positive DFR 0 63     1ST LESION INTERVENTIONS:  A successful drug-eluting stent procedure was performed on the 85 % lesion in the mid LAD  Following intervention there was an excellent angiographic appearance with a 0 % residual stenosis  A Resolute Ismael Rx 2 25 x 18mm drug-eluting stent was placed across the lesion and deployed at a maximum inflation pressure of 12 zeferino      INDICATIONS:  --  Coronary artery disease: significant (50% or greater) lesion in a major coronary artery  Staged procedure post STEMI        3/25/21 POST PROCEDURE OVERNIGHT BED - NO CLASS ORDER   OPNCB Order requested    POD # 1 Cardiology Progress Note:   underwent a successful PCI and drug-eluting stent to an 85% mid LAD lesion  A Resolute Ismael 2 25 by 18 mm drug-eluting stent was implanted      Patient was originally planned to be a 1 day PCI discharge  However due to bleeding from her groin site, her discharge was canceled and she was kept overnight      Patient will require DAPT (aspirin 81 mg daily and Brilinta 90 mg b i d ) x1 year      Patient will be referred to cardiac rehab now that she has completed her revascularization      Patient has been off her Crestor due to increased myalgia which is greater that her fibromyalgia discomfort  She was removed from her statin on 03/16/2021 by Dr Sergio Julio her office cardiologist   See his plan below    This will be re-evaluated at her next office visit     PLAN:  -stop rosuvastatin for the next month  -ordered for CPK/LFTs ASAP, if elevated will repeat in about a month as long as her symptoms improves  -if symptoms resolve or return to baseline, would resume at 5 mg q48H or trial another hydrophilic lipid-lowering agent  -failure of multiple statin and alternate agent would be required prior to PCSK9 consideration      Admission Orders: Date/Time/Statement:  3/25/21 POST PROCEDURE OVERNIGHT BED - NO WRITTEN CLASS ORDER   OPNCB Order requested    Vital Signs: /76   Pulse 55   Temp (!) 97 4 °F (36 3 °C) (Oral) Resp 16   Ht 5' 7" (1 702 m)   Wt 94 3 kg (208 lb)   SpO2 96%   BMI 32 58 kg/m²   Diet: Cardiac  Mobility: 3/26 Up + OOB   DVT Prophylaxis: ASA; Plavix, Ambulatory    Scheduled Medications:  aspirin, 81 mg, Oral, Daily  carvedilol, 12 5 mg, Oral, Q12H LOPEZ  insulin glargine, 25 Units, Subcutaneous, HS  insulin lispro, 1-5 Units, Subcutaneous, TID AC  losartan, 25 mg, Oral, Daily  nicotine, 1 patch, Transdermal, Daily  ticagrelor, 90 mg, Oral, Q12H [facility]    Continuous IV Infusions:  IVF sodium chloride, 125 mL/hr, Intravenous, Continuous    PRN Meds:  acetaminophen, 650 mg, Oral, Q4H PRN GIVEN X 1  albuterol, 2 puff, Inhalation, Q6H PRN  nitroglycerin, 0 4 mg, Sublingual, Q5 Min PRN  ondansetron, 4 mg, Intravenous, Q6H PRN  oxyCODONE-acetaminophen, 1 tablet, Oral, Q6H PRN GIVEN X 2  traMADol, 50 mg, Oral, Q8H PRN    Network Utilization Review Department  ATTENTION: Please call with any questions or concerns to [phone] and carefully listen to the prompts so that you are directed to the right person  All voicemails are confidential   Lizette Fox all requests for admission clinical reviews, approved or denied determinations and any other requests to dedicated fax number below belonging to the campus where the patient is receiving treatment   List of dedicated fax numbers for the Facilities:  [address] DENIALS (Administrative/Medical Necessity) [phone]   [address] (Maternity/NICU/Pediatrics) [phone]   [address] [address] [facility] [address]) [address] [facility] [address]   [facility] [phone]   [address] [address] [phone]

## 2021-03-28 DIAGNOSIS — E78.2 MODERATE MIXED HYPERLIPIDEMIA NOT REQUIRING STATIN THERAPY: ICD-10-CM

## 2021-03-28 DIAGNOSIS — I21.11 ACUTE ST ELEVATION MYOCARDIAL INFARCTION (STEMI) DUE TO OCCLUSION OF MID PORTION OF RIGHT CORONARY ARTERY (HCC): ICD-10-CM

## 2021-03-28 DIAGNOSIS — I25.10 CORONARY ARTERY DISEASE: ICD-10-CM

## 2021-03-28 DIAGNOSIS — I21.11 ACUTE ST ELEVATION MYOCARDIAL INFARCTION (STEMI) DUE TO OCCLUSION OF DISTAL PORTION OF RIGHT CORONARY ARTERY (HCC): ICD-10-CM

## 2021-03-28 DIAGNOSIS — E11.65 TYPE 2 DIABETES MELLITUS WITH HYPERGLYCEMIA, WITHOUT LONG-TERM CURRENT USE OF INSULIN (HCC): ICD-10-CM

## 2021-03-28 DIAGNOSIS — I21.19 INFERIOR MI (HCC): ICD-10-CM

## 2021-03-29 RX ORDER — LOSARTAN POTASSIUM 25 MG/1
25 TABLET ORAL DAILY
Qty: 90 TABLET | Refills: 0 | Status: SHIPPED | OUTPATIENT
Start: 2021-03-29 | End: 2022-03-16 | Stop reason: SDUPTHER

## 2021-03-30 DIAGNOSIS — E11.65 UNCONTROLLED TYPE 2 DIABETES MELLITUS WITH HYPERGLYCEMIA (HCC): Chronic | ICD-10-CM

## 2021-03-30 RX ORDER — EMPAGLIFLOZIN 25 MG/1
25 TABLET, FILM COATED ORAL EVERY MORNING
Qty: 30 TABLET | Refills: 0 | Status: SHIPPED | OUTPATIENT
Start: 2021-03-30 | End: 2021-04-27 | Stop reason: ALTCHOICE

## 2021-03-31 DIAGNOSIS — E11.65 UNCONTROLLED TYPE 2 DIABETES MELLITUS WITH HYPERGLYCEMIA (HCC): Primary | Chronic | ICD-10-CM

## 2021-03-31 RX ORDER — BLOOD SUGAR DIAGNOSTIC
STRIP MISCELLANEOUS
COMMUNITY
End: 2021-03-31 | Stop reason: SDUPTHER

## 2021-03-31 RX ORDER — BLOOD SUGAR DIAGNOSTIC
STRIP MISCELLANEOUS
Qty: 360 EACH | Refills: 1 | Status: SHIPPED | OUTPATIENT
Start: 2021-03-31 | End: 2021-09-01 | Stop reason: SDUPTHER

## 2021-04-01 ENCOUNTER — TELEPHONE (OUTPATIENT)
Dept: CARDIOLOGY CLINIC | Facility: CLINIC | Age: 61
End: 2021-04-01

## 2021-04-01 NOTE — TELEPHONE ENCOUNTER
Pt called stating she felt the Left groin Cardiac Cath wound site was "not looking"    Cath was done by Dr Hayden 3/25/21  Redness, warm to touch and tender to touch  Pt is diabetic     Appt made for tomorrow at 8:00am

## 2021-04-02 ENCOUNTER — OFFICE VISIT (OUTPATIENT)
Dept: CARDIOLOGY CLINIC | Facility: CLINIC | Age: 61
End: 2021-04-02
Payer: COMMERCIAL

## 2021-04-02 VITALS
OXYGEN SATURATION: 97 % | BODY MASS INDEX: 32.68 KG/M2 | HEART RATE: 71 BPM | HEIGHT: 67 IN | DIASTOLIC BLOOD PRESSURE: 60 MMHG | WEIGHT: 208.2 LBS | TEMPERATURE: 97.1 F | SYSTOLIC BLOOD PRESSURE: 98 MMHG

## 2021-04-02 DIAGNOSIS — Z95.5 S/P CORONARY ARTERY STENT PLACEMENT: ICD-10-CM

## 2021-04-02 DIAGNOSIS — E78.2 MIXED HYPERLIPIDEMIA: ICD-10-CM

## 2021-04-02 DIAGNOSIS — L73.2 HIDRADENITIS SUPPURATIVA: ICD-10-CM

## 2021-04-02 DIAGNOSIS — I25.10 CORONARY ARTERY DISEASE INVOLVING NATIVE CORONARY ARTERY OF NATIVE HEART WITHOUT ANGINA PECTORIS: Primary | ICD-10-CM

## 2021-04-02 DIAGNOSIS — R10.31 DISCOMFORT OF RIGHT GROIN: ICD-10-CM

## 2021-04-02 DIAGNOSIS — I10 ESSENTIAL HYPERTENSION: ICD-10-CM

## 2021-04-02 DIAGNOSIS — E11.65 UNCONTROLLED TYPE 2 DIABETES MELLITUS WITH HYPERGLYCEMIA (HCC): ICD-10-CM

## 2021-04-02 PROCEDURE — 99215 OFFICE O/P EST HI 40 MIN: CPT | Performed by: PHYSICIAN ASSISTANT

## 2021-04-02 NOTE — PROGRESS NOTES
General Cardiology Outpatient Progress Note    China Davis 63 y o  female   MRN: [id number]  Encounter: [id number]    Assessment:  Patient Active Problem List    Diagnosis Date Noted    Coronary artery disease involving native coronary artery 03/24/2021    Essential hypertension 02/28/2021    Uncontrolled type 2 diabetes mellitus ([facility] ) 02/27/2021    Acute ST elevation myocardial infarction (STEMI) due to occlusion of mid portion of right coronary artery ([place] [id number] ) 02/26/2021    Tobacco use disorder, continuous 11/03/2020    Inflammatory bowel disease (Crohn's disease) ([place] [address] ) 10/04/2016    Migraine without aura and without status migrainosus, not intractable 10/04/2016    GERD (gastroesophageal reflux disease) 08/13/2015    Hidradenitis suppurativa 04/10/2014    Mixed hyperlipidemia 08/08/2012    Fibromyalgia 08/07/2012    Peripheral neuropathy 08/07/2012    B-complex deficiency 10/08/2007       Today's Plan:   Discussed completing vascular ultrasound to assess right groin lump, patient wishes to defer at this time  Advised to contact office with any acute changes   Continue current medications   To complete CBC and BMP before next appointment   Given difficulty with tolerating statins, would consider addition of PCSK9 inhibitor   Planning to complete cardiac rehab evaluation in near future  Considering FMLA to make scheduling simpler  Plan:  Right groin lump / discomfort   S/p LHC via right femoral access on 03/25/2021  See image below  No evidence of infection/cellutitis/abscess  Puncture site healing appropriately  Patient wishes to defer vascular ultrasound at this time  Coronary artery disease   Without active chest pain  S/p inferior STEMI in 02/2021 with ZACHARY x1 to distal RCA and ZACHARY x1 to mid RCA  S/p staged PCI with ZACHARY x1 to mid LAD on 03/25/2021  TTE 02/26/2021: LVEF 60%  LVIDd 4 88 cm  Normal RV size and RVSF  Trace MR and TR     Continue on aspirin, Brillinta, and carvedilol 12 5 mg q12 hours  PRN SL nitro prescribed  Hypertension   BP of 98/60 mmHg in office today  Continue on carvedilol 12 5 mg q12 hours and losartan 25 mg daily  Hyperlipidemia, mixed   Most recent lipid panel from 02/26/2021: cholesterol 271; ; HDL 40; calculated   Unable to tolerate statins  Diabetes mellitus, type II    Insulin dependent; also on empagliflozin 25 mg daily  Most recent hemoglobin A1c of 12 3 from 02/26/2021  Hidradenitis suppurativa   Crohn's disease  Fibromyalgia  Tobacco abuse    HPI:   Tao Carrizales is a 57-year-old woman with a PMH as above who presents to the office for an acute visit  Presented to [facility] on 03/25 for staged PCI  Underwent a successful PCI and drug-eluting stent to an 85% mid LAD lesion  Remained in hospital overnight due to bleeding from groin site  Was discharged home on 03/26/2021  Contacted office on 04/01/2021 with concern regarding the healing of her right femoral access site  Acute visit was then scheduled  04/02/2021: Presents for hospital follow-up  Feeling well since discharge  No further chest pain and has not required SL nitro  Continues to smoke 1-1 5 packs cigarettes weekly; difficult to quit as she lives with fellow smokers  Continues to have some joint pain  She feels this may have been exacerbated after receiving her most recent COVID vaccine  On 03/31, noted that her right groin site was more sore after working a 10-12 hour day at work and later noted that the site was firmer to palpation  Used heating pad which helped relieve some of this soreness  Has been changing bandages daily and has not soaked/submerged site  Denies any bleeding, drainage or discharge from site since being home  Also denies any fevers, chills, nausea, vomiting, and changes in sensation/movement of right LE       Past Medical History:   Diagnosis Date    Coronary artery disease     Crohn's colitis ([facility] )     Diabetes mellitus ([facility] )     Fibromyalgia     HTN (hypertension)     Hyperlipidemia     Migraine     Myocardial infarction [facility])        Review of Systems   Constitutional: Negative for activity change, appetite change, chills, fatigue, fever and unexpected weight change  HENT: Negative for congestion, postnasal drip, rhinorrhea, sneezing, sore throat and trouble swallowing  Eyes: Negative  Respiratory: Negative for cough, chest tightness and shortness of breath  Cardiovascular: Negative for chest pain, palpitations and leg swelling  Gastrointestinal: Negative for abdominal distention, abdominal pain, diarrhea, nausea and vomiting  Endocrine: Negative  Genitourinary: Negative for decreased urine volume, difficulty urinating, dysuria, frequency and urgency  Musculoskeletal: Positive for arthralgias  Skin: Negative  Allergic/Immunologic: Negative  Neurological: Negative for dizziness, tremors, syncope, weakness, light-headedness and headaches  Hematological: Negative  Psychiatric/Behavioral: Negative for agitation, confusion and sleep disturbance  The patient is not nervous/anxious  14-point ROS completed and negative except as stated above and/or in the HPI      Allergies   Allergen Reactions    Penicillins Anaphylaxis    Cefuroxime     Metaxalone     Influenza Vaccines Rash    Keflet [Cephalexin] Rash    Nuts - Food Allergy Itching    Sulfa Antibiotics Rash    Sulfamethoxazole-Trimethoprim Hives and Rash    Topiramate Rash       Current Outpatient Medications:     acetaminophen (TYLENOL) 500 mg tablet, Take 1,000 mg by mouth every 6 (six) hours as needed for mild pain, Disp: , Rfl:     aspirin 81 mg chewable tablet, Chew 1 tablet (81 mg total) daily, Disp: 90 tablet, Rfl: 3    carvedilol (COREG) 12 5 mg tablet, Take 1 tablet (12 5 mg total) by mouth every 12 (twelve) hours, Disp: 30 tablet, Rfl: 5    Continuous Blood Gluc  (Dexcom G6 ) CRISTINA, Disp 1 , Disp: 1 Device, Rfl: 1    Continuous Blood Gluc Sensor (Dexcom G6 Sensor) MISC, Use 1 every 10 days, Disp: 1 each, Rfl: 1    Continuous Blood Gluc Transmit (Dexcom G6 Transmitter) MISC, Use 1 every 3 months, Disp: 1 each, Rfl: 1    Empagliflozin (Jardiance) 25 MG TABS, Take 1 tablet (25 mg total) by mouth every morning, Disp: 30 tablet, Rfl: 0    insulin aspart (NovoLOG FlexPen) 100 UNIT/ML injection pen, 6 unit before (Patient taking differently: 3 (three) times a day with meals 6 unit before), Disp: , Rfl:     insulin glargine (LANTUS) 100 units/mL subcutaneous injection, Inject 25 Units under the skin daily at bedtime, Disp: 10 mL, Rfl: 0    Insulin Pen Needle (Pen Needles) 32G X 5 MM MISC, Use 4 per day, Disp: 360 each, Rfl: 1    losartan (COZAAR) 25 mg tablet, Take 1 tablet (25 mg total) by mouth daily, Disp: 90 tablet, Rfl: 0    nitroglycerin (NITROSTAT) 0 4 mg SL tablet, Place 1 tablet (0 4 mg total) under the tongue every 5 (five) minutes as needed for chest pain, Disp: 30 tablet, Rfl: 3    ondansetron (ZOFRAN-ODT) 4 mg disintegrating tablet, Take 1 tablet (4 mg total) by mouth every 8 (eight) hours as needed for nausea or vomiting, Disp: 12 tablet, Rfl: 0    ticagrelor (BRILINTA) 90 MG, Take 1 tablet (90 mg total) by mouth every 12 (twelve) hours, Disp: 180 tablet, Rfl: 0    cholecalciferol (VITAMIN D3) 1,000 units tablet, Take 1 tablet (1,000 Units total) by mouth daily (Patient not taking: Reported on 4/2/2021), Disp: 90 tablet, Rfl: 3    nicotine (NICODERM CQ) 21 mg/24 hr TD 24 hr patch, Place 1 patch on the skin daily (Patient not taking: Reported on 4/2/2021), Disp: 28 patch, Rfl: 3    traMADol (ULTRAM) 50 mg tablet, Take 1 tablet (50 mg total) by mouth every 8 (eight) hours as needed for moderate pain (Patient not taking: Reported on 4/2/2021), Disp: 10 tablet, Rfl: 0    Social History     Socioeconomic History    Marital status: /Civil Union     Spouse name: Not on file    Number of children: Not on file    Years of education: Not on file    Highest education level: Not on file   Occupational History    Not on file   Social Needs    Financial resource strain: Not on file    Food insecurity     Worry: Not on file     Inability: Not on file    Transportation needs     Medical: Not on file     Non-medical: Not on file   Tobacco Use    Smoking status: Current Some Day Smoker     Packs/day: 1 00     Types: Cigarettes    Smokeless tobacco: Never Used    Tobacco comment: Currently smoking 1-1 5 packs cigarettes weekly (Updated 04/02/2021)  Substance and Sexual Activity    Alcohol use: Yes     Frequency: Monthly or less     Drinks per session: 1 or 2     Binge frequency: Never     Comment: Rarely    Drug use: Never    Sexual activity: Not on file   Lifestyle    Physical activity     Days per week: Not on file     Minutes per session: Not on file    Stress: Not on file   Relationships    Social connections     Talks on phone: Not on file     Gets together: Not on file     Attends Alevism service: Not on file     Active member of club or organization: Not on file     Attends meetings of clubs or organizations: Not on file     Relationship status: Not on file    Intimate partner violence     Fear of current or ex partner: Not on file     Emotionally abused: Not on file     Physically abused: Not on file     Forced sexual activity: Not on file   Other Topics Concern    Not on file   Social History Narrative    Not on file     Family History   Problem Relation Age of Onset    Coronary artery disease Mother     Dementia Mother     Heart disease Brother     Alcohol abuse Neg Hx     Substance Abuse Neg Hx        Vitals:   Blood pressure 98/60, pulse 71, temperature (!) 97 1 °F (36 2 °C), temperature source Temporal, height 5' 7" (1 702 m), weight 94 4 kg (208 lb 3 2 oz), SpO2 97 %, not currently breastfeeding  Body mass index is 32 61 kg/m²      Wt Readings from Last 3 Encounters:   04/02/21 94 4 kg (208 lb 3 2 oz)   03/25/21 94 3 kg (208 lb)   03/08/21 94 7 kg (208 lb 11 2 oz)     Vitals:    04/02/21 0811   BP: 98/60   BP Location: Left arm   Patient Position: Sitting   Cuff Size: Large   Pulse: 71   Temp: (!) 97 1 °F (36 2 °C)   TempSrc: Temporal   SpO2: 97%   Weight: 94 4 kg (208 lb 3 2 oz)   Height: 5' 7" (1 702 m)       Physical Exam  Vitals signs reviewed  Constitutional:       General: She is awake  She is not in acute distress  Appearance: Normal appearance  She is well-developed and overweight  Comments: Face mask present   HENT:      Head: Normocephalic  Nose: Nose normal       Mouth/Throat:      Mouth: Mucous membranes are moist    Eyes:      General: No scleral icterus  Conjunctiva/sclera: Conjunctivae normal    Neck:      Musculoskeletal: Neck supple  Vascular: No JVD  Trachea: No tracheal deviation  Cardiovascular:      Rate and Rhythm: Normal rate and regular rhythm  No extrasystoles are present  Pulses: Normal pulses  Heart sounds: No murmur  Pulmonary:      Effort: Pulmonary effort is normal  No tachypnea, bradypnea, accessory muscle usage or respiratory distress  Breath sounds: Normal air entry  No decreased breath sounds, rhonchi or rales  Abdominal:      General: Bowel sounds are normal  There is no distension  Palpations: Abdomen is soft  Tenderness: There is no abdominal tenderness  Genitourinary:     Comments: Right femoral access site without active signs of infection/cellulitis  No oozing, bleeding, or discharge noted  Surrounding ecchymosis present  Semi-firm, non-fluctuant nickel-sized lump palpated underneath healed puncture site  Musculoskeletal:      Right lower leg: No edema  Left lower leg: No edema  Skin:     General: Skin is dry  Neurological:      General: No focal deficit present  Mental Status: She is alert and oriented to person, place, and time        Sensory: Sensation is intact  Motor: Motor function is intact  Psychiatric:         Mood and Affect: Mood and affect normal          Speech: Speech normal          Behavior: Behavior normal  Behavior is cooperative  Thought Content: Thought content normal        Image: right groin/inguinal region  Labs & Results:  Lab Results   Component Value Date    WBC 9 08 03/17/2021    HGB 13 9 03/17/2021    HCT 43 3 03/17/2021    MCV 91 03/17/2021     03/17/2021     Lab Results   Component Value Date    SODIUM 140 03/26/2021    K 3 9 03/26/2021     (H) 03/26/2021    CO2 22 03/26/2021    BUN 15 03/26/2021    CREATININE 0 65 03/26/2021    GLUC 99 03/26/2021    CALCIUM 9 0 03/26/2021     Lab Results   Component Value Date    INR 0 98 02/26/2021    PROTIME 13 0 02/26/2021     No results found for: NTBNP   EKG personally reviewed by HAMMAD Patel PA-C

## 2021-04-02 NOTE — PATIENT INSTRUCTIONS
Continue to monitor groin site  Have blood work completed in 1-2 weeks  Non fasting is okay  Continue current medications

## 2021-04-06 ENCOUNTER — TELEPHONE (OUTPATIENT)
Dept: NON INVASIVE DIAGNOSTICS | Facility: HOSPITAL | Age: 61
End: 2021-04-06

## 2021-04-12 DIAGNOSIS — E11.65 UNCONTROLLED TYPE 2 DIABETES MELLITUS WITH HYPERGLYCEMIA (HCC): Chronic | ICD-10-CM

## 2021-04-12 RX ORDER — BLOOD-GLUCOSE SENSOR
EACH MISCELLANEOUS
Qty: 1 EACH | Refills: 1 | Status: SHIPPED | OUTPATIENT
Start: 2021-04-12 | End: 2021-04-27 | Stop reason: SDUPTHER

## 2021-04-13 ENCOUNTER — APPOINTMENT (OUTPATIENT)
Dept: LAB | Facility: CLINIC | Age: 61
End: 2021-04-13
Payer: COMMERCIAL

## 2021-04-13 DIAGNOSIS — I25.10 CAD S/P PERCUTANEOUS CORONARY ANGIOPLASTY: ICD-10-CM

## 2021-04-13 DIAGNOSIS — Z98.61 CAD S/P PERCUTANEOUS CORONARY ANGIOPLASTY: ICD-10-CM

## 2021-04-13 LAB
ANION GAP SERPL CALCULATED.3IONS-SCNC: 6 MMOL/L (ref 4–13)
BUN SERPL-MCNC: 17 MG/DL (ref 5–25)
CALCIUM SERPL-MCNC: 9.5 MG/DL (ref 8.3–10.1)
CHLORIDE SERPL-SCNC: 106 MMOL/L (ref 100–108)
CO2 SERPL-SCNC: 25 MMOL/L (ref 21–32)
CREAT SERPL-MCNC: 0.95 MG/DL (ref 0.6–1.3)
ERYTHROCYTE [DISTWIDTH] IN BLOOD BY AUTOMATED COUNT: 15.7 % (ref 11.6–15.1)
GFR SERPL CREATININE-BSD FRML MDRD: 66 ML/MIN/1.73SQ M
GLUCOSE P FAST SERPL-MCNC: 119 MG/DL (ref 65–99)
HCT VFR BLD AUTO: 43.4 % (ref 34.8–46.1)
HGB BLD-MCNC: 14.2 G/DL (ref 11.5–15.4)
MCH RBC QN AUTO: 30.5 PG (ref 26.8–34.3)
MCHC RBC AUTO-ENTMCNC: 32.7 G/DL (ref 31.4–37.4)
MCV RBC AUTO: 93 FL (ref 82–98)
PLATELET # BLD AUTO: 294 THOUSANDS/UL (ref 149–390)
PMV BLD AUTO: 9.4 FL (ref 8.9–12.7)
POTASSIUM SERPL-SCNC: 3.9 MMOL/L (ref 3.5–5.3)
RBC # BLD AUTO: 4.66 MILLION/UL (ref 3.81–5.12)
SODIUM SERPL-SCNC: 137 MMOL/L (ref 136–145)
WBC # BLD AUTO: 8.51 THOUSAND/UL (ref 4.31–10.16)

## 2021-04-13 PROCEDURE — 80048 BASIC METABOLIC PNL TOTAL CA: CPT

## 2021-04-13 PROCEDURE — 85027 COMPLETE CBC AUTOMATED: CPT

## 2021-04-13 PROCEDURE — 36415 COLL VENOUS BLD VENIPUNCTURE: CPT

## 2021-04-14 ENCOUNTER — CLINICAL SUPPORT (OUTPATIENT)
Dept: CARDIAC REHAB | Facility: HOSPITAL | Age: 61
End: 2021-04-14
Payer: COMMERCIAL

## 2021-04-14 DIAGNOSIS — Z95.5 S/P CORONARY ARTERY STENT PLACEMENT: Primary | ICD-10-CM

## 2021-04-14 DIAGNOSIS — Z98.61 CAD S/P PERCUTANEOUS CORONARY ANGIOPLASTY: ICD-10-CM

## 2021-04-14 DIAGNOSIS — I25.10 CAD S/P PERCUTANEOUS CORONARY ANGIOPLASTY: ICD-10-CM

## 2021-04-14 PROCEDURE — 93797 PHYS/QHP OP CAR RHAB WO ECG: CPT

## 2021-04-14 NOTE — PROGRESS NOTES
CARDIAC REHAB ASSESSMENT    Today's date: 2021  Patient name: Lizeth Artis     : 1960       MRN: [id number]  PCP: William Saleem PA-C  Referring Physician:   Cardiologist: Dr Patrick Oliveira  Surgeon: n/a  Dx: stent       Date of onset: 3/25/2021  Cultural needs: n/a    Weight:    Wt Readings from Last 1 Encounters:   21 94 4 kg (208 lb 3 2 oz)      Height:   Ht Readings from Last 1 Encounters:   21 5' 7" (1 702 m)     Medical History:   Past Medical History:   Diagnosis Date    Coronary artery disease     Crohn's colitis ([facility] )     Diabetes mellitus ([facility] )     Fibromyalgia     HTN (hypertension)     Hyperlipidemia     Migraine     Myocardial infarction [facility])          Physical Limitations: B/L knee pain-knee spurs, hip pain, all joints ache    Fall Risk: Low   Comments: Ambulates with a steady gait with no assist device    Anginal Equivalent: Chest Pressure and Jaw Pain   NTG use: No prescription    Risk Factors   Cholesterol: Yes  Smoking: Current user:  average ppd:  1-1 5 pack/week currently  HTN: Yes  DM: Type 2   insulin  Obesity: Yes   Inactivity: Yes  Stress:  perceived  stress: 8/10   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, works from home and has help during work hours   Goals for Stress Management:Practice Relaxation Techniques, Exercise, Keep a positive mindset and Enjoy a hobby    Family History:  Family History   Problem Relation Age of Onset    Coronary artery disease Mother     Dementia Mother     Heart disease Brother     Alcohol abuse Neg Hx     Substance Abuse Neg Hx        Allergies: Penicillins, Cefuroxime, Metaxalone, Influenza vaccines, Keflet [cephalexin], Nuts - food allergy, Sulfa antibiotics, Sulfamethoxazole-trimethoprim, and Topiramate  ETOH:   Social History     Substance and Sexual Activity   Alcohol Use Yes    Frequency: Monthly or less    Drinks per session: 1 or 2    Binge frequency: Never    Comment: Rarely Current Medications:   Current Outpatient Medications   Medication Sig Dispense Refill    acetaminophen (TYLENOL) 500 mg tablet Take 1,000 mg by mouth every 6 (six) hours as needed for mild pain      aspirin 81 mg chewable tablet Chew 1 tablet (81 mg total) daily 90 tablet 3    carvedilol (COREG) 12 5 mg tablet Take 1 tablet (12 5 mg total) by mouth every 12 (twelve) hours 30 tablet 5    cholecalciferol (VITAMIN D3) 1,000 units tablet Take 1 tablet (1,000 Units total) by mouth daily (Patient not taking: Reported on 4/2/2021) 90 tablet 3    Continuous Blood Gluc  (Dexcom G6 ) CRISTINA Disp 1  1 Device 1    Continuous Blood Gluc Sensor (Dexcom G6 Sensor) MISC Use 1 every 10 days 1 each 1    Continuous Blood Gluc Transmit (Dexcom G6 Transmitter) MISC Use 1 every 3 months 1 each 1    Empagliflozin (Jardiance) 25 MG TABS Take 1 tablet (25 mg total) by mouth every morning 30 tablet 0    insulin aspart (NovoLOG FlexPen) 100 UNIT/ML injection pen 6 unit before (Patient taking differently: 3 (three) times a day with meals 6 unit before)      insulin glargine (LANTUS) 100 units/mL subcutaneous injection Inject 25 Units under the skin daily at bedtime 10 mL 0    Insulin Pen Needle (Pen Needles) 32G X 5 MM MISC Use 4 per day 360 each 1    losartan (COZAAR) 25 mg tablet Take 1 tablet (25 mg total) by mouth daily 90 tablet 0    nicotine (NICODERM CQ) 21 mg/24 hr TD 24 hr patch Place 1 patch on the skin daily (Patient not taking: Reported on 4/2/2021) 28 patch 3    nitroglycerin (NITROSTAT) 0 4 mg SL tablet Place 1 tablet (0 4 mg total) under the tongue every 5 (five) minutes as needed for chest pain 30 tablet 3    ondansetron (ZOFRAN-ODT) 4 mg disintegrating tablet Take 1 tablet (4 mg total) by mouth every 8 (eight) hours as needed for nausea or vomiting 12 tablet 0    ticagrelor (BRILINTA) 90 MG Take 1 tablet (90 mg total) by mouth every 12 (twelve) hours 180 tablet 0    traMADol (ULTRAM) 50 mg tablet Take 1 tablet (50 mg total) by mouth every 8 (eight) hours as needed for moderate pain (Patient not taking: Reported on 4/2/2021) 10 tablet 0     No current facility-administered medications for this visit  Functional Status Prior to Diagnosis for Treatment   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[employer])  Recreation: none  ADLs: No limitations  [place]: No limitations  Exercise: no regular aerobic exercise  Other: reports very sedentary due to her job    Current Functional Status  Occupation: full time job coding  Recreation: none  ADLs:Capable of performing light to moderate ADLs limited by Weakness  Some shortness of breath  [place]: No limitations  Exercise: no regular aerobic exercise  Other: n/a    Patient Specific Goals:  Be able to walk up hill and climb stairs without shortness of breath, increase overall body strength and endurance to make ADLs easier, quit smoking by decreasing number of cigarettes/day, learn stress management and relaxation techniques to help with high levels of stress    Short Term Program Goals: dietary modifications increased strength improved energy/stamina with ADLs exercise 120-150 mins/wk improved BG control wt loss 1-2 ppw return to work    Long Term Goals: increased maximal walking duration  increased intial training workload  Improved [facility] Activity Status score  Improved functional capacity  Improved Quality of Life - [facility] score reduced  Improved lipid profile  Reduced body fat%  Reduced waist circumference  Smoking cessation  Improved fasting glucose  Reduced stress  weight loss goal of -10-15 lbs  improved Rate Your Plate Score    Ability to reach goals/rehabilitation potential:  Very Good     Projected return to function: 12 weeks  Objective tests: sub-max TM ETT      Nutritional   Reviewed details of Rate your Plate  Discussed key elements of heart healthy eating   Reviewed patient goals for dietary modifications and their clinical implications  Reviewed most recent lipid profile       Goals for dietary modification: choose lean cuts of meat  poultry without the skin  low fat ground meat and poultry  eliminate processed meats  reduce portions of meat to 3 oz  increase fish intake  more meatless meals  low fat dairy   reduced fat cheese  increase whole grains  increase fruits and vegetables  eliminate butter  low sodium  improved snack choices  more nuts/seeds  reduce sweets/frozen desserts      Emotional/Social  Patient does not have sufficient emotional support  Denies depression    Marital status:     Domestic Violence Screening: No    Comments: n/a

## 2021-04-14 NOTE — PROGRESS NOTES
Cardiac Rehabilitation Plan of Care   Initial Care Plan          Today's date: 2021   # of Exercise Sessions Completed: 1-evaluation  Patient name: Keesha Sorensen      : 1960  Age: 63 y o  MRN: [id number]  Referring Physician: Noemi Singh  Cardiologist: Dr Zhen Baldwin  Provider: Josselyn raines  Clinician: Liam Alejandra MS, CEP      Dx:   Encounter Diagnoses   Name Primary?  CAD S/P percutaneous coronary angioplasty     S/P coronary artery stent placement Yes     Date of onset: 3/25/2021      SUMMARY OF PROGRESS:  Mrs Ernesto Chinchilla is here today for her cardiac rehabilitation evaluation after recent MI and stenting procedure  She reports she has been feeling tired and has low energy levels since coming home from the hospital  She is working from home and reports he is typically very sedentary due to her job  Her main goals for her rehab program are to be able to walk up hill and climb stairs without shortness of breath, increase overall body strength and endurance to make ADLs easier, increase energy levels, quit smoking by decreasing number of cigarettes/day, and to learn stress management and relaxation techniques to help with high levels of stress  She reports she has decreased smoking from 1 ppd to 1-1 5 packs per week  She denies depression, however PHQ-9=10, also denies anxiety at this time (NITZA-7=2)  She does report high levels of stress due to caring for her mom who has dementia  She lives with her and does have help come during her working hours  She completed an initial Submaximal ETT and reached a 3 1 MET level  At rest, the patient's HR was 77, and BP was 112/72  During exercise, the patient's peak HR was 112, while peak BP was 138/78  During recovery, the patient's HR was 88, and BP was 118/70  Patient denied having any symptoms during exercise  Additionally, the patient's telemetry revealed NSR  Patient had correct hemodynamic responses to activity     Patient is agreeable to coming to Cardiac Rehab 3 times/week for 12 weeks or until he reaches 36 sessions at 9:00 starting 2021          Medication compliance: Yes   Comments: Pt reports to be compliant with medications  Fall Risk: Low   Comments: Ambulates with a steady gait with no assist device    EKG Interpretation: NSR      EXERCISE ASSESSMENT and PLAN    Current Exercise Program in Rehab:       Frequency: 3 days/week Supplement with home exercise 2+ days/wk as tolerated       Minutes: 30-4-0        METS: 2 5-3 0            HR: 30 beats above resting   RPE: 4-6         Modalities: Treadmill, UBE, NuStep and Recumbent bike      Exercise Progression 30 Day Goals :    Frequency: 3 days/week of cardiac rehab     Supplement with home exercise 2+ days/wk as tolerated    Minutes: 40                            >150 mins/wk of moderate intensity exercise   METS: 3 0   HR: 30 beats above resting    RPE: 4-6   Modalities: Treadmill, Airdyne bike, UBE, NuStep and Recumbent bike    Strength trainin-3 days / week  12-15 repetitions  1-2 sets per modality   Will be added following 2-3 weeks of monitored exercise sessions   Modalities: Pull Downs, Lateral Raise, Arm Extension, Arm Curl, Sit to [employer] Exercise: none    Goals: 10% improvement in functional capacity - based on max METs achieved in fitness assessment, Reduced dyspnea with physical activity  0-1/10, improved DASI score by 10%, Increase in exercise capacity by 40% - based on peak METs tolerated in cardiac rehab exercise session, Exercise 5 days/wk, >150mins/wk of moderate intensity exercise, Resume ADLs with increased strength, Attend Rehab regularly, Decrease sitting time and Start a walking program    Progression Toward Goals:  Reviewed Pt goals and determined plan of care    Education: benefit of exercise for CAD risk factors, home exercise guidelines, AHA guidelines to achieve >150 mins/wk of moderate exercise and RPE scale Plan:education on home exercise guidelines, home exercise 30+ mins 2 days opposite CR and Education class: Risk Factors for Heart Disease  Readiness to change: Preparation:  (Getting ready to change)       NUTRITION ASSESSMENT AND PLAN    Weight control:    Starting weight: 208 lb   Current weight:     Waist circumference:    Starting:    Current:      Diabetes: T2D, Insulin -has recently started insulin  Is trying to use sliding scale due to low glucose readings  A1c: 12 3    last measured: 2/26/2021    Lipid management: Discussed diet and lipid management and Last lipid profile 2/26/2021  Chol 271    HDL 40      Goals:LDL <100, HDL >40, TRG <150, CHOL <200, decreased body fat % <33%  (F), reduced waist circumference <35 inches (F), fasting BG , improved A1c  < 7 0%, Improved Rate Your Plate score  >19, Wt  loss 1-2 ppw,  goal of -10-15 lbs   and 2 5-5%  wt loss    Progression Toward Goals: Reviewed Pt goals and determined plan of care    Education: heart healthy eating  low sodium diet  hydration  nutrition for  lipid management  nutrition for Improved BG control  target goal for A1c <7 0  exercise and diabetes management   wt  loss   portion control  Plan: Education class: Reading Food Labels, Education Class: Heart Healthy Eating and refer to diabetes educator  Readiness to change: Preparation:  (Getting ready to change)       PSYCHOSOCIAL ASSESSMENT AND PLAN    Emotional:  Depression assessment:  PHQ-9 = 10-14 = Moderate Depression-denies depression            Anxiety measure:  NITZA-7 = 0-4  = Not anxious  Self-reported stress level:  8  Social support: Good     Goals:  Reduce perceived stress to 1-3/10, PHQ-9 - reduced severity by one level, Feelings in [place] Score < 3, Physical Fitness in [place] Score < 3, Social Support in [place] Score < 3, Daily Activity in [place] Score < 3, Pain in [place] Score < 3, Overall Health in [facility] Score < 3, Quality of Life in [facility] Score < 3 , Change in Health in [facility] Score < 3 , Increased interest in doing things, improved positive thoughts of well being and increased energy    Progression Toward Goals: Reviewed Pt goals and determined plan of care    Education: signs/sxs of depression, benefits of a positive support system, stress management techniques and depression and CAD  Plan: Class: Stress and Your Health, Class: Relaxation, Refer to Judson Banks, Practice relaxation techniques, Exercise and Enjoy a hobby  Readiness to change: Preparation:  (Getting ready to change)       OTHER CORE COMPONENTS     Tobacco:   Social History     Tobacco Use   Smoking Status Current Some Day Smoker    Packs/day: 1 00    Types: Cigarettes   Smokeless Tobacco Never Used   Tobacco Comment    Currently smoking 1-1 5 packs cigarettes weekly (Updated 2021)  Tobacco Use Intervention:   Pt continues to smoke 1-1 5 packs per week  Decreased from 1 ppd  Has goal to quit, but has not set a quit date  SHe reprots she has been able to quit in the past on her own  She reports she is not able to use patch due to migraines       Anginal Symptoms:  chest pressure   NTG use: No prescription    Blood pressure:    Restin/72   Exercise: 138/78    Goals: consistent BP < 130/80, reduced dietary sodium <2300mg, moderate intensity exercise >150 mins/wk, medication compliance, reduce number of medications  needed for BP control, reduce number of cigarettes/day and set a target quit date    Progression Toward Goals: Reviewed Pt goals and determined plan of care    Education:  understanding high blood pressure and it's relationship to CAD, low sodium diet and HTN, smoking and heart disease, tobacco triggers, setting a smoking cessation plan and provide information on tobacco cessation treatment  Plan: Class: Understanding Heart Disease, Class: Common Heart Medications, Set target quit date for smoking, reduce number of cigarettes per day, Avoid Processed foods and engage in regular exercise  Readiness to change: Preparation:  (Getting ready to change)

## 2021-04-22 ENCOUNTER — OFFICE VISIT (OUTPATIENT)
Dept: CARDIOLOGY CLINIC | Facility: CLINIC | Age: 61
End: 2021-04-22
Payer: COMMERCIAL

## 2021-04-22 VITALS
SYSTOLIC BLOOD PRESSURE: 118 MMHG | BODY MASS INDEX: 32.55 KG/M2 | HEART RATE: 74 BPM | HEIGHT: 67 IN | WEIGHT: 207.4 LBS | OXYGEN SATURATION: 96 % | DIASTOLIC BLOOD PRESSURE: 74 MMHG

## 2021-04-22 DIAGNOSIS — E11.65 UNCONTROLLED TYPE 2 DIABETES MELLITUS WITH HYPERGLYCEMIA (HCC): Chronic | ICD-10-CM

## 2021-04-22 DIAGNOSIS — E78.2 MIXED HYPERLIPIDEMIA: Primary | ICD-10-CM

## 2021-04-22 DIAGNOSIS — I25.10 CORONARY ARTERY DISEASE INVOLVING NATIVE CORONARY ARTERY OF NATIVE HEART WITHOUT ANGINA PECTORIS: Chronic | ICD-10-CM

## 2021-04-22 PROCEDURE — 99214 OFFICE O/P EST MOD 30 MIN: CPT | Performed by: INTERNAL MEDICINE

## 2021-04-22 RX ORDER — EZETIMIBE 10 MG/1
10 TABLET ORAL DAILY
Qty: 30 TABLET | Refills: 5 | Status: SHIPPED | OUTPATIENT
Start: 2021-04-22 | End: 2021-07-01

## 2021-04-22 NOTE — PROGRESS NOTES
General Cardiology - Outpatient Progress Note   Zaid Davis 63 y o  female   MRN: [id number]  @ Encounter: [id number]    Alyson Mercado PA-C  Consults      Risk factors:  -HTN  -HLD  -DM2  -active tobacco use      Interval History:   Since last encounter in the hospital, patient has followed up twice in our office  Once with MERI Jean for routine visit and another time with Susan Turk PA-C in an urgent manner for right femoral access significant tenderness and ecchymosis  She has remained free of her initial presenting symptoms  She denies any exertional dyspnea, chest pain, diaphoresis, nausea, vomiting, orthopnea, PND or lower extremity edema  She intermittently has some shortness of breath which does not follow any particular pattern and could be attributed to ticagrelor  She has decreased her tobacco use significantly to 1 5 packs weekly  She has developed significant statin intolerance and was unable to tolerate even the lowest dose of rosuvastatin  She continues to have myalgia and arthralgias which has been unresponsive to acetaminophen and a trial of tramadol  She has been refraining from  NSAIDs  Furthermore, she is in the midst of a Crohn's flare and is inquiring whether steroids would be appropriate  Lastly, she is awaiting a tooth extraction and even show implant for fractured upper posterior tooth  Initial appointment is set for May  She has been encountering some difficulties maintaining appropriate sugar levels  She reports frequent episodes of hypoglycemia and feels that her medication is "too much "    Cardiac History Summary:   Orquidea Whatley is a 63y o  year old female, employee of [employer], who routinely follows with Alyson Mercado PA-C for her primary care needs        She has a history of 3-V coronary artery disease, inferior STEMI with subsequent staged LAD PCI, residual nonobstructive disease, uncontrolled diabetes mellitus type 2, active tobacco use disorder, dyslipidemia, class 1 obesity, irritable bowel syndrome, Crohn's colitis and fibromyalgia  Her initial contact with [address] [place] [address] Cardiology was in February 2021 for inferior type 1 MI which required 2 drug-eluting stents to the mid /distal RCA  Transthoracic echocardiogram at that time revealed preserved biventricular function, basal to mid inferior akinesis and no significant valvulopathy  In March 2021, she underwent staged PCI to the mid LAD with 1 drug-eluting stent; there was a 60% proximal LAD which was not flow significant (DFR 0 93)  Objective:  Review of Systems  ROS as noted in interval history  Physical Exam:  Vitals: Blood pressure 118/74, pulse 74, height 5' 7" (1 702 m), weight 94 1 kg (207 lb 6 4 oz), SpO2 96 %, not currently breastfeeding , Body mass index is 32 48 kg/m²  GEN: Kendra Low appears well, alert and oriented x 3, pleasant and cooperative   HEENT:  Normocephalic, atraumatic, anicteric, significant bilateral xanthelasma around medial palpebral commissures  NECK: No JVD or carotid bruits   HEART: Regular rhythm, normal rate, normal S1 and S2, no murmurs, clicks, gallops or rubs   LUNGS: Clear to auscultation bilaterally; no wheezes, rales, or rhonchi; respiration nonlabored   ABDOMEN:  Normoactive bowel sounds, soft, no tenderness, no distention  EXTREMITIES: radial pulses 2+ palpable; no edema  NEURO: no gross focal findings; cranial nerves grossly intact   SKIN:  Dry, small punctate ecchymoses in upper limbs, warm to touch    ACCESS: Right groin with no hematoma or bruit  Moderate-size ecchymosis with various stages of healing  2+ dorsalis pedis/ posterior tibialis      Home Medications: (Not in a hospital admission)      Current Outpatient Medications:     acetaminophen (TYLENOL) 500 mg tablet, Take 1,000 mg by mouth every 6 (six) hours as needed for mild pain, Disp: , Rfl:     aspirin 81 mg chewable tablet, Chew 1 tablet (81 mg total) daily, Disp: 90 tablet, Rfl: 3    carvedilol (COREG) 12 5 mg tablet, Take 1 tablet (12 5 mg total) by mouth every 12 (twelve) hours, Disp: 30 tablet, Rfl: 5    cholecalciferol (VITAMIN D3) 1,000 units tablet, Take 1 tablet (1,000 Units total) by mouth daily, Disp: 90 tablet, Rfl: 3    Continuous Blood Gluc  (Dexcom G6 ) CRISTINA, Disp 1 , Disp: 1 Device, Rfl: 1    Continuous Blood Gluc Sensor (Dexcom G6 Sensor) MISC, Use 1 every 10 days, Disp: 1 each, Rfl: 1    Continuous Blood Gluc Transmit (Dexcom G6 Transmitter) MISC, Use 1 every 3 months, Disp: 1 each, Rfl: 1    Empagliflozin (Jardiance) 25 MG TABS, Take 1 tablet (25 mg total) by mouth every morning, Disp: 30 tablet, Rfl: 0    insulin aspart (NovoLOG FlexPen) 100 UNIT/ML injection pen, 6 unit before (Patient taking differently: 3 (three) times a day with meals 6 unit before), Disp: , Rfl:     insulin glargine (LANTUS) 100 units/mL subcutaneous injection, Inject 25 Units under the skin daily at bedtime, Disp: 10 mL, Rfl: 0    Insulin Pen Needle (Pen Needles) 32G X 5 MM MISC, Use 4 per day, Disp: 360 each, Rfl: 1    losartan (COZAAR) 25 mg tablet, Take 1 tablet (25 mg total) by mouth daily, Disp: 90 tablet, Rfl: 0    nicotine (NICODERM CQ) 21 mg/24 hr TD 24 hr patch, Place 1 patch on the skin daily, Disp: 28 patch, Rfl: 3    nitroglycerin (NITROSTAT) 0 4 mg SL tablet, Place 1 tablet (0 4 mg total) under the tongue every 5 (five) minutes as needed for chest pain, Disp: 30 tablet, Rfl: 3    ondansetron (ZOFRAN-ODT) 4 mg disintegrating tablet, Take 1 tablet (4 mg total) by mouth every 8 (eight) hours as needed for nausea or vomiting, Disp: 12 tablet, Rfl: 0    ticagrelor (BRILINTA) 90 MG, Take 1 tablet (90 mg total) by mouth every 12 (twelve) hours, Disp: 180 tablet, Rfl: 0    traMADol (ULTRAM) 50 mg tablet, Take 1 tablet (50 mg total) by mouth every 8 (eight) hours as needed for moderate pain, Disp: 10 tablet, Rfl: 0    ezetimibe (ZETIA) 10 mg tablet, Take 1 tablet (10 mg total) by mouth daily, Disp: 30 tablet, Rfl: 5    Labs & Results:  Lab Results   Component Value Date    TROPONINI 2 46 (H) 2021    TROPONINI 2 47 (H) 2021    TROPONINI 2 45 (H) 2021     Lab Results   Component Value Date    TRIG 250 (H) 2021    TRIG 244 (H) 2020    HDL 40 2021    HDL 44 2020    LDLCALC 181 (H) 2021    LDLCALC 168 (H) 2020    HGBA1C 12 3 (H) 2021     Lab Results   Component Value Date     2016    K 3 9 2021    CO2 25 2021     2021    BUN 17 2021    CREATININE 0 95 2021    ALT 12 2021    AST 10 2021     Lab Results   Component Value Date    WBC 8 51 2021    HGB 14 2 2021    HCT 43 4 2021    MCV 93 2021     2021     Lab Results   Component Value Date    INR 0 98 2021       EKG:  Date: 21 post catheterization      Holter/Device Interrogation:   None to be reviewed    Imaging: I have personally reviewed pertinent reports  and I have personally reviewed pertinent films in PACS      Echo:   Results for orders placed during the hospital encounter of 21   Echo Complete with Contrast if Indicated    Narrative [facility]  Transthoracic Echocardiogram 2D, M-mode, Doppler, and Color Doppler  Study date:  2021    Patient: Dee Sanabria  MR number: [id number]  Account number: [de-identified]  : 1960  Age: 63 years  Gender: Female  Status: Inpatient  Location: Bedside  Height: 66 in  Weight: 213 6 lb  BP: 161/ 81 mmHg    Indications: Assess left ventricular function  Diagnoses: I21 3 - ST elevation (STEMI) myocardial infarction of unspecified site    Sonographer:  TODD Michael  Primary Physician:  Benjamín [facility]  Referring Physician:  So Fischer MD  Group:  [id number]  Interpreting Physician:  Collette Nail, MD    SUMMARY    LEFT VENTRICLE:  Systolic function was normal  Ejection fraction was estimated to be 60 %  There was akinesis of the basal-mid inferior wall(s)  RIGHT VENTRICLE:  The size was normal   Systolic function was normal     HISTORY: PRIOR HISTORY: Myocardial infarction, Dyslipidemia, Current everyday smoker, Obesity      LEFT VENTRICLE: Size was normal  Systolic function was normal  Ejection fraction was estimated to be 60 %  There was akinesis of the basal-mid inferior wall(s)  Wall thickness was normal  DOPPLER: Left ventricular diastolic function parameters were normal     RIGHT VENTRICLE: The size was normal  Systolic function was normal  Wall thickness was normal     LEFT ATRIUM: Size was normal     RIGHT ATRIUM: Size was normal     MITRAL VALVE: Valve structure was normal  There was normal leaflet separation  DOPPLER: The transmitral velocity was within the normal range  There was no evidence for stenosis  There was trace regurgitation  AORTIC VALVE: The valve was trileaflet  Leaflets exhibited normal thickness and normal cuspal separation  DOPPLER: Transaortic velocity was within the normal range  There was no evidence for stenosis  There was no significant  regurgitation  TRICUSPID VALVE: The valve structure was normal  There was normal leaflet separation  DOPPLER: The transtricuspid velocity was within the normal range  There was no evidence for stenosis  There was trace regurgitation  PULMONIC VALVE: Leaflets exhibited normal thickness, no calcification, and normal cuspal separation  DOPPLER: The transpulmonic velocity was within the normal range  There was trace regurgitation  PERICARDIUM: There was no pericardial effusion  The pericardium was normal in appearance  AORTA: The root exhibited normal size  SYSTEMIC VEINS: IVC: The inferior vena cava was normal in size   Respirophasic changes were normal       [facility] Accredited Echocardiography Laboratory    Prepared and electronically signed by  Jason Wu MD  Signed [id number] 19:20:33         Cath:   [facility] [facility]  Invasive Cardiovascular Lab Complete Report  Study date: 02/26/2021  Indication: inferior STEMI      SUMMARY  CORONARY CIRCULATION:  Mid LAD: There was a discrete 80 % stenosis  It appears amenable to percutaneous intervention  1st diagonal: There was a 40 % stenosis  2nd diagonal: There was a tubular 60 % stenosis  1st obtuse marginal: There was a tubular 50 % stenosis  Mid RCA: There was a discrete 90 % stenosis  An intervention was performed  Distal RCA: There was a tubular 99 % stenosis  This lesion is a likely culprit for the patient's recent myocardial infarction  An intervention was performed      1ST LESION INTERVENTIONS:  A successful balloon angioplasty with stent procedure was performed on the 99 % lesion in the distal RCA  Following intervention there was an excellent angiographic appearance with a 0 % residual stenosis  A Resolute Ismael Rx 3 0 x 26mm drug-eluting stent was placed across the lesion and deployed at a maximum inflation pressure of 14 zeferino      2ND LESION INTERVENTIONS:  A successful balloon angioplasty with stent procedure was performed on the 90 % lesion in the mid RCA  Following intervention there was an excellent angiographic appearance with a 0 % residual stenosis  A Resolute [place] Rx 3 0 x 18mm drug-eluting stent was placed across the lesion and deployed at a maximum inflation pressure of 16 zeferino      IMPRESSIONS:  Angulated innominate/aorta angle , unable to access ascending aorta from right RA  There is significant triple vessel coronary artery disease      RECOMMENDATIONS  Patient instructed to return for staged percutaneous intervention in three weeks      Prepared and signed by  Raf Paul MD    ============================================================  [facility]  Invasive Cardiovascular Lab Complete Report  Study date: 03/25/2021  Indication: staged LAD PCI    SUMMARY  CORONARY CIRCULATION:  Proximal LAD: There was a 60 % stenosis at the origin of D1  Negative DFR 0 93  Mid LAD: There was a tubular 85 % stenosis  An intervention was performed  Positive DFR 0 63     1ST LESION INTERVENTIONS:  A successful drug-eluting stent procedure was performed on the 85 % lesion in the mid LAD  Following intervention there was an excellent angiographic appearance with a 0 % residual stenosis  A Resolute Ismael Rx 2 25 x 18mm drug-eluting stent was placed across the lesion and deployed at a maximum inflation pressure of 12 zeferino  IMPRESSIONS:  There is significant single vessel coronary artery disease      Prepared and signed by  Yvonne Bustillo MD      Assessment/Plan     Assessment:    1  Coronary artery disease  - type 1 inferior STEMI + residual disease  - 2/2021 index LHC: 100% distal RCA culprit t/w 3 0 x 26 mm Resolute ismael, 80-90% mid RCA lesion t/w 3 0 x 18 mm Resolute ismael; residual 80-90% mid LAD, diffuse nonobstructive D1 disease, 50% mid LCX  -  3/2021 staged PCI:  85% mid LAD t/w 2 25 x 18 mm Resolute ismael; 60% D1 non flow-limiting (DFR 0 93)  - 2/2021 TTE: LVEF 60%, basal-mid inferior RWMA, normal RV, no significant valve disease  - aspirin, ticagrelor, carvedilol, losartan, empagliflozin  - no use of PRN nitro    2  Dyslipidemia with statin intolerance  - 2/2021 lipids:  Total 271, , HDL 40, direct ; A1c 12 3%  - debilitating myalgia and arthralgia (normal CPK + LFTs)  - meds trialed:  atorvastatin and rosuvastatin  - DM regiment: empagliflozin, insulin glargine/aspart    3  Angulated innominate/aorta  - unable to access ascending aorta via right radial approach      Plan:  1  As patient is intolerant of statins, acetamide 10 mg daily was started today with repeat lipid panel in 3 months times  If not tolerated or insufficient response, would order PCSK9 inhibitor    Appropriate diabetes control would also help improve lipid levels, patient was encourage to continue discussion with endocrinologist to fine-tune anti-glycemic regimen  2  Otherwise continue current regiment  Patient may proceed with a dental procedure if [facility] physician is comfortable proceeding on dual antiplatelet  Otherwise, would defer procedure to mid-late June at the earliest   Given bruising, could consider transitioning from ticagrelor to clopidogrel  3  In regards to her Crohn's flare, steroids would not be contraindicated from a cardiac standpoint if treating physician deems it necessary  4  Patient was encouraged to continue her smoking cessation to help prevent progression of her residual nonobstructive lesions  5  Cardiac rehabilitation may be deferred for now until her muscle and joint aches improve/subside  6  She is to follow-up in our office in 6 months time or earlier if any changes in her clinical status  She is to continue monitoring her right groin access; if any further issues, she is to contact our office or present to the ED  Case discussed and reviewed with Dr Gertrude Winkler who agrees with my assessment and plan  Milton Radford MD  Cardiology Fellow PGY-5    ==========================================================================================    Epic/ Allscripts/Care Everywhere records reviewed: yes    ** Please Note: Fluency DirectDictation voice to text software may have been used in the creation of this document   **

## 2021-04-23 ENCOUNTER — TELEPHONE (OUTPATIENT)
Dept: FAMILY MEDICINE CLINIC | Facility: CLINIC | Age: 61
End: 2021-04-23

## 2021-04-23 ENCOUNTER — TELEPHONE (OUTPATIENT)
Dept: CARDIAC REHAB | Age: 61
End: 2021-04-23

## 2021-04-23 NOTE — TELEPHONE ENCOUNTER
She's going to call her GI doc  She said the problem is that she hasn't seen her GI doctor for a while  She is going to call them  If there is an issue, she will call back for the small amount script

## 2021-04-23 NOTE — TELEPHONE ENCOUNTER
Patient is having a Crohn's flareup and would like you to send a prescription for Prednisone to the [facility] in [facility]

## 2021-04-23 NOTE — TELEPHONE ENCOUNTER
Prednisone is going to send patients sugars up now that she is diabetic, especially the doses her GI use to put her on  At this point it might be worth consulting with GI to see if there are other options  please let me know what she says   I can write for a small amount but really would like her to follow up with GI

## 2021-04-23 NOTE — TELEPHONE ENCOUNTER
Pt reports she is putting CR on hold d/t severe joint pain  She will be scheduling an office visit with a rheumatologist for a consultation  She will call CR to reschedule when ready

## 2021-04-27 ENCOUNTER — OFFICE VISIT (OUTPATIENT)
Dept: ENDOCRINOLOGY | Facility: CLINIC | Age: 61
End: 2021-04-27
Payer: COMMERCIAL

## 2021-04-27 ENCOUNTER — TELEPHONE (OUTPATIENT)
Dept: FAMILY MEDICINE CLINIC | Facility: CLINIC | Age: 61
End: 2021-04-27

## 2021-04-27 VITALS
DIASTOLIC BLOOD PRESSURE: 80 MMHG | SYSTOLIC BLOOD PRESSURE: 118 MMHG | HEIGHT: 67 IN | WEIGHT: 210.38 LBS | HEART RATE: 80 BPM | BODY MASS INDEX: 33.02 KG/M2

## 2021-04-27 DIAGNOSIS — E11.65 UNCONTROLLED TYPE 2 DIABETES MELLITUS WITH HYPERGLYCEMIA (HCC): Primary | Chronic | ICD-10-CM

## 2021-04-27 DIAGNOSIS — I10 ESSENTIAL HYPERTENSION: ICD-10-CM

## 2021-04-27 DIAGNOSIS — K50.919 CROHN'S DISEASE WITH COMPLICATION, UNSPECIFIED GASTROINTESTINAL TRACT LOCATION (HCC): Primary | ICD-10-CM

## 2021-04-27 DIAGNOSIS — Z79.52 LONG TERM CURRENT USE OF SYSTEMIC STEROIDS: ICD-10-CM

## 2021-04-27 DIAGNOSIS — E78.2 MIXED HYPERLIPIDEMIA: ICD-10-CM

## 2021-04-27 DIAGNOSIS — Z13.820 SCREENING FOR OSTEOPOROSIS: ICD-10-CM

## 2021-04-27 PROCEDURE — 95251 CONT GLUC MNTR ANALYSIS I&R: CPT | Performed by: PHYSICIAN ASSISTANT

## 2021-04-27 PROCEDURE — 99215 OFFICE O/P EST HI 40 MIN: CPT | Performed by: PHYSICIAN ASSISTANT

## 2021-04-27 RX ORDER — INSULIN DEGLUDEC INJECTION 100 U/ML
25 INJECTION, SOLUTION SUBCUTANEOUS DAILY
Qty: 30 ML | Refills: 1 | Status: SHIPPED | OUTPATIENT
Start: 2021-04-27 | End: 2021-12-07 | Stop reason: SDUPTHER

## 2021-04-27 RX ORDER — INSULIN ASPART 100 [IU]/ML
INJECTION, SOLUTION INTRAVENOUS; SUBCUTANEOUS
Qty: 15 ML | Refills: 1 | Status: SHIPPED | OUTPATIENT
Start: 2021-04-27 | End: 2021-09-01 | Stop reason: SDUPTHER

## 2021-04-27 RX ORDER — PREDNISONE 10 MG/1
TABLET ORAL
Qty: 26 TABLET | Refills: 0 | Status: SHIPPED | OUTPATIENT
Start: 2021-04-27 | End: 2021-05-27

## 2021-04-27 RX ORDER — BLOOD-GLUCOSE SENSOR
EACH MISCELLANEOUS
Qty: 3 EACH | Refills: 3 | Status: SHIPPED | OUTPATIENT
Start: 2021-04-27 | End: 2021-07-27 | Stop reason: SDUPTHER

## 2021-04-27 NOTE — TELEPHONE ENCOUNTER
Patient called her Endocronologist regarding the prednisone for her Crohn's flare up  He has no problem with her being on it, but they don't prescribe it      Can you send a script for the Prednisone to the [facility] on [address] [facility]

## 2021-04-27 NOTE — PROGRESS NOTES
Established Patient Progress Note      Chief Complaint   Patient presents with    Diabetes Type 2        History of Present Illness:   Doug Santizo is a 63 y o  female with a history of type 2 diabetes with long term use of insulin since 2017-- but started on insulin earlier this year  Reports complications of neuropathy  Denies recent illness or hospitalizations  Denies recent severe hypoglycemic or severe hyperglycemic episodes  Denies any issues with her current regimen except burning with lantus  home glucose monitoring: are performed regularly using Dexcom G6  She has needed to get a new transmitter  She is wondering if she can take prednisone due to worsening arthritis symptoms and crohns flare which has helped her in the past   Needs to Set up with GI, she is set up with rheumatology in August   Previous GI doctor ordered DEXA scan- hasn't completed  Current regimen:   Jardiance 25mg daily   Lantus 25 units daily at bedtime  Novolog 6 units before each meal      Last Eye Exam: Needs exam  Last Foot Exam: utd    Has hypertension: Taking carvedilol and losartan  Has hyperlipidemia: Taking zetia  Hasn't tolerated atorvastatin/rosuvastatin  Cardiology considering PCSK9 inhibitor if needed depending on results of zetia         Patient Active Problem List   Diagnosis    B-complex deficiency    Fibromyalgia    GERD (gastroesophageal reflux disease)    [facility]ammatory bowel disease ([facility] disease) [facility] [address] [facility]    Migraine without aura and without status migrainosus, not intractable    Peripheral neuropathy    Tobacco use disorder, continuous    Mixed hyperlipidemia    Hidradenitis suppurativa    Acute ST elevation myocardial infarction (STEMI) due to occlusion of mid portion of right coronary artery ([facility] [address] )    Uncontrolled type 2 diabetes mellitus ([facility] [address] )    Essential hypertension    Coronary artery disease involving native coronary artery      Past Medical History:   Diagnosis Date    Coronary artery disease     [facility]'s colitis ([facility] [address] [facility]     Diabetes mellitus ([facility] [address] [facility]     Fibromyalgia     HTN (hypertension)     Hyperlipidemia     Migraine     Myocardial infarction [facility])       Past Surgical History:   Procedure Laterality Date    CARDIAC CATHETERIZATION      CHOLECYSTECTOMY      CORONARY STENT PLACEMENT      DENTAL SURGERY      [place] teeth extraction    HYSTERECTOMY      TUBAL LIGATION      Bilateral      Family History   Problem Relation Age of Onset    Coronary artery disease Mother     Dementia Mother     Heart disease Brother     Alcohol abuse Neg Hx     Substance Abuse Neg Hx      Social History     Tobacco Use    Smoking status: Current Some Day Smoker     Packs/day: 1 00     Types: Cigarettes    Smokeless tobacco: Never Used    Tobacco comment: Currently smoking 1-1 5 packs cigarettes weekly (Updated 04/02/2021)      Substance Use Topics    Alcohol use: Yes     Frequency: Monthly or less     Drinks per session: 1 or 2     Binge frequency: Never     Comment: Rarely     Allergies   Allergen Reactions    Penicillins Anaphylaxis    Rosuvastatin Myalgia and Arthralgia    Atorvastatin Myalgia    Cefuroxime     Metaxalone     Influenza Vaccines Rash    Keflet [Cephalexin] Rash    Nuts - Food Allergy Itching    Sulfa Antibiotics Rash    Sulfamethoxazole-Trimethoprim Hives and Rash    Topiramate Rash         Current Outpatient Medications:     acetaminophen (TYLENOL) 500 mg tablet, Take 1,000 mg by mouth every 6 (six) hours as needed for mild pain, Disp: , Rfl:     aspirin 81 mg chewable tablet, Chew 1 tablet (81 mg total) daily, Disp: 90 tablet, Rfl: 3    carvedilol (COREG) 12 5 mg tablet, Take 1 tablet (12 5 mg total) by mouth every 12 (twelve) hours, Disp: 30 tablet, Rfl: 5    cholecalciferol (VITAMIN D3) 1,000 units tablet, Take 1 tablet (1,000 Units total) by mouth daily, Disp: 90 tablet, Rfl: 3    Continuous Blood Gluc  (Dexcom G6 ) CRISTINA, Disp 1 , Disp: 1 Device, Rfl: 1    Continuous Blood Gluc Sensor (Dexcom G6 Sensor) MISC, Use 1 sensor every 10 days, disp 90 day supply, Disp: 3 each, Rfl: 3    Continuous Blood Gluc Transmit (Dexcom G6 Transmitter) MISC, Use 1 every 3 months, Disp: 1 each, Rfl: 1    ezetimibe (ZETIA) 10 mg tablet, Take 1 tablet (10 mg total) by mouth daily, Disp: 30 tablet, Rfl: 5    insulin aspart (NovoLOG FlexPen) 100 UNIT/ML injection pen, 6 units before each meal , Disp: 15 mL, Rfl: 1    insulin glargine (LANTUS) 100 units/mL subcutaneous injection, Inject 25 Units under the skin daily at bedtime, Disp: 10 mL, Rfl: 0    Insulin Pen Needle (Pen Needles) 32G X 5 MM MISC, Use 4 per day, Disp: 360 each, Rfl: 1    losartan (COZAAR) 25 mg tablet, Take 1 tablet (25 mg total) by mouth daily, Disp: 90 tablet, Rfl: 0    ondansetron (ZOFRAN-ODT) 4 mg disintegrating tablet, Take 1 tablet (4 mg total) by mouth every 8 (eight) hours as needed for nausea or vomiting, Disp: 12 tablet, Rfl: 0    ticagrelor (BRILINTA) 90 MG, Take 1 tablet (90 mg total) by mouth every 12 (twelve) hours, Disp: 180 tablet, Rfl: 0    insulin degludec Jarold Boyers FlexTouch) 100 units/mL injection pen, Inject 25 Units under the skin daily, Disp: 30 mL, Rfl: 1    nicotine (NICODERM CQ) 21 mg/24 hr TD 24 hr patch, Place 1 patch on the skin daily (Patient not taking: Reported on 4/27/2021), Disp: 28 patch, Rfl: 3    nitroglycerin (NITROSTAT) 0 4 mg SL tablet, Place 1 tablet (0 4 mg total) under the tongue every 5 (five) minutes as needed for chest pain (Patient not taking: Reported on 4/27/2021), Disp: 30 tablet, Rfl: 3    predniSONE 10 mg tablet, 40 mg x 2 days , 20 mg x 2 days , 10 x 14 days, Disp: 26 tablet, Rfl: 0    traMADol (ULTRAM) 50 mg tablet, Take 1 tablet (50 mg total) by mouth every 8 (eight) hours as needed for moderate pain (Patient not taking: Reported on 4/27/2021), Disp: 10 tablet, Rfl: 0    Review of Systems   Constitutional: Negative for activity change, appetite change, chills, diaphoresis, fatigue, fever and unexpected weight change  HENT: Negative for trouble swallowing and voice change  Eyes: Negative for visual disturbance  Respiratory: Negative for shortness of breath  Cardiovascular: Negative for chest pain and palpitations  Gastrointestinal: Positive for abdominal pain  Negative for constipation and diarrhea  Endocrine: Negative for cold intolerance, heat intolerance, polydipsia, polyphagia and polyuria  Genitourinary: Positive for difficulty urinating, dysuria and frequency  Negative for menstrual problem  Musculoskeletal: Positive for arthralgias and myalgias  Skin: Negative for rash  Allergic/Immunologic: Negative for food allergies  Neurological: Negative for dizziness and tremors  Hematological: Negative for adenopathy  Psychiatric/Behavioral: Negative for sleep disturbance  All other systems reviewed and are negative  Physical Exam:  Body mass index is 32 95 kg/m²  /80 (BP Location: Left arm, Patient Position: Sitting, Cuff Size: Large)   Pulse 80   Ht 5' 7" (1 702 m)   Wt 95 4 kg (210 lb 6 oz)   BMI 32 95 kg/m²    Wt Readings from Last 3 Encounters:   04/27/21 95 4 kg (210 lb 6 oz)   04/22/21 94 1 kg (207 lb 6 4 oz)   04/02/21 94 4 kg (208 lb 3 2 oz)       Physical Exam  Vitals signs reviewed  Constitutional:       General: She is not in acute distress  Appearance: She is well-developed  HENT:      Head: Normocephalic and atraumatic  Eyes:      Conjunctiva/sclera: Conjunctivae normal       Pupils: Pupils are equal, round, and reactive to light  Neck:      Musculoskeletal: Normal range of motion and neck supple  Thyroid: No thyromegaly  Cardiovascular:      Rate and Rhythm: Normal rate and regular rhythm  Heart sounds: Normal heart sounds  Pulmonary:      Effort: Pulmonary effort is normal  No respiratory distress  Breath sounds: Normal breath sounds   No wheezing or rales    Abdominal:      General: Bowel sounds are normal  There is no distension  Palpations: Abdomen is soft  Tenderness: There is no abdominal tenderness  Musculoskeletal: Normal range of motion  Skin:     General: Skin is warm and dry  Neurological:      Mental Status: She is alert and oriented to person, place, and time  Labs:   Lab Results   Component Value Date    HGBA1C 12 3 (H) 02/26/2021     Lab Results   Component Value Date    CREATININE 0 95 04/13/2021    CREATININE 0 65 03/26/2021    CREATININE 0 81 03/17/2021    BUN 17 04/13/2021     01/22/2016    K 3 9 04/13/2021     04/13/2021    CO2 25 04/13/2021     eGFR   Date Value Ref Range Status   04/13/2021 66 ml/min/1 73sq m Final     Lab Results   Component Value Date    HDL 40 02/26/2021    TRIG 250 (H) 02/26/2021     Lab Results   Component Value Date    ALT 12 03/17/2021    AST 10 03/17/2021    ALKPHOS 108 03/17/2021    BILITOT 0 4 01/22/2016     Lab Results   Component Value Date    IXQ0LOHHGKBT 1 620 11/13/2020     No results found for: FREET4, TSI    Impression & Plan:    Problem List Items Addressed This Visit        Endocrine    Uncontrolled type 2 diabetes mellitus ([facility] [address] ) - Primary (Chronic)     Diabetes control is improving significantly based on review of CGM  Average sensor glucose 123 over the past two weeks  She is in target 95% of time, high 2% of time, low 0% of time  He is low 2% of time and very low 1% of time  Currently she is reporting UTI symptoms and she feels she may need Treatment with oral steroid due to worsening arthralgias/myaglias  For now, stop the Jardiance due to UTI Symptoms  Will avoid Metformin/GLP-1 agonist for now due to Crohn's symptoms  Change lantus to Edwin Melvin due to complaints of burning at injection site  Follow up primary care to evaluate UTI symptoms and arthralgias  If steroids are required she was give instructions to adjust insulin if needed    She will start using Dexcom Juliana on cell phone and download clarity juliana  She will need to change transmitter ID in the cell phone juliana  Once she has been using this for two weeks she will let us know and we can send invite to share data with our office and make changes to regimen if necessary  Discussed importance of Diabetes Education/medical nutrition therapy  She is overwhelmed with large number of medical visits at this time-- asked her to start with 1:1 MNT for now and can do DSMNT classes in the future when she has more time  Lab Results   Component Value Date    HGBA1C 12 3 (H) 02/26/2021            Relevant Medications    insulin degludec Turner Plum FlexTouch) 100 units/mL injection pen    insulin aspart (NovoLOG FlexPen) 100 UNIT/ML injection pen    Continuous Blood Gluc Sensor (Dexcom G6 Sensor) MISC    Other Relevant Orders    Ambulatory referral to Diabetic Education    Hemoglobin Z3Q    Basic metabolic panel       Cardiovascular and Mediastinum    Essential hypertension     Well controlled on current regimen  Other    Mixed hyperlipidemia     Continue zetia  She is off the rosuvastatin  Other Visit Diagnoses     Screening for osteoporosis        Relevant Orders    DXA bone density spine hip and pelvis    Long term current use of systemic steroids        Relevant Orders    DXA bone density spine hip and pelvis          Orders Placed This Encounter   Procedures    DXA bone density spine hip and pelvis     Standing Status:   Future     Standing Expiration Date:   4/27/2025     Scheduling Instructions:      Please wear comfortable clothing with no metal buttons, zippers, snaps or an underwire bra  Do not take any calcium supplements 24 hours prior to your test  Please bring your insurance cards, a form of photo ID and a list of your medications with you  Arrive 5-10 minutes prior to your appointment time in order to register              Your study cannot be performed if you take your calcium supplement 24 hours before the scheduled Dexa scan examination  To schedule this appointment, please contact Central Scheduling at [phone]  Order Specific Question:   Reason for Exam:     Answer:   screening for osteoporosis, hx steroid use  Order Specific Question:   Is the patient pregnant? Answer:   No    Hemoglobin A1C     Standing Status:   Future     Standing Expiration Date:   10/27/2021    Basic metabolic panel     This is a patient instruction: Patient fasting for 8 hours or longer recommended  Standing Status:   Future     Standing Expiration Date:   10/27/2021    Ambulatory referral to Diabetic Education     Standing Status:   Future     Standing Expiration Date:   4/27/2022     Referral Priority:   Routine     Referral Type:   Consult - AMB     Referral Reason:   Specialty Services Required     Requested Specialty:   Diabetes Services     Number of Visits Requested:   1     Expiration Date:   4/27/2022       Patient Instructions   If Starting steroids,   1- Increase Lantus (Or Tresiba) to 30 units daily while on steroids if blood sugars are elevated  2- Use novolog sliding scale for high blood sugars if needed while on Steroids--- In addition to the 6 units dose  150-200: 2 units  201-250: 4 units  251-300: 6 units  201-350: 8 units  Over 350: 10 units    Change Lantus to [place] due to burning  When you switch sensors-- pair with your phone sofia  Download dexcom Clarity sofia and then let us know-- we can send you invite to share data to share  Discussed with the patient and all questioned fully answered  She will call me if any problems arise  Follow-up appointment in 2 months       Counseled patient on diagnostic results, prognosis, risk and benefit of treatment options, instruction for management, importance of treatment compliance, Risk  factor reduction and impressions    Shara Bruno PA-C

## 2021-04-27 NOTE — PATIENT INSTRUCTIONS
If Starting steroids,   1- Increase Lantus (Or Tresiba) to 30 units daily while on steroids if blood sugars are elevated  2- Use novolog sliding scale for high blood sugars if needed while on Steroids--- In addition to the 6 units dose  150-200: 2 units  201-250: 4 units  251-300: 6 units  201-350: 8 units  Over 350: 10 units    Change Lantus to [place] due to burning  When you switch sensors-- pair with your phone sofia  Download dexcom Clarity sofia and then let us know-- we can send you invite to share data to share

## 2021-04-27 NOTE — TELEPHONE ENCOUNTER
Dose, Route, Frequency: As Directed   Dispense Quantity: 30 tablet Refills: 0 Fills remaining: --           Si mg for 2 days, 20 mg for 2 days 10 mg for 14 days       This was from back on 11/3/20  Not sure that that is for this issue, but it's the only time you prescribed Prednisone for her

## 2021-04-27 NOTE — TELEPHONE ENCOUNTER
What is her preferred dosing again? Prednisone 10 mg  I can't remember the rest because we only ever talked about I never actually prescribed it

## 2021-04-28 ENCOUNTER — TELEPHONE (OUTPATIENT)
Dept: FAMILY MEDICINE CLINIC | Facility: CLINIC | Age: 61
End: 2021-04-28

## 2021-04-28 ENCOUNTER — OFFICE VISIT (OUTPATIENT)
Dept: FAMILY MEDICINE CLINIC | Facility: CLINIC | Age: 61
End: 2021-04-28
Payer: COMMERCIAL

## 2021-04-28 VITALS
RESPIRATION RATE: 16 BRPM | HEIGHT: 67 IN | TEMPERATURE: 98.7 F | BODY MASS INDEX: 32.77 KG/M2 | HEART RATE: 70 BPM | WEIGHT: 208.8 LBS | SYSTOLIC BLOOD PRESSURE: 112 MMHG | DIASTOLIC BLOOD PRESSURE: 70 MMHG

## 2021-04-28 DIAGNOSIS — N89.8 VAGINAL ITCHING: ICD-10-CM

## 2021-04-28 DIAGNOSIS — R39.9 URINARY SYMPTOM OR SIGN: Primary | ICD-10-CM

## 2021-04-28 DIAGNOSIS — R30.0 DYSURIA: ICD-10-CM

## 2021-04-28 LAB
SL AMB  POCT GLUCOSE, UA: 2000
SL AMB LEUKOCYTE ESTERASE,UA: ABNORMAL
SL AMB POCT BILIRUBIN,UA: ABNORMAL
SL AMB POCT BLOOD,UA: ABNORMAL
SL AMB POCT CLARITY,UA: ABNORMAL
SL AMB POCT COLOR,UA: YELLOW
SL AMB POCT KETONES,UA: ABNORMAL
SL AMB POCT NITRITE,UA: ABNORMAL
SL AMB POCT PH,UA: 5
SL AMB POCT SPECIFIC GRAVITY,UA: 1.02
SL AMB POCT URINE PROTEIN: 300
SL AMB POCT UROBILINOGEN: 0.2

## 2021-04-28 PROCEDURE — 99213 OFFICE O/P EST LOW 20 MIN: CPT | Performed by: FAMILY MEDICINE

## 2021-04-28 PROCEDURE — 87086 URINE CULTURE/COLONY COUNT: CPT | Performed by: FAMILY MEDICINE

## 2021-04-28 PROCEDURE — 87660 TRICHOMONAS VAGIN DIR PROBE: CPT | Performed by: FAMILY MEDICINE

## 2021-04-28 PROCEDURE — 87077 CULTURE AEROBIC IDENTIFY: CPT | Performed by: FAMILY MEDICINE

## 2021-04-28 PROCEDURE — 87510 GARDNER VAG DNA DIR PROBE: CPT | Performed by: FAMILY MEDICINE

## 2021-04-28 PROCEDURE — 81002 URINALYSIS NONAUTO W/O SCOPE: CPT | Performed by: FAMILY MEDICINE

## 2021-04-28 PROCEDURE — 87480 CANDIDA DNA DIR PROBE: CPT | Performed by: FAMILY MEDICINE

## 2021-04-28 RX ORDER — CIPROFLOXACIN 250 MG/1
250 TABLET, FILM COATED ORAL 2 TIMES DAILY WITH MEALS
Qty: 14 TABLET | Refills: 0 | Status: SHIPPED | OUTPATIENT
Start: 2021-04-28 | End: 2021-05-05

## 2021-04-28 NOTE — TELEPHONE ENCOUNTER
Pt called back today because she is now experiencing difficulty urinating and believes it is her prolapse issue returning  I scheduled her to see Dr MUHAMMAD this afternoon, unless you think she should go directly to the ER at this point?

## 2021-04-28 NOTE — ASSESSMENT & PLAN NOTE
Diabetes control is improving significantly based on review of CGM  Average sensor glucose 123 over the past two weeks  She is in target 95% of time, high 2% of time, low 0% of time  He is low 2% of time and very low 1% of time  Currently she is reporting UTI symptoms and she feels she may need Treatment with oral steroid due to worsening arthralgias/myaglias  For now, stop the Jardiance due to UTI Symptoms  Will avoid Metformin/GLP-1 agonist for now due to Crohn's symptoms  Change lantus to [place] due to complaints of burning at injection site  Follow up primary care to evaluate UTI symptoms and arthralgias  If steroids are required she was give instructions to adjust insulin if needed  She will start using Dexcom Juliana on cell phone and download clarity juliana  She will need to change transmitter ID in the cell phone juliana  Once she has been using this for two weeks she will let us know and we can send invite to share data with our office and make changes to regimen if necessary  Discussed importance of Diabetes Education/medical nutrition therapy  She is overwhelmed with large number of medical visits at this time-- asked her to start with 1:1 MNT for now and can do DSMNT classes in the future when she has more time     Lab Results   Component Value Date    HGBA1C 12 3 (H) 02/26/2021

## 2021-04-29 ENCOUNTER — TELEPHONE (OUTPATIENT)
Dept: ENDOCRINOLOGY | Facility: CLINIC | Age: 61
End: 2021-04-29

## 2021-04-29 ENCOUNTER — OFFICE VISIT (OUTPATIENT)
Dept: OBGYN CLINIC | Facility: CLINIC | Age: 61
End: 2021-04-29
Payer: COMMERCIAL

## 2021-04-29 VITALS — WEIGHT: 207.4 LBS | SYSTOLIC BLOOD PRESSURE: 114 MMHG | BODY MASS INDEX: 32.48 KG/M2 | DIASTOLIC BLOOD PRESSURE: 78 MMHG

## 2021-04-29 DIAGNOSIS — N81.4 CYSTOCELE WITH PROLAPSE: Primary | ICD-10-CM

## 2021-04-29 PROCEDURE — 99203 OFFICE O/P NEW LOW 30 MIN: CPT | Performed by: NURSE PRACTITIONER

## 2021-04-29 NOTE — PROGRESS NOTES
Assessment/Plan:    Cystocele with prolapse  Exam c/w cystocele with mild prolapse-easily reduced  Hx of prior bladder surgery about 25 years ago  Referral given for urogyn  Aware to call back if wait time is long or prn problems or concerns  She is currently being treated for UTI and having bladder pressure  Advised to add uristat to ABX x 2 days and if symptoms worsen to go to ED  Diagnoses and all orders for this visit:    Cystocele with prolapse  -     Ambulatory referral to Urogynecology; Future        Subjective:      Patient ID: Malia Taylor is a 63 y o  female  Brenden Smith is a 63year old who presents as NP with complaints of dropped bladder  She has hx of CAMPOS and bladder surgery for cysctocle with complete prolapse about 25 years ago  She is currently being treated by PCP for UTI and experiencing bladder pressure and difficulty urinating  She started ABX 2 days ago  She is able to void and does not feel distended  The following portions of the patient's history were reviewed and updated as appropriate: allergies, current medications, past family history, past medical history, past social history, past surgical history and problem list     Review of Systems   Constitutional: Negative for fatigue and fever  HENT: Negative for sinus pressure and sneezing  Respiratory: Negative for cough and shortness of breath  Gastrointestinal: Negative for abdominal pain  Endocrine: Negative  Genitourinary: Positive for decreased urine volume, difficulty urinating, dysuria and frequency  Negative for flank pain, pelvic pain, vaginal bleeding and vaginal discharge  Skin: Negative  Neurological: Negative  Psychiatric/Behavioral: Negative  Objective:      /78 (BP Location: Right arm, Patient Position: Sitting, Cuff Size: Large)   Wt 94 1 kg (207 lb 6 4 oz)   BMI 32 48 kg/m²          Physical Exam  Constitutional:       Appearance: Normal appearance     Pulmonary: Effort: Pulmonary effort is normal    Abdominal:      General: There is no distension  Tenderness: There is no abdominal tenderness  Genitourinary:     Labia:         Right: No rash, tenderness or lesion  Left: No rash, tenderness or lesion  Urethra: No prolapse or urethral pain  Vagina: No signs of injury and foreign body  Prolapsed vaginal walls present  No vaginal discharge, erythema, tenderness or bleeding  Adnexa:         Right: No mass or tenderness  Left: No mass or tenderness  Comments: Cervix and uterus are surgically absent   Skin:     General: Skin is warm and dry  Capillary Refill: Capillary refill takes less than 2 seconds  Neurological:      Mental Status: She is alert and oriented to person, place, and time     Psychiatric:         Mood and Affect: Mood normal

## 2021-04-29 NOTE — ASSESSMENT & PLAN NOTE
Exam c/w cystocele with mild prolapse-easily reduced  Hx of prior bladder surgery about 25 years ago  Referral given for urogyn  Aware to call back if wait time is long or prn problems or concerns  She is currently being treated for UTI and having bladder pressure  Advised to add uristat to ABX x 2 days and if symptoms worsen to go to ED

## 2021-04-29 NOTE — PROGRESS NOTES
Assessment/Plan:    1  UTI symptoms  - will obtain urine culture and call with the results  - advised to increase fluids and start Cipro  - Urine culture  - ciprofloxacin (CIPRO) 250 mg tablet; Take 1 tablet (250 mg total) by mouth 2 (two) times a day with meals for 7 days  Dispense: 14 tablet; Refill: 0    2  Vaginal itching  - will obtain vaginal cultures and call with the results  - advised to start probiotics Align   - VAGINOSIS DNA PROBE (AFFIRM)       Follow up with Sarita Luevano for recheck ion chronic conditions in 2-3 weeks or sooner if symptoms persist or worsen  The patient understands and agrees with the treatment plan  Subjective:   Chief Complaint   Patient presents with    Urinary Tract Infection      Patient ID: Darwin Kaminski is a 63 y o  female with history of type 2 diabetes who presents today with c/o urinary frequency, urgency and burning with urination onset several days ago, she also reports vaginal itching, no vaginal discharge, no abdominal/ flank pain, no fever or chills  She was seen by her Endocrine yesterday and her Ariadna Golder was discontinued due to UTI Symptoms         The following portions of the patient's history were reviewed and updated as appropriate: allergies, current medications, past family history, past medical history, past social history, past surgical history and problem list     Past Medical History:   Diagnosis Date    Coronary artery disease     [facility]'s colitis [facility] [address] [facility]     Diabetes mellitus [facility] [address] [facility]     Fibromyalgia     HTN (hypertension)     Hyperlipidemia     Migraine     Myocardial infarction [facility])      Past Surgical History:   Procedure Laterality Date    CARDIAC CATHETERIZATION      CHOLECYSTECTOMY      CORONARY STENT PLACEMENT      DENTAL SURGERY      [place] teeth extraction    HYSTERECTOMY      TUBAL LIGATION      Bilateral     Family History   Problem Relation Age of Onset    Coronary artery disease Mother     Dementia Mother     Heart disease Brother     Alcohol abuse Neg Hx     Substance Abuse Neg Hx      Social History     Socioeconomic History    Marital status: /Civil Union     Spouse name: Not on file    Number of children: Not on file    Years of education: Not on file    Highest education level: Not on file   Occupational History    Not on file   Social Needs    Financial resource strain: Not on file    Food insecurity     Worry: Not on file     Inability: Not on file   [employer] needs     Medical: Not on file     Non-medical: Not on file   Tobacco Use    Smoking status: Current Some Day Smoker     Packs/day: 1 00     Types: Cigarettes    Smokeless tobacco: Never Used    Tobacco comment: Currently smoking 1-1 5 packs cigarettes weekly (Updated 04/02/2021)      Substance and Sexual Activity    Alcohol use: Yes     Frequency: Monthly or less     Drinks per session: 1 or 2     Binge frequency: Never     Comment: Rarely    Drug use: Never    Sexual activity: Not on file   Lifestyle    Physical activity     Days per week: Not on file     Minutes per session: Not on file    Stress: Not on file   Relationships    Social connections     Talks on phone: Not on file     Gets together: Not on file     Attends Scientology service: Not on file     Active member of club or organization: Not on file     Attends meetings of clubs or organizations: Not on file     Relationship status: Not on file    Intimate partner violence     Fear of current or ex partner: Not on file     Emotionally abused: Not on file     Physically abused: Not on file     Forced sexual activity: Not on file   Other Topics Concern    Not on file   Social History Narrative    Not on file       Current Outpatient Medications:     acetaminophen (TYLENOL) 500 mg tablet, Take 1,000 mg by mouth every 6 (six) hours as needed for mild pain, Disp: , Rfl:     aspirin 81 mg chewable tablet, Chew 1 tablet (81 mg total) daily, Disp: 90 tablet, Rfl: 3   carvedilol (COREG) 12 5 mg tablet, Take 1 tablet (12 5 mg total) by mouth every 12 (twelve) hours, Disp: 30 tablet, Rfl: 5    Continuous Blood Gluc  (Dexcom G6 ) CRISTINA, Disp 1 , Disp: 1 Device, Rfl: 1    Continuous Blood Gluc Sensor (Dexcom G6 Sensor) MISC, Use 1 sensor every 10 days, disp 90 day supply, Disp: 3 each, Rfl: 3    Continuous Blood Gluc Transmit (Dexcom G6 Transmitter) MISC, Use 1 every 3 months, Disp: 1 each, Rfl: 1    ezetimibe (ZETIA) 10 mg tablet, Take 1 tablet (10 mg total) by mouth daily, Disp: 30 tablet, Rfl: 5    insulin aspart (NovoLOG FlexPen) 100 UNIT/ML injection pen, 6 units before each meal , Disp: 15 mL, Rfl: 1    insulin glargine (LANTUS) 100 units/mL subcutaneous injection, Inject 25 Units under the skin daily at bedtime, Disp: 10 mL, Rfl: 0    Insulin Pen Needle (Pen Needles) 32G X 5 MM MISC, Use 4 per day, Disp: 360 each, Rfl: 1    losartan (COZAAR) 25 mg tablet, Take 1 tablet (25 mg total) by mouth daily, Disp: 90 tablet, Rfl: 0    ondansetron (ZOFRAN-ODT) 4 mg disintegrating tablet, Take 1 tablet (4 mg total) by mouth every 8 (eight) hours as needed for nausea or vomiting, Disp: 12 tablet, Rfl: 0    ticagrelor (BRILINTA) 90 MG, Take 1 tablet (90 mg total) by mouth every 12 (twelve) hours, Disp: 180 tablet, Rfl: 0    cholecalciferol (VITAMIN D3) 1,000 units tablet, Take 1 tablet (1,000 Units total) by mouth daily (Patient not taking: Reported on 4/28/2021), Disp: 90 tablet, Rfl: 3    ciprofloxacin (CIPRO) 250 mg tablet, Take 1 tablet (250 mg total) by mouth 2 (two) times a day with meals for 7 days, Disp: 14 tablet, Rfl: 0    insulin degludec Reino Love FlexTouch) 100 units/mL injection pen, Inject 25 Units under the skin daily (Patient not taking: Reported on 4/28/2021), Disp: 30 mL, Rfl: 1    predniSONE 10 mg tablet, 40 mg x 2 days , 20 mg x 2 days , 10 x 14 days (Patient not taking: Reported on 4/28/2021), Disp: 26 tablet, Rfl: 0    Review of Systems   Constitutional: Negative for chills, fatigue and fever  Respiratory: Negative for cough and shortness of breath  Cardiovascular: Negative for chest pain and palpitations  Gastrointestinal: Negative for abdominal pain, blood in stool, diarrhea, nausea and vomiting  Genitourinary: Positive for dysuria, frequency and urgency  Negative for hematuria, vaginal bleeding and vaginal discharge  Neurological: Negative for dizziness and headaches  All other systems reviewed and are negative  Objective:    Vitals:    04/28/21 1610   BP: 112/70   Pulse: 70   Resp: 16   Temp: 98 7 °F (37 1 °C)   Weight: 94 7 kg (208 lb 12 8 oz)   Height: 5' 7" (1 702 m)        Physical Exam  Constitutional:       General: She is not in acute distress  Appearance: She is well-developed  Cardiovascular:      Rate and Rhythm: Normal rate and regular rhythm  Heart sounds: Normal heart sounds  Pulmonary:      Effort: Pulmonary effort is normal       Breath sounds: Normal breath sounds  Abdominal:      Palpations: Abdomen is soft  There is no mass  Tenderness: There is no abdominal tenderness  There is no right CVA tenderness, left CVA tenderness, guarding or rebound  Musculoskeletal:      Right lower leg: No edema  Left lower leg: No edema  Neurological:      Mental Status: She is alert and oriented to person, place, and time  Psychiatric:         Mood and Affect: Mood normal          Behavior: Behavior normal          Thought Content:  Thought content normal           Office Visit on 04/28/2021   Component Date Value Ref Range Status    LEUKOCYTE ESTERASE,UA 04/28/2021 small   Final    NITRITE,UA 04/28/2021 pos   Final    SL AMB POCT UROBILINOGEN 04/28/2021 0 2   Final    POCT URINE PROTEIN 04/28/2021 300   Final     PH,UA 04/28/2021 5 0   Final    BLOOD,UA 04/28/2021 large   Final    SPECIFIC GRAVITY,UA 04/28/2021 1 025   Final    KETONES,UA 04/28/2021 trace   Final   Tiajuana Nine 04/28/2021 small   Final    GLUCOSE, UA 04/28/2021 2,000   Final     COLOR,UA 04/28/2021 Yellow   Final    CLARITY,UA 04/28/2021 Cloudy   Final

## 2021-04-30 LAB
CANDIDA RRNA VAG QL PROBE: POSITIVE
G VAGINALIS RRNA GENITAL QL PROBE: NEGATIVE
T VAGINALIS RRNA GENITAL QL PROBE: NEGATIVE

## 2021-05-01 LAB
BACTERIA UR CULT: ABNORMAL
BACTERIA UR CULT: ABNORMAL

## 2021-05-03 DIAGNOSIS — B37.3 VAGINAL CANDIDIASIS: Primary | ICD-10-CM

## 2021-05-03 RX ORDER — FLUCONAZOLE 150 MG/1
150 TABLET ORAL ONCE
Qty: 2 TABLET | Refills: 0 | Status: SHIPPED | OUTPATIENT
Start: 2021-05-03 | End: 2021-05-03

## 2021-05-26 DIAGNOSIS — K50.919 CROHN'S DISEASE WITH COMPLICATION, UNSPECIFIED GASTROINTESTINAL TRACT LOCATION (HCC): ICD-10-CM

## 2021-05-27 RX ORDER — PREDNISONE 10 MG/1
TABLET ORAL
Qty: 26 TABLET | Refills: 0 | Status: SHIPPED | OUTPATIENT
Start: 2021-05-27 | End: 2021-06-16 | Stop reason: ALTCHOICE

## 2021-06-01 DIAGNOSIS — E11.65 UNCONTROLLED TYPE 2 DIABETES MELLITUS WITH HYPERGLYCEMIA (HCC): Chronic | ICD-10-CM

## 2021-06-02 DIAGNOSIS — I21.11 ACUTE ST ELEVATION MYOCARDIAL INFARCTION (STEMI) DUE TO OCCLUSION OF DISTAL PORTION OF RIGHT CORONARY ARTERY (HCC): ICD-10-CM

## 2021-06-02 DIAGNOSIS — I21.11 ACUTE ST ELEVATION MYOCARDIAL INFARCTION (STEMI) DUE TO OCCLUSION OF MID PORTION OF RIGHT CORONARY ARTERY (HCC): ICD-10-CM

## 2021-06-02 DIAGNOSIS — I21.19 INFERIOR MI (HCC): ICD-10-CM

## 2021-06-02 RX ORDER — CARVEDILOL 12.5 MG/1
12.5 TABLET ORAL EVERY 12 HOURS SCHEDULED
Qty: 180 TABLET | Refills: 3 | Status: SHIPPED | OUTPATIENT
Start: 2021-06-02 | End: 2021-09-10 | Stop reason: SDUPTHER

## 2021-06-07 RX ORDER — INSULIN ASPART 100 [IU]/ML
INJECTION, SOLUTION INTRAVENOUS; SUBCUTANEOUS
Qty: 15 ML | Refills: 0 | OUTPATIENT
Start: 2021-06-07

## 2021-06-16 ENCOUNTER — APPOINTMENT (OUTPATIENT)
Dept: LAB | Facility: CLINIC | Age: 61
End: 2021-06-16
Payer: COMMERCIAL

## 2021-06-16 ENCOUNTER — OFFICE VISIT (OUTPATIENT)
Dept: FAMILY MEDICINE CLINIC | Facility: CLINIC | Age: 61
End: 2021-06-16
Payer: COMMERCIAL

## 2021-06-16 VITALS
HEART RATE: 72 BPM | HEIGHT: 66 IN | DIASTOLIC BLOOD PRESSURE: 60 MMHG | RESPIRATION RATE: 16 BRPM | SYSTOLIC BLOOD PRESSURE: 136 MMHG | WEIGHT: 212.9 LBS | BODY MASS INDEX: 34.22 KG/M2

## 2021-06-16 DIAGNOSIS — E11.65 UNCONTROLLED TYPE 2 DIABETES MELLITUS WITH HYPERGLYCEMIA (HCC): Chronic | ICD-10-CM

## 2021-06-16 DIAGNOSIS — F32.9 REACTIVE DEPRESSION (SITUATIONAL): ICD-10-CM

## 2021-06-16 DIAGNOSIS — M25.50 MULTIPLE JOINT PAIN: Primary | ICD-10-CM

## 2021-06-16 LAB
ANION GAP SERPL CALCULATED.3IONS-SCNC: 6 MMOL/L (ref 4–13)
BUN SERPL-MCNC: 17 MG/DL (ref 5–25)
CALCIUM SERPL-MCNC: 9.4 MG/DL (ref 8.3–10.1)
CHLORIDE SERPL-SCNC: 109 MMOL/L (ref 100–108)
CO2 SERPL-SCNC: 26 MMOL/L (ref 21–32)
CREAT SERPL-MCNC: 0.77 MG/DL (ref 0.6–1.3)
EST. AVERAGE GLUCOSE BLD GHB EST-MCNC: 137 MG/DL
GFR SERPL CREATININE-BSD FRML MDRD: 85 ML/MIN/1.73SQ M
GLUCOSE P FAST SERPL-MCNC: 90 MG/DL (ref 65–99)
HBA1C MFR BLD: 6.4 %
POTASSIUM SERPL-SCNC: 4.2 MMOL/L (ref 3.5–5.3)
SODIUM SERPL-SCNC: 141 MMOL/L (ref 136–145)

## 2021-06-16 PROCEDURE — 36415 COLL VENOUS BLD VENIPUNCTURE: CPT

## 2021-06-16 PROCEDURE — 80048 BASIC METABOLIC PNL TOTAL CA: CPT

## 2021-06-16 PROCEDURE — 83036 HEMOGLOBIN GLYCOSYLATED A1C: CPT

## 2021-06-16 PROCEDURE — 99214 OFFICE O/P EST MOD 30 MIN: CPT | Performed by: PHYSICIAN ASSISTANT

## 2021-06-16 RX ORDER — ALPRAZOLAM 0.25 MG/1
0.25 TABLET ORAL
Qty: 15 TABLET | Refills: 0 | Status: SHIPPED | OUTPATIENT
Start: 2021-06-16 | End: 2021-07-30 | Stop reason: SDUPTHER

## 2021-06-16 RX ORDER — DULOXETIN HYDROCHLORIDE 30 MG/1
30 CAPSULE, DELAYED RELEASE ORAL DAILY
Qty: 30 CAPSULE | Refills: 5 | Status: SHIPPED | OUTPATIENT
Start: 2021-06-16 | End: 2021-09-15 | Stop reason: ALTCHOICE

## 2021-06-16 NOTE — PROGRESS NOTES
Assessment/Plan:    1  Multiple joint pain    - history of fibromyalgia, will start with Cymbalta 30 mg once daily x 2 weeks, if tolerated will increase to 60 mg, patient will send a nvite message to let me know if working  - DULoxetine (CYMBALTA) 30 mg delayed release capsule; Take 1 capsule (30 mg total) by mouth daily  Dispense: 30 capsule; Refill: 5    2  Reactive depression    - Xanax prn for now, will also start cymbalta which will help with #1 that is most likely exacerbated by this    3  DM Type 2     - due for labs will get done and see endo  Using insulin more on a sliding scale  See note below    F/u as needed  F/u 1 month      Subjective:   Chief Complaint   Patient presents with   [facility]    Sleep issues      Patient ID: Riya Hendricks is a 63 y o  female  Patient here complaining of joint pain  Has gotten worse since covid second shot  Hurts in all joints down to toes and fingers  Large joints worse  Trouble walking around yard on grass even  Alwasy use to take advil leora and cannot take that daily, now on Tylenol 2,000 mg daily  History of fibromyalgia  Has appointment with rheum  Can't sleep  Mom passed away a month ago, use to wake up every few hours with mom and now that she is gone she still wakes up  Cries most nights  Patient had to reschedule endo  Dosing insulin based on sugars, last night sugar 95 skipped lantus, woke up 111, and ate breakfast oatmeal and toast  Sugar 211 after rye toast and then took 6 units, then ate a banana, did 6 units again and came here  Using Dexcom        The following portions of the patient's history were reviewed and updated as appropriate: allergies, current medications, past family history, past medical history, past social history, past surgical history and problem list     Past Medical History:   Diagnosis Date    Coronary artery disease     [facility] [address] [facility]     Diabetes mellitus [facility] [address] [facility]     Fibromyalgia     HTN (hypertension)     Hyperlipidemia     Migraine     Myocardial infarction [facility])      Past Surgical History:   Procedure Laterality Date    CARDIAC CATHETERIZATION      CHOLECYSTECTOMY      CORONARY STENT PLACEMENT      DENTAL SURGERY      [place] teeth extraction    HYSTERECTOMY      TUBAL LIGATION      Bilateral     Family History   Problem Relation Age of Onset    Coronary artery disease Mother     Dementia Mother     Stroke Mother     Heart disease Brother     Alcohol abuse Neg Hx     Substance Abuse Neg Hx      Social History     Socioeconomic History    Marital status: /Civil Union     Spouse name: Not on file    Number of children: Not on file    Years of education: Not on file    Highest education level: Not on file   Occupational History    Not on file   Tobacco Use    Smoking status: Current Some Day Smoker     Packs/day: 1 00     Types: Cigarettes    Smokeless tobacco: Never Used    Tobacco comment: Currently smoking 1-1 5 packs cigarettes weekly (Updated 04/02/2021)  Vaping Use    Vaping Use: Never used   Substance and Sexual Activity    Alcohol use: Not Currently     Comment: Rarely    Drug use: Never    Sexual activity: Not Currently     Partners: Male   Other Topics Concern    Not on file   Social History Narrative    Not on file     Social Determinants of Health     Financial Resource Strain:     Difficulty of Paying Living Expenses:    Food Insecurity:     Worried About Running Out of Food in the Last Year:     [address] in the Last Year:    Transportation Needs:     Lack of Transportation (Medical):      Lack of Transportation (Non-Medical):    Physical Activity:     Days of Exercise per Week:     Minutes of Exercise per Session:    Stress:     Feeling of Stress :    Social Connections:     Frequency of Communication with Friends and Family:     Frequency of Social Gatherings with Friends and Family:     Attends Bahai Services:     Active Member of Clubs or Organizations:     Attends Club or Organization Meetings:     Marital Status:    Intimate Partner Violence:     Fear of Current or Ex-Partner:     Emotionally Abused:     Physically Abused:     Sexually Abused:        Current Outpatient Medications:     acetaminophen (TYLENOL) 500 mg tablet, Take 1,000 mg by mouth every 6 (six) hours as needed for mild pain, Disp: , Rfl:     aspirin 81 mg chewable tablet, Chew 1 tablet (81 mg total) daily, Disp: 90 tablet, Rfl: 3    carvedilol (COREG) 12 5 mg tablet, Take 1 tablet (12 5 mg total) by mouth every 12 (twelve) hours, Disp: 180 tablet, Rfl: 3    cholecalciferol (VITAMIN D3) 1,000 units tablet, Take 1 tablet (1,000 Units total) by mouth daily, Disp: 90 tablet, Rfl: 3    Continuous Blood Gluc  (Dexcom G6 ) CRISTINA, Disp 1 , Disp: 1 Device, Rfl: 1    Continuous Blood Gluc Sensor (Dexcom G6 Sensor) MISC, Use 1 sensor every 10 days, disp 90 day supply, Disp: 3 each, Rfl: 3    Continuous Blood Gluc Transmit (Dexcom G6 Transmitter) MISC, Use 1 every 3 months, Disp: 1 each, Rfl: 1    ezetimibe (ZETIA) 10 mg tablet, Take 1 tablet (10 mg total) by mouth daily, Disp: 30 tablet, Rfl: 5    insulin aspart (NovoLOG FlexPen) 100 UNIT/ML injection pen, 6 units before each meal , Disp: 15 mL, Rfl: 1    insulin degludec (Tresiba FlexTouch) 100 units/mL injection pen, Inject 25 Units under the skin daily, Disp: 30 mL, Rfl: 1    Insulin Pen Needle (Pen Needles) 32G X 5 MM MISC, Use 4 per day, Disp: 360 each, Rfl: 1    losartan (COZAAR) 25 mg tablet, Take 1 tablet (25 mg total) by mouth daily, Disp: 90 tablet, Rfl: 0    ondansetron (ZOFRAN-ODT) 4 mg disintegrating tablet, Take 1 tablet (4 mg total) by mouth every 8 (eight) hours as needed for nausea or vomiting, Disp: 12 tablet, Rfl: 0    ticagrelor (BRILINTA) 90 MG, Take 1 tablet (90 mg total) by mouth every 12 (twelve) hours, Disp: 180 tablet, Rfl: 0    insulin glargine (LANTUS) 100 units/mL subcutaneous injection, Inject 25 Units under the skin daily at bedtime, Disp: 10 mL, Rfl: 0    Review of Systems          Objective:    Vitals:    06/16/21 1354   BP: 136/60   BP Location: Left arm   Patient Position: Sitting   Cuff Size: Large   Pulse: 72   Resp: 16   Weight: 96 6 kg (212 lb 14 4 oz)   Height: 5' 6 25" (1 683 m)        Physical Exam  Constitutional:       Appearance: Normal appearance  She is obese  Cardiovascular:      Rate and Rhythm: Normal rate and regular rhythm  Pulses: Normal pulses  Heart sounds: Normal heart sounds  Pulmonary:      Effort: Pulmonary effort is normal       Breath sounds: Normal breath sounds  Neurological:      General: No focal deficit present  Mental Status: She is alert and oriented to person, place, and time  Psychiatric:         Behavior: Behavior normal          Thought Content:  Thought content normal          Judgment: Judgment normal       Comments: Flat, crying

## 2021-06-17 ENCOUNTER — TELEPHONE (OUTPATIENT)
Dept: ENDOCRINOLOGY | Facility: CLINIC | Age: 61
End: 2021-06-17

## 2021-06-17 NOTE — TELEPHONE ENCOUNTER
----- Message from Fabien Fernandes PA-C sent at 6/17/2021  9:38 AM EDT -----  A1C improved to 6 4- Amazing job  If problems with blood sugars let us know otherwise follow up as scheduled

## 2021-07-01 DIAGNOSIS — E78.2 MIXED HYPERLIPIDEMIA: ICD-10-CM

## 2021-07-01 DIAGNOSIS — E11.65 UNCONTROLLED TYPE 2 DIABETES MELLITUS WITH HYPERGLYCEMIA (HCC): Chronic | ICD-10-CM

## 2021-07-01 DIAGNOSIS — I25.10 CORONARY ARTERY DISEASE INVOLVING NATIVE CORONARY ARTERY OF NATIVE HEART WITHOUT ANGINA PECTORIS: Chronic | ICD-10-CM

## 2021-07-01 RX ORDER — EZETIMIBE 10 MG/1
TABLET ORAL
Qty: 30 TABLET | Refills: 0 | Status: SHIPPED | OUTPATIENT
Start: 2021-07-01 | End: 2021-08-09 | Stop reason: SDUPTHER

## 2021-07-14 ENCOUNTER — TELEPHONE (OUTPATIENT)
Dept: FAMILY MEDICINE CLINIC | Facility: CLINIC | Age: 61
End: 2021-07-14

## 2021-07-14 NOTE — TELEPHONE ENCOUNTER
Patient is calling because the rash on legs is getting worse  She called rheumatology and is scheduled for 8/11  She has been calling frequently to see if she can get in for a cancellation  Patient is asking if you want to see her again or is this something she can wait till august to be seen  Patient did not like the way she felt on Cymbalta made her extremely tired, so she stopped it  Unless there is something else you can recommend her to try

## 2021-07-15 NOTE — TELEPHONE ENCOUNTER
She can come in for the rash (I do not remember talking about a rash)  She can try calling [facility], they take [employer] and they have a good rheumatologist there

## 2021-07-27 ENCOUNTER — TELEPHONE (OUTPATIENT)
Dept: PULMONOLOGY | Facility: CLINIC | Age: 61
End: 2021-07-27

## 2021-07-27 DIAGNOSIS — E11.65 UNCONTROLLED TYPE 2 DIABETES MELLITUS WITH HYPERGLYCEMIA (HCC): Chronic | ICD-10-CM

## 2021-07-27 RX ORDER — BLOOD-GLUCOSE TRANSMITTER
EACH MISCELLANEOUS
Qty: 1 EACH | Refills: 1 | Status: SHIPPED | OUTPATIENT
Start: 2021-07-27 | End: 2021-11-20 | Stop reason: SDUPTHER

## 2021-07-27 RX ORDER — BLOOD-GLUCOSE SENSOR
EACH MISCELLANEOUS
Qty: 9 EACH | Refills: 3 | Status: SHIPPED | OUTPATIENT
Start: 2021-07-27 | End: 2022-01-11 | Stop reason: SDUPTHER

## 2021-08-11 ENCOUNTER — TELEPHONE (OUTPATIENT)
Dept: NON INVASIVE DIAGNOSTICS | Facility: HOSPITAL | Age: 61
End: 2021-08-11

## 2021-08-11 ENCOUNTER — HOSPITAL ENCOUNTER (OUTPATIENT)
Dept: RADIOLOGY | Facility: HOSPITAL | Age: 61
Discharge: HOME | End: 2021-08-11
Attending: INTERNAL MEDICINE
Payer: COMMERCIAL

## 2021-08-11 ENCOUNTER — OFFICE VISIT (OUTPATIENT)
Dept: RHEUMATOLOGY | Facility: CLINIC | Age: 61
End: 2021-08-11
Payer: COMMERCIAL

## 2021-08-11 ENCOUNTER — LAB (OUTPATIENT)
Dept: LAB | Facility: HOSPITAL | Age: 61
End: 2021-08-11
Attending: INTERNAL MEDICINE
Payer: COMMERCIAL

## 2021-08-11 VITALS
WEIGHT: 220 LBS | BODY MASS INDEX: 33.34 KG/M2 | SYSTOLIC BLOOD PRESSURE: 130 MMHG | HEIGHT: 68 IN | DIASTOLIC BLOOD PRESSURE: 85 MMHG | HEART RATE: 75 BPM

## 2021-08-11 DIAGNOSIS — M47.819 SPONDYLOARTHRITIS: ICD-10-CM

## 2021-08-11 DIAGNOSIS — M79.7 FIBROMYALGIA: Primary | ICD-10-CM

## 2021-08-11 DIAGNOSIS — M79.10 MYALGIA: ICD-10-CM

## 2021-08-11 DIAGNOSIS — I25.10 CORONARY ARTERY DISEASE INVOLVING NATIVE CORONARY ARTERY OF NATIVE HEART WITHOUT ANGINA PECTORIS: Chronic | ICD-10-CM

## 2021-08-11 DIAGNOSIS — E78.2 MIXED HYPERLIPIDEMIA: Primary | ICD-10-CM

## 2021-08-11 DIAGNOSIS — M79.7 FIBROMYALGIA: ICD-10-CM

## 2021-08-11 DIAGNOSIS — E11.65 UNCONTROLLED TYPE 2 DIABETES MELLITUS WITH HYPERGLYCEMIA (HCC): Chronic | ICD-10-CM

## 2021-08-11 DIAGNOSIS — K50.919 CROHN'S DISEASE WITH COMPLICATION, UNSPECIFIED GASTROINTESTINAL TRACT LOCATION (HCC): ICD-10-CM

## 2021-08-11 DIAGNOSIS — R53.83 FATIGUE, UNSPECIFIED TYPE: ICD-10-CM

## 2021-08-11 DIAGNOSIS — E78.5 HYPERLIPIDEMIA, UNSPECIFIED HYPERLIPIDEMIA TYPE: ICD-10-CM

## 2021-08-11 DIAGNOSIS — R79.89 ABNORMAL CBC: ICD-10-CM

## 2021-08-11 DIAGNOSIS — I21.11 ACUTE ST ELEVATION MYOCARDIAL INFARCTION (STEMI) DUE TO OCCLUSION OF MID PORTION OF RIGHT CORONARY ARTERY (HCC): ICD-10-CM

## 2021-08-11 DIAGNOSIS — R73.9 HYPERGLYCEMIA: ICD-10-CM

## 2021-08-11 DIAGNOSIS — E78.2 MIXED HYPERLIPIDEMIA: ICD-10-CM

## 2021-08-11 LAB
ANION GAP SERPL CALCULATED.3IONS-SCNC: 5 MMOL/L (ref 4–13)
BASOPHILS # BLD AUTO: 0.06 THOUSANDS/ΜL (ref 0–0.1)
BASOPHILS NFR BLD AUTO: 1 % (ref 0–1)
BUN SERPL-MCNC: 17 MG/DL (ref 5–25)
CALCIUM SERPL-MCNC: 9.7 MG/DL (ref 8.3–10.1)
CHLORIDE SERPL-SCNC: 110 MMOL/L (ref 100–108)
CHOLEST SERPL-MCNC: 250 MG/DL (ref 50–200)
CO2 SERPL-SCNC: 21 MMOL/L (ref 21–32)
CREAT SERPL-MCNC: 0.65 MG/DL (ref 0.6–1.3)
CREAT UR-MCNC: 74.3 MG/DL
CRP SERPL QL: 15 MG/L
EOSINOPHIL # BLD AUTO: 0.53 THOUSAND/ΜL (ref 0–0.61)
EOSINOPHIL NFR BLD AUTO: 6 % (ref 0–6)
ERYTHROCYTE [DISTWIDTH] IN BLOOD BY AUTOMATED COUNT: 14.4 % (ref 11.6–15.1)
ERYTHROCYTE [SEDIMENTATION RATE] IN BLOOD: 52 MM/HOUR (ref 0–29)
EST. AVERAGE GLUCOSE BLD GHB EST-MCNC: 163 MG/DL
GFR SERPL CREATININE-BSD FRML MDRD: 97 ML/MIN/1.73SQ M
GLUCOSE P FAST SERPL-MCNC: 143 MG/DL (ref 65–99)
HBA1C MFR BLD: 7.3 %
HCT VFR BLD AUTO: 42.2 % (ref 34.8–46.1)
HDLC SERPL-MCNC: 40 MG/DL
HGB BLD-MCNC: 14 G/DL (ref 11.5–15.4)
IMM GRANULOCYTES # BLD AUTO: 0.02 THOUSAND/UL (ref 0–0.2)
IMM GRANULOCYTES NFR BLD AUTO: 0 % (ref 0–2)
LDLC SERPL CALC-MCNC: 164 MG/DL (ref 0–100)
LDLC SERPL DIRECT ASSAY-MCNC: 149 MG/DL (ref 0–100)
LYMPHOCYTES # BLD AUTO: 2.35 THOUSANDS/ΜL (ref 0.6–4.47)
LYMPHOCYTES NFR BLD AUTO: 26 % (ref 14–44)
MCH RBC QN AUTO: 30.4 PG (ref 26.8–34.3)
MCHC RBC AUTO-ENTMCNC: 33.2 G/DL (ref 31.4–37.4)
MCV RBC AUTO: 92 FL (ref 82–98)
MICROALBUMIN UR-MCNC: 5 MG/L (ref 0–20)
MICROALBUMIN/CREAT 24H UR: 7 MG/G CREATININE (ref 0–30)
MONOCYTES # BLD AUTO: 0.6 THOUSAND/ΜL (ref 0.17–1.22)
MONOCYTES NFR BLD AUTO: 7 % (ref 4–12)
NEUTROPHILS # BLD AUTO: 5.49 THOUSANDS/ΜL (ref 1.85–7.62)
NEUTS SEG NFR BLD AUTO: 60 % (ref 43–75)
NRBC BLD AUTO-RTO: 0 /100 WBCS
PLATELET # BLD AUTO: 304 THOUSANDS/UL (ref 149–390)
PMV BLD AUTO: 9.9 FL (ref 8.9–12.7)
POTASSIUM SERPL-SCNC: 4.1 MMOL/L (ref 3.5–5.3)
RBC # BLD AUTO: 4.61 MILLION/UL (ref 3.81–5.12)
SODIUM SERPL-SCNC: 136 MMOL/L (ref 136–145)
TRIGL SERPL-MCNC: 231 MG/DL
TSH SERPL DL<=0.05 MIU/L-ACNC: 2.1 UIU/ML (ref 0.36–3.74)
WBC # BLD AUTO: 9.05 THOUSAND/UL (ref 4.31–10.16)

## 2021-08-11 PROCEDURE — 85652 RBC SED RATE AUTOMATED: CPT

## 2021-08-11 PROCEDURE — 99244 OFF/OP CNSLTJ NEW/EST MOD 40: CPT | Performed by: INTERNAL MEDICINE

## 2021-08-11 PROCEDURE — 83721 ASSAY OF BLOOD LIPOPROTEIN: CPT

## 2021-08-11 PROCEDURE — 85025 COMPLETE CBC W/AUTO DIFF WBC: CPT

## 2021-08-11 PROCEDURE — 83036 HEMOGLOBIN GLYCOSYLATED A1C: CPT

## 2021-08-11 PROCEDURE — 86618 LYME DISEASE ANTIBODY: CPT

## 2021-08-11 PROCEDURE — 80061 LIPID PANEL: CPT

## 2021-08-11 PROCEDURE — 86140 C-REACTIVE PROTEIN: CPT

## 2021-08-11 PROCEDURE — 80048 BASIC METABOLIC PNL TOTAL CA: CPT

## 2021-08-11 PROCEDURE — 84443 ASSAY THYROID STIM HORMONE: CPT

## 2021-08-11 PROCEDURE — 72110 X-RAY EXAM L-2 SPINE 4/>VWS: CPT

## 2021-08-11 PROCEDURE — 82570 ASSAY OF URINE CREATININE: CPT

## 2021-08-11 PROCEDURE — 36415 COLL VENOUS BLD VENIPUNCTURE: CPT

## 2021-08-11 PROCEDURE — 82043 UR ALBUMIN QUANTITATIVE: CPT

## 2021-08-11 PROCEDURE — 72202 X-RAY EXAM SI JOINTS 3/> VWS: CPT

## 2021-08-11 PROCEDURE — 81374 HLA I TYPING 1 ANTIGEN LR: CPT

## 2021-08-11 NOTE — PATIENT INSTRUCTIONS
Voltaren gel (topical NSAID) can be applied to the affected joints up to 4 times a day as needed for pain  Physical therapy ordered to help strengthen the muscles of your knees and low back  Please obtain blood tests and X-rays prior to your follow-up visit  Continue to eat a diet low in fat and continue to stay as active as possible  22-Jan-2021 13:16

## 2021-08-11 NOTE — RESULT ENCOUNTER NOTE
Patient remains with poorly controlled dyslipidemia on ezetimibe  She has trialed, atorvastatin 40 mg daily, rosuvastatin 20 mg daily, rosuvastatin 5 mg daily and simvastatin with significant myalgias  She has been on ezetimibe for 4 months with minimal improvement  Will try PCSK9-inhibitor which she is agreeable to  Decision on medication choice pending insurance authorization  Office staff contacted to initiate the process

## 2021-08-11 NOTE — PROGRESS NOTES
Rheumatology Consult   Ingrid Davis 63 y o  female 1960    DATE: 8/11/2021    Reason for Consult: fibromyalgia, Crohn's disease with polyarticular joint pain     Assessment and Plan:  Labs: TSH, ESR, CRP, HLA-B27  X-Ray of the SI joint, and LS spine   Counseled on importance of following up with GI due to history of Crohn's not currently on treatment  Counseled on importance of increasing aerobic exercise   PT ordered for knees and lower spine     History of Present Illness:  Yoav Haji is a 63 y o  female Female with a history of T2DM, Crohn's (not on chronic medication), CAD s/p stenting February 2021, HLD, fibromyalgia started on Cymbalta 30mg by PCP in mid-June for worsening joint pains  Has not been taking secondary to adverse effects  Notes her pain has been present for a while, but she feels it worsened after her COVID vaccination in February 2021  Pain of "all joints" but most severe of the knees and ankles  Knee pain bilaterally, worse when walking on uneven surfaces  Pain of ankles, worse with increased movement  Notes intermittent swelling of the ankles if she is on her feet and during hot weather  Also notes redness of her ankles to the mid-calf that has been present for a while, history of venous stasis in mother  Pain of the bilateral wrists and shoulders, most severe in the morning, takes tylenol as needed which helps for a bit  Cannot take NSAIDs due to history of Crohn's  Fingers occasionally swell in the heat as well  No locking or catching of the fingers  Bilateral elbow pain (right triceps tendinitis)  Pain of the bilateral trochanteric bursa, L > R  Feels as though she has lost muscle strength  Has pain of her upper extremity muscles all the time  Does have pain and difficulty when rising from a seated position  No unplanned weight loss, no pain with chewing, no ulcers of the mouth or nose, no dry eyes or dry mouth       Approximately 3 Crohn's flares a year which consists of abdominal pain and mucus in stools  Feels joint pains worsen when GI sxs arise  Last colonoscopy 11/24/2015 with multiple aphthous ulcers of the lower GI tract, recommended repeat colonoscopy in 6 months at that time which was not completed  Reports she has seen a rheumatologist about 30 years ago and did not receive any official diagnosis  Review of Systems  Review of Systems   Constitutional: Negative for chills, diaphoresis, fatigue, fever and unexpected weight change  HENT: Negative for mouth sores and sore throat  Respiratory: Negative for cough, chest tightness and shortness of breath  Cardiovascular: Positive for leg swelling (when active in hot weather)  Negative for chest pain and palpitations  Gastrointestinal: Negative for abdominal pain, diarrhea, nausea and vomiting  Genitourinary: Negative for dysuria  Musculoskeletal: Positive for arthralgias, back pain (mostly of the thoracic spine) and joint swelling  Negative for myalgias  Neurological: Negative for dizziness, syncope, weakness, numbness and headaches  Psychiatric/Behavioral: Negative for confusion and dysphoric mood  All other systems reviewed and are negative        Allergies  Allergies   Allergen Reactions    Penicillins Anaphylaxis    Rosuvastatin Myalgia and Arthralgia    Atorvastatin Myalgia    Cefuroxime     Metaxalone     Influenza Vaccines Rash    Keflet [Cephalexin] Rash    Lyrica [Pregabalin] Swelling     Feet swelling    Nuts - Food Allergy Itching    Sulfa Antibiotics Rash    Sulfamethoxazole-Trimethoprim Hives and Rash    Topiramate Rash       Current Medications    Current Outpatient Medications:     acetaminophen (TYLENOL) 500 mg tablet, Take 1,000 mg by mouth every 6 (six) hours as needed for mild pain, Disp: , Rfl:     ALPRAZolam (XANAX) 0 25 mg tablet, Take 1 tablet (0 25 mg total) by mouth daily at bedtime as needed for anxiety, Disp: 15 tablet, Rfl: 0    aspirin 81 mg chewable tablet, Chew 1 tablet (81 mg total) daily, Disp: 90 tablet, Rfl: 3    carvedilol (COREG) 12 5 mg tablet, Take 1 tablet (12 5 mg total) by mouth every 12 (twelve) hours, Disp: 180 tablet, Rfl: 3    cholecalciferol (VITAMIN D3) 1,000 units tablet, Take 1 tablet (1,000 Units total) by mouth daily, Disp: 90 tablet, Rfl: 3    Continuous Blood Gluc  (Dexcom G6 ) CRISTINA, Disp 1 , Disp: 1 Device, Rfl: 1    Continuous Blood Gluc Sensor (Dexcom G6 Sensor) MISC, Use 1 sensor every 10 days, disp 90 day supply, Disp: 9 each, Rfl: 3    Continuous Blood Gluc Transmit (Dexcom G6 Transmitter) MISC, Use 1 every 3 months, Disp: 1 each, Rfl: 1    DULoxetine (CYMBALTA) 30 mg delayed release capsule, Take 1 capsule (30 mg total) by mouth daily, Disp: 30 capsule, Rfl: 5    ezetimibe (ZETIA) 10 mg tablet, Take 1 tablet (10 mg total) by mouth daily, Disp: 90 tablet, Rfl: 1    insulin aspart (NovoLOG FlexPen) 100 UNIT/ML injection pen, 6 units before each meal , Disp: 15 mL, Rfl: 1    insulin degludec Viktoria Lay FlexTouch) 100 units/mL injection pen, Inject 25 Units under the skin daily, Disp: 30 mL, Rfl: 1    insulin glargine (LANTUS) 100 units/mL subcutaneous injection, Inject 25 Units under the skin daily at bedtime, Disp: 10 mL, Rfl: 0    Insulin Pen Needle (Pen Needles) 32G X 5 MM MISC, Use 4 per day, Disp: 360 each, Rfl: 1    losartan (COZAAR) 25 mg tablet, Take 1 tablet (25 mg total) by mouth daily, Disp: 90 tablet, Rfl: 0    ondansetron (ZOFRAN-ODT) 4 mg disintegrating tablet, Take 1 tablet (4 mg total) by mouth every 8 (eight) hours as needed for nausea or vomiting, Disp: 12 tablet, Rfl: 0    ticagrelor (BRILINTA) 90 MG, Take 1 tablet (90 mg total) by mouth every 12 (twelve) hours, Disp: 180 tablet, Rfl: 0    Past Medical History  Past Medical History:   Diagnosis Date    Coronary artery disease     [facility]'s [facility] )     Diabetes mellitus ([facility] )     Fibromyalgia     HTN (hypertension)     Hyperlipidemia     Migraine     Myocardial infarction [facility])        Past Surgical History  Past Surgical History:   Procedure Laterality Date    CARDIAC CATHETERIZATION      CHOLECYSTECTOMY      CORONARY STENT PLACEMENT      DENTAL SURGERY      [place] teeth extraction    HYSTERECTOMY      TUBAL LIGATION      Bilateral       Family History  No known autoimmune or inflammatory diseases in the family  Family History   Problem Relation Age of Onset    Coronary artery disease Mother     Dementia Mother     Stroke Mother     Heart disease Brother     Alcohol abuse Neg Hx     Substance Abuse Neg Hx        Social History  Occupation:  for [address] History     Substance and Sexual Activity   Alcohol Use Not Currently    Comment: Rarely     Social History     Substance and Sexual Activity   Drug Use Never     Social History     Tobacco Use   Smoking Status Current Some Day Smoker    Packs/day: 1 00    Types: Cigarettes   Smokeless Tobacco Never Used   Tobacco Comment    Currently smoking 1-1 5 packs cigarettes weekly (Updated 04/02/2021)  Objective:  /85   Pulse 75   Ht 5' 8" (1 727 m)   Wt 99 8 kg (220 lb)   BMI 33 45 kg/m²     Physical Exam  Constitutional:       General: She is not in acute distress  Appearance: Normal appearance  She is obese  She is not ill-appearing  HENT:      Head: Normocephalic and atraumatic  Mouth/Throat:      Mouth: Mucous membranes are moist       Pharynx: Oropharynx is clear  Eyes:      Conjunctiva/sclera: Conjunctivae normal       Pupils: Pupils are equal, round, and reactive to light  Comments: Xanthelasma under bilateral eyes    Cardiovascular:      Rate and Rhythm: Normal rate and regular rhythm  Pulses: Normal pulses  Heart sounds: Normal heart sounds  No murmur heard  No gallop  Pulmonary:      Effort: Pulmonary effort is normal       Breath sounds: Normal breath sounds  No wheezing, rhonchi or rales  Abdominal:      General: Abdomen is flat  Bowel sounds are normal  There is no distension  Palpations: Abdomen is soft  Tenderness: There is no abdominal tenderness  Musculoskeletal:         General: No swelling or tenderness  Cervical back: Normal range of motion  Right knee: Crepitus present  Left knee: Crepitus present  Skin:     General: Skin is warm and dry  Neurological:      General: No focal deficit present  Mental Status: She is alert and oriented to person, place, and time  Lab Results: I have personally reviewed pertinent reports        No results found for: CRP, SEDRATE, JESSY, RF, HAV, HEPAIGM, HEPBIGM, HEPBCAB, HEPCAB, HBEAG        Invalid input(s): URIBILINOGEN         Imaging: I have personally reviewed pertinent films in PACS

## 2021-08-11 NOTE — LETTER
August 11, 2021     Papi Monteiro PA-C  [facility], [address] [place] [address]    Patient: Daniel Smith   YOB: 1960   Date of Visit: 8/11/2021       Dear Dr Dara Selby: Thank you for referring Avelina Arevalo to me for evaluation  Below are my notes for this consultation  If you have questions, please do not hesitate to call me  I look forward to following your patient along with you  Sincerely,        oSnny Alexis MD        CC: No Recipients  Arabella Mendoza MD  8/11/2021  9:19 AM  Attested  Rheumatology Consult   Adriana Davis 63 y o  female 1960    DATE: 8/11/2021    Reason for Consult: fibromyalgia, Crohn's disease with polyarticular joint pain     Assessment and Plan:  Labs: TSH, ESR, CRP, HLA-B27  X-Ray of the SI joint, and LS spine   Counseled on importance of following up with GI due to history of Crohn's not currently on treatment  Counseled on importance of increasing aerobic exercise   PT ordered for knees and lower spine     History of Present Illness:  Daniel Smith is a 63 y o  female Female with a history of T2DM, Crohn's (not on chronic medication), CAD s/p stenting February 2021, HLD, fibromyalgia started on Cymbalta 30mg by PCP in mid-June for worsening joint pains  Has not been taking secondary to adverse effects  Notes her pain has been present for a while, but she feels it worsened after her COVID vaccination in February 2021  Pain of "all joints" but most severe of the knees and ankles  Knee pain bilaterally, worse when walking on uneven surfaces  Pain of ankles, worse with increased movement  Notes intermittent swelling of the ankles if she is on her feet and during hot weather  Also notes redness of her ankles to the mid-calf that has been present for a while, history of venous stasis in mother  Pain of the bilateral wrists and shoulders, most severe in the morning, takes tylenol as needed which helps for a bit   Cannot take NSAIDs due to history of Crohn's  Fingers occasionally swell in the heat as well  No locking or catching of the fingers  Bilateral elbow pain (right triceps tendinitis)  Pain of the bilateral trochanteric bursa, L > R  Feels as though she has lost muscle strength  Has pain of her upper extremity muscles all the time  Does have pain and difficulty when rising from a seated position  No unplanned weight loss, no pain with chewing, no ulcers of the mouth or nose, no dry eyes or dry mouth  Approximately 3 Crohn's flares a year which consists of abdominal pain and mucus in stools  Feels joint pains worsen when GI sxs arise  Last colonoscopy 11/24/2015 with multiple aphthous ulcers of the lower GI tract, recommended repeat colonoscopy in 6 months at that time which was not completed  Reports she has seen a rheumatologist about 30 years ago and did not receive any official diagnosis  Review of Systems  Review of Systems   Constitutional: Negative for chills, diaphoresis, fatigue, fever and unexpected weight change  HENT: Negative for mouth sores and sore throat  Respiratory: Negative for cough, chest tightness and shortness of breath  Cardiovascular: Positive for leg swelling (when active in hot weather)  Negative for chest pain and palpitations  Gastrointestinal: Negative for abdominal pain, diarrhea, nausea and vomiting  Genitourinary: Negative for dysuria  Musculoskeletal: Positive for arthralgias, back pain (mostly of the thoracic spine) and joint swelling  Negative for myalgias  Neurological: Negative for dizziness, syncope, weakness, numbness and headaches  Psychiatric/Behavioral: Negative for confusion and dysphoric mood  All other systems reviewed and are negative        Allergies  Allergies   Allergen Reactions    Penicillins Anaphylaxis    Rosuvastatin Myalgia and Arthralgia    Atorvastatin Myalgia    Cefuroxime     Metaxalone     Influenza Vaccines Rash    Keflet [Cephalexin] Rash    Lyrica [Pregabalin] Swelling     Feet swelling    Nuts - Food Allergy Itching    Sulfa Antibiotics Rash    Sulfamethoxazole-Trimethoprim Hives and Rash    Topiramate Rash       Current Medications    Current Outpatient Medications:     acetaminophen (TYLENOL) 500 mg tablet, Take 1,000 mg by mouth every 6 (six) hours as needed for mild pain, Disp: , Rfl:     ALPRAZolam (XANAX) 0 25 mg tablet, Take 1 tablet (0 25 mg total) by mouth daily at bedtime as needed for anxiety, Disp: 15 tablet, Rfl: 0    aspirin 81 mg chewable tablet, Chew 1 tablet (81 mg total) daily, Disp: 90 tablet, Rfl: 3    carvedilol (COREG) 12 5 mg tablet, Take 1 tablet (12 5 mg total) by mouth every 12 (twelve) hours, Disp: 180 tablet, Rfl: 3    cholecalciferol (VITAMIN D3) 1,000 units tablet, Take 1 tablet (1,000 Units total) by mouth daily, Disp: 90 tablet, Rfl: 3    Continuous Blood Gluc  (Dexcom G6 ) CRISTINA, Disp 1 , Disp: 1 Device, Rfl: 1    Continuous Blood Gluc Sensor (Dexcom G6 Sensor) MISC, Use 1 sensor every 10 days, disp 90 day supply, Disp: 9 each, Rfl: 3    Continuous Blood Gluc Transmit (Dexcom G6 Transmitter) MISC, Use 1 every 3 months, Disp: 1 each, Rfl: 1    DULoxetine (CYMBALTA) 30 mg delayed release capsule, Take 1 capsule (30 mg total) by mouth daily, Disp: 30 capsule, Rfl: 5    ezetimibe (ZETIA) 10 mg tablet, Take 1 tablet (10 mg total) by mouth daily, Disp: 90 tablet, Rfl: 1    insulin aspart (NovoLOG FlexPen) 100 UNIT/ML injection pen, 6 units before each meal , Disp: 15 mL, Rfl: 1    insulin degludec (Tresiba FlexTouch) 100 units/mL injection pen, Inject 25 Units under the skin daily, Disp: 30 mL, Rfl: 1    insulin glargine (LANTUS) 100 units/mL subcutaneous injection, Inject 25 Units under the skin daily at bedtime, Disp: 10 mL, Rfl: 0    Insulin Pen Needle (Pen Needles) 32G X 5 MM MISC, Use 4 per day, Disp: 360 each, Rfl: 1    losartan (COZAAR) 25 mg tablet, Take 1 tablet (25 mg total) by mouth daily, Disp: 90 tablet, Rfl: 0    ondansetron (ZOFRAN-ODT) 4 mg disintegrating tablet, Take 1 tablet (4 mg total) by mouth every 8 (eight) hours as needed for nausea or vomiting, Disp: 12 tablet, Rfl: 0    ticagrelor (BRILINTA) 90 MG, Take 1 tablet (90 mg total) by mouth every 12 (twelve) hours, Disp: 180 tablet, Rfl: 0    Past Medical History  Past Medical History:   Diagnosis Date    Coronary artery disease     [facility]     Diabetes mellitus [facility]     Fibromyalgia     HTN (hypertension)     Hyperlipidemia     Migraine     Myocardial infarction [facility])        Past Surgical History  Past Surgical History:   Procedure Laterality Date    CARDIAC CATHETERIZATION      CHOLECYSTECTOMY      CORONARY STENT PLACEMENT      DENTAL SURGERY      [place] teeth extraction    HYSTERECTOMY      TUBAL LIGATION      Bilateral       Family History  No known autoimmune or inflammatory diseases in the family  Family History   Problem Relation Age of Onset    Coronary artery disease Mother     Dementia Mother     Stroke Mother     Heart disease Brother     Alcohol abuse Neg Hx     Substance Abuse Neg Hx        Social History  Occupation:  for [employer] History     Substance and Sexual Activity   Alcohol Use Not Currently    Comment: Rarely     Social History     Substance and Sexual Activity   Drug Use Never     Social History     Tobacco Use   Smoking Status Current Some Day Smoker    Packs/day: 1 00    Types: Cigarettes   Smokeless Tobacco Never Used   Tobacco Comment    Currently smoking 1-1 5 packs cigarettes weekly (Updated 04/02/2021)  Objective:  /85   Pulse 75   Ht 5' 8" (1 727 m)   Wt 99 8 kg (220 lb)   BMI 33 45 kg/m²     Physical Exam  Constitutional:       General: She is not in acute distress  Appearance: Normal appearance  She is obese  She is not ill-appearing  HENT:      Head: Normocephalic and atraumatic        Mouth/Throat: Mouth: Mucous membranes are moist       Pharynx: Oropharynx is clear  Eyes:      Conjunctiva/sclera: Conjunctivae normal       Pupils: Pupils are equal, round, and reactive to light  Comments: Xanthelasma under bilateral eyes    Cardiovascular:      Rate and Rhythm: Normal rate and regular rhythm  Pulses: Normal pulses  Heart sounds: Normal heart sounds  No murmur heard  No gallop  Pulmonary:      Effort: Pulmonary effort is normal       Breath sounds: Normal breath sounds  No wheezing, rhonchi or rales  Abdominal:      General: Abdomen is flat  Bowel sounds are normal  There is no distension  Palpations: Abdomen is soft  Tenderness: There is no abdominal tenderness  Musculoskeletal:         General: No swelling or tenderness  Cervical back: Normal range of motion  Right knee: Crepitus present  Left knee: Crepitus present  Skin:     General: Skin is warm and dry  Neurological:      General: No focal deficit present  Mental Status: She is alert and oriented to person, place, and time  Lab Results: I have personally reviewed pertinent reports  No results found for: CRP, SEDRATE, JESSY, RF, HAV, HEPAIGM, HEPBIGM, HEPBCAB, HEPCAB, HBEAG        Invalid input(s): URIBILINOGEN         Imaging: I have personally reviewed pertinent films in PACS    Attestation signed by Laine Allen MD at 8/11/2021  9:26 AM:  Patient seen and examined with the medical resident  I agree with the assessment and plan

## 2021-08-11 NOTE — TELEPHONE ENCOUNTER
Called and discussed results of lipid panel with patient  She remains with poorly controlled dyslipidemia on ezetimibe  She has trialed, atorvastatin 40 mg daily, rosuvastatin 20 mg daily, rosuvastatin 5 mg daily and simvastatin with significant myalgias  She has been on ezetimibe for 4 months with minimal improvement       Will try PCSK9-inhibitor which she is agreeable to  Decision on medication choice pending insurance authorization      Office staff contacted to initiate the process

## 2021-08-12 ENCOUNTER — TELEPHONE (OUTPATIENT)
Dept: FAMILY MEDICINE CLINIC | Facility: CLINIC | Age: 61
End: 2021-08-12

## 2021-08-12 LAB — B BURGDOR IGG+IGM SER-ACNC: 6

## 2021-08-12 NOTE — TELEPHONE ENCOUNTER
----- Message from Emery Mathews PA-C sent at 8/12/2021  4:13 PM EDT -----  Please let patient know her lyme test was negative    Patient informed

## 2021-08-16 ENCOUNTER — TELEPHONE (OUTPATIENT)
Dept: OBGYN CLINIC | Facility: HOSPITAL | Age: 61
End: 2021-08-16

## 2021-08-16 DIAGNOSIS — M47.819 SPONDYLOARTHRITIS: Primary | ICD-10-CM

## 2021-08-16 RX ORDER — PREDNISONE 1 MG/1
TABLET ORAL
Qty: 35 TABLET | Refills: 3 | Status: SHIPPED | OUTPATIENT
Start: 2021-08-16 | End: 2021-09-05

## 2021-08-16 NOTE — TELEPHONE ENCOUNTER
DR Emilie Manuel     Re: Labs + xray    Patient called to review recent blood work and xray results with clinical team

## 2021-08-17 LAB — HLA-B27 QL NAA+PROBE: NEGATIVE

## 2021-08-17 NOTE — TELEPHONE ENCOUNTER
Patient is calling in checking status on message  She stated she spoke to Eloise Valencia yesterday and she was suppose to get back to her         Please advise,

## 2021-08-19 ENCOUNTER — TELEPHONE (OUTPATIENT)
Dept: OBGYN CLINIC | Facility: HOSPITAL | Age: 61
End: 2021-08-19

## 2021-08-19 NOTE — TELEPHONE ENCOUNTER
Patient would like a call with her test results  If you could give her a call at [phone]   Thank you

## 2021-08-27 ENCOUNTER — TELEPHONE (OUTPATIENT)
Dept: FAMILY MEDICINE CLINIC | Facility: CLINIC | Age: 61
End: 2021-08-27

## 2021-09-01 ENCOUNTER — OFFICE VISIT (OUTPATIENT)
Dept: ENDOCRINOLOGY | Facility: CLINIC | Age: 61
End: 2021-09-01
Payer: COMMERCIAL

## 2021-09-01 VITALS
HEIGHT: 68 IN | SYSTOLIC BLOOD PRESSURE: 134 MMHG | WEIGHT: 226 LBS | BODY MASS INDEX: 34.25 KG/M2 | DIASTOLIC BLOOD PRESSURE: 80 MMHG | HEART RATE: 72 BPM

## 2021-09-01 DIAGNOSIS — I10 ESSENTIAL HYPERTENSION: ICD-10-CM

## 2021-09-01 DIAGNOSIS — E11.65 UNCONTROLLED TYPE 2 DIABETES MELLITUS WITH HYPERGLYCEMIA (HCC): Primary | ICD-10-CM

## 2021-09-01 DIAGNOSIS — E78.2 MIXED HYPERLIPIDEMIA: ICD-10-CM

## 2021-09-01 PROCEDURE — 95251 CONT GLUC MNTR ANALYSIS I&R: CPT | Performed by: PHYSICIAN ASSISTANT

## 2021-09-01 PROCEDURE — 99215 OFFICE O/P EST HI 40 MIN: CPT | Performed by: PHYSICIAN ASSISTANT

## 2021-09-01 RX ORDER — INSULIN ASPART 100 [IU]/ML
INJECTION, SOLUTION INTRAVENOUS; SUBCUTANEOUS
Qty: 15 ML | Refills: 1
Start: 2021-09-01 | End: 2021-12-07 | Stop reason: SDUPTHER

## 2021-09-01 RX ORDER — GLUCOSAMINE HCL/CHONDROITIN SU 500-400 MG
CAPSULE ORAL
Qty: 400 EACH | Refills: 1 | Status: SHIPPED | OUTPATIENT
Start: 2021-09-01 | End: 2022-01-11 | Stop reason: SDUPTHER

## 2021-09-01 RX ORDER — BLOOD SUGAR DIAGNOSTIC
STRIP MISCELLANEOUS
Qty: 360 EACH | Refills: 1 | Status: SHIPPED | OUTPATIENT
Start: 2021-09-01

## 2021-09-01 NOTE — PROGRESS NOTES
Established Patient Progress Note      Chief Complaint   Patient presents with    Diabetes Type 2        History of Present Illness:   Laurie Escalante is a 63 y o  female with a history of type 2 diabetes with long term use of insulin since 2017, but started insulin earlier this year  Reports complications of neuropathy  Denies recent illness or hospitalizations  Denies recent severe hypoglycemic or severe hyperglycemic episodes  Denies any issues with her current regimen  home glucose monitoring: are performed regularly using dexcom G6  Diet has not been great lately but doesn't want to see dietician  She has seen one in the past and all of the stuff they tell her to eat she can't because of her Crohns  Off SGLT2 inhibitor since April due to UTIs    Was on steroids and adjusted insulin while on this according to sliding scale/instructions from last visit  Still on steroids for next 10 days or so  PCP prescribed but also following with GI/Rheum  Has DEXA Scheduled  Current regimen:    Novolog 6 units before meals (not always taking) plus sliding scale  150-200: 2 units  201-250: 4 units  251-300: 6 units  201-350: 8 units  Over 350: 10 units  Tresiba 25  If high/onsteroids can increase to 30 but occasionally skips if bedtime reading low  CGM Interpretation  Cait Davis   Device used Dexcom G6  Home use   Indication: Type 2 Diabetes  More than 72 hours of data was reviewed  Report to be scanned to chart  Date Range: 8/19/2021-9/1/2021  Analysis of data:   Average Glucose: 148   SD : 54   Time in Target Range: 75%   Time Above Range: 20% high, 4% very high   Time Below Range: <1%   Interpretation of data:   Blood sugars increasing after lunch and dinner, declining after dinner/overnight  Last Eye Exam: needs exam  Last Foot Exam: utd    Has hypertension: Taking carvedilol and losartan  Has hyperlipidemia: Taking Repatha and zetia  Noticed high blood sugars since starting repatha  Was in touch with [facility] and was told they have noticed this can cause high blood sugars  Patient Active Problem List   Diagnosis    B-complex deficiency    Fibromyalgia    GERD (gastroesophageal reflux disease)    Inflammatory bowel disease ([facility]hn's disease) ([facility] )    Migraine without aura and without status migrainosus, not intractable    Peripheral neuropathy    Tobacco use disorder, continuous    Mixed hyperlipidemia    Hidradenitis suppurativa    Acute ST elevation myocardial infarction (STEMI) due to occlusion of mid portion of right coronary artery ([place] [address] )    Uncontrolled type 2 diabetes mellitus with hyperglycemia (HCC)    Essential hypertension    Coronary artery disease involving native coronary artery    Cystocele with prolapse      Past Medical History:   Diagnosis Date    Coronary artery disease     Crohn's colitis ([place] [address] )     Diabetes mellitus ([place] [address] )     Fibromyalgia     HTN (hypertension)     Hyperlipidemia     Migraine     [facility] infarction [facility])       Past Surgical History:   Procedure Laterality Date    CARDIAC CATHETERIZATION      CHOLECYSTECTOMY      CORONARY STENT PLACEMENT      DENTAL SURGERY      [place] teeth extraction    HYSTERECTOMY      TUBAL LIGATION      Bilateral      Family History   Problem Relation Age of Onset    Coronary artery disease Mother     Dementia Mother     Stroke Mother     Heart disease Brother     Alcohol abuse Neg Hx     Substance Abuse Neg Hx      Social History     Tobacco Use    Smoking status: Current Some Day Smoker     Packs/day: 1 00     Types: Cigarettes    Smokeless tobacco: Never Used    Tobacco comment: Currently smoking 1-1 5 packs cigarettes weekly (Updated 04/02/2021)      Substance Use Topics    Alcohol use: Not Currently     Comment: Rarely     Allergies   Allergen Reactions    Penicillins Anaphylaxis    Rosuvastatin Myalgia and Arthralgia    Atorvastatin Myalgia    Cefuroxime     Metaxalone     Influenza Vaccines Rash    Keflet [Cephalexin] Rash    Lyrica [Pregabalin] Swelling     Feet swelling    Nuts - Food Allergy Itching    Sulfa Antibiotics Rash    Sulfamethoxazole-Trimethoprim Hives and Rash    Topiramate Rash         Current Outpatient Medications:     acetaminophen (TYLENOL) 500 mg tablet, Take 1,000 mg by mouth every 6 (six) hours as needed for mild pain, Disp: , Rfl:     ALPRAZolam (XANAX) 0 25 mg tablet, Take 1 tablet (0 25 mg total) by mouth daily at bedtime as needed for anxiety, Disp: 15 tablet, Rfl: 0    aspirin 81 mg chewable tablet, Chew 1 tablet (81 mg total) daily, Disp: 90 tablet, Rfl: 3    carvedilol (COREG) 12 5 mg tablet, Take 1 tablet (12 5 mg total) by mouth every 12 (twelve) hours, Disp: 180 tablet, Rfl: 3    cholecalciferol (VITAMIN D3) 1,000 units tablet, Take 1 tablet (1,000 Units total) by mouth daily, Disp: 90 tablet, Rfl: 3    Continuous Blood Gluc  (Dexcom G6 ) CRISTINA, Disp 1 , Disp: 1 Device, Rfl: 1    Continuous Blood Gluc Sensor (Dexcom G6 Sensor) MISC, Use 1 sensor every 10 days, disp 90 day supply, Disp: 9 each, Rfl: 3    Continuous Blood Gluc Transmit (Dexcom G6 Transmitter) MISC, Use 1 every 3 months, Disp: 1 each, Rfl: 1    Evolocumab 140 MG/ML SOAJ, Inject 1 mL (140 mg total) under the skin every 14 (fourteen) days, Disp: 2 mL, Rfl: 8    ezetimibe (ZETIA) 10 mg tablet, Take 1 tablet (10 mg total) by mouth daily, Disp: 90 tablet, Rfl: 1    insulin aspart (NovoLOG FlexPen) 100 UNIT/ML injection pen, 6 units before each meal , Disp: 15 mL, Rfl: 1    Insulin Degludec (TRESIBA FLEXTOUCH SC), Inject under the skin 25 units at bedtime, Disp: , Rfl:     Insulin Pen Needle (Pen Needles) 32G X 5 MM MISC, Use 4 per day, Disp: 360 each, Rfl: 1    losartan (COZAAR) 25 mg tablet, Take 1 tablet (25 mg total) by mouth daily, Disp: 90 tablet, Rfl: 0    mesalamine (LIALDA) 1 2 g EC tablet, Take 2 tablets (2 4 g total) by mouth daily with breakfast, Disp: 60 tablet, Rfl: 2    ondansetron (ZOFRAN-ODT) 4 mg disintegrating tablet, Take 1 tablet (4 mg total) by mouth every 8 (eight) hours as needed for nausea or vomiting, Disp: 12 tablet, Rfl: 0    predniSONE 5 mg tablet, Take 3 tablets (15 mg total) by mouth daily for 5 days, THEN 2 tablets (10 mg total) daily for 5 days, THEN 1 tablet (5 mg total) daily for 5 days, THEN 1 tablet (5 mg total) every 3 (three) days for 5 days  , Disp: 35 tablet, Rfl: 3    ticagrelor (BRILINTA) 90 MG, Take 1 tablet (90 mg total) by mouth every 12 (twelve) hours, Disp: 180 tablet, Rfl: 0    DULoxetine (CYMBALTA) 30 mg delayed release capsule, Take 1 capsule (30 mg total) by mouth daily (Patient not taking: Reported on 9/1/2021), Disp: 30 capsule, Rfl: 5    insulin degludec Leitha Crazier FlexTouch) 100 units/mL injection pen, Inject 25 Units under the skin daily (Patient not taking: Reported on 9/1/2021), Disp: 30 mL, Rfl: 1    insulin glargine (LANTUS) 100 units/mL subcutaneous injection, Inject 25 Units under the skin daily at bedtime (Patient not taking: Reported on 9/1/2021), Disp: 10 mL, Rfl: 0    predniSONE 10 mg tablet, Take 5 tabs today and tomorrow with breakfast, 4 tabs on day 3,4 with breakfast, 3 tabs on day 5,6, 2 tabs on day 7,8 and 1 tab on day 9,10 (Patient not taking: Reported on 9/1/2021), Disp: 30 tablet, Rfl: 0    Review of Systems   Constitutional: Negative for activity change, appetite change, chills, diaphoresis, fatigue, fever and unexpected weight change  HENT: Negative for trouble swallowing and voice change  Eyes: Negative for visual disturbance  Respiratory: Negative for shortness of breath  Cardiovascular: Negative for chest pain and palpitations  Gastrointestinal: Positive for abdominal pain  Negative for constipation and diarrhea  Endocrine: Negative for cold intolerance, heat intolerance, polydipsia, polyphagia and polyuria     Genitourinary: Negative for frequency and menstrual problem  Musculoskeletal: Positive for arthralgias  Negative for myalgias  Skin: Negative for rash  Allergic/Immunologic: Negative for food allergies  Neurological: Negative for dizziness and tremors  Hematological: Negative for adenopathy  Psychiatric/Behavioral: Negative for sleep disturbance  All other systems reviewed and are negative  Physical Exam:  Body mass index is 34 36 kg/m²  /80   Pulse 72   Ht 5' 8" (1 727 m)   Wt 103 kg (226 lb)   BMI 34 36 kg/m²    Wt Readings from Last 3 Encounters:   09/01/21 103 kg (226 lb)   09/01/21 101 kg (223 lb)   08/11/21 99 8 kg (220 lb)       Physical Exam  Vitals reviewed  Constitutional:       General: She is not in acute distress  Appearance: She is well-developed  HENT:      Head: Normocephalic and atraumatic  Eyes:      Conjunctiva/sclera: Conjunctivae normal       Pupils: Pupils are equal, round, and reactive to light  Neck:      Thyroid: No thyromegaly  Cardiovascular:      Rate and Rhythm: Normal rate and regular rhythm  Heart sounds: Normal heart sounds  Pulmonary:      Effort: Pulmonary effort is normal  No respiratory distress  Breath sounds: Normal breath sounds  No wheezing or rales  Abdominal:      General: Bowel sounds are normal  There is no distension  Palpations: Abdomen is soft  Tenderness: There is no abdominal tenderness  Musculoskeletal:         General: Normal range of motion  Cervical back: Normal range of motion and neck supple  Skin:     General: Skin is warm and dry  Neurological:      Mental Status: She is alert and oriented to person, place, and time           Labs:   Lab Results   Component Value Date    HGBA1C 7 3 (H) 08/11/2021    HGBA1C 6 4 (H) 06/16/2021    HGBA1C 12 3 (H) 02/26/2021     Lab Results   Component Value Date    CREATININE 0 65 08/11/2021    CREATININE 0 77 06/16/2021    CREATININE 0 95 04/13/2021    BUN 17 08/11/2021     01/22/2016    K 4 1 08/11/2021     (H) 08/11/2021    CO2 21 08/11/2021     eGFR   Date Value Ref Range Status   08/11/2021 97 ml/min/1 73sq m Final     Lab Results   Component Value Date    HDL 40 08/11/2021    TRIG 231 (H) 08/11/2021     Lab Results   Component Value Date    ALT 12 03/17/2021    AST 10 03/17/2021    ALKPHOS 108 03/17/2021    BILITOT 0 4 01/22/2016     Lab Results   Component Value Date    FCT8DTKFYFRF 2 100 08/11/2021    BSE1AMJKSIES 1 620 11/13/2020     No results found for: FREET4, TSI    Impression & Plan:    Problem List Items Addressed This Visit        Endocrine    Uncontrolled type 2 diabetes mellitus with hyperglycemia ([facility] ) - Primary    Relevant Orders    Hemoglobin A1C          Orders Placed This Encounter   Procedures    Hemoglobin A1C     Standing Status:   Future     Standing Expiration Date:   9/1/2022       Patient Instructions   Novolog 6 units with lunch and dinner before eating    Can take extra if needed for high blood sugars   addition to the 6 units dose  150-200: 2 units  201-250: 4 units  251-300: 6 units  201-350: 8 units  Over 350: 10 units    Tresiba-- 25 units  Discussed with the patient and all questioned fully answered  She will call me if any problems arise  Follow-up appointment in 3 months       Counseled patient on diagnostic results, prognosis, risk and benefit of treatment options, instruction for management, importance of treatment compliance, Risk  factor reduction and impressions    Joann Chavez PA-C

## 2021-09-01 NOTE — PATIENT INSTRUCTIONS
Novolog 6 units with lunch and dinner before eating    Can take extra if needed for high blood sugars   addition to the 6 units dose  150-200: 2 units  201-250: 4 units  251-300: 6 units  201-350: 8 units  Over 350: 10 units    Tresiba-- 25 units

## 2021-09-02 NOTE — ASSESSMENT & PLAN NOTE
She has not tolerated statins  She is on zetia  She started repatha a few weeks ago and has noticed some higher blood sugars and the company did call her and reported this has been seen in other patients as well  I am not noticing any significant hyperglycemia after starting the Repatha  On Friday August 20th her average glucose was 122 and she was in target 98% of time  I Think hyperglycemia is more likely related to diet/steroids and lack of consistent pre-meal Novolog use and I encouraged her to continue the  Repatha due to her CAD, hyperlipidemia, and statin intolerance  She will discuss with cardiology also

## 2021-09-02 NOTE — ASSESSMENT & PLAN NOTE
A1C higher at 7 3, control is worsening  She is having post-meal hypeglycemia after breakfast/lunch and not taking pre-meal Novolog on a consistent basis  Advised her to take the 6 units consistently before lunch and dinner plus sliding scale if needed  Continue to take tresiba 25 units at bedtime, would not increase to 30 as she does tend to decline overnight  Focus on dietary improvements  I am not noticing any significant hyperglycemia after starting the Repatha and I encouraged her to continue this medication even if this does cause mild hyperglycemia with her CAD treatment of hyperlipidemia is important so we can adjust insulin if needed   She will discuss with cardiologist      Lab Results   Component Value Date    HGBA1C 7 3 (H) 08/11/2021

## 2021-09-07 DIAGNOSIS — F32.9 REACTIVE DEPRESSION (SITUATIONAL): ICD-10-CM

## 2021-09-07 RX ORDER — ALPRAZOLAM 0.25 MG/1
0.25 TABLET ORAL
Qty: 15 TABLET | Refills: 0 | Status: SHIPPED | OUTPATIENT
Start: 2021-09-07 | End: 2021-10-13 | Stop reason: SDUPTHER

## 2021-09-10 ENCOUNTER — HOSPITAL ENCOUNTER (OUTPATIENT)
Dept: MAMMOGRAPHY | Facility: MEDICAL CENTER | Age: 61
Discharge: HOME | End: 2021-09-10
Payer: COMMERCIAL

## 2021-09-10 ENCOUNTER — HOSPITAL ENCOUNTER (OUTPATIENT)
Dept: BONE DENSITY | Facility: MEDICAL CENTER | Age: 61
Discharge: HOME | End: 2021-09-10
Payer: COMMERCIAL

## 2021-09-10 VITALS — HEIGHT: 68 IN | WEIGHT: 226 LBS | BODY MASS INDEX: 34.25 KG/M2

## 2021-09-10 DIAGNOSIS — I21.11 ACUTE ST ELEVATION MYOCARDIAL INFARCTION (STEMI) DUE TO OCCLUSION OF MID PORTION OF RIGHT CORONARY ARTERY (HCC): ICD-10-CM

## 2021-09-10 DIAGNOSIS — I21.11 ACUTE ST ELEVATION MYOCARDIAL INFARCTION (STEMI) DUE TO OCCLUSION OF DISTAL PORTION OF RIGHT CORONARY ARTERY (HCC): ICD-10-CM

## 2021-09-10 DIAGNOSIS — Z12.31 ENCOUNTER FOR SCREENING MAMMOGRAM FOR MALIGNANT NEOPLASM OF BREAST: ICD-10-CM

## 2021-09-10 DIAGNOSIS — I21.19 INFERIOR MI (HCC): ICD-10-CM

## 2021-09-10 DIAGNOSIS — Z79.52 LONG TERM CURRENT USE OF SYSTEMIC STEROIDS: ICD-10-CM

## 2021-09-10 DIAGNOSIS — Z13.820 SCREENING FOR OSTEOPOROSIS: ICD-10-CM

## 2021-09-10 PROCEDURE — 77080 DXA BONE DENSITY AXIAL: CPT

## 2021-09-10 PROCEDURE — 77063 BREAST TOMOSYNTHESIS BI: CPT

## 2021-09-10 PROCEDURE — 77067 SCR MAMMO BI INCL CAD: CPT

## 2021-09-13 RX ORDER — CARVEDILOL 12.5 MG/1
12.5 TABLET ORAL EVERY 12 HOURS SCHEDULED
Qty: 180 TABLET | Refills: 3 | Status: SHIPPED | OUTPATIENT
Start: 2021-09-13 | End: 2022-03-15 | Stop reason: SDUPTHER

## 2021-09-14 DIAGNOSIS — E78.2 MIXED HYPERLIPIDEMIA: ICD-10-CM

## 2021-09-15 ENCOUNTER — OFFICE VISIT (OUTPATIENT)
Dept: FAMILY MEDICINE CLINIC | Facility: CLINIC | Age: 61
End: 2021-09-15
Payer: COMMERCIAL

## 2021-09-15 ENCOUNTER — HOSPITAL ENCOUNTER (OUTPATIENT)
Dept: RADIOLOGY | Facility: HOSPITAL | Age: 61
Discharge: HOME | End: 2021-09-15
Payer: COMMERCIAL

## 2021-09-15 VITALS
DIASTOLIC BLOOD PRESSURE: 88 MMHG | SYSTOLIC BLOOD PRESSURE: 132 MMHG | RESPIRATION RATE: 16 BRPM | HEIGHT: 67 IN | WEIGHT: 222.7 LBS | HEART RATE: 124 BPM | BODY MASS INDEX: 34.95 KG/M2

## 2021-09-15 DIAGNOSIS — K50.919 CROHN'S DISEASE WITH COMPLICATION, UNSPECIFIED GASTROINTESTINAL TRACT LOCATION (HCC): ICD-10-CM

## 2021-09-15 DIAGNOSIS — S16.1XXA STRAIN OF NECK MUSCLE, INITIAL ENCOUNTER: Primary | ICD-10-CM

## 2021-09-15 DIAGNOSIS — S16.1XXA STRAIN OF NECK MUSCLE, INITIAL ENCOUNTER: ICD-10-CM

## 2021-09-15 PROCEDURE — 72050 X-RAY EXAM NECK SPINE 4/5VWS: CPT

## 2021-09-15 PROCEDURE — 99214 OFFICE O/P EST MOD 30 MIN: CPT | Performed by: PHYSICIAN ASSISTANT

## 2021-09-15 RX ORDER — PREDNISONE 10 MG/1
TABLET ORAL
Qty: 30 TABLET | Refills: 0 | Status: SHIPPED | OUTPATIENT
Start: 2021-09-15 | End: 2021-11-19 | Stop reason: ALTCHOICE

## 2021-09-15 RX ORDER — CYCLOBENZAPRINE HCL 10 MG
10 TABLET ORAL 3 TIMES DAILY PRN
Qty: 30 TABLET | Refills: 0 | Status: SHIPPED | OUTPATIENT
Start: 2021-09-15 | End: 2021-09-28 | Stop reason: SDUPTHER

## 2021-09-15 NOTE — PROGRESS NOTES
Assessment/Plan:    1  Strain of neck muscle, initial encounter    - will treat with prednisone, flexeril, will see PT  Will get XR cervical spine  Patient will increase insulin on sliding scale as given by endo due to prednisone  - XR spine cervical complete 4 or 5 vw non injury; Future  - cyclobenzaprine (FLEXERIL) 10 mg tablet; Take 1 tablet (10 mg total) by mouth 3 (three) times a day as needed for muscle spasms  Dispense: 30 tablet; Refill: 0    When patient tapers down on prednisone will try lyrica for fibro, failed cymbalta    F/u as needed    Subjective:   Chief Complaint   Patient presents with    Neck Pain     R side times 4 days     Shoulder Pain    Generalized Body Aches      Patient ID: Kip De La Cruz is a 63 y o  female  Patient here complaining of for the past 2-3 days right shoulder and right neck pain  Started randomly one day, does not remember sleeping funny, trauma  Now to the point she cannot raise right arm, shooting pain down arm  Taking Tylenol with no relief  Has multiple level DDD in lumbar spine  Denies weakness in arm, just shooting pain into arm  Unhappy with treatment regarding generalize aches and pains  Saw Rheum who sent her to GI who sent her back to rheum        The following portions of the patient's history were reviewed and updated as appropriate: allergies, current medications, past family history, past medical history, past social history, past surgical history and problem list     Past Medical History:   Diagnosis Date    Coronary artery disease     [facility]'s colitis ([facility] )     Diabetes mellitus ([facility] )     Fibromyalgia     HTN (hypertension)     Hyperlipidemia     Migraine     Myocardial infarction [facility])      Past Surgical History:   Procedure Laterality Date    BREAST BIOPSY Left     benign    CARDIAC CATHETERIZATION      CHOLECYSTECTOMY      CORONARY STENT PLACEMENT      DENTAL SURGERY      [place] teeth extraction    HYSTERECTOMY      TUBAL LIGATION Bilateral     Family History   Problem Relation Age of Onset    Coronary artery disease Mother     Dementia Mother     Stroke Mother     No Known Problems Father     Cervical cancer Sister 36    Heart disease Brother     No Known Problems Maternal Grandmother     No Known Problems Maternal Grandfather     No Known Problems Paternal Grandmother     No Known Problems Paternal Grandfather     Alcohol abuse Neg Hx     Substance Abuse Neg Hx      Social History     Socioeconomic History    Marital status: /Civil Union     Spouse name: Not on file    Number of children: Not on file    Years of education: Not on file    Highest education level: Not on file   Occupational History    Not on file   Tobacco Use    Smoking status: Current Some Day Smoker     Packs/day: 0 50     Types: Cigarettes    Smokeless tobacco: Never Used   Vaping Use    Vaping Use: Never used   Substance and Sexual Activity    Alcohol use: Not Currently     Comment: Rarely    Drug use: Never    Sexual activity: Not Currently     Partners: Male   Other Topics Concern    Not on file   Social History Narrative    Not on file     Social Determinants of Health     Financial Resource Strain:     Difficulty of Paying Living Expenses:    Food Insecurity:     Worried About Running Out of Food in the Last Year:     Ran Out of Food in the Last Year:    Transportation Needs:     Lack of Transportation (Medical):      Lack of Transportation (Non-Medical):    Physical Activity:     Days of Exercise per Week:     Minutes of Exercise per Session:    Stress:     Feeling of Stress :    Social Connections:     Frequency of Communication with Friends and Family:     Frequency of Social Gatherings with Friends and Family:     Attends Denominational Services:     Active Member of Clubs or Organizations:     Attends Club or Organization Meetings:     Marital Status:    Intimate Partner Violence:     Fear of Current or Ex-Partner:  Emotionally Abused:     Physically Abused:     Sexually Abused:        Current Outpatient Medications:     acetaminophen (TYLENOL) 500 mg tablet, Take 1,000 mg by mouth every 6 (six) hours as needed for mild pain, Disp: , Rfl:     ALPRAZolam (XANAX) 0 25 mg tablet, Take 1 tablet (0 25 mg total) by mouth daily at bedtime as needed for anxiety, Disp: 15 tablet, Rfl: 0    aspirin 81 mg chewable tablet, Chew 1 tablet (81 mg total) daily, Disp: 90 tablet, Rfl: 3    carvedilol (COREG) 12 5 mg tablet, Take 1 tablet (12 5 mg total) by mouth every 12 (twelve) hours, Disp: 180 tablet, Rfl: 3    cholecalciferol (VITAMIN D3) 1,000 units tablet, Take 1 tablet (1,000 Units total) by mouth daily, Disp: 90 tablet, Rfl: 3    Continuous Blood Gluc  (Dexcom G6 ) CRISTINA, Disp 1 , Disp: 1 Device, Rfl: 1    Continuous Blood Gluc Sensor (Dexcom G6 Sensor) MISC, Use 1 sensor every 10 days, disp 90 day supply, Disp: 9 each, Rfl: 3    Continuous Blood Gluc Transmit (Dexcom G6 Transmitter) MISC, Use 1 every 3 months, Disp: 1 each, Rfl: 1    Evolocumab 140 MG/ML SOAJ, Inject 1 mL (140 mg total) under the skin every 14 (fourteen) days, Disp: 2 mL, Rfl: 8    ezetimibe (ZETIA) 10 mg tablet, Take 1 tablet (10 mg total) by mouth daily, Disp: 90 tablet, Rfl: 1    insulin aspart (NovoLOG FlexPen) 100 UNIT/ML injection pen, 6 units before lunch and dinner plus additional if needed according to sliding scale , Disp: 15 mL, Rfl: 1    Insulin Degludec (TRESIBA FLEXTOUCH SC), Inject under the skin 25 units at bedtime, Disp: , Rfl:     Insulin Pen Needle (Pen Needles) 32G X 5 MM MISC, Use 4 per day, Disp: 360 each, Rfl: 1    losartan (COZAAR) 25 mg tablet, Take 1 tablet (25 mg total) by mouth daily, Disp: 90 tablet, Rfl: 0    mesalamine (LIALDA) 1 2 g EC tablet, Take 2 tablets (2 4 g total) by mouth daily with breakfast, Disp: 60 tablet, Rfl: 2    ondansetron (ZOFRAN-ODT) 4 mg disintegrating tablet, Take 1 tablet (4 mg total) by mouth every 8 (eight) hours as needed for nausea or vomiting, Disp: 12 tablet, Rfl: 0    ticagrelor (BRILINTA) 90 MG, Take 1 tablet (90 mg total) by mouth every 12 (twelve) hours, Disp: 180 tablet, Rfl: 0    Alcohol Swabs 70 % PADS, Use 4 per day, Disp: 400 each, Rfl: 1    insulin degludec Viktoria Lay FlexTouch) 100 units/mL injection pen, Inject 25 Units under the skin daily (Patient not taking: Reported on 9/1/2021), Disp: 30 mL, Rfl: 1    predniSONE 10 mg tablet, Take 5 tabs today and tomorrow with breakfast, 4 tabs on day 3,4 with breakfast, 3 tabs on day 5,6, 2 tabs on day 7,8 and 1 tab on day 9,10 (Patient not taking: Reported on 9/15/2021), Disp: 30 tablet, Rfl: 0    Review of Systems          Objective:    Vitals:    09/15/21 1405   BP: 132/88   Pulse: (!) 124   Resp: 16   Weight: 101 kg (222 lb 11 2 oz)   Height: 5' 6 5" (1 689 m)        Physical Exam  Constitutional:       Appearance: Normal appearance  HENT:      Head: Normocephalic and atraumatic  Musculoskeletal:      Comments: c-spine not tender, right cervical muscles tight in spasm, trapezius spasm  Limited ROM in cervical spine and right shoulder due to muscle pain  Neurological:      General: No focal deficit present  Mental Status: She is alert and oriented to person, place, and time  Psychiatric:         Mood and Affect: Mood normal          Behavior: Behavior normal          Thought Content:  Thought content normal          Judgment: Judgment normal

## 2021-09-16 ENCOUNTER — TELEPHONE (OUTPATIENT)
Dept: FAMILY MEDICINE CLINIC | Facility: CLINIC | Age: 61
End: 2021-09-16

## 2021-09-16 NOTE — TELEPHONE ENCOUNTER
----- Message from Poncho Alva PA-C sent at 9/16/2021 11:43 AM EDT -----  Please let patient know she does have degenerative changes in her spine, both the meds and PT should help with this

## 2021-09-24 DIAGNOSIS — I25.10 CORONARY ARTERY DISEASE: ICD-10-CM

## 2021-09-24 DIAGNOSIS — E78.2 MODERATE MIXED HYPERLIPIDEMIA NOT REQUIRING STATIN THERAPY: ICD-10-CM

## 2021-09-24 DIAGNOSIS — I21.11 ACUTE ST ELEVATION MYOCARDIAL INFARCTION (STEMI) DUE TO OCCLUSION OF DISTAL PORTION OF RIGHT CORONARY ARTERY (HCC): ICD-10-CM

## 2021-09-24 DIAGNOSIS — E78.2 MIXED HYPERLIPIDEMIA: ICD-10-CM

## 2021-09-24 DIAGNOSIS — I25.10 CORONARY ARTERY DISEASE INVOLVING NATIVE CORONARY ARTERY OF NATIVE HEART WITHOUT ANGINA PECTORIS: Chronic | ICD-10-CM

## 2021-09-24 DIAGNOSIS — E11.65 UNCONTROLLED TYPE 2 DIABETES MELLITUS WITH HYPERGLYCEMIA (HCC): Chronic | ICD-10-CM

## 2021-09-27 RX ORDER — EZETIMIBE 10 MG/1
10 TABLET ORAL DAILY
Qty: 90 TABLET | Refills: 0 | Status: SHIPPED | OUTPATIENT
Start: 2021-09-27 | End: 2022-01-11 | Stop reason: SDUPTHER

## 2021-09-28 DIAGNOSIS — S16.1XXA STRAIN OF NECK MUSCLE, INITIAL ENCOUNTER: ICD-10-CM

## 2021-09-28 RX ORDER — CYCLOBENZAPRINE HCL 10 MG
10 TABLET ORAL 3 TIMES DAILY PRN
Qty: 30 TABLET | Refills: 0 | Status: SHIPPED | OUTPATIENT
Start: 2021-09-28 | End: 2022-02-11

## 2021-09-30 ENCOUNTER — HOSPITAL ENCOUNTER (OUTPATIENT)
Dept: MAMMOGRAPHY | Facility: CLINIC | Age: 61
Discharge: HOME | End: 2021-09-30
Payer: COMMERCIAL

## 2021-09-30 DIAGNOSIS — R92.8 ABNORMAL SCREENING MAMMOGRAM: ICD-10-CM

## 2021-09-30 PROCEDURE — 77065 DX MAMMO INCL CAD UNI: CPT

## 2021-10-13 DIAGNOSIS — F32.9 REACTIVE DEPRESSION (SITUATIONAL): ICD-10-CM

## 2021-10-14 RX ORDER — ALPRAZOLAM 0.25 MG/1
0.25 TABLET ORAL
Qty: 15 TABLET | Refills: 0 | Status: SHIPPED | OUTPATIENT
Start: 2021-10-14 | End: 2021-12-14

## 2021-10-20 ENCOUNTER — APPOINTMENT (OUTPATIENT)
Dept: LAB | Facility: HOSPITAL | Age: 61
End: 2021-10-20
Attending: INTERNAL MEDICINE
Payer: COMMERCIAL

## 2021-10-22 ENCOUNTER — HOSPITAL ENCOUNTER (OUTPATIENT)
Dept: MAMMOGRAPHY | Facility: CLINIC | Age: 61
Discharge: HOME | End: 2021-10-22

## 2021-10-25 ENCOUNTER — HOSPITAL ENCOUNTER (EMERGENCY)
Facility: HOSPITAL | Age: 61
Discharge: HOME | End: 2021-10-25
Attending: EMERGENCY MEDICINE | Admitting: EMERGENCY MEDICINE
Payer: COMMERCIAL

## 2021-10-25 ENCOUNTER — OFFICE VISIT (OUTPATIENT)
Dept: URGENT CARE | Facility: CLINIC | Age: 61
End: 2021-10-25
Payer: COMMERCIAL

## 2021-10-25 ENCOUNTER — APPOINTMENT (OUTPATIENT)
Dept: RADIOLOGY | Facility: CLINIC | Age: 61
End: 2021-10-25
Payer: COMMERCIAL

## 2021-10-25 VITALS
RESPIRATION RATE: 18 BRPM | DIASTOLIC BLOOD PRESSURE: 91 MMHG | SYSTOLIC BLOOD PRESSURE: 145 MMHG | TEMPERATURE: 97.8 F | HEART RATE: 80 BPM | OXYGEN SATURATION: 97 %

## 2021-10-25 VITALS
WEIGHT: 221 LBS | RESPIRATION RATE: 18 BRPM | TEMPERATURE: 97 F | HEART RATE: 85 BPM | OXYGEN SATURATION: 98 % | BODY MASS INDEX: 35.52 KG/M2 | HEIGHT: 66 IN

## 2021-10-25 DIAGNOSIS — Z20.822 COVID-19 RULED OUT: ICD-10-CM

## 2021-10-25 DIAGNOSIS — J20.9 ACUTE BRONCHITIS, UNSPECIFIED ORGANISM: ICD-10-CM

## 2021-10-25 DIAGNOSIS — R05.9 COUGH: ICD-10-CM

## 2021-10-25 DIAGNOSIS — R06.02 SHORTNESS OF BREATH: ICD-10-CM

## 2021-10-25 DIAGNOSIS — R51.9 HEADACHE: Primary | ICD-10-CM

## 2021-10-25 DIAGNOSIS — R07.89 ATYPICAL CHEST PAIN: Primary | ICD-10-CM

## 2021-10-25 DIAGNOSIS — R52 GENERALIZED BODY ACHES: ICD-10-CM

## 2021-10-25 LAB
ANION GAP SERPL CALCULATED.3IONS-SCNC: 3 MMOL/L (ref 4–13)
BASOPHILS # BLD AUTO: 0.04 THOUSANDS/ΜL (ref 0–0.1)
BASOPHILS NFR BLD AUTO: 0 % (ref 0–1)
BUN SERPL-MCNC: 13 MG/DL (ref 5–25)
CALCIUM SERPL-MCNC: 10.1 MG/DL (ref 8.3–10.1)
CHLORIDE SERPL-SCNC: 107 MMOL/L (ref 100–108)
CO2 SERPL-SCNC: 26 MMOL/L (ref 21–32)
CREAT SERPL-MCNC: 0.79 MG/DL (ref 0.6–1.3)
EOSINOPHIL # BLD AUTO: 0.25 THOUSAND/ΜL (ref 0–0.61)
EOSINOPHIL NFR BLD AUTO: 3 % (ref 0–6)
ERYTHROCYTE [DISTWIDTH] IN BLOOD BY AUTOMATED COUNT: 13.3 % (ref 11.6–15.1)
FLUAV RNA RESP QL NAA+PROBE: NEGATIVE
FLUBV RNA RESP QL NAA+PROBE: NEGATIVE
GFR SERPL CREATININE-BSD FRML MDRD: 82 ML/MIN/1.73SQ M
GLUCOSE SERPL-MCNC: 183 MG/DL (ref 65–140)
HCT VFR BLD AUTO: 40.9 % (ref 34.8–46.1)
HGB BLD-MCNC: 13.6 G/DL (ref 11.5–15.4)
IMM GRANULOCYTES # BLD AUTO: 0.02 THOUSAND/UL (ref 0–0.2)
IMM GRANULOCYTES NFR BLD AUTO: 0 % (ref 0–2)
LYMPHOCYTES # BLD AUTO: 3.15 THOUSANDS/ΜL (ref 0.6–4.47)
LYMPHOCYTES NFR BLD AUTO: 33 % (ref 14–44)
MCH RBC QN AUTO: 30.6 PG (ref 26.8–34.3)
MCHC RBC AUTO-ENTMCNC: 33.3 G/DL (ref 31.4–37.4)
MCV RBC AUTO: 92 FL (ref 82–98)
MONOCYTES # BLD AUTO: 0.72 THOUSAND/ΜL (ref 0.17–1.22)
MONOCYTES NFR BLD AUTO: 8 % (ref 4–12)
NEUTROPHILS # BLD AUTO: 5.28 THOUSANDS/ΜL (ref 1.85–7.62)
NEUTS SEG NFR BLD AUTO: 56 % (ref 43–75)
NRBC BLD AUTO-RTO: 0 /100 WBCS
PLATELET # BLD AUTO: 279 THOUSANDS/UL (ref 149–390)
PMV BLD AUTO: 9.9 FL (ref 8.9–12.7)
POTASSIUM SERPL-SCNC: 3.6 MMOL/L (ref 3.5–5.3)
RBC # BLD AUTO: 4.45 MILLION/UL (ref 3.81–5.12)
RSV RNA RESP QL NAA+PROBE: NEGATIVE
SARS-COV-2 RNA RESP QL NAA+PROBE: NEGATIVE
SODIUM SERPL-SCNC: 136 MMOL/L (ref 136–145)
TROPONIN I SERPL-MCNC: <0.02 NG/ML
WBC # BLD AUTO: 9.46 THOUSAND/UL (ref 4.31–10.16)

## 2021-10-25 PROCEDURE — 71046 X-RAY EXAM CHEST 2 VIEWS: CPT

## 2021-10-25 PROCEDURE — 36415 COLL VENOUS BLD VENIPUNCTURE: CPT

## 2021-10-25 PROCEDURE — 99284 EMERGENCY DEPT VISIT MOD MDM: CPT

## 2021-10-25 PROCEDURE — 80048 BASIC METABOLIC PNL TOTAL CA: CPT

## 2021-10-25 PROCEDURE — 99284 EMERGENCY DEPT VISIT MOD MDM: CPT | Performed by: EMERGENCY MEDICINE

## 2021-10-25 PROCEDURE — 85025 COMPLETE CBC W/AUTO DIFF WBC: CPT

## 2021-10-25 PROCEDURE — G0382 LEV 3 HOSP TYPE B ED VISIT: HCPCS | Performed by: FAMILY MEDICINE

## 2021-10-25 PROCEDURE — 84484 ASSAY OF TROPONIN QUANT: CPT

## 2021-10-25 PROCEDURE — 0241U HB NFCT DS VIR RESP RNA 4 TRGT: CPT

## 2021-10-25 RX ORDER — DIPHENHYDRAMINE HYDROCHLORIDE 50 MG/ML
25 INJECTION INTRAMUSCULAR; INTRAVENOUS ONCE
Status: DISCONTINUED | OUTPATIENT
Start: 2021-10-25 | End: 2021-10-25 | Stop reason: HOSPADM

## 2021-10-25 RX ORDER — SODIUM CHLORIDE, SODIUM GLUCONATE, SODIUM ACETATE, POTASSIUM CHLORIDE, MAGNESIUM CHLORIDE, SODIUM PHOSPHATE, DIBASIC, AND POTASSIUM PHOSPHATE .53; .5; .37; .037; .03; .012; .00082 G/100ML; G/100ML; G/100ML; G/100ML; G/100ML; G/100ML; G/100ML
1000 INJECTION, SOLUTION INTRAVENOUS ONCE
Status: DISCONTINUED | OUTPATIENT
Start: 2021-10-25 | End: 2021-10-25 | Stop reason: HOSPADM

## 2021-10-25 RX ORDER — KETOROLAC TROMETHAMINE 30 MG/ML
15 INJECTION, SOLUTION INTRAMUSCULAR; INTRAVENOUS ONCE
Status: DISCONTINUED | OUTPATIENT
Start: 2021-10-25 | End: 2021-10-25 | Stop reason: HOSPADM

## 2021-10-25 RX ORDER — METOCLOPRAMIDE HYDROCHLORIDE 5 MG/ML
10 INJECTION INTRAMUSCULAR; INTRAVENOUS ONCE
Status: DISCONTINUED | OUTPATIENT
Start: 2021-10-25 | End: 2021-10-25 | Stop reason: HOSPADM

## 2021-10-27 ENCOUNTER — HOSPITAL ENCOUNTER (OUTPATIENT)
Dept: MAMMOGRAPHY | Facility: CLINIC | Age: 61
Discharge: HOME | End: 2021-10-27
Payer: COMMERCIAL

## 2021-10-27 VITALS
BODY MASS INDEX: 35.52 KG/M2 | WEIGHT: 221 LBS | HEIGHT: 66 IN | DIASTOLIC BLOOD PRESSURE: 68 MMHG | HEART RATE: 68 BPM | SYSTOLIC BLOOD PRESSURE: 108 MMHG

## 2021-10-27 DIAGNOSIS — R92.8 ABNORMAL MAMMOGRAM: ICD-10-CM

## 2021-10-27 PROCEDURE — A4648 IMPLANTABLE TISSUE MARKER: HCPCS

## 2021-10-27 PROCEDURE — 19081 BX BREAST 1ST LESION STRTCTC: CPT

## 2021-10-27 PROCEDURE — 88305 TISSUE EXAM BY PATHOLOGIST: CPT | Performed by: PATHOLOGY

## 2021-10-27 RX ORDER — LIDOCAINE HYDROCHLORIDE 10 MG/ML
5 INJECTION, SOLUTION EPIDURAL; INFILTRATION; INTRACAUDAL; PERINEURAL ONCE
Status: COMPLETED | OUTPATIENT
Start: 2021-10-27 | End: 2021-10-27

## 2021-10-27 RX ORDER — LIDOCAINE HYDROCHLORIDE AND EPINEPHRINE BITARTRATE 20; .01 MG/ML; MG/ML
10 INJECTION, SOLUTION SUBCUTANEOUS ONCE
Status: COMPLETED | OUTPATIENT
Start: 2021-10-27 | End: 2021-10-27

## 2021-10-27 RX ADMIN — LIDOCAINE HYDROCHLORIDE AND EPINEPHRINE 10 ML: 20; 10 INJECTION, SOLUTION INFILTRATION; PERINEURAL at 13:05

## 2021-10-27 RX ADMIN — LIDOCAINE HYDROCHLORIDE 5 ML: 10 INJECTION, SOLUTION EPIDURAL; INFILTRATION; INTRACAUDAL; PERINEURAL at 13:05

## 2021-10-29 ENCOUNTER — TELEPHONE (OUTPATIENT)
Dept: FAMILY MEDICINE CLINIC | Facility: CLINIC | Age: 61
End: 2021-10-29

## 2021-10-29 ENCOUNTER — TELEPHONE (OUTPATIENT)
Dept: MAMMOGRAPHY | Facility: CLINIC | Age: 61
End: 2021-10-29

## 2021-11-17 ENCOUNTER — OFFICE VISIT (OUTPATIENT)
Dept: RHEUMATOLOGY | Facility: CLINIC | Age: 61
End: 2021-11-17
Payer: COMMERCIAL

## 2021-11-17 VITALS
WEIGHT: 222 LBS | BODY MASS INDEX: 35.68 KG/M2 | SYSTOLIC BLOOD PRESSURE: 115 MMHG | DIASTOLIC BLOOD PRESSURE: 80 MMHG | HEIGHT: 66 IN | HEART RATE: 65 BPM

## 2021-11-17 DIAGNOSIS — M25.551 CHRONIC ARTHRALGIAS OF KNEES AND HIPS: Primary | ICD-10-CM

## 2021-11-17 DIAGNOSIS — K50.919 CROHN'S DISEASE WITH COMPLICATION, UNSPECIFIED GASTROINTESTINAL TRACT LOCATION (HCC): ICD-10-CM

## 2021-11-17 DIAGNOSIS — G89.29 CHRONIC ARTHRALGIAS OF KNEES AND HIPS: Primary | ICD-10-CM

## 2021-11-17 DIAGNOSIS — M25.562 ARTHRALGIA OF BOTH KNEES: ICD-10-CM

## 2021-11-17 DIAGNOSIS — M25.552 CHRONIC ARTHRALGIAS OF KNEES AND HIPS: Primary | ICD-10-CM

## 2021-11-17 DIAGNOSIS — M25.562 CHRONIC ARTHRALGIAS OF KNEES AND HIPS: Primary | ICD-10-CM

## 2021-11-17 DIAGNOSIS — M25.561 CHRONIC ARTHRALGIAS OF KNEES AND HIPS: Primary | ICD-10-CM

## 2021-11-17 DIAGNOSIS — M25.561 ARTHRALGIA OF BOTH KNEES: ICD-10-CM

## 2021-11-17 PROBLEM — M25.50 ARTHRALGIA: Status: ACTIVE | Noted: 2021-11-17

## 2021-11-17 PROCEDURE — 99214 OFFICE O/P EST MOD 30 MIN: CPT | Performed by: INTERNAL MEDICINE

## 2021-11-17 RX ORDER — DULOXETIN HYDROCHLORIDE 30 MG/1
30 CAPSULE, DELAYED RELEASE ORAL DAILY
Qty: 90 CAPSULE | Refills: 2 | Status: SHIPPED | OUTPATIENT
Start: 2021-11-17 | End: 2021-11-19 | Stop reason: ALTCHOICE

## 2021-11-17 RX ORDER — METHYLPREDNISOLONE 8 MG/1
TABLET ORAL
Qty: 30 TABLET | Refills: 3 | Status: SHIPPED | OUTPATIENT
Start: 2021-11-17 | End: 2021-12-02

## 2021-11-19 ENCOUNTER — OFFICE VISIT (OUTPATIENT)
Dept: FAMILY MEDICINE CLINIC | Facility: CLINIC | Age: 61
End: 2021-11-19
Payer: COMMERCIAL

## 2021-11-19 VITALS
HEIGHT: 67 IN | RESPIRATION RATE: 16 BRPM | BODY MASS INDEX: 34.59 KG/M2 | SYSTOLIC BLOOD PRESSURE: 122 MMHG | TEMPERATURE: 98.6 F | HEART RATE: 100 BPM | WEIGHT: 220.4 LBS | DIASTOLIC BLOOD PRESSURE: 70 MMHG

## 2021-11-19 DIAGNOSIS — K21.9 GASTROESOPHAGEAL REFLUX DISEASE WITHOUT ESOPHAGITIS: ICD-10-CM

## 2021-11-19 DIAGNOSIS — M79.7 FIBROMYALGIA: ICD-10-CM

## 2021-11-19 DIAGNOSIS — M25.561 ARTHRALGIA OF BOTH KNEES: ICD-10-CM

## 2021-11-19 DIAGNOSIS — J01.00 ACUTE NON-RECURRENT MAXILLARY SINUSITIS: Primary | ICD-10-CM

## 2021-11-19 DIAGNOSIS — M25.562 ARTHRALGIA OF BOTH KNEES: ICD-10-CM

## 2021-11-19 PROBLEM — R06.02 SHORTNESS OF BREATH: Status: RESOLVED | Noted: 2021-10-25 | Resolved: 2021-11-19

## 2021-11-19 PROBLEM — J20.9 ACUTE BRONCHITIS: Status: RESOLVED | Noted: 2021-10-25 | Resolved: 2021-11-19

## 2021-11-19 PROBLEM — R07.89 ATYPICAL CHEST PAIN: Status: RESOLVED | Noted: 2021-10-25 | Resolved: 2021-11-19

## 2021-11-19 PROCEDURE — 99214 OFFICE O/P EST MOD 30 MIN: CPT | Performed by: PHYSICIAN ASSISTANT

## 2021-11-19 RX ORDER — AZITHROMYCIN 250 MG/1
TABLET, FILM COATED ORAL
Qty: 6 TABLET | Refills: 0 | Status: SHIPPED | OUTPATIENT
Start: 2021-11-19 | End: 2021-11-24

## 2021-11-20 DIAGNOSIS — E11.65 UNCONTROLLED TYPE 2 DIABETES MELLITUS WITH HYPERGLYCEMIA (HCC): Chronic | ICD-10-CM

## 2021-11-22 RX ORDER — BLOOD-GLUCOSE TRANSMITTER
EACH MISCELLANEOUS
Qty: 1 EACH | Refills: 3 | Status: SHIPPED | OUTPATIENT
Start: 2021-11-22 | End: 2022-03-15 | Stop reason: SDUPTHER

## 2021-12-02 ENCOUNTER — OFFICE VISIT (OUTPATIENT)
Dept: CARDIOLOGY CLINIC | Facility: CLINIC | Age: 61
End: 2021-12-02
Payer: COMMERCIAL

## 2021-12-02 VITALS
DIASTOLIC BLOOD PRESSURE: 78 MMHG | HEIGHT: 66 IN | WEIGHT: 222 LBS | SYSTOLIC BLOOD PRESSURE: 104 MMHG | BODY MASS INDEX: 35.68 KG/M2 | HEART RATE: 81 BPM

## 2021-12-02 DIAGNOSIS — G43.009 MIGRAINE WITHOUT AURA AND WITHOUT STATUS MIGRAINOSUS, NOT INTRACTABLE: ICD-10-CM

## 2021-12-02 DIAGNOSIS — I21.11 ACUTE ST ELEVATION MYOCARDIAL INFARCTION (STEMI) DUE TO OCCLUSION OF MID PORTION OF RIGHT CORONARY ARTERY (HCC): ICD-10-CM

## 2021-12-02 DIAGNOSIS — I25.10 CORONARY ARTERY DISEASE INVOLVING NATIVE CORONARY ARTERY: Primary | ICD-10-CM

## 2021-12-02 PROBLEM — E11.9 TYPE 2 DIABETES MELLITUS (HCC): Status: ACTIVE | Noted: 2021-02-27

## 2021-12-02 PROCEDURE — 93000 ELECTROCARDIOGRAM COMPLETE: CPT | Performed by: INTERNAL MEDICINE

## 2021-12-02 PROCEDURE — 99214 OFFICE O/P EST MOD 30 MIN: CPT | Performed by: INTERNAL MEDICINE

## 2021-12-02 RX ORDER — ASPIRIN 81 MG/1
81 TABLET ORAL DAILY
Qty: 90 TABLET | Refills: 3 | Status: SHIPPED | OUTPATIENT
Start: 2021-12-02 | End: 2022-02-22 | Stop reason: SDUPTHER

## 2021-12-07 DIAGNOSIS — E11.65 UNCONTROLLED TYPE 2 DIABETES MELLITUS WITH HYPERGLYCEMIA (HCC): ICD-10-CM

## 2021-12-07 DIAGNOSIS — E78.2 MIXED HYPERLIPIDEMIA: ICD-10-CM

## 2021-12-07 RX ORDER — INSULIN DEGLUDEC INJECTION 100 U/ML
25 INJECTION, SOLUTION SUBCUTANEOUS DAILY
Qty: 30 ML | Refills: 0 | Status: SHIPPED | OUTPATIENT
Start: 2021-12-07 | End: 2022-01-11 | Stop reason: SDUPTHER

## 2021-12-07 RX ORDER — INSULIN ASPART 100 [IU]/ML
INJECTION, SOLUTION INTRAVENOUS; SUBCUTANEOUS
Qty: 15 ML | Refills: 0
Start: 2021-12-07 | End: 2022-01-11 | Stop reason: SDUPTHER

## 2021-12-12 DIAGNOSIS — F32.9 REACTIVE DEPRESSION (SITUATIONAL): ICD-10-CM

## 2021-12-13 ENCOUNTER — TELEPHONE (OUTPATIENT)
Dept: GASTROENTEROLOGY | Facility: HOSPITAL | Age: 61
End: 2021-12-13

## 2021-12-14 ENCOUNTER — ANESTHESIA EVENT (OUTPATIENT)
Dept: GASTROENTEROLOGY | Facility: HOSPITAL | Age: 61
End: 2021-12-14

## 2021-12-14 ENCOUNTER — HOSPITAL ENCOUNTER (OUTPATIENT)
Dept: GASTROENTEROLOGY | Facility: HOSPITAL | Age: 61
Setting detail: OUTPATIENT SURGERY
Discharge: HOME | End: 2021-12-14
Attending: INTERNAL MEDICINE
Payer: COMMERCIAL

## 2021-12-14 ENCOUNTER — ANESTHESIA (OUTPATIENT)
Dept: GASTROENTEROLOGY | Facility: HOSPITAL | Age: 61
End: 2021-12-14

## 2021-12-14 VITALS
RESPIRATION RATE: 16 BRPM | SYSTOLIC BLOOD PRESSURE: 114 MMHG | HEART RATE: 86 BPM | TEMPERATURE: 98.7 F | DIASTOLIC BLOOD PRESSURE: 66 MMHG | OXYGEN SATURATION: 98 %

## 2021-12-14 DIAGNOSIS — K21.9 GASTROESOPHAGEAL REFLUX DISEASE WITHOUT ESOPHAGITIS: ICD-10-CM

## 2021-12-14 DIAGNOSIS — R13.10 ODYNOPHAGIA: Primary | ICD-10-CM

## 2021-12-14 PROBLEM — Z98.61 HISTORY OF PTCA: Status: ACTIVE | Noted: 2021-12-14

## 2021-12-14 PROCEDURE — 88305 TISSUE EXAM BY PATHOLOGIST: CPT | Performed by: PATHOLOGY

## 2021-12-14 PROCEDURE — 43239 EGD BIOPSY SINGLE/MULTIPLE: CPT | Performed by: INTERNAL MEDICINE

## 2021-12-14 RX ORDER — ALPRAZOLAM 0.25 MG/1
TABLET ORAL
Qty: 15 TABLET | Refills: 0 | Status: SHIPPED | OUTPATIENT
Start: 2021-12-14 | End: 2022-01-04 | Stop reason: SDUPTHER

## 2021-12-14 RX ORDER — SODIUM CHLORIDE 9 MG/ML
125 INJECTION, SOLUTION INTRAVENOUS CONTINUOUS
Status: DISCONTINUED | OUTPATIENT
Start: 2021-12-14 | End: 2021-12-18 | Stop reason: HOSPADM

## 2021-12-14 RX ORDER — PROPOFOL 10 MG/ML
INJECTION, EMULSION INTRAVENOUS AS NEEDED
Status: DISCONTINUED | OUTPATIENT
Start: 2021-12-14 | End: 2021-12-14

## 2021-12-14 RX ORDER — LIDOCAINE HYDROCHLORIDE 20 MG/ML
10 SOLUTION OROPHARYNGEAL 4 TIMES DAILY PRN
Qty: 100 ML | Refills: 1 | Status: SHIPPED | OUTPATIENT
Start: 2021-12-14

## 2021-12-14 RX ADMIN — LIDOCAINE HYDROCHLORIDE 100 MG: 20 INJECTION INTRAVENOUS at 13:42

## 2021-12-14 RX ADMIN — SODIUM CHLORIDE: 0.9 INJECTION, SOLUTION INTRAVENOUS at 13:31

## 2021-12-14 RX ADMIN — PROPOFOL 100 MG: 10 INJECTION, EMULSION INTRAVENOUS at 13:42

## 2022-01-04 DIAGNOSIS — F32.9 REACTIVE DEPRESSION (SITUATIONAL): ICD-10-CM

## 2022-01-04 RX ORDER — ALPRAZOLAM 0.25 MG/1
0.25 TABLET ORAL 4 TIMES DAILY PRN
Qty: 15 TABLET | Refills: 0 | Status: SHIPPED | OUTPATIENT
Start: 2022-01-04 | End: 2022-02-22 | Stop reason: SDUPTHER

## 2022-01-11 DIAGNOSIS — E11.65 UNCONTROLLED TYPE 2 DIABETES MELLITUS WITH HYPERGLYCEMIA (HCC): Chronic | ICD-10-CM

## 2022-01-11 DIAGNOSIS — E11.65 UNCONTROLLED TYPE 2 DIABETES MELLITUS WITH HYPERGLYCEMIA (HCC): ICD-10-CM

## 2022-01-11 DIAGNOSIS — E78.2 MIXED HYPERLIPIDEMIA: ICD-10-CM

## 2022-01-11 DIAGNOSIS — I25.10 CORONARY ARTERY DISEASE INVOLVING NATIVE CORONARY ARTERY OF NATIVE HEART WITHOUT ANGINA PECTORIS: Chronic | ICD-10-CM

## 2022-01-11 DIAGNOSIS — F32.9 REACTIVE DEPRESSION (SITUATIONAL): ICD-10-CM

## 2022-01-11 RX ORDER — EZETIMIBE 10 MG/1
10 TABLET ORAL DAILY
Qty: 90 TABLET | Refills: 0 | OUTPATIENT
Start: 2022-01-11

## 2022-01-11 RX ORDER — EZETIMIBE 10 MG/1
10 TABLET ORAL DAILY
Qty: 90 TABLET | Refills: 3 | Status: SHIPPED | OUTPATIENT
Start: 2022-01-11 | End: 2022-04-07 | Stop reason: SDUPTHER

## 2022-01-11 RX ORDER — ALPRAZOLAM 0.25 MG/1
0.25 TABLET ORAL 4 TIMES DAILY PRN
Qty: 15 TABLET | Refills: 0 | OUTPATIENT
Start: 2022-01-11

## 2022-01-11 NOTE — TELEPHONE ENCOUNTER
Patient states that all she needs is the Zetia to be sent to \[facility]""[facility]   MRP

## 2022-01-12 RX ORDER — BLOOD-GLUCOSE SENSOR
EACH MISCELLANEOUS
Qty: 9 EACH | Refills: 3 | Status: SHIPPED | OUTPATIENT
Start: 2022-01-12

## 2022-01-12 RX ORDER — INSULIN ASPART 100 [IU]/ML
INJECTION, SOLUTION INTRAVENOUS; SUBCUTANEOUS
Qty: 15 ML | Refills: 3 | Status: SHIPPED | OUTPATIENT
Start: 2022-01-12

## 2022-01-12 RX ORDER — GLUCOSAMINE HCL/CHONDROITIN SU 500-400 MG
CAPSULE ORAL
Qty: 400 EACH | Refills: 3 | Status: SHIPPED | OUTPATIENT
Start: 2022-01-12

## 2022-01-12 RX ORDER — INSULIN DEGLUDEC INJECTION 100 U/ML
25 INJECTION, SOLUTION SUBCUTANEOUS DAILY
Qty: 30 ML | Refills: 3 | Status: SHIPPED | OUTPATIENT
Start: 2022-01-12 | End: 2022-02-04 | Stop reason: SDUPTHER

## 2022-01-13 ENCOUNTER — TELEPHONE (OUTPATIENT)
Dept: CARDIOLOGY CLINIC | Facility: CLINIC | Age: 62
End: 2022-01-13

## 2022-01-13 NOTE — TELEPHONE ENCOUNTER
----- Message from Raissa Cast MD sent at 1/13/2022  8:16 AM EST -----  Susan Noble,    Could you please give Adryan Rivera a call to remind her to get that cholesterol check? Thank you    I spoke with the patient  She will get her blood work done tomorrow 1/14

## 2022-01-22 ENCOUNTER — APPOINTMENT (OUTPATIENT)
Dept: LAB | Facility: HOSPITAL | Age: 62
End: 2022-01-22
Payer: COMMERCIAL

## 2022-01-22 DIAGNOSIS — M25.552 CHRONIC ARTHRALGIAS OF KNEES AND HIPS: ICD-10-CM

## 2022-01-22 DIAGNOSIS — E11.65 UNCONTROLLED TYPE 2 DIABETES MELLITUS WITH HYPERGLYCEMIA (HCC): ICD-10-CM

## 2022-01-22 DIAGNOSIS — M25.561 CHRONIC ARTHRALGIAS OF KNEES AND HIPS: ICD-10-CM

## 2022-01-22 DIAGNOSIS — G89.29 CHRONIC ARTHRALGIAS OF KNEES AND HIPS: ICD-10-CM

## 2022-01-22 DIAGNOSIS — I25.10 CORONARY ARTERY DISEASE INVOLVING NATIVE CORONARY ARTERY: ICD-10-CM

## 2022-01-22 DIAGNOSIS — K50.919 CROHN'S DISEASE WITH COMPLICATION, UNSPECIFIED GASTROINTESTINAL TRACT LOCATION (HCC): ICD-10-CM

## 2022-01-22 DIAGNOSIS — M25.551 CHRONIC ARTHRALGIAS OF KNEES AND HIPS: ICD-10-CM

## 2022-01-22 DIAGNOSIS — M25.562 CHRONIC ARTHRALGIAS OF KNEES AND HIPS: ICD-10-CM

## 2022-01-22 DIAGNOSIS — I21.11 ACUTE ST ELEVATION MYOCARDIAL INFARCTION (STEMI) DUE TO OCCLUSION OF MID PORTION OF RIGHT CORONARY ARTERY (HCC): ICD-10-CM

## 2022-01-22 LAB
CHOLEST SERPL-MCNC: 150 MG/DL
CRP SERPL QL: 27.6 MG/L
ERYTHROCYTE [SEDIMENTATION RATE] IN BLOOD: 59 MM/HOUR (ref 0–29)
EST. AVERAGE GLUCOSE BLD GHB EST-MCNC: 223 MG/DL
HBA1C MFR BLD: 9.4 %
HDLC SERPL-MCNC: 40 MG/DL
LDLC SERPL CALC-MCNC: 66 MG/DL (ref 0–100)
TRIGL SERPL-MCNC: 221 MG/DL

## 2022-01-22 PROCEDURE — 83036 HEMOGLOBIN GLYCOSYLATED A1C: CPT

## 2022-01-22 PROCEDURE — 80061 LIPID PANEL: CPT

## 2022-01-22 PROCEDURE — 36415 COLL VENOUS BLD VENIPUNCTURE: CPT

## 2022-01-22 PROCEDURE — 86140 C-REACTIVE PROTEIN: CPT

## 2022-01-22 PROCEDURE — 85652 RBC SED RATE AUTOMATED: CPT

## 2022-01-26 ENCOUNTER — APPOINTMENT (EMERGENCY)
Dept: RADIOLOGY | Facility: HOSPITAL | Age: 62
End: 2022-01-26
Payer: COMMERCIAL

## 2022-01-26 ENCOUNTER — HOSPITAL ENCOUNTER (EMERGENCY)
Facility: HOSPITAL | Age: 62
Discharge: HOME | End: 2022-01-26
Attending: EMERGENCY MEDICINE | Admitting: EMERGENCY MEDICINE
Payer: COMMERCIAL

## 2022-01-26 ENCOUNTER — OFFICE VISIT (OUTPATIENT)
Dept: FAMILY MEDICINE CLINIC | Facility: CLINIC | Age: 62
End: 2022-01-26
Payer: COMMERCIAL

## 2022-01-26 VITALS
HEART RATE: 101 BPM | OXYGEN SATURATION: 97 % | TEMPERATURE: 98 F | DIASTOLIC BLOOD PRESSURE: 71 MMHG | SYSTOLIC BLOOD PRESSURE: 157 MMHG | RESPIRATION RATE: 18 BRPM

## 2022-01-26 VITALS
HEIGHT: 67 IN | BODY MASS INDEX: 34.95 KG/M2 | RESPIRATION RATE: 16 BRPM | DIASTOLIC BLOOD PRESSURE: 70 MMHG | HEART RATE: 106 BPM | SYSTOLIC BLOOD PRESSURE: 132 MMHG | WEIGHT: 222.7 LBS

## 2022-01-26 DIAGNOSIS — R07.9 CHEST PAIN: Primary | ICD-10-CM

## 2022-01-26 DIAGNOSIS — I25.10 CORONARY ARTERY DISEASE INVOLVING NATIVE CORONARY ARTERY OF NATIVE HEART WITHOUT ANGINA PECTORIS: Chronic | ICD-10-CM

## 2022-01-26 DIAGNOSIS — R07.9 CHEST PAIN, UNSPECIFIED TYPE: Primary | ICD-10-CM

## 2022-01-26 DIAGNOSIS — F17.209 TOBACCO USE DISORDER, CONTINUOUS: Chronic | ICD-10-CM

## 2022-01-26 DIAGNOSIS — Z79.4 TYPE 2 DIABETES MELLITUS WITHOUT COMPLICATION, WITH LONG-TERM CURRENT USE OF INSULIN (HCC): ICD-10-CM

## 2022-01-26 DIAGNOSIS — E11.9 TYPE 2 DIABETES MELLITUS WITHOUT COMPLICATION, WITH LONG-TERM CURRENT USE OF INSULIN (HCC): ICD-10-CM

## 2022-01-26 DIAGNOSIS — E78.2 MIXED HYPERLIPIDEMIA: ICD-10-CM

## 2022-01-26 DIAGNOSIS — I21.11 ACUTE ST ELEVATION MYOCARDIAL INFARCTION (STEMI) DUE TO OCCLUSION OF MID PORTION OF RIGHT CORONARY ARTERY (HCC): ICD-10-CM

## 2022-01-26 LAB
2HR DELTA HS TROPONIN: -1 NG/L
ALBUMIN SERPL BCP-MCNC: 3.9 G/DL (ref 3.5–5)
ALP SERPL-CCNC: 119 U/L (ref 46–116)
ALT SERPL W P-5'-P-CCNC: 15 U/L (ref 12–78)
ANION GAP SERPL CALCULATED.3IONS-SCNC: 9 MMOL/L (ref 4–13)
AST SERPL W P-5'-P-CCNC: 16 U/L (ref 5–45)
BASOPHILS # BLD AUTO: 0.06 THOUSANDS/ΜL (ref 0–0.1)
BASOPHILS NFR BLD AUTO: 1 % (ref 0–1)
BILIRUB SERPL-MCNC: 0.35 MG/DL (ref 0.2–1)
BUN SERPL-MCNC: 15 MG/DL (ref 5–25)
CALCIUM SERPL-MCNC: 10.3 MG/DL (ref 8.3–10.1)
CARDIAC TROPONIN I PNL SERPL HS: 4 NG/L
CARDIAC TROPONIN I PNL SERPL HS: 5 NG/L
CHLORIDE SERPL-SCNC: 105 MMOL/L (ref 100–108)
CO2 SERPL-SCNC: 21 MMOL/L (ref 21–32)
CREAT SERPL-MCNC: 0.79 MG/DL (ref 0.6–1.3)
D DIMER PPP FEU-MCNC: 1.07 UG/ML FEU
EOSINOPHIL # BLD AUTO: 0.26 THOUSAND/ΜL (ref 0–0.61)
EOSINOPHIL NFR BLD AUTO: 3 % (ref 0–6)
ERYTHROCYTE [DISTWIDTH] IN BLOOD BY AUTOMATED COUNT: 13.3 % (ref 11.6–15.1)
GFR SERPL CREATININE-BSD FRML MDRD: 81 ML/MIN/1.73SQ M
GLUCOSE SERPL-MCNC: 150 MG/DL (ref 65–140)
GLUCOSE SERPL-MCNC: 177 MG/DL (ref 65–140)
HCT VFR BLD AUTO: 43 % (ref 34.8–46.1)
HGB BLD-MCNC: 14.1 G/DL (ref 11.5–15.4)
IMM GRANULOCYTES # BLD AUTO: 0.03 THOUSAND/UL (ref 0–0.2)
IMM GRANULOCYTES NFR BLD AUTO: 0 % (ref 0–2)
LYMPHOCYTES # BLD AUTO: 2.61 THOUSANDS/ΜL (ref 0.6–4.47)
LYMPHOCYTES NFR BLD AUTO: 29 % (ref 14–44)
MCH RBC QN AUTO: 29.6 PG (ref 26.8–34.3)
MCHC RBC AUTO-ENTMCNC: 32.8 G/DL (ref 31.4–37.4)
MCV RBC AUTO: 90 FL (ref 82–98)
MONOCYTES # BLD AUTO: 0.71 THOUSAND/ΜL (ref 0.17–1.22)
MONOCYTES NFR BLD AUTO: 8 % (ref 4–12)
NEUTROPHILS # BLD AUTO: 5.46 THOUSANDS/ΜL (ref 1.85–7.62)
NEUTS SEG NFR BLD AUTO: 59 % (ref 43–75)
NRBC BLD AUTO-RTO: 0 /100 WBCS
PLATELET # BLD AUTO: 340 THOUSANDS/UL (ref 149–390)
PMV BLD AUTO: 9.5 FL (ref 8.9–12.7)
POTASSIUM SERPL-SCNC: 4.4 MMOL/L (ref 3.5–5.3)
PROT SERPL-MCNC: 8.5 G/DL (ref 6.4–8.2)
RBC # BLD AUTO: 4.76 MILLION/UL (ref 3.81–5.12)
SODIUM SERPL-SCNC: 135 MMOL/L (ref 136–145)
WBC # BLD AUTO: 9.13 THOUSAND/UL (ref 4.31–10.16)

## 2022-01-26 PROCEDURE — 96365 THER/PROPH/DIAG IV INF INIT: CPT

## 2022-01-26 PROCEDURE — 80053 COMPREHEN METABOLIC PANEL: CPT | Performed by: EMERGENCY MEDICINE

## 2022-01-26 PROCEDURE — 93000 ELECTROCARDIOGRAM COMPLETE: CPT | Performed by: PHYSICIAN ASSISTANT

## 2022-01-26 PROCEDURE — 99284 EMERGENCY DEPT VISIT MOD MDM: CPT | Performed by: EMERGENCY MEDICINE

## 2022-01-26 PROCEDURE — G1004 CDSM NDSC: HCPCS

## 2022-01-26 PROCEDURE — 93005 ELECTROCARDIOGRAM TRACING: CPT

## 2022-01-26 PROCEDURE — 99214 OFFICE O/P EST MOD 30 MIN: CPT | Performed by: PHYSICIAN ASSISTANT

## 2022-01-26 PROCEDURE — 85379 FIBRIN DEGRADATION QUANT: CPT | Performed by: STUDENT IN AN ORGANIZED HEALTH CARE EDUCATION/TRAINING PROGRAM

## 2022-01-26 PROCEDURE — 71275 CT ANGIOGRAPHY CHEST: CPT

## 2022-01-26 PROCEDURE — 99285 EMERGENCY DEPT VISIT HI MDM: CPT

## 2022-01-26 PROCEDURE — 96366 THER/PROPH/DIAG IV INF ADDON: CPT

## 2022-01-26 PROCEDURE — 85025 COMPLETE CBC W/AUTO DIFF WBC: CPT | Performed by: EMERGENCY MEDICINE

## 2022-01-26 PROCEDURE — 36415 COLL VENOUS BLD VENIPUNCTURE: CPT

## 2022-01-26 PROCEDURE — 96375 TX/PRO/DX INJ NEW DRUG ADDON: CPT

## 2022-01-26 PROCEDURE — 84484 ASSAY OF TROPONIN QUANT: CPT | Performed by: EMERGENCY MEDICINE

## 2022-01-26 PROCEDURE — 82948 REAGENT STRIP/BLOOD GLUCOSE: CPT

## 2022-01-26 RX ORDER — KETOROLAC TROMETHAMINE 30 MG/ML
15 INJECTION, SOLUTION INTRAMUSCULAR; INTRAVENOUS ONCE
Status: COMPLETED | OUTPATIENT
Start: 2022-01-26 | End: 2022-01-26

## 2022-01-26 RX ORDER — SODIUM FLUORIDE 6 MG/ML
PASTE, DENTIFRICE DENTAL
COMMUNITY
Start: 2022-01-03 | End: 2022-03-07 | Stop reason: HOSPADM

## 2022-01-26 RX ORDER — SODIUM CHLORIDE, SODIUM GLUCONATE, SODIUM ACETATE, POTASSIUM CHLORIDE, MAGNESIUM CHLORIDE, SODIUM PHOSPHATE, DIBASIC, AND POTASSIUM PHOSPHATE .53; .5; .37; .037; .03; .012; .00082 G/100ML; G/100ML; G/100ML; G/100ML; G/100ML; G/100ML; G/100ML
1000 INJECTION, SOLUTION INTRAVENOUS ONCE
Status: COMPLETED | OUTPATIENT
Start: 2022-01-26 | End: 2022-01-26

## 2022-01-26 RX ADMIN — SODIUM CHLORIDE, SODIUM GLUCONATE, SODIUM ACETATE, POTASSIUM CHLORIDE, MAGNESIUM CHLORIDE, SODIUM PHOSPHATE, DIBASIC, AND POTASSIUM PHOSPHATE 1000 ML: .53; .5; .37; .037; .03; .012; .00082 INJECTION, SOLUTION INTRAVENOUS at 16:08

## 2022-01-26 RX ADMIN — IOHEXOL 85 ML: 350 INJECTION, SOLUTION INTRAVENOUS at 17:23

## 2022-01-26 RX ADMIN — KETOROLAC TROMETHAMINE 15 MG: 30 INJECTION, SOLUTION INTRAMUSCULAR at 16:09

## 2022-01-26 NOTE — ED PROVIDER NOTES
History  Chief Complaint   Patient presents with    Chest Pain     CP x1 week sent from PCP who was concerned about her EKG done today     HPI   21-year-old female past medical history of MI presents with complaint of chest pain  Patient states that the chest pain began 1 week ago and has been persistent is worse when she exerts herself  Pain described as a pressure-like sensation the pill the chest radiates to left-sided chest   Patient also states that she feels like this pain might be muscular because she has been using her hands a lot to lift her body weight off of the toilet frequently because her knees hurt  She denies any fever, chills, nausea, vomiting, shortness of breath, abdominal pain, diarrhea, constipation  She has tried Flexeril for the pain has not helped  Prior to Admission Medications   Prescriptions Last Dose Informant Patient Reported? Taking?    ALPRAZolam (XANAX) 0 25 mg tablet   No No   Sig: Take 1 tablet (0 25 mg total) by mouth 4 (four) times a day as needed for anxiety   Alcohol Swabs 70 % PADS   No No   Sig: Use 4 per day   Continuous Blood Gluc  (Dexcom G6 ) CRISTINA  Self No No   Sig: Disp 1    Continuous Blood Gluc Sensor (Dexcom G6 Sensor) MISC   No No   Sig: Use 1 sensor every 10 days, disp 90 day supply   Continuous Blood Gluc Transmit (Dexcom G6 Transmitter) MISC  Self No No   Sig: Use daily as directed for CGM - Change every 3 months   Evolocumab 140 MG/ML SOAJ   No No   Sig: Inject one 1 ml pen every 14 days   Insulin Degludec (TRESIBA FLEXTOUCH SC)  Self Yes No   Sig: Inject under the skin 25 units at bedtime   Insulin Pen Needle (Pen Needles) 32G X 5 MM MISC  Self No No   Sig: Use 4 per day   Lidocaine Viscous HCl (XYLOCAINE) 2 % mucosal solution   No No   Sig: Swish and swallow 10 mL 4 (four) times a day as needed for mouth pain or discomfort   Sodium Fluoride 5000 PPM 1 1 % PSTE   Yes No   acetaminophen (TYLENOL) 500 mg tablet  Self Yes No   Sig: Take 1,000 mg by mouth every 6 (six) hours as needed for mild pain   aspirin (ECOTRIN LOW STRENGTH) 81 mg EC tablet   No No   Sig: Take 1 tablet (81 mg total) by mouth daily   carvedilol (COREG) 12 5 mg tablet  Self No No   Sig: Take 1 tablet (12 5 mg total) by mouth every 12 (twelve) hours   cholecalciferol (VITAMIN D3) 1,000 units tablet  Self No No   Sig: Take 1 tablet (1,000 Units total) by mouth daily   cyclobenzaprine (FLEXERIL) 10 mg tablet  Self No No   Sig: Take 1 tablet (10 mg total) by mouth 3 (three) times a day as needed for muscle spasms   dicyclomine (BENTYL) 10 mg capsule   No No   Sig: Take 1 capsule (10 mg total) by mouth 3 (three) times a day as needed (abd cramping and/or diarrhea)   ezetimibe (ZETIA) 10 mg tablet   No No   Sig: Take 1 tablet (10 mg total) by mouth daily   insulin aspart (NovoLOG FlexPen) 100 UNIT/ML injection pen   No No   Si units before lunch and dinner plus additional if needed according to sliding scale     insulin degludec Danella Canavan FlexTouch) 100 units/mL injection pen   No No   Sig: Inject 25 Units under the skin daily   losartan (COZAAR) 25 mg tablet  Self No No   Sig: Take 1 tablet (25 mg total) by mouth daily   mesalamine (LIALDA) 1 2 g EC tablet  Self No No   Sig: Take 2 tablets (2 4 g total) by mouth daily with breakfast   Patient taking differently: Take 2,400 mg by mouth daily with breakfast 1 tab daily    ondansetron (ZOFRAN-ODT) 4 mg disintegrating tablet  Self No No   Sig: Take 1 tablet (4 mg total) by mouth every 8 (eight) hours as needed for nausea or vomiting   pantoprazole (PROTONIX) 40 mg tablet   No No   Sig: Take 1 tablet (40 mg total) by mouth 2 (two) times a day   sucralfate (CARAFATE) 1 g/10 mL suspension   No No   Sig: Take 10 mL (1 g total) by mouth 3 (three) times a day   ticagrelor (BRILINTA) 90 MG  Self No No   Sig: Take 1 tablet (90 mg total) by mouth every 12 (twelve) hours      Facility-Administered Medications: None       Past Medical History:   Diagnosis Date    Coronary artery disease     [facility] colitis ([facility] [address] [facility]     Diabetes mellitus ([facility] [address] [facility]     Fibromyalgia     Gastric ulcer     [facility] GI SURGEON    HTN (hypertension)     Hyperlipidemia     IBD (inflammatory bowel disease) 11/2015    [facility] GI SURGEON    Migraine     Myocardial infarction [facility])        Past Surgical History:   Procedure Laterality Date    BREAST BIOPSY Left     benign    BREAST BIOPSY Left 10/27/2021    Stereo    CARDIAC CATHETERIZATION      CHOLECYSTECTOMY      COLONOSCOPY  12/03/2018    internal hemorrhoids, 3mm tubular adenoma polyp transverse colon, bx negative for dysplasia    COLONOSCOPY  11/2015    CORONARY STENT PLACEMENT      DENTAL SURGERY      [place] teeth extraction    EGD  12/2018    2cm hiatal hernia, gastritis bx shows foci of intestinal metaplasia, negative Hpylori    EGD  2015    ESOPHAGITIS/MALLHIATUS HERNIA  FOUND    HYSTERECTOMY      MAMMO STEREOTACTIC BREAST BIOPSY LEFT (ALL INC) Left 10/27/2021    TUBAL LIGATION      Bilateral       Family History   Problem Relation Age of Onset    Coronary artery disease Mother     Dementia Mother     Stroke Mother     No Known Problems Father     Cervical cancer Sister 36    Heart disease Brother     No Known Problems Maternal Grandmother     No Known Problems Maternal Grandfather     No Known Problems Paternal Grandmother     No Known Problems Paternal Grandfather     Alcohol abuse Neg Hx     Substance Abuse Neg Hx      I have reviewed and agree with the history as documented      E-Cigarette/Vaping    E-Cigarette Use Never User      E-Cigarette/Vaping Substances    Nicotine No     THC No     CBD No     Flavoring No      Social History     Tobacco Use    Smoking status: Current Every Day Smoker     Packs/day: 0 50     Types: Cigarettes    Smokeless tobacco: Never Used   Vaping Use    Vaping Use: Never used   Substance Use Topics    Alcohol use: Not Currently     Comment: Rarely  Drug use: Never        Review of Systems   Constitutional: Negative for chills and fever  HENT: Negative for congestion, ear pain, rhinorrhea and sore throat  Eyes: Negative for pain  Respiratory: Negative for apnea, cough, choking, chest tightness, shortness of breath, wheezing and stridor  Cardiovascular: Positive for chest pain  Negative for palpitations and leg swelling  Gastrointestinal: Negative for abdominal pain, constipation, diarrhea, nausea and vomiting  Genitourinary: Negative for hematuria  Musculoskeletal: Negative for arthralgias and back pain  Skin: Negative for rash and wound  Neurological: Negative for dizziness  Psychiatric/Behavioral: Negative for agitation and hallucinations  All other systems reviewed and are negative  Physical Exam  ED Triage Vitals   Temperature Pulse Respirations Blood Pressure SpO2   01/26/22 1504 01/26/22 1504 01/26/22 1504 01/26/22 1504 01/26/22 1504   98 °F (36 7 °C) 104 18 (!) 184/110 98 %      Temp Source Heart Rate Source Patient Position - Orthostatic VS BP Location FiO2 (%)   01/26/22 1504 01/26/22 1700 01/26/22 1700 01/26/22 1700 --   Oral Monitor Lying Left arm       Pain Score       01/26/22 1504       8             Orthostatic Vital Signs  Vitals:    01/26/22 1504 01/26/22 1700 01/26/22 1919   BP: (!) 184/110 142/67 157/71   Pulse: 104 86 101   Patient Position - Orthostatic VS:  Lying Lying       Physical Exam  Vitals reviewed  Constitutional:       General: She is not in acute distress  Appearance: She is well-developed  HENT:      Head: Normocephalic and atraumatic  Right Ear: External ear normal       Left Ear: External ear normal       Nose: Nose normal       Mouth/Throat:      Mouth: Mucous membranes are moist    Eyes:      Extraocular Movements: Extraocular movements intact  Conjunctiva/sclera: Conjunctivae normal       Pupils: Pupils are equal, round, and reactive to light     Cardiovascular:      Rate and Rhythm: Normal rate and regular rhythm  Pulses:           Radial pulses are 2+ on the right side and 2+ on the left side  Dorsalis pedis pulses are 2+ on the right side and 2+ on the left side  Heart sounds: Normal heart sounds  Pulmonary:      Effort: Pulmonary effort is normal  No respiratory distress  Breath sounds: Normal breath sounds  No wheezing or rales  Chest:      Chest wall: Tenderness (Anterior chest wall) present  Abdominal:      Palpations: Abdomen is soft  Tenderness: There is no abdominal tenderness  Musculoskeletal:         General: Normal range of motion  Cervical back: Normal range of motion and neck supple  No rigidity  Skin:     General: Skin is warm  Neurological:      General: No focal deficit present  Mental Status: She is alert and oriented to person, place, and time  Cranial Nerves: No cranial nerve deficit  Motor: No weakness     Psychiatric:         Mood and Affect: Mood normal          ED Medications  Medications   ketorolac (TORADOL) injection 15 mg (15 mg Intravenous Given 1/26/22 1609)   multi-electrolyte (ISOLYTE-S PH 7 4) bolus 1,000 mL (0 mL Intravenous Stopped 1/26/22 1909)   iohexol (OMNIPAQUE) 350 MG/ML injection (SINGLE-DOSE) 85 mL (85 mL Intravenous Given 1/26/22 1723)       Diagnostic Studies  Results Reviewed     Procedure Component Value Units Date/Time    Fingerstick Glucose (POCT) [[id number]]  (Abnormal) Collected: 01/26/22 2049    Lab Status: Final result Updated: 01/26/22 2118     POC Glucose 150 mg/dl     HS Troponin I 2hr [[id number]]  (Normal) Collected: 01/26/22 1908    Lab Status: Final result Specimen: Blood from Arm, Right Updated: 01/26/22 2036     hs TnI 2hr 4 ng/L      Delta 2hr hsTnI -1 ng/L     D-dimer, quantitative [[id number]]  (Abnormal) Collected: 01/26/22 1609    Lab Status: Final result Specimen: Blood from Arm, Right Updated: 01/26/22 1646     D-Dimer, Quant 1 07 ug/ml FEU     Comprehensive metabolic panel [[id number]]  (Abnormal) Collected: 01/26/22 1542    Lab Status: Final result Specimen: Blood from Arm, Right Updated: 01/26/22 1646     Sodium 135 mmol/L      Potassium 4 4 mmol/L      Chloride 105 mmol/L      CO2 21 mmol/L      ANION GAP 9 mmol/L      BUN 15 mg/dL      Creatinine 0 79 mg/dL      Glucose 177 mg/dL      Calcium 10 3 mg/dL      AST 16 U/L      ALT 15 U/L      Alkaline Phosphatase 119 U/L      Total Protein 8 5 g/dL      Albumin 3 9 g/dL      Total Bilirubin 0 35 mg/dL      eGFR 81 ml/min/1 73sq m     Narrative:      National Kidney Disease Foundation guidelines for Chronic Kidney Disease (CKD):     Stage 1 with normal or high GFR (GFR > 90 mL/min/1 73 square meters)    Stage 2 Mild CKD (GFR = 60-89 mL/min/1 73 square meters)    Stage 3A Moderate CKD (GFR = 45-59 mL/min/1 73 square meters)    Stage 3B Moderate CKD (GFR = 30-44 mL/min/1 73 square meters)    Stage 4 Severe CKD (GFR = 15-29 mL/min/1 73 square meters)    Stage 5 End Stage CKD (GFR <15 mL/min/1 73 square meters)  Note: GFR calculation is accurate only with a steady state creatinine    HS Troponin 0hr (reflex protocol) [[id number]]  (Normal) Collected: 01/26/22 1542    Lab Status: Final result Specimen: Blood from Arm, Right Updated: 01/26/22 1621     hs TnI 0hr 5 ng/L     CBC and differential [[id number]] Collected: 01/26/22 1542    Lab Status: Final result Specimen: Blood from Arm, Right Updated: 01/26/22 1557     WBC 9 13 Thousand/uL      RBC 4 76 Million/uL      Hemoglobin 14 1 g/dL      Hematocrit 43 0 %      MCV 90 fL      MCH 29 6 pg      MCHC 32 8 g/dL      RDW 13 3 %      MPV 9 5 fL      Platelets 714 Thousands/uL      nRBC 0 /100 WBCs      Neutrophils Relative 59 %      Immat GRANS % 0 %      Lymphocytes Relative 29 %      Monocytes Relative 8 %      Eosinophils Relative 3 %      Basophils Relative 1 %      Neutrophils Absolute 5 46 Thousands/µL      Immature Grans Absolute 0 03 Thousand/uL      Lymphocytes Absolute 2 61 Thousands/µL      Monocytes Absolute 0 71 Thousand/µL      Eosinophils Absolute 0 26 Thousand/µL      Basophils Absolute 0 06 Thousands/µL                  CTA ED chest PE Study   Final Result by Gokul Driver MD (01/26 1758)      No pulmonary embolism  Small bilateral nodules measuring up to 4 mm  No prior studies  Based on current Fleischner Society 2017 Guidelines on incidental pulmonary nodule, no routine follow-up is needed if the patient is considered low risk for lung cancer  If the patient is    considered high risk for lung cancer, 12 month follow-up non-contrast chest CT is recommended  Enlarged fatty liver  Workstation performed: [id number]               Procedures  Procedures      ED Course  ED Course as of 01/27/22 1849   Wed Jan 26, 2022 1810 patient is   considered high risk for lung cancer, 12 month follow-up non-contrast chest CT is recommended- patient aware  HEART Risk Score      Most Recent Value   Heart Score Risk Calculator    History 1 Filed at: 01/26/2022 1815   ECG 0 Filed at: 01/26/2022 1815   Age 1 Filed at: 01/26/2022 1815   Risk Factors 2 Filed at: 01/26/2022 1815   Troponin 0 Filed at: 01/26/2022 1815   HEART Score 4 Filed at: 01/26/2022 1815                                MDM  Number of Diagnoses or Management Options    29-year-old female past medical history of MI presents with complaint of chest pain  EKG nonischemic, chest pain seems to be more muscular on examination due to tenderness with chest palpation and tenderness on patient abducts her arms together and a fly motion  Patient has been using her arms to lift up her body weight after she has been sitting down which is causing strain on her pectoralis muscles bilaterally    Patient had elevated D-dimer but had a negative CTA PE study however incidental nodule for found since the patient is a smoker is recommended that she has a follow-up CT scan in 12 months she is made aware of this finding  Chest x-ray unremarkable  Patient is given discharge instructions and follow-up with cardiology for heart score of 4  Disposition  Final diagnoses:   Chest pain     Time reflects when diagnosis was documented in both MDM as applicable and the Disposition within this note     Time User Action Codes Description Comment    1/26/2022  7:33 PM Shelbie Oviedo Olegario [R07 [id number]] Chest pain       ED Disposition     ED Disposition Condition Date/Time Comment    Discharge Stable Wed Jan 26, 2022  8:56 PM Narendra Davis discharge to home/self care              Follow-up Information     Follow up With Specialties Details Why Contact Info Additional Information    Fish Samson PA-C Family Medicine, Physician Assistant Schedule an appointment as soon as possible for a visit   [address] [facility], [address]  [address] [facility] Cardiology Schedule an appointment as soon as possible for a visit   [address] [facility] [address] [facility] [id number]  [address] Cardiology [place], [facility] [place], [place], [address]          Discharge Medication List as of 1/26/2022  8:57 PM      CONTINUE these medications which have NOT CHANGED    Details   acetaminophen (TYLENOL) 500 mg tablet Take 1,000 mg by mouth every 6 (six) hours as needed for mild pain, Historical Med      Alcohol Swabs 70 % PADS Use 4 per day, Normal      ALPRAZolam (XANAX) 0 25 mg tablet Take 1 tablet (0 25 mg total) by mouth 4 (four) times a day as needed for anxiety, Starting Tue 1/4/2022, Normal      aspirin (ECOTRIN LOW STRENGTH) 81 mg EC tablet Take 1 tablet (81 mg total) by mouth daily, Starting Thu 12/2/2021, Normal      carvedilol (COREG) 12 5 mg tablet Take 1 tablet (12 5 mg total) by mouth every 12 (twelve) hours, Starting Mon 9/13/2021, Normal      cholecalciferol (VITAMIN D3) 1,000 units tablet Take 1 tablet (1,000 Units total) by mouth daily, Starting Sun 2/28/2021, Normal      Continuous Blood Gluc  (Dexcom G6 ) CRISTINA Disp 1 , Normal      Continuous Blood Gluc Sensor (Dexcom G6 Sensor) MISC Use 1 sensor every 10 days, disp 90 day supply, Normal      Continuous Blood Gluc Transmit (Dexcom G6 Transmitter) MISC Use daily as directed for CGM - Change every 3 months, Normal      cyclobenzaprine (FLEXERIL) 10 mg tablet Take 1 tablet (10 mg total) by mouth 3 (three) times a day as needed for muscle spasms, Starting Tue 9/28/2021, Normal      dicyclomine (BENTYL) 10 mg capsule Take 1 capsule (10 mg total) by mouth 3 (three) times a day as needed (abd cramping and/or diarrhea), Starting Tue 12/7/2021, Normal      Evolocumab 140 MG/ML SOAJ Inject one 1 ml pen every 14 days, Normal      ezetimibe (ZETIA) 10 mg tablet Take 1 tablet (10 mg total) by mouth daily, Starting Tue 1/11/2022, Normal      insulin aspart (NovoLOG FlexPen) 100 UNIT/ML injection pen 6 units before lunch and dinner plus additional if needed according to sliding scale , Normal      !!  Insulin Degludec (TRESIBA FLEXTOUCH SC) Inject under the skin 25 units at bedtime, Historical Med      !! insulin degludec Aguirre Schultze FlexTouch) 100 units/mL injection pen Inject 25 Units under the skin daily, Starting Wed 1/12/2022, Until Tue 4/12/2022, Normal      Insulin Pen Needle (Pen Needles) 32G X 5 MM MISC Use 4 per day, Normal      Lidocaine Viscous HCl (XYLOCAINE) 2 % mucosal solution Swish and swallow 10 mL 4 (four) times a day as needed for mouth pain or discomfort, Starting Tue 12/14/2021, Normal      losartan (COZAAR) 25 mg tablet Take 1 tablet (25 mg total) by mouth daily, Starting Mon 3/29/2021, Normal      mesalamine (LIALDA) 1 2 g EC tablet Take 2 tablets (2 4 g total) by mouth daily with breakfast, Starting Wed 9/1/2021, Normal      ondansetron (ZOFRAN-ODT) 4 mg disintegrating tablet Take 1 tablet (4 mg total) by mouth every 8 (eight) hours as needed for nausea or vomiting, Starting Tue 11/3/2020, Print      pantoprazole (PROTONIX) 40 mg tablet Take 1 tablet (40 mg total) by mouth 2 (two) times a day, Starting Fri 1/14/2022, Until Sun 2/13/2022, Normal      Sodium Fluoride 5000 PPM 1 1 % PSTE Starting Mon 1/3/2022, Historical Med      sucralfate (CARAFATE) 1 g/10 mL suspension Take 10 mL (1 g total) by mouth 3 (three) times a day, Starting Tue 12/7/2021, Normal      ticagrelor (BRILINTA) 90 MG Take 1 tablet (90 mg total) by mouth every 12 (twelve) hours, Starting Mon 9/27/2021, Normal       !! - Potential duplicate medications found  Please discuss with provider  PDMP Review       Value Time User    PDMP Reviewed  Yes 7/30/2021 10:24 AM Arian Villasenor PA-C           ED Provider  Attending physically available and evaluated Estefany Graves  I managed the patient along with the ED Attending      Electronically Signed by         Sis Rojas DO  01/27/22 6458

## 2022-01-26 NOTE — PROGRESS NOTES
Assessment/Plan:    1  Chest pain, unspecified type    - most likely MSK pain but due to slight changes on EKG, tachycardia  in office, visibly uncomfortable, SOB with talking and risjk factors will send to Er for evaluation  Refusing ambulance,  to transport  - POCT ECG    2  Type 2 diabetes mellitus without complication, with long-term current use of insulin (HCC)    - under care endo    3  Acute ST elevation myocardial infarction (STEMI) due to occlusion of mid portion of right coronary artery ([facility] )    - 3/2021, under care cardio    4  Coronary artery disease involving native coronary artery of native heart without angina pectoris    - under care cardio    5  Tobacco use disorder, continuous    - needs to commit to quitting    6  Mixed hyperlipidemia    - under care cardiology    F/u after Er        Subjective:   Chief Complaint   Patient presents with    Chest Pain      Patient ID: Russel Denver is a 63 y o  female  Patient here complaining of sternal pain for 1 week  Worse with movement, sneezing, laughing  Laying, partially sitting up at night no pain  But otherwise 10:10, radiating around to ribs or back but only with sneezing and cough  Flexeril with some help  Slight shortness of breath with exertion       3/25/2021 CAD with stent, Type 2 DM, hyperlipidemia, smoker      The following portions of the patient's history were reviewed and updated as appropriate: allergies, current medications, past family history, past medical history, past social history, past surgical history and problem list     Past Medical History:   Diagnosis Date    Coronary artery disease     Crohn's colitis ([facility] [address] )     [facility]     Fibromyalgia     G[facility]    HTN (hypertension)     Hyperlipidemia     IBD [facility]inflammatory bowel disease) [facility]    [facility]    Migraine     Myocardial infarction [facility])      Past Surgical History:   Procedure Laterality Date  BREAST BIOPSY Left     benign    BREAST BIOPSY Left 10/27/2021    Stereo    CARDIAC CATHETERIZATION      CHOLECYSTECTOMY      COLONOSCOPY  12/03/2018    internal hemorrhoids, 3mm tubular adenoma polyp transverse colon, bx negative for dysplasia    COLONOSCOPY  11/2015    CORONARY STENT PLACEMENT      DENTAL SURGERY      [place] teeth extraction    EGD  12/2018    2cm hiatal hernia, gastritis bx shows foci of intestinal metaplasia, negative Hpylori    EGD  2015    ESOPHAGITIS/MALLHIATUS HERNIA  FOUND    HYSTERECTOMY      MAMMO STEREOTACTIC BREAST BIOPSY LEFT (ALL INC) Left 10/27/2021    TUBAL LIGATION      Bilateral     Family History   Problem Relation Age of Onset    Coronary artery disease Mother     Dementia Mother     Stroke Mother     No Known Problems Father     Cervical cancer Sister 36    Heart disease Brother     No Known Problems Maternal Grandmother     No Known Problems Maternal Grandfather     No Known Problems Paternal Grandmother     No Known Problems Paternal Grandfather     Alcohol abuse Neg Hx     Substance Abuse Neg Hx      Social History     Socioeconomic History    Marital status: /Civil Union     Spouse name: Not on file    Number of children: Not on file    Years of education: Not on file    Highest education level: Not on file   Occupational History    Occupation: Coding   Tobacco Use    Smoking status: Current Every Day Smoker     Packs/day: 0 50     Types: Cigarettes    Smokeless tobacco: Never Used   Vaping Use    Vaping Use: Never used   Substance and Sexual Activity    Alcohol use: Not Currently     Comment: Rarely    Drug use: Never    Sexual activity: Not Currently     Partners: Male   Other Topics Concern    Not on file   Social History Narrative    Not on file     Social Determinants of Health     Financial Resource Strain: Not on file   Food Insecurity: Not on file   Transportation Needs: Not on file   Physical Activity: Not on file Stress: Not on file   Social Connections: Not on file   Intimate Partner Violence: Not on file   Housing Stability: Not on file       Current Outpatient Medications:     acetaminophen (TYLENOL) 500 mg tablet, Take 1,000 mg by mouth every 6 (six) hours as needed for mild pain, Disp: , Rfl:     Alcohol Swabs 70 % PADS, Use 4 per day, Disp: 400 each, Rfl: 3    ALPRAZolam (XANAX) 0 25 mg tablet, Take 1 tablet (0 25 mg total) by mouth 4 (four) times a day as needed for anxiety, Disp: 15 tablet, Rfl: 0    aspirin (ECOTRIN LOW STRENGTH) 81 mg EC tablet, Take 1 tablet (81 mg total) by mouth daily, Disp: 90 tablet, Rfl: 3    carvedilol (COREG) 12 5 mg tablet, Take 1 tablet (12 5 mg total) by mouth every 12 (twelve) hours, Disp: 180 tablet, Rfl: 3    cholecalciferol (VITAMIN D3) 1,000 units tablet, Take 1 tablet (1,000 Units total) by mouth daily, Disp: 90 tablet, Rfl: 3    Continuous Blood Gluc  (Dexcom G6 ) CRISTINA, Disp 1 , Disp: 1 Device, Rfl: 1    Continuous Blood Gluc Sensor (Dexcom G6 Sensor) MISC, Use 1 sensor every 10 days, disp 90 day supply, Disp: 9 each, Rfl: 3    Continuous Blood Gluc Transmit (Dexcom G6 Transmitter) MISC, Use daily as directed for CGM - Change every 3 months, Disp: 1 each, Rfl: 3    cyclobenzaprine (FLEXERIL) 10 mg tablet, Take 1 tablet (10 mg total) by mouth 3 (three) times a day as needed for muscle spasms, Disp: 30 tablet, Rfl: 0    dicyclomine (BENTYL) 10 mg capsule, Take 1 capsule (10 mg total) by mouth 3 (three) times a day as needed (abd cramping and/or diarrhea), Disp: 90 capsule, Rfl: 1    Evolocumab 140 MG/ML SOAJ, Inject one 1 ml pen every 14 days, Disp: 2 mL, Rfl: 5    ezetimibe (ZETIA) 10 mg tablet, Take 1 tablet (10 mg total) by mouth daily, Disp: 90 tablet, Rfl: 3    insulin aspart (NovoLOG FlexPen) 100 UNIT/ML injection pen, 6 units before lunch and dinner plus additional if needed according to sliding scale , Disp: 15 mL, Rfl: 3    Insulin Degludec (TRESIBA FLEXTOUCH SC), Inject under the skin 25 units at bedtime, Disp: , Rfl:     insulin degludec Guerda Frieze FlexTouch) 100 units/mL injection pen, Inject 25 Units under the skin daily, Disp: 30 mL, Rfl: 3    Insulin Pen Needle (Pen Needles) 32G X 5 MM MISC, Use 4 per day, Disp: 360 each, Rfl: 1    Lidocaine Viscous HCl (XYLOCAINE) 2 % mucosal solution, Swish and swallow 10 mL 4 (four) times a day as needed for mouth pain or discomfort, Disp: 100 mL, Rfl: 1    losartan (COZAAR) 25 mg tablet, Take 1 tablet (25 mg total) by mouth daily, Disp: 90 tablet, Rfl: 0    mesalamine (LIALDA) 1 2 g EC tablet, Take 2 tablets (2 4 g total) by mouth daily with breakfast (Patient taking differently: Take 2,400 mg by mouth daily with breakfast 1 tab daily ), Disp: 60 tablet, Rfl: 2    ondansetron (ZOFRAN-ODT) 4 mg disintegrating tablet, Take 1 tablet (4 mg total) by mouth every 8 (eight) hours as needed for nausea or vomiting, Disp: 12 tablet, Rfl: 0    pantoprazole (PROTONIX) 40 mg tablet, Take 1 tablet (40 mg total) by mouth 2 (two) times a day, Disp: 180 tablet, Rfl: 3    Sodium Fluoride 5000 PPM 1 1 % PSTE, , Disp: , Rfl:     sucralfate (CARAFATE) 1 g/10 mL suspension, Take 10 mL (1 g total) by mouth 3 (three) times a day, Disp: 420 mL, Rfl: 1    ticagrelor (BRILINTA) 90 MG, Take 1 tablet (90 mg total) by mouth every 12 (twelve) hours, Disp: 180 tablet, Rfl: 0    Review of Systems          Objective:    Vitals:    01/26/22 1346   BP: 132/70   BP Location: Left arm   Patient Position: Sitting   Cuff Size: Large   Pulse: 84   Resp: 16   Weight: 101 kg (222 lb 11 2 oz)   Height: 5' 6 75" (1 695 m)        Physical Exam  Constitutional:       Appearance: She is not diaphoretic  Comments: Uncomfortable    HENT:      Head: Normocephalic and atraumatic  Cardiovascular:      Rate and Rhythm: Tachycardia present  Heart sounds: Normal heart sounds        Comments: Reproducible chest wall pain sternal border right  Pulmonary:      Effort: Pulmonary effort is normal       Breath sounds: Normal breath sounds  Neurological:      General: No focal deficit present  Mental Status: She is alert and oriented to person, place, and time     Psychiatric:         Mood and Affect: Mood normal          Behavior: Behavior normal

## 2022-01-27 ENCOUNTER — TELEPHONE (OUTPATIENT)
Dept: FAMILY MEDICINE CLINIC | Facility: CLINIC | Age: 62
End: 2022-01-27

## 2022-01-27 DIAGNOSIS — R07.89 STERNAL PAIN: Primary | ICD-10-CM

## 2022-01-27 RX ORDER — PREDNISONE 10 MG/1
TABLET ORAL
Qty: 20 TABLET | Refills: 0 | Status: SHIPPED | OUTPATIENT
Start: 2022-01-27 | End: 2022-02-11

## 2022-01-27 NOTE — DISCHARGE INSTRUCTIONS
Please follow-up with her primary care doctor and Cardiology  Return to the emergency department if you experience worsening shortness of breath or chest pain  Please schedule a repeat CT scan of your chest within 12 months for further investigation of the pulmonary nodules that were incidentally found on your CTA chest today

## 2022-01-27 NOTE — TELEPHONE ENCOUNTER
Ada saw Frankie Mosqueda yesterday for sternum pain and was sent to the ER  The ER determined that it wasn't due to her heart  She is now requesting a script for prednisone because the Flexeril is too strong  She doesn't want to take it during the day when she has to go to work  She will take the Flexeril at night      Wants to use [facility]

## 2022-01-28 LAB
ATRIAL RATE: 108 BPM
P AXIS: 83 DEGREES
PR INTERVAL: 144 MS
QRS AXIS: 60 DEGREES
QRSD INTERVAL: 74 MS
QT INTERVAL: 314 MS
QTC INTERVAL: 420 MS
T WAVE AXIS: 54 DEGREES
VENTRICULAR RATE: 108 BPM

## 2022-01-28 PROCEDURE — 93010 ELECTROCARDIOGRAM REPORT: CPT | Performed by: INTERNAL MEDICINE

## 2022-01-31 NOTE — ED ATTENDING ATTESTATION
1/26/2022  Kika GRIFFITH MD, saw and evaluated the patient  I have discussed the patient with the resident/non-physician practitioner and agree with the resident's/non-physician practitioner's findings, Plan of Care, and MDM as documented in the resident's/non-physician practitioner's note, except where noted  All available labs and Radiology studies were reviewed  I was present for key portions of any procedure(s) performed by the resident/non-physician practitioner and I was immediately available to provide assistance  At this point I agree with the current assessment done in the Emergency Department  I have conducted an independent evaluation of this patient a history and physical is as follows:    ED Course     Patient presents for evaluation secondary to 1 week of chest pain that is worse with exertion  Patient states that due to having some knee pain, she has been using her arms more lately especially to lift herself off of the toilet  Patient notes pain with this activity  No additional complaints  Exam: AAOx3, NAD, RRR, reproduction of symptoms with fly and press movement of upper extremities  A/P:  Chest pain  Symptoms likely muscular; however, given risk factors, will check cardiac labs and D-dimer       Critical Care Time  Procedures

## 2022-02-04 ENCOUNTER — OFFICE VISIT (OUTPATIENT)
Dept: ENDOCRINOLOGY | Facility: CLINIC | Age: 62
End: 2022-02-04
Payer: COMMERCIAL

## 2022-02-04 VITALS
DIASTOLIC BLOOD PRESSURE: 60 MMHG | SYSTOLIC BLOOD PRESSURE: 134 MMHG | WEIGHT: 224.8 LBS | HEIGHT: 67 IN | BODY MASS INDEX: 35.28 KG/M2 | HEART RATE: 83 BPM

## 2022-02-04 DIAGNOSIS — E78.2 MIXED HYPERLIPIDEMIA: ICD-10-CM

## 2022-02-04 DIAGNOSIS — E11.65 UNCONTROLLED TYPE 2 DIABETES MELLITUS WITH HYPERGLYCEMIA (HCC): Primary | ICD-10-CM

## 2022-02-04 DIAGNOSIS — I10 ESSENTIAL HYPERTENSION: ICD-10-CM

## 2022-02-04 PROCEDURE — 95251 CONT GLUC MNTR ANALYSIS I&R: CPT

## 2022-02-04 PROCEDURE — 99214 OFFICE O/P EST MOD 30 MIN: CPT

## 2022-02-04 RX ORDER — INSULIN DEGLUDEC INJECTION 100 U/ML
25 INJECTION, SOLUTION SUBCUTANEOUS DAILY
Qty: 30 ML | Refills: 3 | Status: SHIPPED | OUTPATIENT
Start: 2022-02-04 | End: 2022-05-05

## 2022-02-04 NOTE — ASSESSMENT & PLAN NOTE
A1C has increased significantly  BGs overall high, spikes during the day with meals  Was not taking insulin correctly  Only taking basal insulin 2x week, only if BG above 200 and taking bolus insulin at random times of the day when high, not with meals  Was taking bolus insulin at bed with prednisone, causing some hypoglycemia overnight  Reviewed proper way to take insulin  She will now take Tresiba 25 units every night and Novolog 6 units 15-20 units before eating 3 times a day with every meal  She will call if having hypoglycemia or consistent highs  Will review [employer] report in 2 weeks  Reviewed proper foot care, recommend her stop getting [facility], educating her on increased infection risk with this  She will continue to work on diet and eat consistent carb intake with each meal  Not interested in MNT, has seen dietician in past  Stressed importance of yearly diabetic eye exams, referral was made  Repeat A1C and CMP before next visit    Lab Results   Component Value Date    HGBA1C 9 4 (H) 01/22/2022

## 2022-02-04 NOTE — PROGRESS NOTES
Established Patient Progress Note      Chief Complaint   Patient presents with    Diabetes Type 2        History of Present Illness:   Jeanine Kennedy is a 63 y o  female with type 2 diabetes with long term use of insulin since 2017  Last seen in the office 9/1/2021  Reports complications of neuropathy  Last A1C was 9 4 up from 7 3  Denies recent illness or hospitalizations  She recently started Repatha for cholesterol a few months ago  Her BGs have increasingly gotten worse since starting medication  She notices her BGs are higher right after receiving dose every 2 weeks  She has been off and on prednisone for her Crohn's and generalized joint pain  She is currently back on prednisone  She reports in the past she only required insulin when on prednisone  She is only taking [place] 8-10 units about 2 times a week, only when her BG is over 200 before bed  She reports she had two hypoglycemia episodes 50-60 within last few weeks and was nervous the [place] was making it low  She has been taking Novolog, usually 8 units about 2x a day when BGs get high, not taking with meals  She has been taking prednisone at night and then taking Novolog 8 units at the same time  Could not tolerate Metformin in the past due to GI side effects, had UTIs with SGLT2  Home glucose monitoring: performed with CGM  She makes a conscious effort to make healthier choices with foods  She does not eat much meat, eats vegetables and carbs  Met with dietician in past, hard to follow diet with Crohn's  Current regimen:   Tresiba 25 units daily, doing 8-10 units, not taking if BGs below 200, taking only 2x week  Novolog 6 units plus sliding scale, taking 2x a day at random times, not wirh meals    Taylor Davis   Device used: Dexcom  Home use   Indication: Type 2 Diabetes  More than 72 hours of data was reviewed  Report to be scanned to chart     Date Range: 1/22/2022-2/4/2022  Analysis of data:   Average Glucose: 177  SD : 43   Time in Target Range: 58%   Time Above Range: 34% high, 7% very high   Time Below Range: <1%   Interpretation of data: BGs high majority of day 11AM-12AM  BGs are in range at night when sleeping       Last Eye Exam: needs to schedule, referral made  Last Foot Exam: done today     Has hypertension: Taking carvedilol and losartan  Has hyperlipidemia: Taking Repatha and zetia, does not tolerate statins  Vitamin D Deficiency: Taking 1,000 units daily     Patient Active Problem List   Diagnosis    B-complex deficiency    Fibromyalgia    GERD (gastroesophageal reflux disease)    Inflammatory bowel disease ([facility]'s disease) [facility] )    Migraine without aura and without status migrainosus, not intractable    Peripheral neuropathy    Tobacco use disorder, continuous    Mixed hyperlipidemia    Hidradenitis suppurativa    Acute ST elevation myocardial infarction (STEMI) due to occlusion of mid portion of right coronary artery (HCC)    Type 2 diabetes mellitus ([place] [address] )    Essential hypertension    Coronary artery disease involving native coronary artery    Cystocele with prolapse    Shortness of breath    Arthralgia    History of PTCA    Uncontrolled type 2 diabetes mellitus with hyperglycemia ([place] [address] )      Past Medical History:   Diagnosis Date    Coronary artery disease     Crohn's colitis ([place] [address] )     Diabetes mellitus ([place] [address] )     Fibromyalgia     Gastric ulcer     [facility]GEON    HTN (hypertension)     Hyperlipidemia     IBD (inflammatory bowel disease) 11/2015    [facility] SURGEON    Migraine     Myocardial infarction [facility])       Past Surgical History:   Procedure Laterality Date    BREAST BIOPSY Left     benign    BREAST BIOPSY Left 10/27/2021    Stereo    CARDIAC CATHETERIZATION      CHOLECYSTECTOMY      COLONOSCOPY  12/03/2018    internal hemorrhoids, 3mm tubular adenoma polyp transverse colon, bx negative for dysplasia    COLONOSCOPY  11/2015    CORONARY STENT PLACEMENT      DENTAL SURGERY [place] teeth extraction    EGD  12/2018    2cm hiatal hernia, gastritis bx shows foci of intestinal metaplasia, negative Hpylori    EGD  2015    ESOPHAGITIS/MALLHIATUS HERNIA  FOUND    HYSTERECTOMY      MAMMO STEREOTACTIC BREAST BIOPSY LEFT (ALL INC) Left 10/27/2021    TUBAL LIGATION      Bilateral      Family History   Problem Relation Age of Onset    Coronary artery disease Mother     Dementia Mother     Stroke Mother     No Known Problems Father     Cervical cancer Sister 36    Heart disease Brother     No Known Problems Maternal Grandmother     No Known Problems Maternal Grandfather     No Known Problems Paternal Grandmother     No Known Problems Paternal Grandfather     Alcohol abuse Neg Hx     Substance Abuse Neg Hx      Social History     Tobacco Use    Smoking status: Current Every Day Smoker     Packs/day: 0 50     Types: Cigarettes    Smokeless tobacco: Never Used   Substance Use Topics    Alcohol use: Not Currently     Comment: Rarely     Allergies   Allergen Reactions    Penicillins Anaphylaxis    Rosuvastatin Myalgia and Arthralgia    Atorvastatin Myalgia    Cefuroxime     Metaxalone     Influenza Vaccines Rash    Keflet [Cephalexin] Rash    Lyrica [Pregabalin] Swelling     Feet swelling    Nuts - Food Allergy Itching    Sulfa Antibiotics Rash    Sulfamethoxazole-Trimethoprim Hives and Rash    Topiramate Rash         Current Outpatient Medications:     acetaminophen (TYLENOL) 500 mg tablet, Take 1,000 mg by mouth every 6 (six) hours as needed for mild pain, Disp: , Rfl:     Alcohol Swabs 70 % PADS, Use 4 per day, Disp: 400 each, Rfl: 3    ALPRAZolam (XANAX) 0 25 mg tablet, Take 1 tablet (0 25 mg total) by mouth 4 (four) times a day as needed for anxiety, Disp: 15 tablet, Rfl: 0    aspirin (ECOTRIN LOW STRENGTH) 81 mg EC tablet, Take 1 tablet (81 mg total) by mouth daily, Disp: 90 tablet, Rfl: 3    carvedilol (COREG) 12 5 mg tablet, Take 1 tablet (12 5 mg total) by mouth every 12 (twelve) hours, Disp: 180 tablet, Rfl: 3    cholecalciferol (VITAMIN D3) 1,000 units tablet, Take 1 tablet (1,000 Units total) by mouth daily, Disp: 90 tablet, Rfl: 3    Continuous Blood Gluc  (Dexcom G6 ) CRISTINA, Disp 1 , Disp: 1 Device, Rfl: 1    Continuous Blood Gluc Sensor (Dexcom G6 Sensor) MISC, Use 1 sensor every 10 days, disp 90 day supply, Disp: 9 each, Rfl: 3    Continuous Blood Gluc Transmit (Dexcom G6 Transmitter) MISC, Use daily as directed for CGM - Change every 3 months, Disp: 1 each, Rfl: 3    cyclobenzaprine (FLEXERIL) 10 mg tablet, Take 1 tablet (10 mg total) by mouth 3 (three) times a day as needed for muscle spasms, Disp: 30 tablet, Rfl: 0    dicyclomine (BENTYL) 10 mg capsule, Take 1 capsule (10 mg total) by mouth 3 (three) times a day as needed (abd cramping and/or diarrhea), Disp: 90 capsule, Rfl: 1    Evolocumab 140 MG/ML SOAJ, Inject one 1 ml pen every 14 days, Disp: 2 mL, Rfl: 5    ezetimibe (ZETIA) 10 mg tablet, Take 1 tablet (10 mg total) by mouth daily, Disp: 90 tablet, Rfl: 3    insulin aspart (NovoLOG FlexPen) 100 UNIT/ML injection pen, 6 units before lunch and dinner plus additional if needed according to sliding scale , Disp: 15 mL, Rfl: 3    insulin degludec (Tresiba FlexTouch) 100 units/mL injection pen, Inject 25 Units under the skin daily, Disp: 30 mL, Rfl: 3    Insulin Pen Needle (Pen Needles) 32G X 5 MM MISC, Use 4 per day, Disp: 360 each, Rfl: 1    Lidocaine Viscous HCl (XYLOCAINE) 2 % mucosal solution, Swish and swallow 10 mL 4 (four) times a day as needed for mouth pain or discomfort, Disp: 100 mL, Rfl: 1    losartan (COZAAR) 25 mg tablet, Take 1 tablet (25 mg total) by mouth daily, Disp: 90 tablet, Rfl: 0    mesalamine (LIALDA) 1 2 g EC tablet, Take 2 tablets (2 4 g total) by mouth daily with breakfast (Patient taking differently: Take 2,400 mg by mouth daily with breakfast 1 tab daily ), Disp: 60 tablet, Rfl: 2   ondansetron (ZOFRAN-ODT) 4 mg disintegrating tablet, Take 1 tablet (4 mg total) by mouth every 8 (eight) hours as needed for nausea or vomiting, Disp: 12 tablet, Rfl: 0    pantoprazole (PROTONIX) 40 mg tablet, Take 1 tablet (40 mg total) by mouth 2 (two) times a day, Disp: 180 tablet, Rfl: 3    predniSONE 10 mg tablet, Take 4 tablets with food on days 1 & 2  Then take 3 tablets with food on days 3 &4  Then take 2 tablets with food on days 5 & 6  Then take 1 tablet with food on days 7 & 8 , Disp: 20 tablet, Rfl: 0    Sodium Fluoride 5000 PPM 1 1 % PSTE, , Disp: , Rfl:     sucralfate (CARAFATE) 1 g/10 mL suspension, Take 10 mL (1 g total) by mouth 3 (three) times a day, Disp: 420 mL, Rfl: 1    ticagrelor (BRILINTA) 90 MG, Take 1 tablet (90 mg total) by mouth every 12 (twelve) hours, Disp: 180 tablet, Rfl: 0    Review of Systems   Constitutional: Positive for fatigue (chronic)  Negative for activity change, appetite change, chills, fever and unexpected weight change  HENT: Negative for sore throat, trouble swallowing and voice change  Eyes: Negative for visual disturbance  Respiratory: Negative for chest tightness and shortness of breath  Cardiovascular: Positive for palpitations (occasionally - seeing cardiologist next week)  Negative for chest pain and leg swelling  Gastrointestinal: Positive for diarrhea (chronic)  Negative for abdominal pain, constipation, nausea and vomiting  Endocrine: Negative for polydipsia, polyphagia and polyuria  Genitourinary: Negative for frequency  Musculoskeletal: Positive for arthralgias and back pain  Negative for gait problem, joint swelling and myalgias  Skin: Negative for color change, pallor, rash and wound  Neurological: Positive for numbness (tingling/pins in b/l feet)  Negative for dizziness, tremors, weakness and light-headedness  Psychiatric/Behavioral: Negative  All other systems reviewed and are negative        Physical Exam:  Body mass index is 35 47 kg/m²  /60   Pulse 83   Ht 5' 6 75" (1 695 m)   Wt 102 kg (224 lb 12 8 oz)   BMI 35 47 kg/m²    Wt Readings from Last 3 Encounters:   02/04/22 102 kg (224 lb 12 8 oz)   01/26/22 101 kg (222 lb 11 2 oz)   12/08/21 100 kg (221 lb)       Physical Exam  Vitals reviewed  Constitutional:       General: She is not in acute distress  Appearance: Normal appearance  She is obese  She is not ill-appearing or diaphoretic  HENT:      Head: Normocephalic  Right Ear: External ear normal       Left Ear: External ear normal    Eyes:      Extraocular Movements: Extraocular movements intact  Conjunctiva/sclera: Conjunctivae normal       Pupils: Pupils are equal, round, and reactive to light  Cardiovascular:      Rate and Rhythm: Normal rate and regular rhythm  Pulses: no weak pulses          Dorsalis pedis pulses are 1+ on the right side and 1+ on the left side  Heart sounds: Normal heart sounds  No murmur heard  No friction rub  No gallop  Pulmonary:      Effort: Pulmonary effort is normal  No respiratory distress  Breath sounds: Normal breath sounds  No stridor  No wheezing, rhonchi or rales  Abdominal:      General: Bowel sounds are normal  There is no distension  Palpations: Abdomen is soft  Tenderness: There is no abdominal tenderness  Musculoskeletal:         General: No swelling, tenderness or signs of injury  Normal range of motion  Cervical back: Normal range of motion and neck supple  Right lower leg: No edema  Left lower leg: No edema  Feet:      Right foot:      Skin integrity: Dry skin present  No ulcer, skin breakdown, erythema, warmth or callus  Toenail Condition: Right toenails are normal       Left foot:      Skin integrity: Dry skin present  No ulcer, skin breakdown, erythema, warmth or callus  Toenail Condition: Left toenails are normal    Skin:     General: Skin is warm and dry        Coloration: Skin is not jaundiced  Findings: No bruising, erythema or rash  Neurological:      General: No focal deficit present  Mental Status: She is alert and oriented to person, place, and time  Sensory: No sensory deficit  Motor: No weakness  Coordination: Coordination normal       Gait: Gait normal    Psychiatric:         Mood and Affect: Mood normal          Behavior: Behavior normal          Thought Content: Thought content normal          Judgment: Judgment normal        Right Foot/Ankle   Right Foot Inspection  Skin Exam: skin normal, skin intact and dry skin  No warmth, no callus, no erythema, no maceration, no abnormal color, no pre-ulcer, no ulcer and no callus  Toe Exam: ROM and strength within normal limits  No swelling, no tenderness, erythema and  no right toe deformity    Sensory   Vibration: diminished  Monofilament testing: diminished    Vascular  Capillary refills: < 3 seconds  The right DP pulse is 1+  Left Foot/Ankle  Left Foot Inspection  Skin Exam: skin normal, skin intact and dry skin  No warmth, no erythema, no maceration, normal color, no pre-ulcer, no ulcer and no callus  Toe Exam: ROM and strength within normal limits  No swelling, no tenderness, no erythema and no left toe deformity  Sensory   Vibration: intact  Monofilament testing: diminished    Vascular  Capillary refills: < 3 seconds  The left DP pulse is 1+       Assign Risk Category  No deformity present  Loss of protective sensation  No weak pulses  Risk: 1      Labs:   Lab Results   Component Value Date    HGBA1C 9 4 (H) 01/22/2022    HGBA1C 7 3 (H) 08/11/2021    HGBA1C 6 4 (H) 06/16/2021     Lab Results   Component Value Date    CREATININE 0 79 01/26/2022    CREATININE 0 79 10/25/2021    CREATININE 0 65 08/11/2021    BUN 15 01/26/2022     01/22/2016    K 4 4 01/26/2022     01/26/2022    CO2 21 01/26/2022     eGFR   Date Value Ref Range Status   01/26/2022 81 ml/min/1 73sq m Final     Lab Results Component Value Date    HDL 40 (L) 01/22/2022    TRIG 221 (H) 01/22/2022     Lab Results   Component Value Date    ALT 15 01/26/2022    AST 16 01/26/2022    ALKPHOS 119 (H) 01/26/2022    BILITOT 0 4 01/22/2016     Lab Results   Component Value Date    PAY7FANXOXBD 2 100 08/11/2021    SSI6IDMXLVXZ 1 620 11/13/2020     No results found for: FREET4, TSI    Impression & Plan:    Problem List Items Addressed This Visit        Endocrine    Uncontrolled type 2 diabetes mellitus with hyperglycemia ([facility] [address] ) - Primary     A1C has increased significantly  BGs overall high, spikes during the day with meals  Was not taking insulin correctly  Only taking basal insulin 2x week, only if BG above 200 and taking bolus insulin at random times of the day when high, not with meals  Was taking bolus insulin at bed with prednisone, causing some hypoglycemia overnight  Reviewed proper way to take insulin  She will now take Tresiba 25 units every night and Novolog 6 units 15-20 units before eating 3 times a day with every meal  She will call if having hypoglycemia or consistent highs  Will review [employer] report in 2 weeks  Reviewed proper foot care, recommend her stop getting pedicures at salon, educating her on increased infection risk with this  She will continue to work on diet and eat consistent carb intake with each meal  Not interested in MNT, has seen dietician in past  Stressed importance of yearly diabetic eye exams, referral was made  Repeat A1C and CMP before next visit  Lab Results   Component Value Date    HGBA1C 9 4 (H) 01/22/2022            Relevant Medications    insulin degludec [place] FlexTouch) 100 units/mL injection pen    Other Relevant Orders    Ambulatory referral to Ophthalmology    Comprehensive metabolic panel Lab Collect    HEMOGLOBIN A1C W/ EAG ESTIMATION Lab Collect       Cardiovascular and Mediastinum    Essential hypertension     BP at goal today  Continue current medication regimen               Other Mixed hyperlipidemia     Lipid panel greatly improved with Repatha, now at goal  When using PCSK9, there is a 9-10% chance that it exacerbates somebody's diabetes, however this is the only class of medication that has improved her cholesterol significantly to goal  She did not tolerate statins in the past  Continue with Repatha  Orders Placed This Encounter   Procedures    Comprehensive metabolic panel Lab Collect     This is a patient instruction: Patient fasting for 8 hours or longer recommended  Standing Status:   Future     Standing Expiration Date:   2/4/2023    HEMOGLOBIN A1C W/ EAG ESTIMATION Lab Collect     Standing Status:   Future     Standing Expiration Date:   2/4/2023    Ambulatory referral to Ophthalmology     Standing Status:   Future     Standing Expiration Date:   2/4/2023     Referral Priority:   Routine     Referral Type:   Consult - AMB     Referral Reason:   Specialty Services Required     Referred to Provider:   Sosa Bojorquez MD     Requested Specialty:   Ophthalmology     Number of Visits Requested:   1     Expiration Date:   2/4/2023       Patient Instructions   Take [place] 25 units every night - this is long acting insulin that works over 24 hours  Take Novolog 6 units 15-20 minutes before each meal  Do not take if not eating meal    Call if having lows at least 2x a week or consistent highs  Discussed with the patient and all questioned fully answered  She will call me if any problems arise      MERI Acevedo

## 2022-02-04 NOTE — PATIENT INSTRUCTIONS
Take Tresiba 25 units every night - this is long acting insulin that works over 24 hours  Take Novolog 6 units 15-20 minutes before each meal  Do not take if not eating meal    Call if having lows at least 2x a week or consistent highs

## 2022-02-04 NOTE — ASSESSMENT & PLAN NOTE
Lipid panel greatly improved with Repatha, now at goal  When using PCSK9, there is a 9-10% chance that it exacerbates somebody's diabetes, however this is the only class of medication that has improved her cholesterol significantly to goal  She did not tolerate statins in the past  Continue with Repatha

## 2022-02-07 ENCOUNTER — OFFICE VISIT (OUTPATIENT)
Dept: CARDIOLOGY CLINIC | Facility: CLINIC | Age: 62
End: 2022-02-07
Payer: COMMERCIAL

## 2022-02-07 VITALS
BODY MASS INDEX: 36.07 KG/M2 | HEIGHT: 66 IN | HEART RATE: 91 BPM | WEIGHT: 224.4 LBS | DIASTOLIC BLOOD PRESSURE: 80 MMHG | SYSTOLIC BLOOD PRESSURE: 124 MMHG | OXYGEN SATURATION: 99 %

## 2022-02-07 DIAGNOSIS — R07.9 CHEST PAIN, UNSPECIFIED TYPE: Primary | ICD-10-CM

## 2022-02-07 DIAGNOSIS — I25.118 CORONARY ARTERY DISEASE OF NATIVE HEART WITH STABLE ANGINA PECTORIS, UNSPECIFIED VESSEL OR LESION TYPE (HCC): ICD-10-CM

## 2022-02-07 DIAGNOSIS — E11.9 TYPE 2 DIABETES MELLITUS WITHOUT COMPLICATION, WITH LONG-TERM CURRENT USE OF INSULIN (HCC): ICD-10-CM

## 2022-02-07 DIAGNOSIS — Z95.5 S/P CORONARY ARTERY STENT PLACEMENT: ICD-10-CM

## 2022-02-07 DIAGNOSIS — F17.209 TOBACCO USE DISORDER, CONTINUOUS: Chronic | ICD-10-CM

## 2022-02-07 DIAGNOSIS — E78.5 DYSLIPIDEMIA: ICD-10-CM

## 2022-02-07 DIAGNOSIS — I10 HYPERTENSION, UNSPECIFIED TYPE: ICD-10-CM

## 2022-02-07 DIAGNOSIS — E66.9 OBESITY (BMI 35.0-39.9 WITHOUT COMORBIDITY): ICD-10-CM

## 2022-02-07 DIAGNOSIS — Z79.4 TYPE 2 DIABETES MELLITUS WITHOUT COMPLICATION, WITH LONG-TERM CURRENT USE OF INSULIN (HCC): ICD-10-CM

## 2022-02-07 PROCEDURE — 99215 OFFICE O/P EST HI 40 MIN: CPT | Performed by: NURSE PRACTITIONER

## 2022-02-07 NOTE — H&P (VIEW-ONLY)
Cardiology Follow Up    Juwan Davis  1960  [id number]  [facility] [address]RIANE [address]  [phone]    1  Chest pain, unspecified type  NM myocardial perfusion spect (rx stress and/or rest)   2  S/P coronary artery stent placement     3  Coronary artery disease of native heart with stable angina pectoris, unspecified vessel or lesion type ([facility] )     4  Hypertension, unspecified type     5  Dyslipidemia     6  Type 2 diabetes mellitus without complication, with long-term current use of insulin ([facility] )     7  Tobacco use disorder, continuous     8  Obesity (BMI 35 0-39 9 without comorbidity)         Interval History: On 1/26/22 Ms Tod Day presented to [facility] ED with Chest pain  Rocio Meyer complained of a 1 week history of persistent chest pain worse with exertion  Chest pain located on left side  /110  It was felt chest pain was more muscular skeletal  On examination due to tenderness with palpation and abducting her arms  Troponin 5, 4 and delta 2 hr -1  CTA negative for PE,   Small bilateral nodules measuring up to 4 mm     She was discharged home and instructed to follow up with Cardiology  Ms Tod Day presents to our office for a recent ED follow up visit  She admits to a one month history of burning in mid chest, radiating under left breast   She described the feeling as a "bubble, unable to burp"  Rocio Meyer was a the [employer] and experienced mid sternal chest burning, radiating up neck, associated with shortness of breath, relieved with rest   Rocio Meyer has not used SL NTG  She admits to fatigue  Odharini Meyer continues to smoke 6-7 cigarettes a day  She does not eat a fresh diet due to chron's disease  HPI:  2/26/21 STEMI sp Successful balloon angioplasty with stent procedure performed on the 90% lesion in the mid RCA  Resolute jose Rx drug-eluting stent    A successful balloon angioplasty with stent procedure was performed on the 90% lesion in the mid RCA  Resolute jose Rx drug-eluting stent   3/25/21 Sp ZACHARY to 85% lesion in mid LAD, Resolute [place] Rx ZACHARY     2/26/21 [facility] Mid LAD discrete 80% stenosis, 1st diagonal 40% stenosis, 2nd diagonal tubular 60% stenosis, 1st obtuse marginal 50% stenosis  Mid RCA 90% stenosis  Distal RCA tubular 99% stenosis  Dyslipidemia 1/22/22 , , HDL 40, LDL 66   DM2 HgbA1C 9 4 on 1/22/22   Tobacco abuse continues smoke decreased 6-7 a day   Obesity     2/26/21 TTE showed LVEF 60%,   Akinesis of the basal mid inferior wall  Right ventricle size systolic function normal   Trace MR, no evidence of aortic stenosis or regurgitation, trace TR    Patient Active Problem List   Diagnosis    B-complex deficiency    Fibromyalgia    GERD (gastroesophageal reflux disease)    Inflammatory bowel disease (Crohn's disease) ([facility] )    Migraine without aura and without status migrainosus, not intractable    Peripheral neuropathy    Tobacco use disorder, continuous    Mixed hyperlipidemia    Hidradenitis suppurativa    Acute ST elevation myocardial infarction (STEMI) due to occlusion of mid portion of right coronary artery (HCC)    Type 2 diabetes mellitus ([facility] )    Essential hypertension    Coronary artery disease involving native coronary artery    Cystocele with prolapse    Shortness of breath    Arthralgia    History of PTCA    Uncontrolled type 2 diabetes mellitus with hyperglycemia (HCC)     Past Medical History:   Diagnosis Date    Coronary artery disease     Crohn's colitis ([facility] )     Diabetes mellitus ([facility] )     Fibromyalgia     Gastric ulcer     [facility]    HTN (hypertension)     Hyperlipidemia     [facility]inflammatory [facility]    Migraine     Myocardial infarction [facility])      Social History     Socioeconomic History    Marital status: /Civil Union     Spouse name: Not on file    Number of children: Not on file    Years of education: Not on file    Highest education level: Not on file   Occupational History    Occupation: Coding   Tobacco Use    Smoking status: Current Every Day Smoker     Packs/day: 0 50     Types: Cigarettes    Smokeless tobacco: Never Used   Vaping Use    Vaping Use: Never used   Substance and Sexual Activity    Alcohol use: Not Currently     Comment: Rarely    Drug use: Never    Sexual activity: Not Currently     Partners: Male   Other Topics Concern    Not on file   Social History Narrative    Not on file     Social Determinants of Health     Financial Resource Strain: Not on file   Food Insecurity: Not on file   Transportation Needs: Not on file   Physical Activity: Not on file   Stress: Not on file   Social Connections: Not on file   Intimate Partner Violence: Not on file   Housing Stability: Not on file      Family History   Problem Relation Age of Onset    Coronary artery disease Mother     Dementia Mother     Stroke Mother     No Known Problems Father     Cervical cancer Sister 36    Heart disease Brother     No Known Problems Maternal Grandmother     No Known Problems Maternal Grandfather     No Known Problems Paternal Grandmother     No Known Problems Paternal Grandfather     Alcohol abuse Neg Hx     Substance Abuse Neg Hx      Past Surgical History:   Procedure Laterality Date    BREAST BIOPSY Left     benign    BREAST BIOPSY Left 10/27/2021    Stereo    CARDIAC CATHETERIZATION      CHOLECYSTECTOMY      COLONOSCOPY  12/03/2018    internal hemorrhoids, 3mm tubular adenoma polyp transverse colon, bx negative for dysplasia    COLONOSCOPY  11/2015    CORONARY STENT PLACEMENT      DENTAL SURGERY      [place] teeth extraction    EGD  12/2018    2cm hiatal hernia, gastritis bx shows foci of intestinal metaplasia, negative Hpylori    EGD  2015    ESOPHAGITIS/MALLHIATUS HERNIA  FOUND    HYSTERECTOMY      MAMMO STEREOTACTIC BREAST BIOPSY LEFT (ALL INC) Left 10/27/2021    TUBAL LIGATION      Bilateral       Current Outpatient Medications:     acetaminophen (TYLENOL) 500 mg tablet, Take 1,000 mg by mouth every 6 (six) hours as needed for mild pain, Disp: , Rfl:     Alcohol Swabs 70 % PADS, Use 4 per day, Disp: 400 each, Rfl: 3    ALPRAZolam (XANAX) 0 25 mg tablet, Take 1 tablet (0 25 mg total) by mouth 4 (four) times a day as needed for anxiety, Disp: 15 tablet, Rfl: 0    aspirin (ECOTRIN LOW STRENGTH) 81 mg EC tablet, Take 1 tablet (81 mg total) by mouth daily, Disp: 90 tablet, Rfl: 3    carvedilol (COREG) 12 5 mg tablet, Take 1 tablet (12 5 mg total) by mouth every 12 (twelve) hours, Disp: 180 tablet, Rfl: 3    cholecalciferol (VITAMIN D3) 1,000 units tablet, Take 1 tablet (1,000 Units total) by mouth daily, Disp: 90 tablet, Rfl: 3    Continuous Blood Gluc  (Dexcom G6 ) CRISTINA, Disp 1 , Disp: 1 Device, Rfl: 1    Continuous Blood Gluc Sensor (Dexcom G6 Sensor) MISC, Use 1 sensor every 10 days, disp 90 day supply, Disp: 9 each, Rfl: 3    Continuous Blood Gluc Transmit (Dexcom G6 Transmitter) MISC, Use daily as directed for CGM - Change every 3 months, Disp: 1 each, Rfl: 3    cyclobenzaprine (FLEXERIL) 10 mg tablet, Take 1 tablet (10 mg total) by mouth 3 (three) times a day as needed for muscle spasms, Disp: 30 tablet, Rfl: 0    dicyclomine (BENTYL) 10 mg capsule, Take 1 capsule (10 mg total) by mouth 3 (three) times a day as needed (abd cramping and/or diarrhea), Disp: 90 capsule, Rfl: 1    Evolocumab 140 MG/ML SOAJ, Inject one 1 ml pen every 14 days, Disp: 2 mL, Rfl: 5    ezetimibe (ZETIA) 10 mg tablet, Take 1 tablet (10 mg total) by mouth daily, Disp: 90 tablet, Rfl: 3    insulin aspart (NovoLOG FlexPen) 100 UNIT/ML injection pen, 6 units before lunch and dinner plus additional if needed according to sliding scale , Disp: 15 mL, Rfl: 3    insulin degludec Irl Paolo FlexTouch) 100 units/mL injection pen, Inject 25 Units under the skin daily, Disp: 30 mL, Rfl: 3    Insulin Pen Needle (Pen Needles) 32G X 5 MM MISC, Use 4 per day, Disp: 360 each, Rfl: 1    Lidocaine Viscous HCl (XYLOCAINE) 2 % mucosal solution, Swish and swallow 10 mL 4 (four) times a day as needed for mouth pain or discomfort, Disp: 100 mL, Rfl: 1    losartan (COZAAR) 25 mg tablet, Take 1 tablet (25 mg total) by mouth daily, Disp: 90 tablet, Rfl: 0    mesalamine (LIALDA) 1 2 g EC tablet, Take 2 tablets (2 4 g total) by mouth daily with breakfast (Patient taking differently: Take 2,400 mg by mouth daily with breakfast 1 tab daily ), Disp: 60 tablet, Rfl: 2    ondansetron (ZOFRAN-ODT) 4 mg disintegrating tablet, Take 1 tablet (4 mg total) by mouth every 8 (eight) hours as needed for nausea or vomiting, Disp: 12 tablet, Rfl: 0    pantoprazole (PROTONIX) 40 mg tablet, Take 1 tablet (40 mg total) by mouth 2 (two) times a day, Disp: 180 tablet, Rfl: 3    predniSONE 10 mg tablet, Take 4 tablets with food on days 1 & 2  Then take 3 tablets with food on days 3 &4  Then take 2 tablets with food on days 5 & 6   Then take 1 tablet with food on days 7 & 8 , Disp: 20 tablet, Rfl: 0    Sodium Fluoride 5000 PPM 1 1 % PSTE, , Disp: , Rfl:     sucralfate (CARAFATE) 1 g/10 mL suspension, Take 10 mL (1 g total) by mouth 3 (three) times a day, Disp: 420 mL, Rfl: 1    ticagrelor (BRILINTA) 90 MG, Take 1 tablet (90 mg total) by mouth every 12 (twelve) hours, Disp: 180 tablet, Rfl: 0  Allergies   Allergen Reactions    Penicillins Anaphylaxis    Rosuvastatin Myalgia and Arthralgia    Atorvastatin Myalgia    Cefuroxime     Metaxalone     Influenza Vaccines Rash    Keflet [Cephalexin] Rash    Lyrica [Pregabalin] Swelling     Feet swelling    Nuts - Food Allergy Itching    Sulfa Antibiotics Rash    Sulfamethoxazole-Trimethoprim Hives and Rash    Topiramate Rash       Labs:  Admission on 01/26/2022, Discharged on 01/26/2022   Component Date Value    WBC 01/26/2022 9 13     RBC 01/26/2022 4 76     Hemoglobin 01/26/2022 14 1     Hematocrit 01/26/2022 43 0     MCV 01/26/2022 90     MCH 01/26/2022 29 6     MCHC 01/26/2022 32 8     RDW 01/26/2022 13 3     MPV 01/26/2022 9 5     Platelets 19/92/5046 340     nRBC 01/26/2022 0     Neutrophils Relative 01/26/2022 59     Immat GRANS % 01/26/2022 0     Lymphocytes Relative 01/26/2022 29     Monocytes Relative 01/26/2022 8     Eosinophils Relative 01/26/2022 3     Basophils Relative 01/26/2022 1     Neutrophils Absolute 01/26/2022 5 46     Immature Grans Absolute 01/26/2022 0 03     Lymphocytes Absolute 01/26/2022 2 61     Monocytes Absolute 01/26/2022 0 71     Eosinophils Absolute 01/26/2022 0 26     Basophils Absolute 01/26/2022 0 06     Sodium 01/26/2022 135*    Potassium 01/26/2022 4 4     Chloride 01/26/2022 105     CO2 01/26/2022 21     ANION GAP 01/26/2022 9     BUN 01/26/2022 15     Creatinine 01/26/2022 0 79     Glucose 01/26/2022 177*    Calcium 01/26/2022 10 3*    AST 01/26/2022 16     ALT 01/26/2022 15     Alkaline Phosphatase 01/26/2022 119*    Total Protein 01/26/2022 8 5*    Albumin 01/26/2022 3 9     Total Bilirubin 01/26/2022 0 35     eGFR 01/26/2022 81     hs TnI 0hr 01/26/2022 5     D-Dimer, Quant 01/26/2022 1 07*    hs TnI 2hr 01/26/2022 4     Delta 2hr hsTnI 01/26/2022 -1     POC Glucose 01/26/2022 150*    Ventricular Rate 01/26/2022 108     Atrial Rate 01/26/2022 108     SC Interval 01/26/2022 144     QRSD Interval 01/26/2022 74     QT Interval 01/26/2022 314     QTC Interval 01/26/2022 420     P Axis 01/26/2022 83     QRS Axis 01/26/2022 60     T Wave Axis 01/26/2022 54    Office Visit on 01/26/2022   Component Date Value    OTHER 01/26/2022     Appointment on 01/22/2022   Component Date Value    Hemoglobin A1C 01/22/2022 9 4*    EAG 01/22/2022 223     Sed Rate 01/22/2022 59*    CRP 01/22/2022 27 6*    Cholesterol 01/22/2022 150  Triglycerides 01/22/2022 221*    HDL, Direct 01/22/2022 40*    LDL Calculated 01/22/2022 1280 Doug Ayala Outpatient Visit on 12/14/2021   Component Date Value    Case Report 12/14/2021                      Value:Surgical Pathology Report                         Case: [id number]                                   Authorizing Provider:  Conchita Goff MD             Collected:           12/14/2021 1345              Ordering Location:     [facility]        Received:            12/14/2021 [address] [place] [address] Endoscopy                                                          Pathologist:           Flaco Burton MD                                                         Specimen:    Esophagus                                                                                  Note 12/14/2021                      Value: This result contains rich text formatting which cannot be displayed here   Additional Information 12/14/2021                      Value: This result contains rich text formatting which cannot be displayed here  Mademetra Hoyos Gross Description 12/14/2021                      Value: This result contains rich text formatting which cannot be displayed here  Imaging: CTA ED chest PE Study    Result Date: 1/26/2022  Narrative: CTA - CHEST WITH IV CONTRAST - PULMONARY ANGIOGRAM INDICATION: Chest pain  DDimer elevated  Smoker  COMPARISON: Chest x-ray from 10/25/2021 TECHNIQUE: CTA examination of the chest was performed using angiographic technique according to a protocol specifically tailored to evaluate for pulmonary embolism  Axial, sagittal, and coronal 2D reformatted images were created from the source data and  submitted for interpretation  In addition, coronal 3D MIP postprocessing was performed on the acquisition scanner  Radiation dose length product (DLP) for this visit:  325 43 mGy-cm     This examination, like all CT scans performed in the [facility], was performed utilizing techniques to minimize radiation dose exposure, including the use of iterative  reconstruction and automated exposure control  IV Contrast:  85 mL of iohexol (OMNIPAQUE)  FINDINGS: PULMONARY ARTERIAL TREE:  No pulmonary embolus is seen  LUNGS:  4 mm left lower lobe nodule image 3/72   3 mm subpleural nodule right lower lobe image 3/59   3 mm left apical nodule image 3/15  There is no tracheal or endobronchial lesion  PLEURA:  Unremarkable  HEART/GREAT VESSELS:  Unremarkable for patient's age  MEDIASTINUM AND VIKRAM:  Unremarkable  CHEST WALL AND LOWER NECK:   Unremarkable  VISUALIZED STRUCTURES IN THE UPPER ABDOMEN:  Enlarged fatty liver  Upper abdomen otherwise unremarkable  OSSEOUS STRUCTURES:  No acute fracture or destructive osseous lesion  Degenerative changes throughout the thoracic spine  Impression: No pulmonary embolism  Small bilateral nodules measuring up to 4 mm  No prior studies  Based on current Fleischner Society 2017 Guidelines on incidental pulmonary nodule, no routine follow-up is needed if the patient is considered low risk for lung cancer  If the patient is considered high risk for lung cancer, 12 month follow-up non-contrast chest CT is recommended  Enlarged fatty liver  Workstation performed: [id number]       Review of Systems:  Review of Systems   Constitutional: Positive for fatigue  Respiratory: Positive for shortness of breath  Cardiovascular:        Chest burning    Musculoskeletal: Positive for arthralgias and myalgias  All other systems reviewed and are negative  Physical Exam:  Physical Exam  Vitals reviewed  Constitutional:       Appearance: She is obese  HENT:      Head: Normocephalic  Eyes:      Pupils: Pupils are equal, round, and reactive to light  Cardiovascular:      Rate and Rhythm: Normal rate and regular rhythm  Pulses: Normal pulses  Heart sounds: Normal heart sounds     Pulmonary:      Effort: Pulmonary effort is normal       Breath sounds: Normal breath sounds  Abdominal:      General: Bowel sounds are normal       Palpations: Abdomen is soft  Musculoskeletal:         General: Normal range of motion  Cervical back: Normal range of motion and neck supple  Right lower leg: No edema  Left lower leg: No edema  Skin:     General: Skin is warm and dry  Capillary Refill: Capillary refill takes less than 2 seconds  Neurological:      General: No focal deficit present  Mental Status: She is alert and oriented to person, place, and time  Psychiatric:         Mood and Affect: Mood normal          Behavior: Behavior normal          Discussion/Summary:  1  Chest pain- described as burning, occurring with rest and worse with exertion  "bubble, unable to burp", known hx of non obstructive CAD on [facility] 2/26/21  Nanci Ashby cannot run on a treadmill due to robbi Knee pain from OA  Rx walking Nuclear stress test R/O ischemia  Instructed to hold Coreg prior to procedure  2  2/26/21 STEMI sp Successful balloon angioplasty with stent procedure performed on the 90% lesion in the mid RCA  Resolute jose Rx drug-eluting stent  A successful balloon angioplasty with stent procedure was performed on the 90% lesion in the mid RCA  Resolute jose Rx drug-eluting stent  3/25/21 Sp ZACHARY to 85% lesion in mid LAD, Resolute [place] Rx ZACHARY  3  Non obstructive CAD, 2/26/21 LHC 1st diagonal 40% stenosis, 2nd diagonal tubular 60% stenosis, 1st obtuse marginal 50% stenosis  Continue on Coreg 12 5 mg b i d , losartan 25 mg daily, aspirin 81 mg daily, Brilinta 90 mg b i d , Zetia 10 mg daily,Evolocumab 140mg Every 14 days, heart healthy diet  4  Hypertension-  Continue on Coreg 12 5 mg b i d , losartan 25 mg daily, 2 g sodium diet, instructed on home blood pressure monitoring and to keep a log   5  Dyslipidemia 1/22/22 , , HDL 40, LDL 66 -  Continue on Zetia 10 mg daily, Evolocumab 140mg Every 14 days  Heart healthy diet  6  DM2 HgbA1C 9 4 on 1/22/22 improved, continue on Tresiba Flex Touch 25 units daily  Diabetic diet  7  Tobacco abuse continues smoke decreased 6-7 a day   Continues actively working on smoking cessation  8   Obesity BMI 36 22

## 2022-02-07 NOTE — PROGRESS NOTES
Cardiology Follow Up    Jalen Davis  1960  [id number]  [facility] [address]  [phone]    1  Chest pain, unspecified type  NM myocardial perfusion spect (rx stress and/or rest)   2  S/P coronary artery stent placement     3  Coronary artery disease of native heart with stable angina pectoris, unspecified vessel or lesion type ([facility] )     4  Hypertension, unspecified type     5  Dyslipidemia     6  Type 2 diabetes mellitus without complication, with long-term current use of insulin ([facility] )     7  Tobacco use disorder, continuous     8  Obesity (BMI 35 0-39 9 without comorbidity)         Interval History: On 1/26/22 Ms Mady Dawson presented to [facility] ED with Chest pain  Bianca Cabral complained of a 1 week history of persistent chest pain worse with exertion  Chest pain located on left side  /110  It was felt chest pain was more muscular skeletal  On examination due to tenderness with palpation and abducting her arms  Troponin 5, 4 and delta 2 hr -1  CTA negative for PE,   Small bilateral nodules measuring up to 4 mm     She was discharged home and instructed to follow up with Cardiology  Ms Mady Dawson presents to our office for a recent ED follow up visit  She admits to a one month history of burning in mid chest, radiating under left breast   She described the feeling as a "bubble, unable to burp"  Bianca Cabral was a the [employer] and experienced mid sternal chest burning, radiating up neck, associated with shortness of breath, relieved with rest   Bianca Cabral has not used SL NTG  She admits to fatigue  Bianca Cabral continues to smoke 6-7 cigarettes a day  She does not eat a fresh diet due to chron's disease  HPI:  2/26/21 STEMI sp Successful balloon angioplasty with stent procedure performed on the 90% lesion in the mid RCA  Resolute jose Rx drug-eluting stent    A successful balloon angioplasty with stent procedure was performed on the 90% lesion in the mid RCA  Resolute [place]ose Rx drug-eluting stent   3/25/21 Sp ZACHARY to 85% lesion in mid LAD, Resolute [place] Rx ZACHARY     2/26/21 [facility] Mid LAD discrete 80% stenosis, 1st diagonal 40% stenosis, 2nd diagonal tubular 60% stenosis, 1st obtuse marginal 50% stenosis  Mid RCA 90% stenosis  Distal RCA tubular 99% stenosis  Dyslipidemia 1/22/22 , , HDL 40, LDL 66   DM2 HgbA1C 9 4 on 1/22/22   Tobacco abuse continues smoke decreased 6-7 a day   Obesity     2/26/21 TTE showed LVEF 60%,   Akinesis of the basal mid inferior wall  Right ventricle size systolic function normal   Trace MR, no evidence of aortic stenosis or regurgitation, trace TR    Patient Active Problem List   Diagnosis    B-complex deficiency    Fibromyalgia    GERD (gastroesophageal reflux disease)    Inflammatory bowel disease (Crohn's disease) ([facility] [address] )    Migraine without aura and without status migrainosus, not intractable    Peripheral neuropathy    Tobacco use disorder, continuous    Mixed hyperlipidemia    Hidradenitis suppurativa    Acute ST elevation myocardial infarction (STEMI) due to occlusion of mid portion of right coronary artery (HCC)    Type 2 diabetes mellitus ([facility] )    Essential hypertension    Coronary artery disease involving native coronary artery    Cystocele with prolapse    Shortness of breath    Arthralgia    History of PTCA    Uncontrolled type 2 diabetes mellitus with hyperglycemia (HCC)     Past Medical History:   Diagnosis Date    Coronary artery disease     Crohn's colitis ([facility] )     Diabetes mellitus ([facility] )     Fibromyalgia     Gastric ulcer     [facility]    HTN (hypertension)     Hyperlipidemia     [facility]    Migraine     Myocardial infarction [facility])      Social History     Socioeconomic History    Marital status: /Civil Union     Spouse name: Not on file    Number of children: Not on file    Years of education: Not on file    Highest education level: Not on file   Occupational History    Occupation: Coding   Tobacco Use    Smoking status: Current Every Day Smoker     Packs/day: 0 50     Types: Cigarettes    Smokeless tobacco: Never Used   Vaping Use    Vaping Use: Never used   Substance and Sexual Activity    Alcohol use: Not Currently     Comment: Rarely    Drug use: Never    Sexual activity: Not Currently     Partners: Male   Other Topics Concern    Not on file   Social History Narrative    Not on file     Social Determinants of Health     Financial Resource Strain: Not on file   Food Insecurity: Not on file   Transportation Needs: Not on file   Physical Activity: Not on file   Stress: Not on file   Social Connections: Not on file   Intimate Partner Violence: Not on file   Housing Stability: Not on file      Family History   Problem Relation Age of Onset    Coronary artery disease Mother     Dementia Mother     Stroke Mother     No Known Problems Father     Cervical cancer Sister 36    Heart disease Brother     No Known Problems Maternal Grandmother     No Known Problems Maternal Grandfather     No Known Problems Paternal Grandmother     No Known Problems Paternal Grandfather     Alcohol abuse Neg Hx     Substance Abuse Neg Hx      Past Surgical History:   Procedure Laterality Date    BREAST BIOPSY Left     benign    BREAST BIOPSY Left 10/27/2021    Stereo    CARDIAC CATHETERIZATION      CHOLECYSTECTOMY      COLONOSCOPY  12/03/2018    internal hemorrhoids, 3mm tubular adenoma polyp transverse colon, bx negative for dysplasia    COLONOSCOPY  11/2015    CORONARY STENT PLACEMENT      DENTAL SURGERY      [place] teeth extraction    EGD  12/2018    2cm hiatal hernia, gastritis bx shows foci of intestinal metaplasia, negative Hpylori    EGD  2015    ESOPHAGITIS/MALLHIATUS HERNIA  FOUND    HYSTERECTOMY      MAMMO STEREOTACTIC BREAST BIOPSY LEFT (ALL INC) Left 10/27/2021    TUBAL LIGATION      Bilateral       Current Outpatient Medications:     acetaminophen (TYLENOL) 500 mg tablet, Take 1,000 mg by mouth every 6 (six) hours as needed for mild pain, Disp: , Rfl:     Alcohol Swabs 70 % PADS, Use 4 per day, Disp: 400 each, Rfl: 3    ALPRAZolam (XANAX) 0 25 mg tablet, Take 1 tablet (0 25 mg total) by mouth 4 (four) times a day as needed for anxiety, Disp: 15 tablet, Rfl: 0    aspirin (ECOTRIN LOW STRENGTH) 81 mg EC tablet, Take 1 tablet (81 mg total) by mouth daily, Disp: 90 tablet, Rfl: 3    carvedilol (COREG) 12 5 mg tablet, Take 1 tablet (12 5 mg total) by mouth every 12 (twelve) hours, Disp: 180 tablet, Rfl: 3    cholecalciferol (VITAMIN D3) 1,000 units tablet, Take 1 tablet (1,000 Units total) by mouth daily, Disp: 90 tablet, Rfl: 3    Continuous Blood Gluc  (Dexcom G6 ) CRISTINA, Disp 1 , Disp: 1 Device, Rfl: 1    Continuous Blood Gluc Sensor (Dexcom G6 Sensor) MISC, Use 1 sensor every 10 days, disp 90 day supply, Disp: 9 each, Rfl: 3    Continuous Blood Gluc Transmit (Dexcom G6 Transmitter) MISC, Use daily as directed for CGM - Change every 3 months, Disp: 1 each, Rfl: 3    cyclobenzaprine (FLEXERIL) 10 mg tablet, Take 1 tablet (10 mg total) by mouth 3 (three) times a day as needed for muscle spasms, Disp: 30 tablet, Rfl: 0    dicyclomine (BENTYL) 10 mg capsule, Take 1 capsule (10 mg total) by mouth 3 (three) times a day as needed (abd cramping and/or diarrhea), Disp: 90 capsule, Rfl: 1    Evolocumab 140 MG/ML SOAJ, Inject one 1 ml pen every 14 days, Disp: 2 mL, Rfl: 5    ezetimibe (ZETIA) 10 mg tablet, Take 1 tablet (10 mg total) by mouth daily, Disp: 90 tablet, Rfl: 3    insulin aspart (NovoLOG FlexPen) 100 UNIT/ML injection pen, 6 units before lunch and dinner plus additional if needed according to sliding scale , Disp: 15 mL, Rfl: 3    insulin degludec Velinda Ring FlexTouch) 100 units/mL injection pen, Inject 25 Units under the skin daily, Disp: 30 mL, Rfl: 3    Insulin Pen Needle (Pen Needles) 32G X 5 MM MISC, Use 4 per day, Disp: 360 each, Rfl: 1    Lidocaine Viscous HCl (XYLOCAINE) 2 % mucosal solution, Swish and swallow 10 mL 4 (four) times a day as needed for mouth pain or discomfort, Disp: 100 mL, Rfl: 1    losartan (COZAAR) 25 mg tablet, Take 1 tablet (25 mg total) by mouth daily, Disp: 90 tablet, Rfl: 0    mesalamine (LIALDA) 1 2 g EC tablet, Take 2 tablets (2 4 g total) by mouth daily with breakfast (Patient taking differently: Take 2,400 mg by mouth daily with breakfast 1 tab daily ), Disp: 60 tablet, Rfl: 2    ondansetron (ZOFRAN-ODT) 4 mg disintegrating tablet, Take 1 tablet (4 mg total) by mouth every 8 (eight) hours as needed for nausea or vomiting, Disp: 12 tablet, Rfl: 0    pantoprazole (PROTONIX) 40 mg tablet, Take 1 tablet (40 mg total) by mouth 2 (two) times a day, Disp: 180 tablet, Rfl: 3    predniSONE 10 mg tablet, Take 4 tablets with food on days 1 & 2  Then take 3 tablets with food on days 3 &4  Then take 2 tablets with food on days 5 & 6   Then take 1 tablet with food on days 7 & 8 , Disp: 20 tablet, Rfl: 0    Sodium Fluoride 5000 PPM 1 1 % PSTE, , Disp: , Rfl:     sucralfate (CARAFATE) 1 g/10 mL suspension, Take 10 mL (1 g total) by mouth 3 (three) times a day, Disp: 420 mL, Rfl: 1    ticagrelor (BRILINTA) 90 MG, Take 1 tablet (90 mg total) by mouth every 12 (twelve) hours, Disp: 180 tablet, Rfl: 0  Allergies   Allergen Reactions    Penicillins Anaphylaxis    Rosuvastatin Myalgia and Arthralgia    Atorvastatin Myalgia    Cefuroxime     Metaxalone     Influenza Vaccines Rash    Keflet [Cephalexin] Rash    Lyrica [Pregabalin] Swelling     Feet swelling    Nuts - Food Allergy Itching    Sulfa Antibiotics Rash    Sulfamethoxazole-Trimethoprim Hives and Rash    Topiramate Rash       Labs:  Admission on 01/26/2022, Discharged on 01/26/2022   Component Date Value    WBC 01/26/2022 9 13     RBC 01/26/2022 4 76     Hemoglobin 01/26/2022 14 1     Hematocrit 01/26/2022 43 0     MCV 01/26/2022 90     MCH 01/26/2022 29 6     MCHC 01/26/2022 32 8     RDW 01/26/2022 13 3     MPV 01/26/2022 9 5     Platelets 41/91/5835 340     nRBC 01/26/2022 0     Neutrophils Relative 01/26/2022 59     Immat GRANS % 01/26/2022 0     Lymphocytes Relative 01/26/2022 29     Monocytes Relative 01/26/2022 8     Eosinophils Relative 01/26/2022 3     Basophils Relative 01/26/2022 1     Neutrophils Absolute 01/26/2022 5 46     Immature Grans Absolute 01/26/2022 0 03     Lymphocytes Absolute 01/26/2022 2 61     Monocytes Absolute 01/26/2022 0 71     Eosinophils Absolute 01/26/2022 0 26     Basophils Absolute 01/26/2022 0 06     Sodium 01/26/2022 135*    Potassium 01/26/2022 4 4     Chloride 01/26/2022 105     CO2 01/26/2022 21     ANION GAP 01/26/2022 9     BUN 01/26/2022 15     Creatinine 01/26/2022 0 79     Glucose 01/26/2022 177*    Calcium 01/26/2022 10 3*    AST 01/26/2022 16     ALT 01/26/2022 15     Alkaline Phosphatase 01/26/2022 119*    Total Protein 01/26/2022 8 5*    Albumin 01/26/2022 3 9     Total Bilirubin 01/26/2022 0 35     eGFR 01/26/2022 81     hs TnI 0hr 01/26/2022 5     D-Dimer, Quant 01/26/2022 1 07*    hs TnI 2hr 01/26/2022 4     Delta 2hr hsTnI 01/26/2022 -1     POC Glucose 01/26/2022 150*    Ventricular Rate 01/26/2022 108     Atrial Rate 01/26/2022 108     DC Interval 01/26/2022 144     QRSD Interval 01/26/2022 74     QT Interval 01/26/2022 314     QTC Interval 01/26/2022 420     P Axis 01/26/2022 83     QRS Axis 01/26/2022 60     T Wave Axis 01/26/2022 54    Office Visit on 01/26/2022   Component Date Value    OTHER 01/26/2022     Appointment on 01/22/2022   Component Date Value    Hemoglobin A1C 01/22/2022 9 4*    EAG 01/22/2022 223     Sed Rate 01/22/2022 59*    CRP 01/22/2022 27 6*    Cholesterol 01/22/2022 150  Triglycerides 01/22/2022 221*    HDL, Direct 01/22/2022 40*    LDL Calculated 01/22/2022 1280 Doug Ayala Outpatient Visit on 12/14/2021   Component Date Value    Case Report 12/14/2021                      Value:Surgical Pathology Report                         Case: [id number]                                   Authorizing Provider:  Keshav Fitzpatrick MD             Collected:           12/14/2021 1345              Ordering Location:     [facility]        Received:            12/14/2021 [facility]                                                          Pathologist:           Tierra Coleman MD                                                         Specimen:    Esophagus                                                                                  Note 12/14/2021                      Value: This result contains rich text formatting which cannot be displayed here   Additional Information 12/14/2021                      Value: This result contains rich text formatting which cannot be displayed here  Minnie Damon Gross Description 12/14/2021                      Value: This result contains rich text formatting which cannot be displayed here  Imaging: CTA ED chest PE Study    Result Date: 1/26/2022  Narrative: CTA - CHEST WITH IV CONTRAST - PULMONARY ANGIOGRAM INDICATION: Chest pain  DDimer elevated  Smoker  COMPARISON: Chest x-ray from 10/25/2021 TECHNIQUE: CTA examination of the chest was performed using angiographic technique according to a protocol specifically tailored to evaluate for pulmonary embolism  Axial, sagittal, and coronal 2D reformatted images were created from the source data and  submitted for interpretation  In addition, coronal 3D MIP postprocessing was performed on the acquisition scanner  Radiation dose length product (DLP) for this visit:  325 43 mGy-cm     This examination, like all CT scans performed in the [facility], was performed utilizing techniques to minimize radiation dose exposure, including the use of iterative  reconstruction and automated exposure control  IV Contrast:  85 mL of iohexol (OMNIPAQUE)  FINDINGS: PULMONARY ARTERIAL TREE:  No pulmonary embolus is seen  LUNGS:  4 mm left lower lobe nodule image 3/72   3 mm subpleural nodule right lower lobe image 3/59   3 mm left apical nodule image 3/15  There is no tracheal or endobronchial lesion  PLEURA:  Unremarkable  HEART/GREAT VESSELS:  Unremarkable for patient's age  MEDIASTINUM AND VIKRAM:  Unremarkable  CHEST WALL AND LOWER NECK:   Unremarkable  VISUALIZED STRUCTURES IN THE UPPER ABDOMEN:  Enlarged fatty liver  Upper abdomen otherwise unremarkable  OSSEOUS STRUCTURES:  No acute fracture or destructive osseous lesion  Degenerative changes throughout the thoracic spine  Impression: No pulmonary embolism  Small bilateral nodules measuring up to 4 mm  No prior studies  Based on current Fleischner Society 2017 Guidelines on incidental pulmonary nodule, no routine follow-up is needed if the patient is considered low risk for lung cancer  If the patient is considered high risk for lung cancer, 12 month follow-up non-contrast chest CT is recommended  Enlarged fatty liver  Workstation performed: [id number]       Review of Systems:  Review of Systems   Constitutional: Positive for fatigue  Respiratory: Positive for shortness of breath  Cardiovascular:        Chest burning    Musculoskeletal: Positive for arthralgias and myalgias  All other systems reviewed and are negative  Physical Exam:  Physical Exam  Vitals reviewed  Constitutional:       Appearance: She is obese  HENT:      Head: Normocephalic  Eyes:      Pupils: Pupils are equal, round, and reactive to light  Cardiovascular:      Rate and Rhythm: Normal rate and regular rhythm  Pulses: Normal pulses  Heart sounds: Normal heart sounds     Pulmonary:      Effort: Pulmonary effort is normal       Breath sounds: Normal breath sounds  Abdominal:      General: Bowel sounds are normal       Palpations: Abdomen is soft  Musculoskeletal:         General: Normal range of motion  Cervical back: Normal range of motion and neck supple  Right lower leg: No edema  Left lower leg: No edema  Skin:     General: Skin is warm and dry  Capillary Refill: Capillary refill takes less than 2 seconds  Neurological:      General: No focal deficit present  Mental Status: She is alert and oriented to person, place, and time  Psychiatric:         Mood and Affect: Mood normal          Behavior: Behavior normal          Discussion/Summary:  1  Chest pain- described as burning, occurring with rest and worse with exertion  "bubble, unable to burp", known hx of non obstructive CAD on [facility] 2/26/21  Mcdaniel Miners cannot run on a treadmill due to robbi Knee pain from OA  Rx walking Nuclear stress test R/O ischemia  Instructed to hold Coreg prior to procedure  2  2/26/21 STEMI sp Successful balloon angioplasty with stent procedure performed on the 90% lesion in the mid RCA  Resolute jose Rx drug-eluting stent  A successful balloon angioplasty with stent procedure was performed on the 90% lesion in the mid RCA  Resolute jose Rx drug-eluting stent  3/25/21 Sp ZACHARY to 85% lesion in mid LAD, Resolute [place] Rx ZACHARY  3  Non obstructive CAD, 2/26/21 LHC 1st diagonal 40% stenosis, 2nd diagonal tubular 60% stenosis, 1st obtuse marginal 50% stenosis  Continue on Coreg 12 5 mg b i d , losartan 25 mg daily, aspirin 81 mg daily, Brilinta 90 mg b i d , Zetia 10 mg daily,Evolocumab 140mg Every 14 days, heart healthy diet  4  Hypertension-  Continue on Coreg 12 5 mg b i d , losartan 25 mg daily, 2 g sodium diet, instructed on home blood pressure monitoring and to keep a log   5  Dyslipidemia 1/22/22 , , HDL 40, LDL 66 -  Continue on Zetia 10 mg daily, Evolocumab 140mg Every 14 days  Heart healthy diet  6  DM2 HgbA1C 9 4 on 1/22/22 improved, continue on Tresiba Flex Touch 25 units daily  Diabetic diet  7  Tobacco abuse continues smoke decreased 6-7 a day   Continues actively working on smoking cessation  8   Obesity BMI 36 22

## 2022-02-07 NOTE — PATIENT INSTRUCTIONS
2gm sodium low fat low cholesterol low carbohydrate no concentrated sweets diet, eating fresh is best     Hold Coreg prior to stress test  Rx walking nuclear stress test  [facility] home upper arm BP cuff   Home BP monitoring keep a log,sit for 5 min prior to taking BP

## 2022-02-11 ENCOUNTER — OFFICE VISIT (OUTPATIENT)
Dept: FAMILY MEDICINE CLINIC | Facility: CLINIC | Age: 62
End: 2022-02-11
Payer: COMMERCIAL

## 2022-02-11 VITALS
SYSTOLIC BLOOD PRESSURE: 128 MMHG | DIASTOLIC BLOOD PRESSURE: 80 MMHG | HEIGHT: 66 IN | RESPIRATION RATE: 16 BRPM | HEART RATE: 76 BPM | BODY MASS INDEX: 35.53 KG/M2 | WEIGHT: 221.1 LBS

## 2022-02-11 DIAGNOSIS — M25.50 MULTIPLE JOINT PAIN: Primary | ICD-10-CM

## 2022-02-11 DIAGNOSIS — R79.82 CRP ELEVATED: ICD-10-CM

## 2022-02-11 DIAGNOSIS — R70.0 ELEVATED SED RATE: ICD-10-CM

## 2022-02-11 PROCEDURE — 99214 OFFICE O/P EST MOD 30 MIN: CPT | Performed by: PHYSICIAN ASSISTANT

## 2022-02-11 NOTE — PROGRESS NOTES
Assessment/Plan:    1  Multiple joint pain    - reviewed patients chart  Cannot find any rheum labs  Reviewed Er visit with patient  Will order this now and will refer to Dr Adela Edward for further evaluation and treatment  - Ambulatory Referral to Rheumatology; Future  - RF Screen w/ Reflex to Titer; Future  - Lyme Antibody Profile with reflex to WB; Future  - JESSY+KARRI+C3+C4+DNA/DS; Future  - HLA-B27 antigen; Future  - Sjogren's Antibodies; Future    2  Elevated sed rate    - Ambulatory Referral to Rheumatology; Future  - RF Screen w/ Reflex to Titer; Future  - Lyme Antibody Profile with reflex to WB; Future  - JESSY+KARRI+C3+C4+DNA/DS; Future  - HLA-B27 antigen; Future  - Sjogren's Antibodies; Future    3  CRP elevated    - Ambulatory Referral to Rheumatology; Future  - RF Screen w/ Reflex to Titer; Future  - Lyme Antibody Profile with reflex to WB; Future  - JESSY+KARRI+C3+C4+DNA/DS; Future  - HLA-B27 antigen; Future  - Sjogren's Antibodies; Future    F/u as needed      Subjective:   Chief Complaint   Patient presents with    Generalized Body Aches      Patient ID: Jacinto Mercer is a 63 y o  female  Patient here for generalized aches and pains  Has been months now, cannot even get out of bed the other day the pain was so bad  Late to work and missing work because of this  Saw a rheumatologist at [facility]) who did not blood work and told her her CRP was elevated due to her colitis and to see Dr Rohan Wu from GI said it is not her colitis  Patient can barely get up and down the stairs  Can not tolerate Cymbalta  Recently, chest rib pain so bad was thought to be cardiac in nature and was sent to ER, labs, work up negative  Is seeing cardiology for a stress test next week to be thorough  In the past year has multiple medical issues begin  Had MI, dx type 2 DM insulin dependent  Is working on quitting smoking  Follows with GI for colitis         The following portions of the patient's history were reviewed and updated as appropriate: allergies, current medications, past family history, past medical history, past social history, past surgical history and problem list     Past Medical History:   Diagnosis Date    Coronary artery disease     [facility]'s colitis ([facility] [address] [facility]     [facility] mellitus [facility]     Fibromyalgia     Gastric ulcer     [facility] GI SURGEON    HTN (hypertension)     Hyperlipidemia     IBD (inflammatory bowel disease) 11/2015    [facility] GI SURGEON    Migraine     Myocardial infarction [facility])      Past Surgical History:   Procedure Laterality Date    BREAST BIOPSY Left     benign    BREAST BIOPSY Left 10/27/2021    Stereo    CARDIAC CATHETERIZATION      CHOLECYSTECTOMY      COLONOSCOPY  12/03/2018    internal hemorrhoids, 3mm tubular adenoma polyp transverse colon, bx negative for dysplasia    COLONOSCOPY  11/2015    CORONARY STENT PLACEMENT      DENTAL SURGERY      [place] teeth extraction    EGD  12/2018    2cm hiatal hernia, gastritis bx shows foci of intestinal metaplasia, negative Hpylori    EGD  2015    ESOPHAGITIS/MALLHIATUS HERNIA  FOUND    HYSTERECTOMY      MAMMO STEREOTACTIC BREAST BIOPSY LEFT (ALL INC) Left 10/27/2021    TUBAL LIGATION      Bilateral     Family History   Problem Relation Age of Onset    Coronary artery disease Mother     Dementia Mother     Stroke Mother     No Known Problems Father     Cervical cancer Sister 36    Heart disease Brother     No Known Problems Maternal Grandmother     No Known Problems Maternal Grandfather     No Known Problems Paternal Grandmother     No Known Problems Paternal Grandfather     Alcohol abuse Neg Hx     Substance Abuse Neg Hx      Social History     Socioeconomic History    Marital status: /Civil Union     Spouse name: Not on file    Number of children: Not on file    Years of education: Not on file    Highest education level: Not on file   Occupational History    Occupation: Coding   Tobacco Use    Smoking status: Current Every Day Smoker     Packs/day: 0 50     Types: Cigarettes    Smokeless tobacco: Never Used   Vaping Use    Vaping Use: Never used   Substance and Sexual Activity    Alcohol use: Not Currently     Comment: Rarely    Drug use: Never    Sexual activity: Not Currently     Partners: Male   Other Topics Concern    Not on file   Social History Narrative    Not on file     Social Determinants of Health     Financial Resource Strain: Not on file   Food Insecurity: Not on file   Transportation Needs: Not on file   Physical Activity: Not on file   Stress: Not on file   Social Connections: Not on file   Intimate Partner Violence: Not on file   Housing Stability: Not on file       Current Outpatient Medications:     acetaminophen (TYLENOL) 500 mg tablet, Take 1,000 mg by mouth every 6 (six) hours as needed for mild pain, Disp: , Rfl:     Alcohol Swabs 70 % PADS, Use 4 per day, Disp: 400 each, Rfl: 3    ALPRAZolam (XANAX) 0 25 mg tablet, Take 1 tablet (0 25 mg total) by mouth 4 (four) times a day as needed for anxiety, Disp: 15 tablet, Rfl: 0    aspirin (ECOTRIN LOW STRENGTH) 81 mg EC tablet, Take 1 tablet (81 mg total) by mouth daily, Disp: 90 tablet, Rfl: 3    carvedilol (COREG) 12 5 mg tablet, Take 1 tablet (12 5 mg total) by mouth every 12 (twelve) hours, Disp: 180 tablet, Rfl: 3    cholecalciferol (VITAMIN D3) 1,000 units tablet, Take 1 tablet (1,000 Units total) by mouth daily (Patient taking differently: Take 1,000 Units by mouth every 14 (fourteen) days  ), Disp: 90 tablet, Rfl: 3    Continuous Blood Gluc  (Dexcom G6 ) CRISTINA, Disp 1 , Disp: 1 Device, Rfl: 1    Continuous Blood Gluc Sensor (Dexcom G6 Sensor) MISC, Use 1 sensor every 10 days, disp 90 day supply, Disp: 9 each, Rfl: 3    Continuous Blood Gluc Transmit (Dexcom G6 Transmitter) MISC, Use daily as directed for CGM - Change every 3 months, Disp: 1 each, Rfl: 3    dicyclomine (BENTYL) 10 mg capsule, Take 1 capsule (10 mg total) by mouth 3 (three) times a day as needed (abd cramping and/or diarrhea), Disp: 90 capsule, Rfl: 1    Evolocumab 140 MG/ML SOAJ, Inject one 1 ml pen every 14 days, Disp: 2 mL, Rfl: 5    ezetimibe (ZETIA) 10 mg tablet, Take 1 tablet (10 mg total) by mouth daily, Disp: 90 tablet, Rfl: 3    hyoscyamine (LEVSIN/SL) 0 125 mg SL tablet, Take 1 tablet (0 125 mg total) by mouth 4 (four) times a day, Disp: 120 tablet, Rfl: 5    insulin aspart (NovoLOG FlexPen) 100 UNIT/ML injection pen, 6 units before lunch and dinner plus additional if needed according to sliding scale , Disp: 15 mL, Rfl: 3    insulin degludec Rubi Moh FlexTouch) 100 units/mL injection pen, Inject 25 Units under the skin daily, Disp: 30 mL, Rfl: 3    Insulin Pen Needle (Pen Needles) 32G X 5 MM MISC, Use 4 per day, Disp: 360 each, Rfl: 1    Lidocaine Viscous HCl (XYLOCAINE) 2 % mucosal solution, Swish and swallow 10 mL 4 (four) times a day as needed for mouth pain or discomfort, Disp: 100 mL, Rfl: 1    losartan (COZAAR) 25 mg tablet, Take 1 tablet (25 mg total) by mouth daily, Disp: 90 tablet, Rfl: 0    mesalamine (LIALDA) 1 2 g EC tablet, Take 2 tablets (2 4 g total) by mouth daily with breakfast (Patient taking differently: Take 2,400 mg by mouth as needed  ), Disp: 60 tablet, Rfl: 2    ondansetron (ZOFRAN-ODT) 4 mg disintegrating tablet, Take 1 tablet (4 mg total) by mouth every 8 (eight) hours as needed for nausea or vomiting, Disp: 12 tablet, Rfl: 0    pantoprazole (PROTONIX) 40 mg tablet, Take 1 tablet (40 mg total) by mouth 2 (two) times a day, Disp: 180 tablet, Rfl: 3    Sodium Fluoride 5000 PPM 1 1 % PSTE, , Disp: , Rfl:     sucralfate (CARAFATE) 1 g/10 mL suspension, Take 10 mL (1 g total) by mouth 3 (three) times a day (Patient taking differently: Take 1 g by mouth as needed  ), Disp: 420 mL, Rfl: 1    ticagrelor (BRILINTA) 90 MG, Take 1 tablet (90 mg total) by mouth every 12 (twelve) hours, Disp: 180 tablet, Rfl: 0    Review of Systems          Objective:    Vitals:    02/11/22 1349   BP: 128/80   BP Location: Left arm   Patient Position: Sitting   Cuff Size: Standard   Pulse: 76   Resp: 16   Weight: 100 kg (221 lb 1 6 oz)   Height: 5' 6" (1 676 m)        Physical Exam  Constitutional:       Appearance: Normal appearance  HENT:      Head: Normocephalic and atraumatic  Musculoskeletal:      Comments: Walking with obvious discomfort, difficulty moving positions   Neurological:      General: No focal deficit present  Mental Status: She is alert and oriented to person, place, and time  Psychiatric:         Mood and Affect: Mood normal          Behavior: Behavior normal          Thought Content:  Thought content normal          Judgment: Judgment normal

## 2022-02-17 ENCOUNTER — APPOINTMENT (OUTPATIENT)
Dept: LAB | Facility: CLINIC | Age: 62
End: 2022-02-17
Payer: COMMERCIAL

## 2022-02-17 DIAGNOSIS — M25.50 MULTIPLE JOINT PAIN: ICD-10-CM

## 2022-02-17 DIAGNOSIS — R79.82 CRP ELEVATED: ICD-10-CM

## 2022-02-17 DIAGNOSIS — R70.0 ELEVATED SED RATE: ICD-10-CM

## 2022-02-17 LAB
C3 SERPL-MCNC: 148 MG/DL (ref 90–180)
C4 SERPL-MCNC: 33 MG/DL (ref 10–40)

## 2022-02-17 PROCEDURE — 81374 HLA I TYPING 1 ANTIGEN LR: CPT

## 2022-02-17 PROCEDURE — 86235 NUCLEAR ANTIGEN ANTIBODY: CPT

## 2022-02-17 PROCEDURE — 86038 ANTINUCLEAR ANTIBODIES: CPT

## 2022-02-17 PROCEDURE — 86430 RHEUMATOID FACTOR TEST QUAL: CPT

## 2022-02-17 PROCEDURE — 36415 COLL VENOUS BLD VENIPUNCTURE: CPT

## 2022-02-17 PROCEDURE — 86618 LYME DISEASE ANTIBODY: CPT

## 2022-02-17 PROCEDURE — 86160 COMPLEMENT ANTIGEN: CPT

## 2022-02-17 PROCEDURE — 86225 DNA ANTIBODY NATIVE: CPT

## 2022-02-18 ENCOUNTER — HOSPITAL ENCOUNTER (OUTPATIENT)
Dept: NON INVASIVE DIAGNOSTICS | Facility: CLINIC | Age: 62
Discharge: HOME | End: 2022-02-18
Payer: COMMERCIAL

## 2022-02-18 VITALS — BODY MASS INDEX: 35.52 KG/M2 | WEIGHT: 221 LBS | HEIGHT: 66 IN

## 2022-02-18 DIAGNOSIS — R07.9 CHEST PAIN, UNSPECIFIED TYPE: ICD-10-CM

## 2022-02-18 LAB
B BURGDOR IGG+IGM SER-ACNC: 42
CHEST PAIN STATEMENT: NORMAL
DSDNA AB SER-ACNC: <1 IU/ML (ref 0–9)
ENA RNP AB SER-ACNC: <0.2 AI (ref 0–0.9)
ENA SM AB SER-ACNC: <0.2 AI (ref 0–0.9)
ENA SS-A AB SER-ACNC: <0.2 AI (ref 0–0.9)
ENA SS-B AB SER-ACNC: <0.2 AI (ref 0–0.9)
MAX DIASTOLIC BP: 82 MMHG
MAX HEART RATE: 121 BPM
MAX HR PERCENT: 75 %
MAX PREDICTED HEART RATE: 160 BPM
MAX. SYSTOLIC BP: 150 MMHG
NUC STRESS EJECTION FRACTION: 40 %
PROTOCOL NAME: NORMAL
RATE PRESSURE PRODUCT: NORMAL
REASON FOR TERMINATION: NORMAL
RHEUMATOID FACT SER QL LA: NEGATIVE
RYE IGE QN: NEGATIVE
SL CV REST NUCLEAR ISOTOPE DOSE: 10.29 MCI
SL CV STRESS NUCLEAR ISOTOPE DOSE: 32.1 MCI
SL CV STRESS RECOVERY BP: NORMAL MMHG
SL CV STRESS RECOVERY HR: 89 BPM
SL CV STRESS RECOVERY O2 SAT: 98 %
STRESS ANGINA INDEX: 2
STRESS BASELINE BP: NORMAL MMHG
STRESS BASELINE HR: 74 BPM
STRESS DUKE TREADMILL SCORE: -5
STRESS O2 SAT REST: 98 %
STRESS PEAK HR: 121 BPM
STRESS PERCENT HR: 75 %
STRESS POST ESTIMATED WORKLOAD: 1.6 METS
STRESS POST EXERCISE DUR MIN: 3 MIN
STRESS POST EXERCISE DUR SEC: 0 SEC
STRESS POST O2 SAT PEAK: 98 %
STRESS POST PEAK BP: 148 MMHG
STRESS ST DEPRESSION: 0 MM
STRESS TARGET HR: 121 BPM
STRESS/REST PERFUSION RATIO: 1.01
TARGET HR FORMULA: NORMAL
TEST INDICATION: NORMAL
TIME IN EXERCISE PHASE: NORMAL

## 2022-02-18 PROCEDURE — G1004 CDSM NDSC: HCPCS

## 2022-02-18 PROCEDURE — A9502 TC99M TETROFOSMIN: HCPCS

## 2022-02-18 PROCEDURE — 93017 CV STRESS TEST TRACING ONLY: CPT

## 2022-02-18 PROCEDURE — 78452 HT MUSCLE IMAGE SPECT MULT: CPT

## 2022-02-18 PROCEDURE — 78452 HT MUSCLE IMAGE SPECT MULT: CPT | Performed by: INTERNAL MEDICINE

## 2022-02-18 PROCEDURE — 93016 CV STRESS TEST SUPVJ ONLY: CPT | Performed by: INTERNAL MEDICINE

## 2022-02-18 PROCEDURE — 93018 CV STRESS TEST I&R ONLY: CPT | Performed by: INTERNAL MEDICINE

## 2022-02-18 RX ADMIN — REGADENOSON 0.4 MG: 0.08 INJECTION, SOLUTION INTRAVENOUS at 10:37

## 2022-02-21 ENCOUNTER — PREP FOR PROCEDURE (OUTPATIENT)
Dept: CARDIOLOGY CLINIC | Facility: CLINIC | Age: 62
End: 2022-02-21

## 2022-02-21 DIAGNOSIS — R94.39 ABNORMAL STRESS TEST: Primary | ICD-10-CM

## 2022-02-22 ENCOUNTER — TELEPHONE (OUTPATIENT)
Dept: CARDIOLOGY CLINIC | Facility: CLINIC | Age: 62
End: 2022-02-22

## 2022-02-22 DIAGNOSIS — F32.9 REACTIVE DEPRESSION (SITUATIONAL): ICD-10-CM

## 2022-02-22 DIAGNOSIS — I25.10 CORONARY ARTERY DISEASE: ICD-10-CM

## 2022-02-22 DIAGNOSIS — I21.11 ACUTE ST ELEVATION MYOCARDIAL INFARCTION (STEMI) DUE TO OCCLUSION OF MID PORTION OF RIGHT CORONARY ARTERY (HCC): ICD-10-CM

## 2022-02-22 DIAGNOSIS — E78.2 MODERATE MIXED HYPERLIPIDEMIA NOT REQUIRING STATIN THERAPY: ICD-10-CM

## 2022-02-22 DIAGNOSIS — I25.10 CORONARY ARTERY DISEASE INVOLVING NATIVE CORONARY ARTERY: ICD-10-CM

## 2022-02-22 DIAGNOSIS — I21.11 ACUTE ST ELEVATION MYOCARDIAL INFARCTION (STEMI) DUE TO OCCLUSION OF DISTAL PORTION OF RIGHT CORONARY ARTERY (HCC): ICD-10-CM

## 2022-02-22 RX ORDER — ASPIRIN 81 MG/1
81 TABLET ORAL DAILY
Qty: 90 TABLET | Refills: 0 | Status: SHIPPED | OUTPATIENT
Start: 2022-02-22 | End: 2022-07-04 | Stop reason: SDUPTHER

## 2022-02-22 RX ORDER — ALPRAZOLAM 0.25 MG/1
0.25 TABLET ORAL 4 TIMES DAILY PRN
Qty: 15 TABLET | Refills: 0 | Status: SHIPPED | OUTPATIENT
Start: 2022-02-22 | End: 2022-03-15 | Stop reason: SDUPTHER

## 2022-02-22 NOTE — TELEPHONE ENCOUNTER
Patient scheduled for [facility] on 3/7/22 in [facility] with Dr Jaime Hubbard  Instructions sent to patient through [address]  Medication hold Odilia Serrano and Nov[employer]  Can I have auth?

## 2022-02-24 ENCOUNTER — PREP FOR PROCEDURE (OUTPATIENT)
Dept: CARDIOLOGY CLINIC | Facility: CLINIC | Age: 62
End: 2022-02-24

## 2022-02-24 ENCOUNTER — APPOINTMENT (OUTPATIENT)
Dept: LAB | Facility: HOSPITAL | Age: 62
End: 2022-02-24
Payer: COMMERCIAL

## 2022-02-24 DIAGNOSIS — I25.119 CORONARY ARTERY DISEASE INVOLVING NATIVE CORONARY ARTERY WITH ANGINA PECTORIS, UNSPECIFIED WHETHER NATIVE OR TRANSPLANTED HEART (HCC): ICD-10-CM

## 2022-02-24 DIAGNOSIS — I25.119 CORONARY ARTERY DISEASE INVOLVING NATIVE CORONARY ARTERY WITH ANGINA PECTORIS, UNSPECIFIED WHETHER NATIVE OR TRANSPLANTED HEART (HCC): Primary | ICD-10-CM

## 2022-02-24 LAB
ALBUMIN SERPL BCP-MCNC: 3.3 G/DL (ref 3.5–5)
ALP SERPL-CCNC: 109 U/L (ref 46–116)
ALT SERPL W P-5'-P-CCNC: 11 U/L (ref 12–78)
ANION GAP SERPL CALCULATED.3IONS-SCNC: 7 MMOL/L (ref 4–13)
AST SERPL W P-5'-P-CCNC: 10 U/L (ref 5–45)
BASOPHILS # BLD AUTO: 0.06 THOUSANDS/ΜL (ref 0–0.1)
BASOPHILS NFR BLD AUTO: 1 % (ref 0–1)
BILIRUB SERPL-MCNC: 0.37 MG/DL (ref 0.2–1)
BUN SERPL-MCNC: 12 MG/DL (ref 5–25)
CALCIUM ALBUM COR SERPL-MCNC: 10.2 MG/DL (ref 8.3–10.1)
CALCIUM SERPL-MCNC: 9.6 MG/DL (ref 8.3–10.1)
CHLORIDE SERPL-SCNC: 105 MMOL/L (ref 100–108)
CO2 SERPL-SCNC: 24 MMOL/L (ref 21–32)
CREAT SERPL-MCNC: 0.8 MG/DL (ref 0.6–1.3)
EOSINOPHIL # BLD AUTO: 0.24 THOUSAND/ΜL (ref 0–0.61)
EOSINOPHIL NFR BLD AUTO: 3 % (ref 0–6)
ERYTHROCYTE [DISTWIDTH] IN BLOOD BY AUTOMATED COUNT: 13.4 % (ref 11.6–15.1)
GFR SERPL CREATININE-BSD FRML MDRD: 80 ML/MIN/1.73SQ M
GLUCOSE SERPL-MCNC: 170 MG/DL (ref 65–140)
HCT VFR BLD AUTO: 40.3 % (ref 34.8–46.1)
HGB BLD-MCNC: 13 G/DL (ref 11.5–15.4)
HLA-B27 QL NAA+PROBE: NEGATIVE
IMM GRANULOCYTES # BLD AUTO: 0.02 THOUSAND/UL (ref 0–0.2)
IMM GRANULOCYTES NFR BLD AUTO: 0 % (ref 0–2)
LYMPHOCYTES # BLD AUTO: 2.17 THOUSANDS/ΜL (ref 0.6–4.47)
LYMPHOCYTES NFR BLD AUTO: 26 % (ref 14–44)
MCH RBC QN AUTO: 29.4 PG (ref 26.8–34.3)
MCHC RBC AUTO-ENTMCNC: 32.3 G/DL (ref 31.4–37.4)
MCV RBC AUTO: 91 FL (ref 82–98)
MONOCYTES # BLD AUTO: 0.51 THOUSAND/ΜL (ref 0.17–1.22)
MONOCYTES NFR BLD AUTO: 6 % (ref 4–12)
NEUTROPHILS # BLD AUTO: 5.44 THOUSANDS/ΜL (ref 1.85–7.62)
NEUTS SEG NFR BLD AUTO: 64 % (ref 43–75)
NRBC BLD AUTO-RTO: 0 /100 WBCS
PLATELET # BLD AUTO: 317 THOUSANDS/UL (ref 149–390)
PMV BLD AUTO: 9.3 FL (ref 8.9–12.7)
POTASSIUM SERPL-SCNC: 4.3 MMOL/L (ref 3.5–5.3)
PROT SERPL-MCNC: 7.1 G/DL (ref 6.4–8.2)
RBC # BLD AUTO: 4.42 MILLION/UL (ref 3.81–5.12)
SODIUM SERPL-SCNC: 136 MMOL/L (ref 136–145)
WBC # BLD AUTO: 8.44 THOUSAND/UL (ref 4.31–10.16)

## 2022-02-24 PROCEDURE — 80053 COMPREHEN METABOLIC PANEL: CPT

## 2022-02-24 PROCEDURE — 36415 COLL VENOUS BLD VENIPUNCTURE: CPT

## 2022-02-24 PROCEDURE — 85025 COMPLETE CBC W/AUTO DIFF WBC: CPT

## 2022-03-04 RX ORDER — ASPIRIN 81 MG/1
324 TABLET, CHEWABLE ORAL ONCE
Status: CANCELLED | OUTPATIENT
Start: 2022-03-04 | End: 2022-03-04

## 2022-03-04 RX ORDER — SODIUM CHLORIDE 9 MG/ML
125 INJECTION, SOLUTION INTRAVENOUS CONTINUOUS
Status: CANCELLED | OUTPATIENT
Start: 2022-03-04

## 2022-03-07 ENCOUNTER — HOSPITAL ENCOUNTER (OUTPATIENT)
Facility: HOSPITAL | Age: 62
Setting detail: OUTPATIENT SURGERY
Discharge: HOME | End: 2022-03-07
Attending: INTERNAL MEDICINE | Admitting: INTERNAL MEDICINE
Payer: COMMERCIAL

## 2022-03-07 VITALS
HEIGHT: 66 IN | WEIGHT: 220.46 LBS | RESPIRATION RATE: 16 BRPM | DIASTOLIC BLOOD PRESSURE: 72 MMHG | TEMPERATURE: 97.4 F | OXYGEN SATURATION: 98 % | SYSTOLIC BLOOD PRESSURE: 124 MMHG | BODY MASS INDEX: 35.43 KG/M2 | HEART RATE: 67 BPM

## 2022-03-07 DIAGNOSIS — F17.209 TOBACCO USE DISORDER, CONTINUOUS: Chronic | ICD-10-CM

## 2022-03-07 DIAGNOSIS — Z98.61 CAD S/P PERCUTANEOUS CORONARY ANGIOPLASTY: Primary | ICD-10-CM

## 2022-03-07 DIAGNOSIS — I25.10 CAD S/P PERCUTANEOUS CORONARY ANGIOPLASTY: Primary | ICD-10-CM

## 2022-03-07 DIAGNOSIS — R94.39 ABNORMAL STRESS TEST: ICD-10-CM

## 2022-03-07 LAB
ATRIAL RATE: 60 BPM
GLUCOSE SERPL-MCNC: 124 MG/DL (ref 65–140)
KCT BLD-ACNC: 341 SEC (ref 89–137)
P AXIS: 55 DEGREES
PR INTERVAL: 158 MS
QRS AXIS: 41 DEGREES
QRSD INTERVAL: 96 MS
QT INTERVAL: 448 MS
QTC INTERVAL: 448 MS
SPECIMEN SOURCE: ABNORMAL
T WAVE AXIS: 22 DEGREES
VENTRICULAR RATE: 60 BPM

## 2022-03-07 PROCEDURE — 82948 REAGENT STRIP/BLOOD GLUCOSE: CPT

## 2022-03-07 PROCEDURE — C1725 CATH, TRANSLUMIN NON-LASER: HCPCS | Performed by: INTERNAL MEDICINE

## 2022-03-07 PROCEDURE — C1894 INTRO/SHEATH, NON-LASER: HCPCS | Performed by: INTERNAL MEDICINE

## 2022-03-07 PROCEDURE — C1769 GUIDE WIRE: HCPCS | Performed by: INTERNAL MEDICINE

## 2022-03-07 PROCEDURE — 99152 MOD SED SAME PHYS/QHP 5/>YRS: CPT | Performed by: INTERNAL MEDICINE

## 2022-03-07 PROCEDURE — C1887 CATHETER, GUIDING: HCPCS | Performed by: INTERNAL MEDICINE

## 2022-03-07 PROCEDURE — NC001 PR NO CHARGE: Performed by: INTERNAL MEDICINE

## 2022-03-07 PROCEDURE — 92928 PRQ TCAT PLMT NTRAC ST 1 LES: CPT | Performed by: INTERNAL MEDICINE

## 2022-03-07 PROCEDURE — 93010 ELECTROCARDIOGRAM REPORT: CPT | Performed by: INTERNAL MEDICINE

## 2022-03-07 PROCEDURE — 85347 COAGULATION TIME ACTIVATED: CPT

## 2022-03-07 PROCEDURE — 93454 CORONARY ARTERY ANGIO S&I: CPT | Performed by: INTERNAL MEDICINE

## 2022-03-07 PROCEDURE — C1874 STENT, COATED/COV W/DEL SYS: HCPCS | Performed by: INTERNAL MEDICINE

## 2022-03-07 PROCEDURE — C9600 PERC DRUG-EL COR STENT SING: HCPCS | Performed by: INTERNAL MEDICINE

## 2022-03-07 PROCEDURE — 99153 MOD SED SAME PHYS/QHP EA: CPT | Performed by: INTERNAL MEDICINE

## 2022-03-07 DEVICE — STENT RONYX30018UX RESOLUTE ONYX 3.00X18
Type: IMPLANTABLE DEVICE | Status: FUNCTIONAL
Brand: RESOLUTE ONYX™

## 2022-03-07 RX ORDER — LIDOCAINE HYDROCHLORIDE 10 MG/ML
INJECTION, SOLUTION EPIDURAL; INFILTRATION; INTRACAUDAL; PERINEURAL AS NEEDED
Status: DISCONTINUED | OUTPATIENT
Start: 2022-03-07 | End: 2022-03-07 | Stop reason: HOSPADM

## 2022-03-07 RX ORDER — DICYCLOMINE HYDROCHLORIDE 10 MG/1
10 CAPSULE ORAL 3 TIMES DAILY PRN
Status: DISCONTINUED | OUTPATIENT
Start: 2022-03-07 | End: 2022-03-07 | Stop reason: HOSPADM

## 2022-03-07 RX ORDER — ASPIRIN 81 MG/1
81 TABLET ORAL DAILY
Status: DISCONTINUED | OUTPATIENT
Start: 2022-03-08 | End: 2022-03-07 | Stop reason: HOSPADM

## 2022-03-07 RX ORDER — NITROGLYCERIN 20 MG/100ML
INJECTION INTRAVENOUS AS NEEDED
Status: DISCONTINUED | OUTPATIENT
Start: 2022-03-07 | End: 2022-03-07 | Stop reason: HOSPADM

## 2022-03-07 RX ORDER — HEPARIN SODIUM 1000 [USP'U]/ML
INJECTION, SOLUTION INTRAVENOUS; SUBCUTANEOUS AS NEEDED
Status: DISCONTINUED | OUTPATIENT
Start: 2022-03-07 | End: 2022-03-07 | Stop reason: HOSPADM

## 2022-03-07 RX ORDER — CARVEDILOL 12.5 MG/1
12.5 TABLET ORAL EVERY 12 HOURS SCHEDULED
Status: DISCONTINUED | OUTPATIENT
Start: 2022-03-07 | End: 2022-03-07 | Stop reason: HOSPADM

## 2022-03-07 RX ORDER — FENTANYL CITRATE 50 UG/ML
INJECTION, SOLUTION INTRAMUSCULAR; INTRAVENOUS AS NEEDED
Status: DISCONTINUED | OUTPATIENT
Start: 2022-03-07 | End: 2022-03-07 | Stop reason: HOSPADM

## 2022-03-07 RX ORDER — EZETIMIBE 10 MG/1
10 TABLET ORAL DAILY
Status: DISCONTINUED | OUTPATIENT
Start: 2022-03-08 | End: 2022-03-07 | Stop reason: HOSPADM

## 2022-03-07 RX ORDER — SODIUM CHLORIDE 9 MG/ML
125 INJECTION, SOLUTION INTRAVENOUS CONTINUOUS
Status: DISPENSED | OUTPATIENT
Start: 2022-03-07 | End: 2022-03-07

## 2022-03-07 RX ORDER — ALPRAZOLAM 0.25 MG/1
0.25 TABLET ORAL 4 TIMES DAILY PRN
Status: DISCONTINUED | OUTPATIENT
Start: 2022-03-07 | End: 2022-03-07 | Stop reason: HOSPADM

## 2022-03-07 RX ORDER — LOSARTAN POTASSIUM 25 MG/1
25 TABLET ORAL DAILY
Status: DISCONTINUED | OUTPATIENT
Start: 2022-03-08 | End: 2022-03-07 | Stop reason: HOSPADM

## 2022-03-07 RX ORDER — ACETAMINOPHEN 325 MG/1
650 TABLET ORAL EVERY 4 HOURS PRN
Status: DISCONTINUED | OUTPATIENT
Start: 2022-03-07 | End: 2022-03-07 | Stop reason: HOSPADM

## 2022-03-07 RX ORDER — INSULIN GLARGINE 100 [IU]/ML
25 INJECTION, SOLUTION SUBCUTANEOUS EVERY MORNING
Status: DISCONTINUED | OUTPATIENT
Start: 2022-03-07 | End: 2022-03-07 | Stop reason: HOSPADM

## 2022-03-07 RX ORDER — NITROGLYCERIN 0.4 MG/1
0.4 TABLET SUBLINGUAL
Status: DISCONTINUED | OUTPATIENT
Start: 2022-03-07 | End: 2022-03-07 | Stop reason: HOSPADM

## 2022-03-07 RX ORDER — ONDANSETRON 2 MG/ML
4 INJECTION INTRAMUSCULAR; INTRAVENOUS EVERY 6 HOURS PRN
Status: DISCONTINUED | OUTPATIENT
Start: 2022-03-07 | End: 2022-03-07 | Stop reason: HOSPADM

## 2022-03-07 RX ORDER — MIDAZOLAM HYDROCHLORIDE 2 MG/2ML
INJECTION, SOLUTION INTRAMUSCULAR; INTRAVENOUS AS NEEDED
Status: DISCONTINUED | OUTPATIENT
Start: 2022-03-07 | End: 2022-03-07 | Stop reason: HOSPADM

## 2022-03-07 RX ADMIN — ACETAMINOPHEN 650 MG: 325 TABLET, FILM COATED ORAL at 13:21

## 2022-03-07 NOTE — LETTER
[address] [place] [address],[address] LAB  [address] [facility] Abhinav [place] [address]  Dept: [phone]    March 7, 2022     Patient: Malia Claudia   YOB: 1960   Date of Visit: 2/22/2022       To Whom it May Concern:    Karla Mancia is under my professional care  She was seen in the hospital from 2/22/2022   to 03/07/22  She may return to work on 3/14/22 without limitations  If you have any questions or concerns, please don't hesitate to call           Sincerely,          Faythe Hatchet, CRNP

## 2022-03-07 NOTE — INTERVAL H&P NOTE
Prior H&P reviewed  Patient seen and examined  /85   Pulse 84   Temp (!) 97 2 °F (36 2 °C) (Tympanic)   Resp 16   Ht 5' 6" (1 676 m)   Wt 100 kg (220 lb 7 4 oz)   SpO2 98%   BMI 35 58 kg/m²      After examining the patient, I find no changes to the H&P since it has been written       Accept for today's history and physical

## 2022-03-07 NOTE — LETTER
[address] [place] [address],[place] [address] CATH LAB  [address] [facility] [address]  Dept: [phone]    March 7, 2022     Patient: Indra Walsh   YOB: 1960   Date of Visit: 2/22/2022       To Whom it May Concern:    Angelo Andino is under my professional care  She was seen in the hospital from 2/22/2022   to 03/07/22  She may return to work on 3/14/22  without limitations  If you have any questions or concerns, please don't hesitate to call           Sincerely,          MERI Bonilla  [facility]

## 2022-03-07 NOTE — INTERVAL H&P NOTE
/85   Pulse 84   Temp (!) 97 2 °F (36 2 °C) (Tympanic)   Resp 16   Ht 5' 6" (1 676 m)   Wt 100 kg (220 lb 7 4 oz)   SpO2 98%   BMI 35 58 kg/m²    H&P reviewed  After examining the patient, I find no changed to the H&P since it had been written  Patient re-evaluated   Accept as history and physical     Familia Hill MD/March 7, 2022/7:23 AM

## 2022-03-07 NOTE — DISCHARGE INSTRUCTIONS
After Coronary Angioplasty and Intravascular Stent Placement   AMBULATORY CARE:   After coronary angioplasty and intravascular stent placement,  you will need to move carefully for 48 hours  Your healthcare provider will give you specific activity instructions to follow while you heal  You will also need to care for your procedure wound to prevent infection and help it heal   Call 911 for any of the following:   · You have any of the following signs of a heart attack:      ? Squeezing, pressure, or pain in your chest    ? You may  also have any of the following:     § Discomfort or pain in your back, neck, jaw, stomach, or arm    § Shortness of breath    § Nausea or vomiting    § Lightheadedness or a sudden cold sweat    · You have any of the following signs of a stroke:      ? Numbness or drooping on one side of your face     ? Weakness in an arm or leg    ? Confusion or difficulty speaking    ? Dizziness, a severe headache, or vision loss    · You cough up blood  Seek care immediately if:   · Your arm or leg feels warm, tender, and painful  It may look swollen and red  · Your leg or arm becomes numb, or your fingers or toes turn white or blue  · The area where the catheter was placed is swollen, red, or has pus or foul-smelling fluid coming from it  · You start to bleed from your catheter site again  Contact your cardiologist if:   · You have a fever or chills  · You have questions or concerns about your condition or care  Medicines: You may be given any of the following:  · Antiplatelets  prevent blood clots from forming  You will need to take aspirin and another type of antiplatelet medicine  Take this medicine daily as directed  Do not stop taking aspirin or other type of antiplatelet medicine without asking your healthcare provider  · Cholesterol medicine  helps decrease the amount of cholesterol in your blood  Too much cholesterol in your blood may cause plaque buildup  · Blood pressure medicine  lowers your blood pressure  · Take your medicine as directed  Contact your healthcare provider if you think your medicine is not helping or if you have side effects  Tell him or her if you are allergic to any medicine  Keep a list of the medicines, vitamins, and herbs you take  Include the amounts, and when and why you take them  Bring the list or the pill bottles to follow-up visits  Carry your medicine list with you in case of an emergency  Drink liquids as directed:  Drink extra liquids if contrast liquid was used during your procedure  Liquid will help flush the contrast out of your body  Ask your healthcare provider how much liquid to drink each day and which liquids are best for you  Activity:   · Rest for 1 or 2 days after your procedure  If you had a heart attack, you may need to rest longer  · Increase activity slowly until you reach your normal level of activity  Limits after groin insertion: The following will reduce pressure on your catheter site and prevent bleeding:  · Do not lift more than 10 pounds for 1 week  · Do not strain to have a bowel movement  · Avoid intense exercise for 2 to 4 weeks  · If you need to cough, support the catheter site area with your hand  · Ask your healthcare provider how long these limits should last     Limits after wrist insertion: The following will reduce pressure on your catheter site and prevent bleeding:  · Do not use your wrist to lift more than 2 pounds  · Avoid activities that use your wrist, such as tennis, bowling, and golf  · Do not push or pull items  · If you need to cough, support the catheter site area with your hand  · Ask your healthcare provider how long these limits should last     Wound care: Most bandages can be removed the day after your procedure  Gently clean the catheter site with soap and water daily  Do not rub it   Do not  soak in a tub, swimming pool, or hot tub until your healthcare provider says it is okay  Do not smoke:  Nicotine and other chemicals in cigarettes and cigars can cause heart damage  Ask your healthcare provider for information if you currently smoke and need help to quit  E-cigarettes or smokeless tobacco still contain nicotine  Talk to your healthcare provider before you use these products  Cardiac rehab:  Your cardiologist may recommend that you attend cardiac rehabilitation (rehab)  This is a program run by specialists who will help you safely strengthen your heart and reduce the risk for more heart disease  The plan includes exercise, relaxation, stress management, and heart-healthy nutrition  Healthcare providers will also check to make sure any medicines you are taking are working  Follow up with your cardiologist as directed: You may need more tests  If you need an MRI, wait at least 6 to 8 weeks after stent placement, or as directed  Write down your questions so you remember to ask them during your visits  © [employer] 2022 Information is for End User's use only and may not be sold, redistributed or otherwise used for commercial purposes  All illustrations and images included in CareNotes® are the copyrighted property of [employer]  or [facility] Shirley Gomez   The above information is an  only  It is not intended as medical advice for individual conditions or treatments  Talk to your doctor, nurse or pharmacist before following any medical regimen to see if it is safe and effective for you

## 2022-03-07 NOTE — DISCHARGE SUMMARY
Discharge Summary - Raj Davis 63 y o  female MRN: [id number]        Admission Date: 3/7/2022     Date of Discharge: 3/7/2022    Admitting Diagnosis: Abnormal stress test [R94 [id number]]    Secondary Diagnoses: Diabetes mellitus, Hyperlipidemia, Tobacco use    Discharge Diagnosis: Coronary artery disease    Cardiologist: Anaya Caraballo MD    Office Cardiologist: Laurel Weinstein MD    PCP: Koki Cobb [facility] Course: Ms Max Ortega presented for a cardiac catheterization for further evaluation of an abnormal outpatient pharmacologic perfusion study showing evidence of inferior/inferolateral myocardial ischemia, which was performed after she had an ED visit for chest pain in January  She was found to have significant (90%) stenosis of her distal RCA which was treated successfully with angioplasty and stenting  She has additional non-obstructive disease in the proximal circumflex and the LAD  She will be discharged with outpatient cardiology follow up  Procedures Performed:   Orders Placed This Encounter   Procedures    Cardiac catheterization       Complications: None    Discharge instructions/Information to patient and family:   See after visit summary for information provided to patient and family  Provisions for Follow-Up Care: Follow up appointment with outpatient cardiologist       Disposition: Home      Planned Readmission: No    Discharge Statement   I spent 20 minutes discharging the patient  This time was spent on the day of discharge  I had direct contact with the patient on the day of discharge  Additional documentation is required if more than 30 minutes were spent on discharge  Discharge Medications:  Please refer to medication discharge record for current medications  Beverly Felix

## 2022-03-07 NOTE — Clinical Note
The left coronary artery was injected and visualized  Multiple views of the injected vessel were taken  Pt c/o "alergy eyes"--[facility] given eye drops which aren't helping.--pt c/o red,tearing eyes x1 week

## 2022-03-07 NOTE — LETTER
[address] [facility] CATH LAB  [address] [facility]  [address] [facility] [address]  Dept: [phone]    March 7, 2022     Patient: Leland Curling   YOB: 1960   Date of Visit: 2/22/2022       To Whom it May Concern:    Daija Higgins is under my professional care  She was seen in the hospital from 2/22/2022   to 03/07/22  She may return to work on 3/14/22 without limitations  If you have any questions or concerns, please don't hesitate to call           Sincerely,          Julius Bee, [address] [place]  [phone]

## 2022-03-15 DIAGNOSIS — I21.11 ACUTE ST ELEVATION MYOCARDIAL INFARCTION (STEMI) DUE TO OCCLUSION OF DISTAL PORTION OF RIGHT CORONARY ARTERY (HCC): ICD-10-CM

## 2022-03-15 DIAGNOSIS — I21.19 INFERIOR MI (HCC): ICD-10-CM

## 2022-03-15 DIAGNOSIS — I21.11 ACUTE ST ELEVATION MYOCARDIAL INFARCTION (STEMI) DUE TO OCCLUSION OF MID PORTION OF RIGHT CORONARY ARTERY (HCC): ICD-10-CM

## 2022-03-15 DIAGNOSIS — E78.2 MIXED HYPERLIPIDEMIA: ICD-10-CM

## 2022-03-15 DIAGNOSIS — E11.65 TYPE 2 DIABETES MELLITUS WITH HYPERGLYCEMIA, WITHOUT LONG-TERM CURRENT USE OF INSULIN (HCC): ICD-10-CM

## 2022-03-15 DIAGNOSIS — E11.65 UNCONTROLLED TYPE 2 DIABETES MELLITUS WITH HYPERGLYCEMIA (HCC): Chronic | ICD-10-CM

## 2022-03-15 RX ORDER — LOSARTAN POTASSIUM 25 MG/1
25 TABLET ORAL DAILY
Qty: 90 TABLET | Refills: 0 | Status: CANCELLED | OUTPATIENT
Start: 2022-03-15

## 2022-03-16 RX ORDER — LOSARTAN POTASSIUM 25 MG/1
25 TABLET ORAL DAILY
Qty: 90 TABLET | Refills: 2 | Status: SHIPPED | OUTPATIENT
Start: 2022-03-16 | End: 2022-07-04 | Stop reason: SDUPTHER

## 2022-03-16 RX ORDER — CARVEDILOL 12.5 MG/1
12.5 TABLET ORAL EVERY 12 HOURS SCHEDULED
Qty: 180 TABLET | Refills: 1 | Status: SHIPPED | OUTPATIENT
Start: 2022-03-16 | End: 2022-07-04 | Stop reason: SDUPTHER

## 2022-03-16 RX ORDER — BLOOD-GLUCOSE TRANSMITTER
EACH MISCELLANEOUS
Qty: 1 EACH | Refills: 0 | Status: SHIPPED | OUTPATIENT
Start: 2022-03-16 | End: 2022-08-01 | Stop reason: SDUPTHER

## 2022-03-17 ENCOUNTER — OFFICE VISIT (OUTPATIENT)
Dept: CARDIOLOGY CLINIC | Facility: CLINIC | Age: 62
End: 2022-03-17
Payer: COMMERCIAL

## 2022-03-17 VITALS
DIASTOLIC BLOOD PRESSURE: 72 MMHG | OXYGEN SATURATION: 98 % | WEIGHT: 223.3 LBS | HEIGHT: 66 IN | BODY MASS INDEX: 35.89 KG/M2 | HEART RATE: 80 BPM | SYSTOLIC BLOOD PRESSURE: 120 MMHG

## 2022-03-17 DIAGNOSIS — E11.00 TYPE 2 DIABETES MELLITUS WITH HYPEROSMOLARITY WITHOUT COMA, UNSPECIFIED WHETHER LONG TERM INSULIN USE (HCC): ICD-10-CM

## 2022-03-17 DIAGNOSIS — I25.10 CORONARY ARTERY DISEASE INVOLVING NATIVE CORONARY ARTERY OF NATIVE HEART WITHOUT ANGINA PECTORIS: Primary | ICD-10-CM

## 2022-03-17 DIAGNOSIS — E78.2 MIXED HYPERLIPIDEMIA: ICD-10-CM

## 2022-03-17 DIAGNOSIS — R07.9 CHEST PAIN: ICD-10-CM

## 2022-03-17 PROCEDURE — 99214 OFFICE O/P EST MOD 30 MIN: CPT | Performed by: STUDENT IN AN ORGANIZED HEALTH CARE EDUCATION/TRAINING PROGRAM

## 2022-03-17 RX ORDER — PREDNISONE 1 MG/1
20 TABLET ORAL DAILY
COMMUNITY

## 2022-03-17 NOTE — PROGRESS NOTES
General Cardiology - Outpatient Progress Note   Alisa Davis 63 y o  female   MRN: [id number]  @ Encounter: [id number]    Arian Villasenor PA-C  Consults      Risk factors:  -HTN  -HLD   -DM2  -class 2 obesity  -active tobacco use  -residual nonobstructive CAD      Interval History:   Since last encounter, patient had 2 ED visits for anginal equivalent which limited ambulation  Her troponins were normal therefore she was set up for outpatient follow-up in our office  She was seen by Nakul Lenz in early February who referred her for nuclear stress which was positive  She underwent PCI to the distal RCA  Since then, she had no recurrence of her symptoms  She is able to perform all work-related tasks and activities of daily living without limitation  She has no complaints  Her main issue has been tobacco cessation for which she hired a   She is down to 5 cigarettes a day with a quit date set for tomorrow 3/18  She is currently on prednisone for newly diagnosed polymyalgia rheumatica and her diabetes/fingersticks have been significantly elevated  Cardiac History Summary:   Estefany Graves is a 63y o  year old female, employed as a  for [employer], who routinely follows Janie Dominguez PA-C for her primary care needs        She has a history of 3-vessel coronary artery disease, inferior STEMI s/p RCA ZACHARY x2 (02/2021)  with subsequent staged LAD PCI (03/2021), residual nonobstructive disease, class 2 obesity with metabolic syndrome (HLD, DM2), active tobacco use disorder, angulated innominate artery, irritable bowel syndrome, Crohn's colitis and fibromyalgia      Her initial contact with [facility] was in February 2021 for inferior type 1 MI which required 2 drug-eluting stents to the mid /distal RCA   Transthoracic echocardiogram at that time revealed preserved biventricular function, basal to mid inferior akinesis and no significant valvulopathy   In March 2021, she underwent staged PCI to the mid LAD with 1 drug-eluting stent; there was a 60% proximal LAD which was not flow significant (DFR 0 93)  In February 2022, an ambulatory pharmacological nuclear stress test (ordered in setting of angina) demonstrated inferior and inferolateral ischemia  Elective cardiac catheterization in March 2022 showed focal distal RCA culprit lesion which was treated with a drug-eluting stent and patent LAD stent otherwise          Objective:  Review of Systems  Review of system was conducted and was negative except for as stated in the interval history      Physical Exam:  Vitals: not currently breastfeeding  , There is no height or weight on file to calculate BMI     GEN: Juan Manuel Bush appears well, alert and oriented x 3, pleasant and cooperative   HEENT:  Normocephalic, atraumatic, anicteric, regressing medial palpebral bilateral xanthelasma  NECK:  No JVD or carotid bruits    HEART:  Regular rhythm, normal rate, normal S1 and S2, no murmurs, clicks, gallops or rubs   LUNGS: Clear to auscultation bilaterally; no wheezes, rales, or rhonchi; respiration nonlabored   ABDOMEN:  Normoactive bowel sounds, soft, no tenderness, no distention  EXTREMITIES: radial pulses 2+ palpable; no edema  NEURO: no gross focal findings; cranial nerves grossly intact   SKIN:  Dry, intact, warm to touch    ACCESS: Right groin no hematoma, ecchymosis or bruit      Home Medications: (Not in a hospital admission)      Current Outpatient Medications:     acetaminophen (TYLENOL) 500 mg tablet, Take 1,000 mg by mouth every 6 (six) hours as needed for mild pain, Disp: , Rfl:     Alcohol Swabs 70 % PADS, Use 4 per day, Disp: 400 each, Rfl: 3    ALPRAZolam (XANAX) 0 25 mg tablet, Take 1 tablet (0 25 mg total) by mouth 4 (four) times a day as needed for anxiety, Disp: 30 tablet, Rfl: 0    aspirin (ECOTRIN LOW STRENGTH) 81 mg EC tablet, Take 1 tablet (81 mg total) by mouth daily, Disp: 90 tablet, Rfl: 0    carvedilol (COREG) 12 5 mg tablet, Take 1 tablet (12 5 mg total) by mouth every 12 (twelve) hours, Disp: 180 tablet, Rfl: 1    cholecalciferol (VITAMIN D3) 1,000 units tablet, Take 1 tablet (1,000 Units total) by mouth daily (Patient taking differently: Take 1,000 Units by mouth every 14 (fourteen) days  ), Disp: 90 tablet, Rfl: 3    Continuous Blood Gluc  (Dexcom G6 ) CRISTINA, Disp 1 , Disp: 1 Device, Rfl: 1    Continuous Blood Gluc Sensor (Dexcom G6 Sensor) MISC, Use 1 sensor every 10 days, disp 90 day supply, Disp: 9 each, Rfl: 3    Continuous Blood Gluc Transmit (Dexcom G6 Transmitter) MISC, Use daily as directed for CGM - Change every 3 months, Disp: 1 each, Rfl: 0    dicyclomine (BENTYL) 10 mg capsule, Take 1 capsule (10 mg total) by mouth 3 (three) times a day as needed (abd cramping and/or diarrhea), Disp: 90 capsule, Rfl: 1    Evolocumab 140 MG/ML SOAJ, Inject one 1 ml pen every 14 days, Disp: 6 mL, Rfl: 1    ezetimibe (ZETIA) 10 mg tablet, Take 1 tablet (10 mg total) by mouth daily, Disp: 90 tablet, Rfl: 3    hyoscyamine (LEVSIN/SL) 0 125 mg SL tablet, Take 1 tablet (0 125 mg total) by mouth 4 (four) times a day, Disp: 120 tablet, Rfl: 5    insulin aspart (NovoLOG FlexPen) 100 UNIT/ML injection pen, 6 units before lunch and dinner plus additional if needed according to sliding scale , Disp: 15 mL, Rfl: 3    insulin degludec Deirdre Spray FlexTouch) 100 units/mL injection pen, Inject 25 Units under the skin daily (Patient taking differently: Inject 25 Units under the skin daily Took half dose 3/6/22 as instructed pre cath ), Disp: 30 mL, Rfl: 3    Insulin Pen Needle (Pen Needles) 32G X 5 MM MISC, Use 4 per day, Disp: 360 each, Rfl: 1    Lidocaine Viscous HCl (XYLOCAINE) 2 % mucosal solution, Swish and swallow 10 mL 4 (four) times a day as needed for mouth pain or discomfort, Disp: 100 mL, Rfl: 1    losartan (COZAAR) 25 mg tablet, Take 1 tablet (25 mg total) by mouth daily, Disp: 90 tablet, Rfl: 2    mesalamine (LIALDA) 1 2 g EC tablet, Take 2 tablets (2 4 g total) by mouth daily with breakfast (Patient taking differently: Take 2,400 mg by mouth as needed  ), Disp: 60 tablet, Rfl: 2    ondansetron (ZOFRAN-ODT) 4 mg disintegrating tablet, Take 1 tablet (4 mg total) by mouth every 8 (eight) hours as needed for nausea or vomiting, Disp: 12 tablet, Rfl: 0    pantoprazole (PROTONIX) 40 mg tablet, Take 1 tablet (40 mg total) by mouth 2 (two) times a day 30 min before breakfast, 30 min before dinner, Disp: 180 tablet, Rfl: 3    sucralfate (CARAFATE) 1 g/10 mL suspension, Take 10 mL (1 g total) by mouth 3 (three) times a day (Patient taking differently: Take 1 g by mouth as needed  ), Disp: 420 mL, Rfl: 1    ticagrelor (BRILINTA) 90 MG, Take 1 tablet (90 mg total) by mouth every 12 (twelve) hours, Disp: 180 tablet, Rfl: 0    Labs & Results:  Lab Results   Component Value Date    TROPONINI <0 02 10/25/2021    TROPONINI 2 46 (H) 02/27/2021    TROPONINI 2 47 (H) 02/26/2021     Lab Results   Component Value Date    TRIG 221 (H) 01/22/2022    TRIG 231 (H) 08/11/2021    HDL 40 (L) 01/22/2022    HDL 40 08/11/2021    LDLCALC 66 01/22/2022    LDLCALC 164 (H) 08/11/2021    HGBA1C 9 4 (H) 01/22/2022    HGBA1C 7 3 (H) 08/11/2021     Lab Results   Component Value Date     01/22/2016    K 4 3 02/24/2022    CO2 24 02/24/2022     02/24/2022    BUN 12 02/24/2022    CREATININE 0 80 02/24/2022    ALT 11 (L) 02/24/2022    AST 10 02/24/2022     Lab Results   Component Value Date    WBC 8 44 02/24/2022    HGB 13 0 02/24/2022    HCT 40 3 02/24/2022    MCV 91 02/24/2022     02/24/2022     Lab Results   Component Value Date    INR 0 98 02/26/2021       EKG:  Date: 03/07/22      Device Interrogation:   No results found for this or any previous visit  HOLTER  No results found for this or any previous visit        Imaging: I have personally reviewed pertinent films in PACS      Previous STRESS TEST:  Results for orders placed during the hospital encounter of 02/18/22    NM myocardial perfusion spect (rx stress and/or rest)      Interpretation Summary    Stress ECG: No ST deviation is noted  There were no arrhythmias during recovery    No ecg changes during pharmacological stress    Stress Function: Left ventricular function post-stress is normal  Post-stress ejection fraction is 40 %  There is a defect in the basal inferior location(s)  The defect has mildly reduced function    Perfusion: There is small to medium in size with mild reduction in uptake present in the inferior and inferolateral location(s) that is reversible  The defect appears to have some subdiaphragmatic artifact  There is a small-medium in size with mild reduction in the inferior/inferolateral wall that is reversible  There is a minor defect in the anterior wall that improves with exercise  Previous Cath/PCI:  Results for orders placed during the hospital encounter of 03/07/22    Cardiac catheterization    Impression  · Dist RCA lesion is 90% stenosed  · Ost Cx to Prox Cx lesion is 50% stenosed  · Prox LAD lesion is 50% stenosed  · 1st Diag lesion is 40% stenosed  · 2nd Diag lesion is 50% stenosed  3v CAD      ECHO:  Results for orders placed during the hospital encounter of 02/26/21    Echo Complete with Contrast if Indicated    Nahomi Levine [address]  Transthoracic Echocardiogram 2D, M-mode, Doppler, and Color Doppler  Study date:  26-Feb-2021      Diagnoses: I21 3 - ST elevation (STEMI) myocardial infarction of unspecified site    Sonographer:  TODD Gallego  Primary Physician:  Toshia Higuera [facility]  Referring Physician:  Irvin Reyes MD  Group:  [facility]  Interpreting Physician:  Zeenat Restrepo MD    SUMMARY    LEFT VENTRICLE:  Systolic function was normal  Ejection fraction was estimated to be 60 %  There was akinesis of the basal-mid inferior wall(s)      RIGHT VENTRICLE:  The size was normal   Systolic function was normal     HISTORY: PRIOR HISTORY: Myocardial infarction, Dyslipidemia, Current everyday smoker, Obesity    LEFT VENTRICLE: Size was normal  Systolic function was normal  Ejection fraction was estimated to be 60 %  There was akinesis of the basal-mid inferior wall(s)  Wall thickness was normal  DOPPLER: Left ventricular diastolic function parameters were normal     RIGHT VENTRICLE: The size was normal  Systolic function was normal  Wall thickness was normal     LEFT ATRIUM: Size was normal     RIGHT ATRIUM: Size was normal     MITRAL VALVE: Valve structure was normal  There was normal leaflet separation  DOPPLER: The transmitral velocity was within the normal range  There was no evidence for stenosis  There was trace regurgitation  AORTIC VALVE: The valve was trileaflet  Leaflets exhibited normal thickness and normal cuspal separation  DOPPLER: Transaortic velocity was within the normal range  There was no evidence for stenosis  There was no significant regurgitation  TRICUSPID VALVE: The valve structure was normal  There was normal leaflet separation  DOPPLER: The transtricuspid velocity was within the normal range  There was no evidence for stenosis  There was trace regurgitation  PULMONIC VALVE: Leaflets exhibited normal thickness, no calcification, and normal cuspal separation  DOPPLER: The transpulmonic velocity was within the normal range  There was trace regurgitation  PERICARDIUM: There was no pericardial effusion  The pericardium was normal in appearance  AORTA: The root exhibited normal size  SYSTEMIC VEINS: IVC: The inferior vena cava was normal in size  Respirophasic changes were normal       [employer] Accredited Echocardiography Laboratory    Prepared and electronically signed by    Mercedes Umanzor MD  Signed [id number]      ROSITA:  No results found for this or any previous visit        CMR:  No results found for this or any previous visit  Assessment/Plan     Assessment:    1   Coronary artery disease  -type 1 inferior STEMI + residual disease  -2/2021 index [facility] ([place], [facility]): 100% distal RCA culprit t/w 3 0 x 26 mm Resolute jose, 80-90% mid RCA lesion t/w 3 0 x 18 mm Resolute jose; residual 80-90% mid LAD, diffuse nonobstructive D1 disease, 50% mid LCX  -3/2021 staged PCI ([facility]esh [facility], [facility]):  85% mid LAD t/w 2 25 x 18 mm Resolute jose; 60% D1 non flow-limiting (DFR 0 93)  -2/2021 TTE: LVEF 60%, basal-mid inferior RWMA, normal RV, no significant valve disease  -02/2022 Rx NMST:  Small-medium inferior/inferolateral ischemia, gated EF 40%, mild basal inferior hypokinesis  -03/2022 [facility] ([place], [facility]):  90% mid-distal RCA t/w 3 0 x 18 mm resolute jose, patent remaining stents x3; residual 50% prox LAD, 40% D1, 50% D2, 50% ostial LCX  - aspirin, ticagrelor, carvedilol, losartan, evolocumab, ezetimibe     2  Metabolic syndrome  -statin intolerance  -01/2022 lipids:  T 150, , HDL 40, calculated LDL 66; A1c 9 4%  -debilitating myalgia and arthralgia (normal CPK + LFTs)  -meds trialed:  atorvastatin and rosuvastatin; GI intolerance for metformin, UTI x2 for empagliflozin  -evolocumab, ezetimibe, insulin, prednisone     3  Angulated innominate/aorta  - unable to access ascending aorta via right radial approach      Plan:  1  Patient with significant 3 vessel disease  She has 3 drug-eluting stents spanning from the mid to the distal RCA with residual nonobstructive disease distally in the PDA and PLB  There is patent mid LAD stent but small- to moderate-sized left system vessels  She has a high atherosclerotic burden  LDL is finally at goal   However, she is continuing to work on smoking cessation as well as diabetes control  Currently steroids have been a main driving factor for elevated A1c and fingersticks  Plan for methotrexate which has no cardiac contraindications      2  Patient with set quit did on 03/18   She has been utilizing a   She was commended on this feat  3  Repeat lipid panel sent to ensure stability of LDL level  Otherwise, continue current medication regimen  4  Patient to follow-up in our office in 6 months or earlier if any changes in clinical status  As I graduate in June 2022, her subsequent appointment will be with Dr Anthony Bartlett, per patient request   Until then, all tasks to be sent to me  Case discussed and reviewed with Dr Pema Fletcher who agrees with my assessment and plan  Christiano Streeter MD  Cardiology Fellow PGY-6    ==========================================================================================    Epic/ Allscripts/Care Everywhere records reviewed:  Yes    ** Please Note: Fluency DirectDictation voice to text software may have been used in the creation of this document   **

## 2022-04-07 ENCOUNTER — LAB (OUTPATIENT)
Dept: LAB | Facility: CLINIC | Age: 62
End: 2022-04-07
Payer: COMMERCIAL

## 2022-04-07 ENCOUNTER — APPOINTMENT (OUTPATIENT)
Dept: LAB | Facility: CLINIC | Age: 62
End: 2022-04-07
Payer: COMMERCIAL

## 2022-04-07 DIAGNOSIS — Z98.61 CAD S/P PERCUTANEOUS CORONARY ANGIOPLASTY: ICD-10-CM

## 2022-04-07 DIAGNOSIS — I25.10 CORONARY ARTERY DISEASE INVOLVING NATIVE CORONARY ARTERY OF NATIVE HEART WITHOUT ANGINA PECTORIS: ICD-10-CM

## 2022-04-07 DIAGNOSIS — M35.3 POLYMYALGIA RHEUMATICA (HCC): ICD-10-CM

## 2022-04-07 DIAGNOSIS — E78.2 MIXED HYPERLIPIDEMIA: ICD-10-CM

## 2022-04-07 DIAGNOSIS — E11.65 UNCONTROLLED TYPE 2 DIABETES MELLITUS WITH HYPERGLYCEMIA (HCC): ICD-10-CM

## 2022-04-07 DIAGNOSIS — E11.65 UNCONTROLLED TYPE 2 DIABETES MELLITUS WITH HYPERGLYCEMIA (HCC): Chronic | ICD-10-CM

## 2022-04-07 DIAGNOSIS — F32.9 REACTIVE DEPRESSION (SITUATIONAL): ICD-10-CM

## 2022-04-07 DIAGNOSIS — I25.10 CAD S/P PERCUTANEOUS CORONARY ANGIOPLASTY: ICD-10-CM

## 2022-04-07 DIAGNOSIS — E11.00 TYPE 2 DIABETES MELLITUS WITH HYPEROSMOLARITY WITHOUT COMA, UNSPECIFIED WHETHER LONG TERM INSULIN USE (HCC): ICD-10-CM

## 2022-04-07 DIAGNOSIS — I25.10 CORONARY ARTERY DISEASE INVOLVING NATIVE CORONARY ARTERY OF NATIVE HEART WITHOUT ANGINA PECTORIS: Chronic | ICD-10-CM

## 2022-04-07 DIAGNOSIS — Z79.899 ENCOUNTER FOR LONG-TERM (CURRENT) USE OF OTHER MEDICATIONS: ICD-10-CM

## 2022-04-07 LAB
ALBUMIN SERPL BCP-MCNC: 3.7 G/DL (ref 3.5–5)
ALP SERPL-CCNC: 80 U/L (ref 46–116)
ALT SERPL W P-5'-P-CCNC: 15 U/L (ref 12–78)
ANION GAP SERPL CALCULATED.3IONS-SCNC: 5 MMOL/L (ref 4–13)
AST SERPL W P-5'-P-CCNC: 7 U/L (ref 5–45)
BILIRUB SERPL-MCNC: 0.36 MG/DL (ref 0.2–1)
BUN SERPL-MCNC: 17 MG/DL (ref 5–25)
CALCIUM SERPL-MCNC: 9.7 MG/DL (ref 8.3–10.1)
CHLORIDE SERPL-SCNC: 111 MMOL/L (ref 100–108)
CHOLEST SERPL-MCNC: 111 MG/DL
CO2 SERPL-SCNC: 25 MMOL/L (ref 21–32)
CREAT SERPL-MCNC: 0.87 MG/DL (ref 0.6–1.3)
CRP SERPL QL: 9.2 MG/L
ERYTHROCYTE [SEDIMENTATION RATE] IN BLOOD: 25 MM/HOUR (ref 0–29)
EST. AVERAGE GLUCOSE BLD GHB EST-MCNC: 160 MG/DL
GFR SERPL CREATININE-BSD FRML MDRD: 72 ML/MIN/1.73SQ M
GLUCOSE P FAST SERPL-MCNC: 96 MG/DL (ref 65–99)
HBA1C MFR BLD: 7.2 %
HDLC SERPL-MCNC: 55 MG/DL
LDLC SERPL CALC-MCNC: 21 MG/DL (ref 0–100)
NONHDLC SERPL-MCNC: 56 MG/DL
POTASSIUM SERPL-SCNC: 3.8 MMOL/L (ref 3.5–5.3)
PROT SERPL-MCNC: 7.2 G/DL (ref 6.4–8.2)
SODIUM SERPL-SCNC: 141 MMOL/L (ref 136–145)
TRIGL SERPL-MCNC: 174 MG/DL

## 2022-04-07 PROCEDURE — 85652 RBC SED RATE AUTOMATED: CPT

## 2022-04-07 PROCEDURE — 86140 C-REACTIVE PROTEIN: CPT

## 2022-04-07 PROCEDURE — 80053 COMPREHEN METABOLIC PANEL: CPT

## 2022-04-07 PROCEDURE — 83036 HEMOGLOBIN GLYCOSYLATED A1C: CPT

## 2022-04-07 PROCEDURE — 36415 COLL VENOUS BLD VENIPUNCTURE: CPT

## 2022-04-07 PROCEDURE — 86200 CCP ANTIBODY: CPT

## 2022-04-07 PROCEDURE — 80061 LIPID PANEL: CPT

## 2022-04-07 RX ORDER — EZETIMIBE 10 MG/1
10 TABLET ORAL DAILY
Qty: 90 TABLET | Refills: 0 | Status: SHIPPED | OUTPATIENT
Start: 2022-04-07

## 2022-04-07 RX ORDER — ALPRAZOLAM 0.25 MG/1
0.25 TABLET ORAL 4 TIMES DAILY PRN
Qty: 30 TABLET | Refills: 0 | Status: SHIPPED | OUTPATIENT
Start: 2022-04-07 | End: 2022-06-06 | Stop reason: SDUPTHER

## 2022-04-08 LAB — CCP AB SER IA-ACNC: 2

## 2022-04-20 ENCOUNTER — OFFICE VISIT (OUTPATIENT)
Dept: FAMILY MEDICINE CLINIC | Facility: CLINIC | Age: 62
End: 2022-04-20
Payer: COMMERCIAL

## 2022-04-20 VITALS
DIASTOLIC BLOOD PRESSURE: 80 MMHG | HEART RATE: 88 BPM | WEIGHT: 223.5 LBS | SYSTOLIC BLOOD PRESSURE: 126 MMHG | RESPIRATION RATE: 16 BRPM | BODY MASS INDEX: 35.92 KG/M2 | HEIGHT: 66 IN

## 2022-04-20 DIAGNOSIS — R30.0 DYSURIA: Primary | ICD-10-CM

## 2022-04-20 LAB
BACTERIA UR QL AUTO: ABNORMAL /HPF
BILIRUB UR QL STRIP: NEGATIVE
CLARITY UR: ABNORMAL
COLOR UR: ABNORMAL
GLUCOSE UR STRIP-MCNC: ABNORMAL MG/DL
HGB UR QL STRIP.AUTO: ABNORMAL
KETONES UR STRIP-MCNC: NEGATIVE MG/DL
LEUKOCYTE ESTERASE UR QL STRIP: ABNORMAL
NITRITE UR QL STRIP: NEGATIVE
NON-SQ EPI CELLS URNS QL MICRO: ABNORMAL /HPF
PH UR STRIP.AUTO: 6 [PH]
PROT UR STRIP-MCNC: ABNORMAL MG/DL
RBC #/AREA URNS AUTO: ABNORMAL /HPF
SL AMB  POCT GLUCOSE, UA: ABNORMAL
SL AMB LEUKOCYTE ESTERASE,UA: ABNORMAL
SL AMB POCT BILIRUBIN,UA: ABNORMAL
SL AMB POCT BLOOD,UA: ABNORMAL
SL AMB POCT CLARITY,UA: ABNORMAL
SL AMB POCT COLOR,UA: YELLOW
SL AMB POCT KETONES,UA: ABNORMAL
SL AMB POCT NITRITE,UA: ABNORMAL
SL AMB POCT PH,UA: 6
SL AMB POCT SPECIFIC GRAVITY,UA: 1.02
SL AMB POCT URINE PROTEIN: 100
SL AMB POCT UROBILINOGEN: 0.2
SP GR UR STRIP.AUTO: 1.01 (ref 1–1.03)
UROBILINOGEN UR STRIP-ACNC: <2 MG/DL
WBC #/AREA URNS AUTO: ABNORMAL /HPF
WBC CLUMPS # UR AUTO: PRESENT /UL

## 2022-04-20 PROCEDURE — 81001 URINALYSIS AUTO W/SCOPE: CPT | Performed by: PHYSICIAN ASSISTANT

## 2022-04-20 PROCEDURE — 99213 OFFICE O/P EST LOW 20 MIN: CPT | Performed by: PHYSICIAN ASSISTANT

## 2022-04-20 PROCEDURE — 87077 CULTURE AEROBIC IDENTIFY: CPT | Performed by: PHYSICIAN ASSISTANT

## 2022-04-20 PROCEDURE — 87086 URINE CULTURE/COLONY COUNT: CPT | Performed by: PHYSICIAN ASSISTANT

## 2022-04-20 PROCEDURE — 87186 SC STD MICRODIL/AGAR DIL: CPT | Performed by: PHYSICIAN ASSISTANT

## 2022-04-20 PROCEDURE — 81002 URINALYSIS NONAUTO W/O SCOPE: CPT | Performed by: PHYSICIAN ASSISTANT

## 2022-04-20 RX ORDER — FLUCONAZOLE 150 MG/1
TABLET ORAL
Qty: 2 TABLET | Refills: 0 | Status: SHIPPED | OUTPATIENT
Start: 2022-04-20 | End: 2022-04-23

## 2022-04-20 NOTE — PROGRESS NOTES
Assessment/Plan:    1  Dysuria    - reviewed 4/2021 note, had vaginitis from candida at that time, diflucan was success  At this time will send out urine for culture and start diflucan  If needed can bring patient back for vaginal cultures  - POCT urine dip  - fluconazole (DIFLUCAN) 150 mg tablet; Take 1 and repeat in 3 days  Dispense: 2 tablet; Refill: 0  - Urine culture  - UA (URINE) with reflex to Scope    F/u as needed      Subjective:   Chief Complaint   Patient presents with    Possible UTI      Patient ID: Tan Jimenez is a 63 y o  female  Patient here in follow up for same symptoms she was seen last April for  Her vaginal area feels "like it is falling out, raw", some slight itch, her vaginal area burns with urination  No discharge or odor  Denies hematuria, fever, chills, back pain  The following portions of the patient's history were reviewed and updated as appropriate: allergies, current medications, past family history, past medical history, past social history, past surgical history and problem list     Past Medical History:   Diagnosis Date    Coronary artery disease     Crohn's colitis ([facility] [address] )     [facility]     Fibromyalgia     Gastric ulcer     [facility] GI SURGEON    HTN (hypertension)     Hyperlipidemia     IBD (inflammatory bowel disease) 11/2015    [facility] GI SURGEON    Migraine     Myocardial infarction [facility])      Past Surgical History:   Procedure Laterality Date    BREAST BIOPSY Left     benign    BREAST BIOPSY Left 10/27/2021    Stereo    CARDIAC CATHETERIZATION      CARDIAC CATHETERIZATION Left 3/7/2022    Procedure: Cardiac catheterization;  Surgeon: David Eugene MD;  [facility]: [facility]; Service: Cardiology    CARDIAC CATHETERIZATION N/A 3/7/2022    Procedure: Cardiac Coronary Angiogram;  Surgeon: David Eugene MD;  [facility];   Service: Cardiology    CARDIAC CATHETERIZATION N/A 3/7/2022    Procedure: Cardiac pci; Surgeon: Kate Mahan MD;  Location:  CARDIAC CATH LAB;   Service: Cardiology    CHOLECYSTECTOMY      COLONOSCOPY  12/03/2018    internal hemorrhoids, 3mm tubular adenoma polyp transverse colon, bx negative for dysplasia    COLONOSCOPY  11/2015    CORONARY STENT PLACEMENT      DENTAL SURGERY      [place] teeth extraction    EGD  12/2018    2cm hiatal hernia, gastritis bx shows foci of intestinal metaplasia, negative Hpylori    EGD  2015    ESOPHAGITIS/MALLHIATUS HERNIA  FOUND    HYSTERECTOMY      MAMMO STEREOTACTIC BREAST BIOPSY LEFT (ALL INC) Left 10/27/2021    TUBAL LIGATION      Bilateral     Family History   Problem Relation Age of Onset    Coronary artery disease Mother     Dementia Mother     Stroke Mother     No Known Problems Father     Cervical cancer Sister 36    Heart disease Brother     No Known Problems Maternal Grandmother     No Known Problems Maternal Grandfather     No Known Problems Paternal Grandmother     No Known Problems Paternal Grandfather     Alcohol abuse Neg Hx     Substance Abuse Neg Hx      Social History     Socioeconomic History    Marital status: /Civil Union     Spouse name: Not on file    Number of children: Not on file    Years of education: Not on file    Highest education level: Not on file   Occupational History    Occupation: Coding   Tobacco Use    Smoking status: Current Every Day Smoker     Packs/day: 0 50     Types: Cigarettes    Smokeless tobacco: Never Used   Vaping Use    Vaping Use: Never used   Substance and Sexual Activity    Alcohol use: Not Currently     Comment: Rarely    Drug use: Never    Sexual activity: Not Currently     Partners: Male   Other Topics Concern    Not on file   Social History Narrative    Not on file     Social Determinants of Health     Financial Resource Strain: Not on file   Food Insecurity: Not on file   Transportation Needs: Not on file   Physical Activity: Not on file   Stress: Not on file   Social Connections: Not on file   Intimate Partner Violence: Not on file   Housing Stability: Not on file       Current Outpatient Medications:     acetaminophen (TYLENOL) 500 mg tablet, Take 1,000 mg by mouth every 6 (six) hours as needed for mild pain, Disp: , Rfl:     Alcohol Swabs 70 % PADS, Use 4 per day, Disp: 400 each, Rfl: 3    ALPRAZolam (XANAX) 0 25 mg tablet, Take 1 tablet (0 25 mg total) by mouth 4 (four) times a day as needed for anxiety, Disp: 30 tablet, Rfl: 0    aspirin (ECOTRIN LOW STRENGTH) 81 mg EC tablet, Take 1 tablet (81 mg total) by mouth daily, Disp: 90 tablet, Rfl: 0    carvedilol (COREG) 12 5 mg tablet, Take 1 tablet (12 5 mg total) by mouth every 12 (twelve) hours, Disp: 180 tablet, Rfl: 1    cholecalciferol (VITAMIN D3) 1,000 units tablet, Take 1 tablet (1,000 Units total) by mouth daily (Patient taking differently: Take 1,000 Units by mouth every 14 (fourteen) days  ), Disp: 90 tablet, Rfl: 3    Continuous Blood Gluc  (Dexcom G6 ) CRISTINA, Disp 1 , Disp: 1 Device, Rfl: 1    Continuous Blood Gluc Sensor (Dexcom G6 Sensor) MISC, Use 1 sensor every 10 days, disp 90 day supply, Disp: 9 each, Rfl: 3    Continuous Blood Gluc Transmit (Dexcom G6 Transmitter) MISC, Use daily as directed for CGM - Change every 3 months, Disp: 1 each, Rfl: 0    Evolocumab 140 MG/ML SOAJ, Inject one 1 ml pen every 14 days, Disp: 6 mL, Rfl: 1    ezetimibe (ZETIA) 10 mg tablet, Take 1 tablet (10 mg total) by mouth daily, Disp: 90 tablet, Rfl: 0    hyoscyamine (LEVSIN/SL) 0 125 mg SL tablet, Take 1 tablet (0 125 mg total) by mouth every 6 (six) hours as needed (esophageal spasm), Disp: 120 tablet, Rfl: 5    insulin aspart (NovoLOG FlexPen) 100 UNIT/ML injection pen, 6 units before lunch and dinner plus additional if needed according to sliding scale , Disp: 15 mL, Rfl: 3    insulin degludec Veneda Leventhal FlexTouch) 100 units/mL injection pen, Inject 25 Units under the skin daily (Patient taking differently: Inject 25 Units under the skin daily Took half dose 3/6/22 as instructed pre cath ), Disp: 30 mL, Rfl: 3    Insulin Pen Needle (Pen Needles) 32G X 5 MM MISC, Use 4 per day, Disp: 360 each, Rfl: 1    Lidocaine Viscous HCl (XYLOCAINE) 2 % mucosal solution, Swish and swallow 10 mL 4 (four) times a day as needed for mouth pain or discomfort, Disp: 100 mL, Rfl: 1    losartan (COZAAR) 25 mg tablet, Take 1 tablet (25 mg total) by mouth daily, Disp: 90 tablet, Rfl: 2    mesalamine (LIALDA) 1 2 g EC tablet, Take 2 tablets (2 4 g total) by mouth daily with breakfast (Patient taking differently: Take 2,400 mg by mouth as needed  ), Disp: 60 tablet, Rfl: 2    ondansetron (ZOFRAN-ODT) 4 mg disintegrating tablet, Take 1 tablet (4 mg total) by mouth every 8 (eight) hours as needed for nausea or vomiting, Disp: 12 tablet, Rfl: 0    pantoprazole (PROTONIX) 40 mg tablet, Take 1 tablet (40 mg total) by mouth 2 (two) times a day 30 min before breakfast, 30 min before dinner, Disp: 180 tablet, Rfl: 3    predniSONE 5 mg tablet, Take 20 mg by mouth daily, Disp: , Rfl:     sucralfate (CARAFATE) 1 g/10 mL suspension, Take 10 mL (1 g total) by mouth 3 (three) times a day (Patient taking differently: Take 1 g by mouth as needed  ), Disp: 420 mL, Rfl: 1    ticagrelor (BRILINTA) 90 MG, Take 1 tablet (90 mg total) by mouth every 12 (twelve) hours, Disp: 180 tablet, Rfl: 0    Review of Systems          Objective:    Vitals:    04/20/22 1049   BP: 126/80   BP Location: Left arm   Patient Position: Sitting   Cuff Size: Standard   Pulse: 88   Resp: 16   Weight: 101 kg (223 lb 8 oz)   Height: 5' 6" (1 676 m)        Physical Exam    Physical Exam   Constitutional: She is oriented to person, place, and time  She appears well-developed and well-nourished  Cardiovascular: Normal rate, regular rhythm and normal heart sounds  Pulmonary/Chest: Effort normal and breath sounds normal    Abdominal: Soft   Bowel sounds are normal  She exhibits no distension and no mass  There is suprapubic tenderenss  There is no rebound and no guarding  No cva tenderness   Neurological: She is alert and oriented to person, place, and time  Skin: Skin is warm and dry  Psychiatric: She has a normal mood and affect   Judgment normal

## 2022-04-22 ENCOUNTER — TELEPHONE (OUTPATIENT)
Dept: FAMILY MEDICINE CLINIC | Facility: CLINIC | Age: 62
End: 2022-04-22

## 2022-04-22 NOTE — TELEPHONE ENCOUNTER
----- Message from Justin Duong PA-C sent at 4/21/2022  9:36 PM EDT -----  Please let patient know her prelim culture shows she has a UTI from bacteria this time, not a yeast infection  I am going to call in an antibiotic  Macrobid 1 tab twice daily for 7 days

## 2022-04-26 ENCOUNTER — TELEPHONE (OUTPATIENT)
Dept: FAMILY MEDICINE CLINIC | Facility: CLINIC | Age: 62
End: 2022-04-26

## 2022-04-26 LAB — BACTERIA UR CULT: ABNORMAL

## 2022-04-26 NOTE — TELEPHONE ENCOUNTER
----- Message from Daniella Piper PA-C sent at 4/26/2022 10:59 AM EDT -----  Please let patient know she did have a UTI, hopefully the antibiotic helped  If not please let me know 
Have her stop macrobid and I called in cipro to be taken 1 tab twice daily for 7 days, Sent to [facility] 
Patient was informed   MRP
Patient was informed  She states that she has 2 days of the antibiotic and she is still experiencing dysuria  Please advise   MRP
Implemented All Universal Safety Interventions:  [place] to call system. Call bell, personal items and telephone within reach. Instruct patient to call for assistance. Room bathroom lighting operational. Non-slip footwear when patient is off stretcher. Physically safe environment: no spills, clutter or unnecessary equipment. Stretcher in lowest position, wheels locked, appropriate side rails in place.

## 2022-06-06 DIAGNOSIS — I25.10 CORONARY ARTERY DISEASE: ICD-10-CM

## 2022-06-06 DIAGNOSIS — I21.11 ACUTE ST ELEVATION MYOCARDIAL INFARCTION (STEMI) DUE TO OCCLUSION OF DISTAL PORTION OF RIGHT CORONARY ARTERY (HCC): ICD-10-CM

## 2022-06-06 DIAGNOSIS — F32.9 REACTIVE DEPRESSION (SITUATIONAL): ICD-10-CM

## 2022-06-06 DIAGNOSIS — E78.2 MODERATE MIXED HYPERLIPIDEMIA NOT REQUIRING STATIN THERAPY: ICD-10-CM

## 2022-06-06 RX ORDER — ALPRAZOLAM 0.25 MG/1
0.25 TABLET ORAL 4 TIMES DAILY PRN
Qty: 30 TABLET | Refills: 0 | Status: SHIPPED | OUTPATIENT
Start: 2022-06-06 | End: 2022-07-04 | Stop reason: SDUPTHER

## 2022-06-17 LAB
LEFT EYE DIABETIC RETINOPATHY: NORMAL
RIGHT EYE DIABETIC RETINOPATHY: NORMAL

## 2022-07-04 DIAGNOSIS — I21.11 ACUTE ST ELEVATION MYOCARDIAL INFARCTION (STEMI) DUE TO OCCLUSION OF MID PORTION OF RIGHT CORONARY ARTERY (HCC): ICD-10-CM

## 2022-07-04 DIAGNOSIS — I21.19 INFERIOR MI (HCC): ICD-10-CM

## 2022-07-04 DIAGNOSIS — I21.11 ACUTE ST ELEVATION MYOCARDIAL INFARCTION (STEMI) DUE TO OCCLUSION OF DISTAL PORTION OF RIGHT CORONARY ARTERY (HCC): ICD-10-CM

## 2022-07-04 DIAGNOSIS — E11.65 TYPE 2 DIABETES MELLITUS WITH HYPERGLYCEMIA, WITHOUT LONG-TERM CURRENT USE OF INSULIN (HCC): ICD-10-CM

## 2022-07-04 DIAGNOSIS — F32.9 REACTIVE DEPRESSION (SITUATIONAL): ICD-10-CM

## 2022-07-04 DIAGNOSIS — I25.10 CORONARY ARTERY DISEASE INVOLVING NATIVE CORONARY ARTERY: ICD-10-CM

## 2022-07-05 RX ORDER — LOSARTAN POTASSIUM 25 MG/1
25 TABLET ORAL DAILY
Qty: 90 TABLET | Refills: 0 | Status: SHIPPED | OUTPATIENT
Start: 2022-07-05 | End: 2022-07-12 | Stop reason: SDUPTHER

## 2022-07-05 RX ORDER — ASPIRIN 81 MG/1
81 TABLET ORAL DAILY
Qty: 90 TABLET | Refills: 0 | Status: SHIPPED | OUTPATIENT
Start: 2022-07-05 | End: 2022-09-20 | Stop reason: SDUPTHER

## 2022-07-05 RX ORDER — ALPRAZOLAM 0.25 MG/1
0.25 TABLET ORAL 4 TIMES DAILY PRN
Qty: 30 TABLET | Refills: 0 | Status: SHIPPED | OUTPATIENT
Start: 2022-07-05 | End: 2022-08-14 | Stop reason: SDUPTHER

## 2022-07-05 RX ORDER — CARVEDILOL 12.5 MG/1
12.5 TABLET ORAL EVERY 12 HOURS SCHEDULED
Qty: 180 TABLET | Refills: 0 | Status: SHIPPED | OUTPATIENT
Start: 2022-07-05

## 2022-07-12 DIAGNOSIS — E11.65 TYPE 2 DIABETES MELLITUS WITH HYPERGLYCEMIA, WITHOUT LONG-TERM CURRENT USE OF INSULIN (HCC): ICD-10-CM

## 2022-07-12 DIAGNOSIS — I21.19 INFERIOR MI (HCC): ICD-10-CM

## 2022-07-12 DIAGNOSIS — I21.11 ACUTE ST ELEVATION MYOCARDIAL INFARCTION (STEMI) DUE TO OCCLUSION OF MID PORTION OF RIGHT CORONARY ARTERY (HCC): ICD-10-CM

## 2022-07-12 DIAGNOSIS — I21.11 ACUTE ST ELEVATION MYOCARDIAL INFARCTION (STEMI) DUE TO OCCLUSION OF DISTAL PORTION OF RIGHT CORONARY ARTERY (HCC): ICD-10-CM

## 2022-07-12 RX ORDER — LOSARTAN POTASSIUM 25 MG/1
25 TABLET ORAL DAILY
Qty: 90 TABLET | Refills: 0 | Status: SHIPPED | OUTPATIENT
Start: 2022-07-12

## 2022-08-01 DIAGNOSIS — E11.65 UNCONTROLLED TYPE 2 DIABETES MELLITUS WITH HYPERGLYCEMIA (HCC): Chronic | ICD-10-CM

## 2022-08-01 RX ORDER — BLOOD-GLUCOSE TRANSMITTER
EACH MISCELLANEOUS
Qty: 1 EACH | Refills: 1 | Status: SHIPPED | OUTPATIENT
Start: 2022-08-01 | End: 2022-09-20 | Stop reason: SDUPTHER

## 2022-08-14 DIAGNOSIS — F32.9 REACTIVE DEPRESSION (SITUATIONAL): ICD-10-CM

## 2022-08-15 RX ORDER — ALPRAZOLAM 0.25 MG/1
0.25 TABLET ORAL 4 TIMES DAILY PRN
Qty: 30 TABLET | Refills: 0 | Status: SHIPPED | OUTPATIENT
Start: 2022-08-15 | End: 2022-09-20 | Stop reason: SDUPTHER

## 2022-08-24 ENCOUNTER — TELEPHONE (OUTPATIENT)
Dept: OTHER | Facility: OTHER | Age: 62
End: 2022-08-24

## 2022-08-25 ENCOUNTER — TELEPHONE (OUTPATIENT)
Dept: ENDOCRINOLOGY | Facility: CLINIC | Age: 62
End: 2022-08-25

## 2022-08-25 DIAGNOSIS — E11.65 UNCONTROLLED TYPE 2 DIABETES MELLITUS WITH HYPERGLYCEMIA (HCC): Primary | ICD-10-CM

## 2022-09-09 PROBLEM — R10.84 GENERALIZED ABDOMINAL PAIN: Status: ACTIVE | Noted: 2022-09-09

## 2022-09-20 ENCOUNTER — APPOINTMENT (OUTPATIENT)
Dept: LAB | Facility: CLINIC | Age: 62
End: 2022-09-20
Payer: COMMERCIAL

## 2022-09-20 ENCOUNTER — TELEPHONE (OUTPATIENT)
Dept: RADIOLOGY | Facility: HOSPITAL | Age: 62
End: 2022-09-20

## 2022-09-20 DIAGNOSIS — K50.119 CROHN'S DISEASE OF COLON WITH COMPLICATION (HCC): ICD-10-CM

## 2022-09-20 DIAGNOSIS — Z79.899 ENCOUNTER FOR LONG-TERM (CURRENT) USE OF OTHER MEDICATIONS: ICD-10-CM

## 2022-09-20 DIAGNOSIS — E11.65 UNCONTROLLED TYPE 2 DIABETES MELLITUS WITH HYPERGLYCEMIA (HCC): Chronic | ICD-10-CM

## 2022-09-20 DIAGNOSIS — M35.3 POLYMYALGIA RHEUMATICA (HCC): ICD-10-CM

## 2022-09-20 DIAGNOSIS — I21.11 ACUTE ST ELEVATION MYOCARDIAL INFARCTION (STEMI) DUE TO OCCLUSION OF MID PORTION OF RIGHT CORONARY ARTERY (HCC): ICD-10-CM

## 2022-09-20 DIAGNOSIS — I25.10 CORONARY ARTERY DISEASE INVOLVING NATIVE CORONARY ARTERY: ICD-10-CM

## 2022-09-20 DIAGNOSIS — E11.65 UNCONTROLLED TYPE 2 DIABETES MELLITUS WITH HYPERGLYCEMIA (HCC): ICD-10-CM

## 2022-09-20 DIAGNOSIS — F32.9 REACTIVE DEPRESSION (SITUATIONAL): ICD-10-CM

## 2022-09-20 LAB
ALBUMIN SERPL BCP-MCNC: 3.3 G/DL (ref 3.5–5)
ALP SERPL-CCNC: 108 U/L (ref 46–116)
ALT SERPL W P-5'-P-CCNC: 13 U/L (ref 12–78)
ANION GAP SERPL CALCULATED.3IONS-SCNC: 6 MMOL/L (ref 4–13)
AST SERPL W P-5'-P-CCNC: 13 U/L (ref 5–45)
BASOPHILS # BLD AUTO: 0.07 THOUSANDS/ΜL (ref 0–0.1)
BASOPHILS NFR BLD AUTO: 1 % (ref 0–1)
BILIRUB SERPL-MCNC: 0.37 MG/DL (ref 0.2–1)
BUN SERPL-MCNC: 17 MG/DL (ref 5–25)
CALCIUM ALBUM COR SERPL-MCNC: 10.4 MG/DL (ref 8.3–10.1)
CALCIUM SERPL-MCNC: 9.8 MG/DL (ref 8.3–10.1)
CHLORIDE SERPL-SCNC: 108 MMOL/L (ref 96–108)
CO2 SERPL-SCNC: 23 MMOL/L (ref 21–32)
CREAT SERPL-MCNC: 0.85 MG/DL (ref 0.6–1.3)
CREAT UR-MCNC: 58 MG/DL
CRP SERPL QL: 24.4 MG/L
EOSINOPHIL # BLD AUTO: 0.3 THOUSAND/ΜL (ref 0–0.61)
EOSINOPHIL NFR BLD AUTO: 4 % (ref 0–6)
ERYTHROCYTE [DISTWIDTH] IN BLOOD BY AUTOMATED COUNT: 14.9 % (ref 11.6–15.1)
ERYTHROCYTE [SEDIMENTATION RATE] IN BLOOD: 46 MM/HOUR (ref 0–29)
EST. AVERAGE GLUCOSE BLD GHB EST-MCNC: 197 MG/DL
GFR SERPL CREATININE-BSD FRML MDRD: 74 ML/MIN/1.73SQ M
GLUCOSE P FAST SERPL-MCNC: 200 MG/DL (ref 65–99)
HBA1C MFR BLD: 8.5 %
HCT VFR BLD AUTO: 41.5 % (ref 34.8–46.1)
HGB BLD-MCNC: 13.6 G/DL (ref 11.5–15.4)
IMM GRANULOCYTES # BLD AUTO: 0.04 THOUSAND/UL (ref 0–0.2)
IMM GRANULOCYTES NFR BLD AUTO: 1 % (ref 0–2)
LYMPHOCYTES # BLD AUTO: 2.11 THOUSANDS/ΜL (ref 0.6–4.47)
LYMPHOCYTES NFR BLD AUTO: 26 % (ref 14–44)
MCH RBC QN AUTO: 30.9 PG (ref 26.8–34.3)
MCHC RBC AUTO-ENTMCNC: 32.8 G/DL (ref 31.4–37.4)
MCV RBC AUTO: 94 FL (ref 82–98)
MICROALBUMIN UR-MCNC: <5 MG/L (ref 0–20)
MICROALBUMIN/CREAT 24H UR: <9 MG/G CREATININE (ref 0–30)
MONOCYTES # BLD AUTO: 0.56 THOUSAND/ΜL (ref 0.17–1.22)
MONOCYTES NFR BLD AUTO: 7 % (ref 4–12)
NEUTROPHILS # BLD AUTO: 5.01 THOUSANDS/ΜL (ref 1.85–7.62)
NEUTS SEG NFR BLD AUTO: 61 % (ref 43–75)
NRBC BLD AUTO-RTO: 0 /100 WBCS
PLATELET # BLD AUTO: 372 THOUSANDS/UL (ref 149–390)
PMV BLD AUTO: 9.6 FL (ref 8.9–12.7)
POTASSIUM SERPL-SCNC: 4.2 MMOL/L (ref 3.5–5.3)
PROT SERPL-MCNC: 7.5 G/DL (ref 6.4–8.4)
RBC # BLD AUTO: 4.4 MILLION/UL (ref 3.81–5.12)
SODIUM SERPL-SCNC: 137 MMOL/L (ref 135–147)
WBC # BLD AUTO: 8.09 THOUSAND/UL (ref 4.31–10.16)

## 2022-09-20 PROCEDURE — 80053 COMPREHEN METABOLIC PANEL: CPT

## 2022-09-20 PROCEDURE — 82043 UR ALBUMIN QUANTITATIVE: CPT

## 2022-09-20 PROCEDURE — 86140 C-REACTIVE PROTEIN: CPT

## 2022-09-20 PROCEDURE — 85025 COMPLETE CBC W/AUTO DIFF WBC: CPT

## 2022-09-20 PROCEDURE — 85652 RBC SED RATE AUTOMATED: CPT

## 2022-09-20 PROCEDURE — 36415 COLL VENOUS BLD VENIPUNCTURE: CPT

## 2022-09-20 PROCEDURE — 82570 ASSAY OF URINE CREATININE: CPT

## 2022-09-20 PROCEDURE — 83036 HEMOGLOBIN GLYCOSYLATED A1C: CPT

## 2022-09-20 RX ORDER — BLOOD-GLUCOSE TRANSMITTER
EACH MISCELLANEOUS
Qty: 1 EACH | Refills: 0 | Status: SHIPPED | OUTPATIENT
Start: 2022-09-20

## 2022-09-20 RX ORDER — ASPIRIN 81 MG/1
81 TABLET ORAL DAILY
Qty: 90 TABLET | Refills: 0 | Status: SHIPPED | OUTPATIENT
Start: 2022-09-20

## 2022-09-20 RX ORDER — ALPRAZOLAM 0.25 MG/1
0.25 TABLET ORAL 4 TIMES DAILY PRN
Qty: 30 TABLET | Refills: 0 | Status: SHIPPED | OUTPATIENT
Start: 2022-09-20 | End: 2022-10-21 | Stop reason: SDUPTHER

## 2022-09-20 NOTE — TELEPHONE ENCOUNTER
Left patient a message to remind her to get her labs done prior to her CT appointment tomorrow evening and left our phone number if she has any questions

## 2022-09-20 NOTE — TELEPHONE ENCOUNTER
Requested medication(s) are due for refill today: Yes  Patient has already received a courtesy refill: No  Other reason request has been forwarded to provider: drug to drug interaction warning

## 2022-09-21 ENCOUNTER — HOSPITAL ENCOUNTER (OUTPATIENT)
Dept: RADIOLOGY | Facility: HOSPITAL | Age: 62
Discharge: HOME | End: 2022-09-21
Attending: INTERNAL MEDICINE
Payer: COMMERCIAL

## 2022-09-21 DIAGNOSIS — K50.119 CROHN'S DISEASE OF COLON WITH COMPLICATION (HCC): ICD-10-CM

## 2022-09-21 PROCEDURE — 74177 CT ABD & PELVIS W/CONTRAST: CPT

## 2022-09-21 PROCEDURE — G1004 CDSM NDSC: HCPCS

## 2022-09-21 RX ADMIN — IOHEXOL 100 ML: 350 INJECTION, SOLUTION INTRAVENOUS at 18:03

## 2022-09-26 DIAGNOSIS — I21.11 ACUTE ST ELEVATION MYOCARDIAL INFARCTION (STEMI) DUE TO OCCLUSION OF DISTAL PORTION OF RIGHT CORONARY ARTERY (HCC): ICD-10-CM

## 2022-09-26 DIAGNOSIS — E78.2 MODERATE MIXED HYPERLIPIDEMIA NOT REQUIRING STATIN THERAPY: ICD-10-CM

## 2022-09-26 DIAGNOSIS — I25.10 CORONARY ARTERY DISEASE: ICD-10-CM

## 2022-10-14 ENCOUNTER — OFFICE VISIT (OUTPATIENT)
Dept: ENDOCRINOLOGY | Facility: CLINIC | Age: 62
End: 2022-10-14
Payer: COMMERCIAL

## 2022-10-14 VITALS
DIASTOLIC BLOOD PRESSURE: 80 MMHG | HEIGHT: 66 IN | WEIGHT: 232.8 LBS | BODY MASS INDEX: 37.41 KG/M2 | SYSTOLIC BLOOD PRESSURE: 138 MMHG | HEART RATE: 83 BPM

## 2022-10-14 DIAGNOSIS — E11.65 UNCONTROLLED TYPE 2 DIABETES MELLITUS WITH HYPERGLYCEMIA (HCC): Primary | ICD-10-CM

## 2022-10-14 DIAGNOSIS — G62.9 PERIPHERAL POLYNEUROPATHY: ICD-10-CM

## 2022-10-14 DIAGNOSIS — E78.2 MIXED HYPERLIPIDEMIA: ICD-10-CM

## 2022-10-14 DIAGNOSIS — I10 ESSENTIAL HYPERTENSION: ICD-10-CM

## 2022-10-14 DIAGNOSIS — E83.52 HYPERCALCEMIA: ICD-10-CM

## 2022-10-14 PROCEDURE — 99214 OFFICE O/P EST MOD 30 MIN: CPT

## 2022-10-14 PROCEDURE — 95251 CONT GLUC MNTR ANALYSIS I&R: CPT

## 2022-10-14 RX ORDER — INSULIN DEGLUDEC INJECTION 100 U/ML
INJECTION, SOLUTION SUBCUTANEOUS
Qty: 30 ML | Refills: 1 | Status: SHIPPED | OUTPATIENT
Start: 2022-10-14

## 2022-10-14 RX ORDER — BLOOD-GLUCOSE METER
EACH MISCELLANEOUS
Qty: 1 KIT | Refills: 0 | Status: SHIPPED | OUTPATIENT
Start: 2022-10-14

## 2022-10-14 RX ORDER — GABAPENTIN 400 MG/1
400 CAPSULE ORAL 2 TIMES DAILY
Qty: 90 CAPSULE | Refills: 1 | Status: SHIPPED | OUTPATIENT
Start: 2022-10-14

## 2022-10-14 RX ORDER — INSULIN ASPART 100 [IU]/ML
INJECTION, SOLUTION INTRAVENOUS; SUBCUTANEOUS
Qty: 15 ML | Refills: 3 | Status: SHIPPED | OUTPATIENT
Start: 2022-10-14

## 2022-10-14 RX ORDER — PERPHENAZINE 16 MG/1
TABLET, FILM COATED ORAL
Qty: 200 STRIP | Refills: 2 | Status: SHIPPED | OUTPATIENT
Start: 2022-10-14

## 2022-10-14 NOTE — ASSESSMENT & PLAN NOTE
HGA1C above goal, BGs elevated during the day, some hypoglycemia over night, however she has not checked with finger stick, I do not believe hypoglycemia on Dexcom report is accurate  Treatment regimen: Will continue with Tresiba 18 units daily and increase to 22 units if increasing prednisone dosing  would recommend to continue with 8 units of novolog with breakfast and dinner and add 5 units of novolog before lunch - BGs are highest between 12pm-6pm and not currently doing novolog with lunch  Will also add sliding scale when prednisone dosing is increased, 1 unit for every 50 above 150  Discussed starting GLP1 but she declines today due to the risk of exacerbating her Crohn's disease  She did not tolerate Metformin or SGLT2  Discussed risks/complications associated with uncontrolled diabetes  Advised to adhere to diabetic diet, and recommended staying active/exercising routinely  Recommend she meet with MNT, she declines today  Keep carbohydrates consistent to limit blood glucose fluctuations  Advised to call if blood sugars less than 70 mg/dl or over 300 mg/dl  Continue with CGM  Advised her to double check BGs when Dexcom reading low - ordered her new glucometer since she is having issues with current one  Discussed symptoms and treatment of hypoglycemia  Recommended routine follow-up with podiatry and ophthalmology  Ordered blood work to complete prior to next visit    Lab Results   Component Value Date    HGBA1C 8 5 (H) 09/20/2022

## 2022-10-14 NOTE — PATIENT INSTRUCTIONS
Continue with Ashley Styles 18 units daily - when on higher doses of prednisone increase Tresiba to 22 units daily  Continue with 8 units of novolog with breakfast and dinner - start novolog 5 units with lunch if snacking  When on higher does of prednisone use sliding scale, 1 units for every 50 above 150       BGs 150-200 - 1 unit  201-250 - 2 units  251 - 300 - 3 units  301 - 350 - 4 units  350 + - 5 units

## 2022-10-14 NOTE — PROGRESS NOTES
Established Patient Progress Note      Chief Complaint   Patient presents with   • Diabetes Type 2        History of Present Illness:   Lizeth Artis is a 63 y o  female with type 2 diabetes with long term use of insulin since 2017  Last seen in the office 2/4/22  Reports complications of neuropathy  Last A1C was 8 5  Denies recent illness or hospitalizations  Denies recent severe hypoglycemic or severe hyperglycemic episodes  Denies any issues with her current regimen  Home glucose monitoring: are performed regularly with CGM  She has been off and on prednisone for her Crohn's and RA  She is currently back on prednisone  She adjusts insulin based on prednisone dosage  She is currently taking Prednisone 10 mg daily  She also admits to poor diet  She finds it difficult to eat healthy with Crohn's  Most healthy foods upset her stomach so she finds herself eating more carbs  She also reports increased shooting pains in her feet and legs  She could not tolerate Metformin in the past due to GI side effects, had UTIs with SGLT2  Lilian Davis   Device used: Dexcom   Home use   Indication: Type 2 Diabetes  More than 72 hours of data was reviewed  Report to be scanned to chart     Date Range: 10/1/22-10/14/22  Analysis of data:   Average Glucose: 176  SD : 53   Time in Target Range: 52%   Time Above Range: 40% high, 5% very high    Time Below Range: <1% low, 2% very low    Interpretation of data: Hppoglycemia episode over night, I do not believe it was accurate, hyperglycemia throughout the day mainly between 12pm-6pm       Current regimen:   Tresiba 18 units daily, will do 22 units if on higher doses of prednisone   Novolog 8 units twice daily with meals, 10 units when on 20 mg of prednisone    Last Eye Exam: 6/17/22, UTD, no retinopathy   Last Foot Exam: 2/4/22, UTD    Has hypertension: Taking carvedilol and losartan  Has hyperlipidemia: zetia, does not tolerate statins, Repatha made RA worse     Patient Active Problem List   Diagnosis   • B-complex deficiency   • Fibromyalgia   • GERD (gastroesophageal reflux disease)   • Inflammatory bowel disease (Crohn's disease) ([facility] )   • Migraine without aura and without status migrainosus, not intractable   • Peripheral neuropathy   • Tobacco use disorder, continuous   • Mixed hyperlipidemia   • Hidradenitis suppurativa   • Acute ST elevation myocardial infarction (STEMI) due to occlusion of mid portion of right coronary artery ([facility])   • Type 2 diabetes mellitus ([place] [address] )   • Essential hypertension   • Coronary artery disease   • Cystocele with prolapse   • Shortness of breath   • Arthralgia   • History of PTCA   • Uncontrolled type 2 diabetes mellitus with hyperglycemia ([facility])   • Generalized abdominal pain      Past Medical History:   Diagnosis Date   • Coronary artery disease    • Crohn's colitis ([place] [address] )    • Diabetes mellitus ([place] [address] )    • Fibromyalgia    • Gastric ulcer     [facility]GE[facility]   • HTN (hypertension)    • Hyperlipidemia    • IBD (inflammatory bowel disease) 11/2015    [facility] SURGE[facility]   • Migraine    • Myocardial infarction [facility])       Past Surgical History:   Procedure Laterality Date   • BREAST BIOPSY Left     benign   • BREAST BIOPSY Left 10/27/2021    Stereo   • CARDIAC CATHETERIZATION     • CARDIAC CATHETERIZATION Left 3/7/2022    Procedure: Cardiac catheterization;  Surgeon: Gloria Horta MD;  [facility]: [facility]; Service: Cardiology   • CARDIAC CATHETERIZATION N/A 3/7/2022    Procedure: Cardiac Coronary Angiogram;  Surgeon: Gloria Horta MD;  [facility]; Service: Cardiology   • CARDIAC CATHETERIZATION N/A 3/7/2022    Procedure: Cardiac pci;  Surgeon: Gloria Horta MD;  [facility];   Service: Cardiology   • CHOLECYSTECTOMY     • COLONOSCOPY  12/03/2018    internal hemorrhoids, 3mm tubular adenoma polyp transverse colon, bx negative for dysplasia   • COLONOSCOPY  11/2015   • CORONARY STENT PLACEMENT     • DENTAL SURGERY      Shedd teeth extraction   • EGD  12/2018    2cm hiatal hernia, gastritis bx shows foci of intestinal metaplasia, negative Hpylori   • EGD  2015    ESOPHAGITIS/MALLHIATUS HERNIA  FOUND   • HYSTERECTOMY     • MAMMO STEREOTACTIC BREAST BIOPSY LEFT (ALL INC) Left 10/27/2021   • TUBAL LIGATION      Bilateral      Family History   Problem Relation Age of Onset   • Coronary artery disease Mother    • Dementia Mother    • Stroke Mother    • No Known Problems Father    • Cervical cancer Sister 36   • Heart disease Brother    • No Known Problems Maternal Grandmother    • No Known Problems Maternal Grandfather    • No Known Problems Paternal Grandmother    • No Known Problems Paternal Grandfather    • Alcohol abuse Neg Hx    • Substance Abuse Neg Hx      Social History     Tobacco Use   • Smoking status: Current Every Day Smoker     Packs/day: 0 50     Types: Cigarettes   • Smokeless tobacco: Never Used   Substance Use Topics   • Alcohol use: Not Currently     Comment: Rarely     Allergies   Allergen Reactions   • Penicillins Anaphylaxis   • Rosuvastatin Myalgia and Arthralgia   • Atorvastatin Myalgia   • Cefuroxime    • Metaxalone    • Influenza Vaccines Rash   • Keflet [Cephalexin] Rash   • Lyrica [Pregabalin] Swelling     Feet swelling   • Nuts - Food Allergy Itching   • Sulfa Antibiotics Rash   • Sulfamethoxazole-Trimethoprim Hives and Rash   • Topiramate Rash         Current Outpatient Medications:   •  acetaminophen (TYLENOL) 500 mg tablet, Take 1,000 mg by mouth every 6 (six) hours as needed for mild pain, Disp: , Rfl:   •  Alcohol Swabs 70 % PADS, Use 4 per day, Disp: 400 each, Rfl: 3  •  ALPRAZolam (XANAX) 0 25 mg tablet, Take 1 tablet (0 25 mg total) by mouth 4 (four) times a day as needed for anxiety, Disp: 30 tablet, Rfl: 0  •  aspirin (ECOTRIN LOW STRENGTH) 81 mg EC tablet, Take 1 tablet (81 mg total) by mouth daily, Disp: 90 tablet, Rfl: 0  •  Blood Glucose Monitoring Suppl (Contour Next Monitor) w/Device KIT, Use to check blood sugar up to 3 times daily  , Disp: 1 kit, Rfl: 0  •  carvedilol (COREG) 12 5 mg tablet, Take 1 tablet (12 5 mg total) by mouth every 12 (twelve) hours, Disp: 180 tablet, Rfl: 0  •  cholecalciferol (VITAMIN D3) 1,000 units tablet, Take 1 tablet (1,000 Units total) by mouth daily (Patient taking differently: Take 1,000 Units by mouth every 14 (fourteen) days), Disp: 90 tablet, Rfl: 3  •  Continuous Blood Gluc  (Dexcom G6 ) CRISTINA, Disp 1 , Disp: 1 Device, Rfl: 1  •  Continuous Blood Gluc Sensor (Dexcom G6 Sensor) MISC, Use 1 sensor every 10 days, disp 90 day supply, Disp: 9 each, Rfl: 3  •  Continuous Blood Gluc Transmit (Dexcom G6 Transmitter) MISC, Use daily as directed for CGM - Change every 3 months, Disp: 1 each, Rfl: 0  •  ezetimibe (ZETIA) 10 mg tablet, Take 1 tablet (10 mg total) by mouth daily, Disp: 90 tablet, Rfl: 0  •  folic acid (FOLVITE) 1 mg tablet, , Disp: , Rfl:   •  gabapentin (NEURONTIN) 400 mg capsule, Take 1 capsule (400 mg total) by mouth 2 (two) times a day, Disp: 90 capsule, Rfl: 1  •  glucose blood (Contour Next Test) test strip, Use as instructed, Disp: 200 strip, Rfl: 2  •  hyoscyamine (LEVSIN/SL) 0 125 mg SL tablet, Take 1 tablet (0 125 mg total) by mouth every 6 (six) hours as needed (esophageal spasm), Disp: 120 tablet, Rfl: 5  •  insulin aspart (NovoLOG FlexPen) 100 UNIT/ML injection pen, 8 units with breakfast and dinner, 5 units with lunch plus sliding scale , Disp: 15 mL, Rfl: 3  •  insulin degludec Sobia Padilla FlexTouch) 100 units/mL injection pen, Inject 18 - 22 units daily  , Disp: 30 mL, Rfl: 1  •  Insulin Pen Needle (Pen Needles) 32G X 5 MM MISC, Use 4 per day, Disp: 360 each, Rfl: 1  •  losartan (COZAAR) 25 mg tablet, Take 1 tablet (25 mg total) by mouth daily, Disp: 90 tablet, Rfl: 0  •  mesalamine (LIALDA) 1 2 g EC tablet, Take 2 tablets (2 4 g total) by mouth daily with breakfast (Patient taking differently: Take 2,400 mg by mouth as needed), Disp: 60 tablet, Rfl: 2  •  methotrexate 2 5 MG tablet, Take by mouth once a week, Disp: , Rfl:   •  predniSONE 5 mg tablet, Take 10 mg by mouth daily, Disp: , Rfl:   •  ticagrelor (BRILINTA) 90 MG, Take 1 tablet (90 mg total) by mouth every 12 (twelve) hours, Disp: 180 tablet, Rfl: 0  •  Evolocumab 140 MG/ML SOAJ, Inject one 1 ml pen every 14 days (Patient not taking: Reported on 10/14/2022), Disp: 6 mL, Rfl: 1  •  Lidocaine Viscous HCl (XYLOCAINE) 2 % mucosal solution, Swish and swallow 10 mL 4 (four) times a day as needed for mouth pain or discomfort (Patient not taking: Reported on 10/14/2022), Disp: 100 mL, Rfl: 1  •  ondansetron (ZOFRAN-ODT) 4 mg disintegrating tablet, Take 1 tablet (4 mg total) by mouth every 8 (eight) hours as needed for nausea or vomiting (Patient not taking: Reported on 10/14/2022), Disp: 12 tablet, Rfl: 0  •  pantoprazole (PROTONIX) 40 mg tablet, Take 1 tablet (40 mg total) by mouth 2 (two) times a day 30 min before breakfast, 30 min before dinner (Patient not taking: No sig reported), Disp: 180 tablet, Rfl: 3  •  sucralfate (CARAFATE) 1 g/10 mL suspension, Take 10 mL (1 g total) by mouth 3 (three) times a day (Patient not taking: No sig reported), Disp: 420 mL, Rfl: 1    Review of Systems   Constitutional: Negative for activity change, appetite change, chills, diaphoresis, fatigue, fever and unexpected weight change  Eyes: Negative for visual disturbance  Respiratory: Negative for chest tightness and shortness of breath  Cardiovascular: Negative for chest pain, palpitations and leg swelling  Gastrointestinal: Positive for abdominal pain (Crohn's disease) and diarrhea (chronic - Crohn's )  Negative for constipation, nausea and vomiting  Endocrine: Negative for polydipsia, polyphagia and polyuria  Musculoskeletal: Positive for arthralgias (RA)  Skin: Negative for color change, pallor, rash and wound     Neurological: Positive for numbness (burning/pain down legs)  Negative for dizziness, tremors, weakness and light-headedness  All other systems reviewed and are negative  Physical Exam:  Body mass index is 37 57 kg/m²  /80 (BP Location: Left arm, Patient Position: Sitting, Cuff Size: Large)   Pulse 83   Ht 5' 6" (1 676 m)   Wt 106 kg (232 lb 12 8 oz)   BMI 37 57 kg/m²    Wt Readings from Last 3 Encounters:   10/14/22 106 kg (232 lb 12 8 oz)   09/09/22 104 kg (230 lb)   04/20/22 101 kg (223 lb 8 oz)       Physical Exam  Vitals reviewed  Constitutional:       Appearance: Normal appearance  She is obese  HENT:      Head: Normocephalic  Cardiovascular:      Rate and Rhythm: Normal rate and regular rhythm  Pulses: Normal pulses  Heart sounds: Normal heart sounds  No murmur heard  Pulmonary:      Effort: Pulmonary effort is normal  No respiratory distress  Breath sounds: Normal breath sounds  No wheezing, rhonchi or rales  Musculoskeletal:      Right lower leg: No edema  Left lower leg: No edema  Skin:     General: Skin is warm and dry  Neurological:      Mental Status: She is alert and oriented to person, place, and time  Psychiatric:         Mood and Affect: Mood normal          Behavior: Behavior normal          Thought Content:  Thought content normal          Judgment: Judgment normal        Labs:   Lab Results   Component Value Date    HGBA1C 8 5 (H) 09/20/2022    HGBA1C 7 2 (H) 04/07/2022    HGBA1C 9 4 (H) 01/22/2022     Lab Results   Component Value Date    CREATININE 0 85 09/20/2022    CREATININE 0 87 04/07/2022    CREATININE 0 80 02/24/2022    BUN 17 09/20/2022     01/22/2016    K 4 2 09/20/2022     09/20/2022    CO2 23 09/20/2022     eGFR   Date Value Ref Range Status   09/20/2022 74 ml/min/1 73sq m Final     Lab Results   Component Value Date    HDL 55 04/07/2022    TRIG 174 (H) 04/07/2022     Lab Results   Component Value Date    ALT 13 09/20/2022    AST 13 09/20/2022    ALKPHOS 108 09/20/2022 BILITOT 0 4 01/22/2016     Lab Results   Component Value Date    [id number] 2 100 08/11/2021    IUR4MWBEECVP 1 620 11/13/2020     Impression & Plan:    Problem List Items Addressed This Visit        Endocrine    Uncontrolled type 2 diabetes mellitus with hyperglycemia ([facility] [address] ) - Primary     HGA1C above goal, BGs elevated during the day, some hypoglycemia over night, however she has not checked with finger stick, I do not believe hypoglycemia on Dexcom report is accurate  Treatment regimen: Will continue with Tresiba 18 units daily and increase to 22 units if increasing prednisone dosing  would recommend to continue with 8 units of novolog with breakfast and dinner and add 5 units of novolog before lunch - BGs are highest between 12pm-6pm and not currently doing novolog with lunch  Will also add sliding scale when prednisone dosing is increased, 1 unit for every 50 above 150  Discussed starting GLP1 but she declines today due to the risk of exacerbating her Crohn's disease  She did not tolerate Metformin or SGLT2  Discussed risks/complications associated with uncontrolled diabetes  Advised to adhere to diabetic diet, and recommended staying active/exercising routinely  Recommend she meet with MNT, she declines today  Keep carbohydrates consistent to limit blood glucose fluctuations  Advised to call if blood sugars less than 70 mg/dl or over 300 mg/dl  Continue with CGM  Advised her to double check BGs when Dexcom reading low - ordered her new glucometer since she is having issues with current one  Discussed symptoms and treatment of hypoglycemia  Recommended routine follow-up with podiatry and ophthalmology  Ordered blood work to complete prior to next visit    Lab Results   Component Value Date    HGBA1C 8 5 (H) 09/20/2022            Relevant Medications    Blood Glucose Monitoring Suppl (Contour Next Monitor) w/Device KIT    glucose blood (Contour Next Test) test strip    insulin degludec [facility] FlexTouch) 100 units/mL injection pen    gabapentin (NEURONTIN) 400 mg capsule    insulin aspart (NovoLOG FlexPen) 100 UNIT/ML injection pen    Other Relevant Orders    Comprehensive metabolic panel    Hemoglobin A1C       Cardiovascular and Mediastinum    Essential hypertension     BP at goal  Continue current medication regimen  Nervous and Auditory    Peripheral neuropathy     This is worsening - will start gabapentin  Other    Mixed hyperlipidemia     She is no longer taking Repatha  She has follow up with caridologist to discuss medication options since she does not tolerate statins  Continue with Zetia  Encouraged to work on lifestyle modifications  Other Visit Diagnoses     Hypercalcemia        Relevant Orders    Vitamin D 25 hydroxy    Phosphorus    PTH, intact          Orders Placed This Encounter   Procedures   • Comprehensive metabolic panel     This is a patient instruction: Patient fasting for 8 hours or longer recommended  Standing Status:   Future     Standing Expiration Date:   10/14/2023   • Hemoglobin A1C     Standing Status:   Future     Standing Expiration Date:   10/14/2023   • Vitamin D 25 hydroxy     Standing Status:   Future     Standing Expiration Date:   10/14/2023   • Phosphorus     Standing Status:   Future     Standing Expiration Date:   10/14/2023   • PTH, intact     Standing Status:   Future     Standing Expiration Date:   10/14/2023       Patient Instructions   Continue with Noemi Gums 18 units daily - when on higher doses of prednisone increase Tresiba to 22 units daily  Continue with 8 units of novolog with breakfast and dinner - start novolog 5 units with lunch if snacking  When on higher does of prednisone use sliding scale, 1 units for every 50 above 150  BGs 150-200 - 1 unit  201-250 - 2 units  251 - 300 - 3 units  301 - 350 - 4 units  350 + - 5 units       Discussed with the patient and all questioned fully answered   She will call me if any problems arise      MERI Guzman

## 2022-10-14 NOTE — ASSESSMENT & PLAN NOTE
She is no longer taking Repatha  She has follow up with caridologist to discuss medication options since she does not tolerate statins  Continue with Sommer  Encouraged to work on lifestyle modifications

## 2022-10-21 DIAGNOSIS — F32.9 REACTIVE DEPRESSION (SITUATIONAL): ICD-10-CM

## 2022-10-24 RX ORDER — ALPRAZOLAM 0.25 MG/1
0.25 TABLET ORAL 4 TIMES DAILY PRN
Qty: 30 TABLET | Refills: 0 | Status: SHIPPED | OUTPATIENT
Start: 2022-10-24

## 2022-11-04 DIAGNOSIS — E78.2 MIXED HYPERLIPIDEMIA: ICD-10-CM

## 2022-11-04 DIAGNOSIS — E11.65 UNCONTROLLED TYPE 2 DIABETES MELLITUS WITH HYPERGLYCEMIA (HCC): Chronic | ICD-10-CM

## 2022-11-04 DIAGNOSIS — E78.2 MODERATE MIXED HYPERLIPIDEMIA NOT REQUIRING STATIN THERAPY: ICD-10-CM

## 2022-11-04 DIAGNOSIS — I25.10 CORONARY ARTERY DISEASE: ICD-10-CM

## 2022-11-04 DIAGNOSIS — I21.11 ACUTE ST ELEVATION MYOCARDIAL INFARCTION (STEMI) DUE TO OCCLUSION OF MID PORTION OF RIGHT CORONARY ARTERY (HCC): ICD-10-CM

## 2022-11-04 DIAGNOSIS — I21.11 ACUTE ST ELEVATION MYOCARDIAL INFARCTION (STEMI) DUE TO OCCLUSION OF DISTAL PORTION OF RIGHT CORONARY ARTERY (HCC): ICD-10-CM

## 2022-11-04 DIAGNOSIS — E11.65 TYPE 2 DIABETES MELLITUS WITH HYPERGLYCEMIA, WITHOUT LONG-TERM CURRENT USE OF INSULIN (HCC): ICD-10-CM

## 2022-11-04 DIAGNOSIS — I25.10 CORONARY ARTERY DISEASE INVOLVING NATIVE CORONARY ARTERY: ICD-10-CM

## 2022-11-04 DIAGNOSIS — I25.10 CORONARY ARTERY DISEASE INVOLVING NATIVE CORONARY ARTERY OF NATIVE HEART WITHOUT ANGINA PECTORIS: Chronic | ICD-10-CM

## 2022-11-04 DIAGNOSIS — I21.19 INFERIOR MI (HCC): ICD-10-CM

## 2022-11-04 RX ORDER — ASPIRIN 81 MG/1
81 TABLET ORAL DAILY
Qty: 90 TABLET | Refills: 0 | Status: SHIPPED | OUTPATIENT
Start: 2022-11-04

## 2022-11-04 RX ORDER — CARVEDILOL 12.5 MG/1
12.5 TABLET ORAL EVERY 12 HOURS SCHEDULED
Qty: 180 TABLET | Refills: 0 | Status: SHIPPED | OUTPATIENT
Start: 2022-11-04

## 2022-11-04 RX ORDER — EZETIMIBE 10 MG/1
10 TABLET ORAL DAILY
Qty: 90 TABLET | Refills: 0 | Status: SHIPPED | OUTPATIENT
Start: 2022-11-04

## 2022-11-04 RX ORDER — LOSARTAN POTASSIUM 25 MG/1
25 TABLET ORAL DAILY
Qty: 90 TABLET | Refills: 0 | Status: SHIPPED | OUTPATIENT
Start: 2022-11-04

## 2022-11-04 NOTE — TELEPHONE ENCOUNTER
Requested medication(s) are due for refill today: Yes  Patient has already received a courtesy refill: NO    Other reason request has been forwarded to provider:

## 2022-11-18 ENCOUNTER — OFFICE VISIT (OUTPATIENT)
Dept: CARDIOLOGY CLINIC | Facility: CLINIC | Age: 62
End: 2022-11-18

## 2022-11-18 VITALS
BODY MASS INDEX: 35.16 KG/M2 | HEART RATE: 101 BPM | HEIGHT: 68 IN | SYSTOLIC BLOOD PRESSURE: 138 MMHG | DIASTOLIC BLOOD PRESSURE: 80 MMHG | WEIGHT: 232 LBS | OXYGEN SATURATION: 99 %

## 2022-11-18 DIAGNOSIS — I25.10 CORONARY ARTERY DISEASE INVOLVING NATIVE CORONARY ARTERY OF NATIVE HEART WITHOUT ANGINA PECTORIS: ICD-10-CM

## 2022-11-18 DIAGNOSIS — I10 ESSENTIAL HYPERTENSION: Primary | ICD-10-CM

## 2022-11-18 DIAGNOSIS — E78.2 MIXED HYPERLIPIDEMIA: ICD-10-CM

## 2022-11-18 NOTE — PROGRESS NOTES
Cardiology   Eran Davis 63 y o  female MRN: [id number]        Impression:  1  CAD s/p NSTEMI and PCI of RCA/LAD - stable  2  Hypertension - borderline control  3  Dyslipidemia - on Zetia  4  Polymyalgias - may contribute to weakness  Recommendations:  1  Continue current medications  2  Check pharmacologic nuclear stress test to evaluate for myocardial ischemia  3  Follow up in 4 months  HPI: Barbara Saxena is a 63y o  year old female with CAD s/p STEMI and PCI of RCA 2/21, PCI of LAD 3/22, hypertension, and dyslipidemia who presents for follow up  Echo 2/21 - EF 60% with no valvular abnormalities  Major complaint is fatigue and tired legs  Is dyspneic, but smokes  Unable to tolerate statins and Repatha  Patient unable to ambulate far for a exercise stress test           Review of Systems   Constitutional: Positive for fatigue  HENT: Negative  Eyes: Negative  Respiratory: Positive for shortness of breath  Negative for chest tightness  Cardiovascular: Negative for chest pain, palpitations and leg swelling  Gastrointestinal: Negative  Endocrine: Negative  Genitourinary: Negative  Musculoskeletal: Positive for myalgias  Skin: Negative  Allergic/Immunologic: Negative  Neurological: Negative  Hematological: Negative  Psychiatric/Behavioral: Negative  All other systems reviewed and are negative          Past Medical History:   Diagnosis Date   • Coronary artery disease    • Crohn's colitis ([facility] [address] )    • Diabetes mellitus ([facility] [address] )    • Fibromyalgia    • Gastric ulcer     [facility] GI SURGEON   • HTN (hypertension)    • Hyperlipidemia    • IBD (inflammatory bowel disease) 11/2015    [facility] GI SURGEON   • Migraine    • Myocardial infarction [facility])      Past Surgical History:   Procedure Laterality Date   • BREAST BIOPSY Left     benign   • BREAST BIOPSY Left 10/27/2021    Stereo   • CARDIAC CATHETERIZATION     • CARDIAC CATHETERIZATION Left 3/7/2022    Procedure: Cardiac catheterization;  Surgeon: Fahad Jauregui MD;  Location: [facility]; Service: Cardiology   • CARDIAC CATHETERIZATION N/A 3/7/2022    Procedure: Cardiac Coronary Angiogram;  Surgeon: Fahad Jauregui MD;  Location: [facility]; Service: Cardiology   • CARDIAC CATHETERIZATION N/A 3/7/2022    Procedure: Cardiac pci;  Surgeon: Fahad Jauregui MD;  Location: [facility]; Service: Cardiology   • CHOLECYSTECTOMY     • COLONOSCOPY  12/03/2018    internal hemorrhoids, 3mm tubular adenoma polyp transverse colon, bx negative for dysplasia   • COLONOSCOPY  11/2015   • CORONARY STENT PLACEMENT     • DENTAL SURGERY      [place] teeth extraction   • EGD  12/2018    2cm hiatal hernia, gastritis bx shows foci of intestinal metaplasia, negative Hpylori   • EGD  2015    ESOPHAGITIS/MALLHIATUS HERNIA  FOUND   • HYSTERECTOMY     • MAMMO STEREOTACTIC BREAST BIOPSY LEFT (ALL INC) Left 10/27/2021   • TUBAL LIGATION      Bilateral     Social History     Substance and Sexual Activity   Alcohol Use Not Currently    Comment: Rarely     Social History     Substance and Sexual Activity   Drug Use Never     Social History     Tobacco Use   Smoking Status Every Day   • Packs/day: 0 25   • Types: Cigarettes   Smokeless Tobacco Never     Family History   Problem Relation Age of Onset   • Coronary artery disease Mother    • Dementia Mother    • Stroke Mother    • No Known Problems Father    • Cervical cancer Sister 36   • Heart disease Brother    • No Known Problems Maternal Grandmother    • No Known Problems Maternal Grandfather    • No Known Problems Paternal Grandmother    • No Known Problems Paternal Grandfather    • Alcohol abuse Neg Hx    • Substance Abuse Neg Hx        Allergies:   Allergies   Allergen Reactions   • Penicillins Anaphylaxis   • Rosuvastatin Myalgia and Arthralgia   • Atorvastatin Myalgia   • Cefuroxime    • Metaxalone    • Influenza Vaccines Rash   • Keflet [Cephalexin] Rash   • Lyrica [Pregabalin] Swelling     Feet swelling   • Nuts - Food Allergy Itching   • Sulfa Antibiotics Rash   • Sulfamethoxazole-Trimethoprim Hives and Rash   • Topiramate Rash       Medications:     Current Outpatient Medications:   •  acetaminophen (TYLENOL) 500 mg tablet, Take 1,000 mg by mouth every 6 (six) hours as needed for mild pain, Disp: , Rfl:   •  Alcohol Swabs 70 % PADS, Use 4 per day, Disp: 400 each, Rfl: 3  •  ALPRAZolam (XANAX) 0 25 mg tablet, Take 1 tablet (0 25 mg total) by mouth 4 (four) times a day as needed for anxiety, Disp: 30 tablet, Rfl: 0  •  aspirin (ECOTRIN LOW STRENGTH) 81 mg EC tablet, Take 1 tablet (81 mg total) by mouth daily, Disp: 90 tablet, Rfl: 0  •  Blood Glucose Monitoring Suppl (Contour Next Monitor) w/Device KIT, Use to check blood sugar up to 3 times daily  , Disp: 1 kit, Rfl: 0  •  carvedilol (COREG) 12 5 mg tablet, Take 1 tablet (12 5 mg total) by mouth every 12 (twelve) hours, Disp: 180 tablet, Rfl: 0  •  cholecalciferol (VITAMIN D3) 1,000 units tablet, Take 1 tablet (1,000 Units total) by mouth daily (Patient taking differently: Take 1,000 Units by mouth every 14 (fourteen) days), Disp: 90 tablet, Rfl: 3  •  Continuous Blood Gluc  (Dexcom G6 ) CRISTINA, Disp 1 , Disp: 1 Device, Rfl: 1  •  Continuous Blood Gluc Sensor (Dexcom G6 Sensor) MISC, Use 1 sensor every 10 days, disp 90 day supply, Disp: 9 each, Rfl: 3  •  Continuous Blood Gluc Transmit (Dexcom G6 Transmitter) MISC, Use daily as directed for CGM - Change every 3 months, Disp: 1 each, Rfl: 0  •  ezetimibe (ZETIA) 10 mg tablet, Take 1 tablet (10 mg total) by mouth daily, Disp: 90 tablet, Rfl: 0  •  folic acid (FOLVITE) 1 mg tablet, , Disp: , Rfl:   •  glucose blood (Contour Next Test) test strip, Use as instructed, Disp: 200 strip, Rfl: 2  •  hyoscyamine (LEVSIN/SL) 0 125 mg SL tablet, Take 1 tablet (0 125 mg total) by mouth every 6 (six) hours as needed (esophageal spasm), Disp: 120 tablet, Rfl: 5  • insulin aspart (NovoLOG FlexPen) 100 UNIT/ML injection pen, 8 units with breakfast and dinner, 5 units with lunch plus sliding scale , Disp: 15 mL, Rfl: 3  •  insulin degludec Abbott Burow FlexTouch) 100 units/mL injection pen, Inject 18 - 22 units daily  , Disp: 30 mL, Rfl: 1  •  Insulin Pen Needle (Pen Needles) 32G X 5 MM MISC, Use 4 per day, Disp: 360 each, Rfl: 1  •  losartan (COZAAR) 25 mg tablet, Take 1 tablet (25 mg total) by mouth daily, Disp: 90 tablet, Rfl: 0  •  mesalamine (LIALDA) 1 2 g EC tablet, Take 2 tablets (2 4 g total) by mouth daily with breakfast (Patient taking differently: Take 2,400 mg by mouth in the morning), Disp: 60 tablet, Rfl: 2  •  methotrexate 2 5 MG tablet, Take by mouth once a week, Disp: , Rfl:   •  ondansetron (ZOFRAN-ODT) 4 mg disintegrating tablet, Take 1 tablet (4 mg total) by mouth every 8 (eight) hours as needed for nausea or vomiting, Disp: 12 tablet, Rfl: 0  •  predniSONE 5 mg tablet, Take 10 mg by mouth daily, Disp: , Rfl:   •  ticagrelor (BRILINTA) 90 MG, Take 1 tablet (90 mg total) by mouth every 12 (twelve) hours, Disp: 180 tablet, Rfl: 0  •  Evolocumab 140 MG/ML SOAJ, Inject one 1 ml pen every 14 days (Patient not taking: Reported on 10/14/2022), Disp: 6 mL, Rfl: 1  •  gabapentin (NEURONTIN) 400 mg capsule, Take 1 capsule (400 mg total) by mouth 2 (two) times a day (Patient not taking: Reported on 11/9/2022), Disp: 90 capsule, Rfl: 1      Wt Readings from Last 3 Encounters:   11/18/22 105 kg (232 lb)   11/09/22 104 kg (230 lb)   10/14/22 106 kg (232 lb 12 8 oz)     Temp Readings from Last 3 Encounters:   11/09/22 97 9 °F (36 6 °C) (Temporal)   09/09/22 97 5 °F (36 4 °C)   03/07/22 (!) 97 4 °F (36 3 °C) (Oral)     BP Readings from Last 3 Encounters:   11/18/22 138/80   11/09/22 148/92   10/14/22 138/80     Pulse Readings from Last 3 Encounters:   11/18/22 101   11/09/22 99   10/14/22 83         Physical Exam  HENT:      Head: Atraumatic        Mouth/Throat:      Mouth: Mucous membranes are moist    Eyes:      Extraocular Movements: Extraocular movements intact  Cardiovascular:      Rate and Rhythm: Normal rate and regular rhythm  Pulses: Normal pulses  Heart sounds: Normal heart sounds  Pulmonary:      Effort: Pulmonary effort is normal       Breath sounds: Normal breath sounds  Abdominal:      General: Abdomen is flat  Musculoskeletal:         General: Normal range of motion  Cervical back: Normal range of motion  Skin:     General: Skin is warm  Neurological:      General: No focal deficit present  Mental Status: She is alert and oriented to person, place, and time  Psychiatric:         Mood and Affect: Mood normal          Behavior: Behavior normal            Laboratory Studies:  CMP:  Lab Results   Component Value Date     2016    K 4 2 2022     2022    CO2 23 2022    BUN 17 2022    CREATININE 0 85 2022    GLUCOSE 78 2016    AST 13 2022    ALT 13 2022    BILITOT 0 4 2016    EGFR 74 2022       Lipid Profile:   No results found for: CHOL  Lab Results   Component Value Date    HDL 55 2022     Lab Results   Component Value Date    LDLCALC 21 2022     Lab Results   Component Value Date    TRIG 174 (H) 2022       Cardiac testing:   EKG reviewed personally:   Results for orders placed during the hospital encounter of 21    Echo Complete with Contrast if Indicated    Narrative  [facility] [address] [place] [address]  [phone]    Transthoracic Echocardiogram  2D, M-mode, Doppler, and Color Doppler    Study date:  2021    Patient: Mina Robles  MR number: [id number]  Account number: [de-identified]  : 1960  Age: 63 years  Gender: Female  Status: Inpatient  Location: Bedside  Height: 66 in  Weight: 213 6 lb  BP: 161/ 81 mmHg    Indications: Assess left ventricular function      Diagnoses: I21 3 - ST elevation (STEMI) myocardial infarction of unspecified site    Sonographer:  TODD Sen  Primary Physician:  Miguel Lopez [facility]  Referring Physician:  Kenyetta Sepulveda MD  Group:  [facility]  Interpreting Physician:  Cyndee Tellez MD    SUMMARY    LEFT VENTRICLE:  Systolic function was normal  Ejection fraction was estimated to be 60 %  There was akinesis of the basal-mid inferior wall(s)  RIGHT VENTRICLE:  The size was normal   Systolic function was normal     HISTORY: PRIOR HISTORY: Myocardial infarction, Dyslipidemia, Current everyday smoker, Obesity    PROCEDURE: The procedure was performed at the bedside  This was a routine study  The transthoracic approach was used  The study included complete 2D imaging, M-mode, complete spectral Doppler, and color Doppler  The heart rate was 55 bpm,  at the start of the study  Images were obtained from the parasternal, apical, subcostal, and suprasternal notch acoustic windows  Image quality was adequate  LEFT VENTRICLE: Size was normal  Systolic function was normal  Ejection fraction was estimated to be 60 %  There was akinesis of the basal-mid inferior wall(s)  Wall thickness was normal  DOPPLER: Left ventricular diastolic function  parameters were normal     RIGHT VENTRICLE: The size was normal  Systolic function was normal  Wall thickness was normal     LEFT ATRIUM: Size was normal     RIGHT ATRIUM: Size was normal     MITRAL VALVE: Valve structure was normal  There was normal leaflet separation  DOPPLER: The transmitral velocity was within the normal range  There was no evidence for stenosis  There was trace regurgitation  AORTIC VALVE: The valve was trileaflet  Leaflets exhibited normal thickness and normal cuspal separation  DOPPLER: Transaortic velocity was within the normal range  There was no evidence for stenosis  There was no significant  regurgitation      TRICUSPID VALVE: The valve structure was normal  There was normal leaflet separation  DOPPLER: The transtricuspid velocity was within the normal range  There was no evidence for stenosis  There was trace regurgitation  PULMONIC VALVE: Leaflets exhibited normal thickness, no calcification, and normal cuspal separation  DOPPLER: The transpulmonic velocity was within the normal range  There was trace regurgitation  PERICARDIUM: There was no pericardial effusion  The pericardium was normal in appearance  AORTA: The root exhibited normal size  SYSTEMIC VEINS: IVC: The inferior vena cava was normal in size  Respirophasic changes were normal     SYSTEM MEASUREMENT TABLES    2D  %FS: 35 61 %  Ao Diam: 3 35 cm  EDV(Teich): 111 59 ml  EF Biplane: 54 33 %  EF(Teich): 64 94 %  ESV(Teich): 39 13 ml  IVSd: 0 99 cm  LA Area: 16 16 cm2  LA Diam: 3 69 cm  LVEDV MOD A2C: 97 35 ml  LVEDV MOD A4C: 86 07 ml  LVEDV MOD BP: 94 47 ml  LVEF MOD A2C: 49 73 %  LVEF MOD A4C: 59 76 %  LVESV MOD A2C: 48 93 ml  LVESV MOD A4C: 34 63 ml  LVESV MOD BP: 43 14 ml  LVIDd: 4 88 cm  LVIDs: 3 14 cm  LVLd A2C: 8 07 cm  LVLd A4C: 7 51 cm  LVLs A2C: 7 03 cm  LVLs A4C: 6 32 cm  LVPWd: 0 93 cm  RA Area: 11 08 cm2  RVIDd: 3 16 cm  SV MOD A2C: 48 42 ml  SV MOD A4C: 51 43 ml  SV(Teich): 72 46 ml    MM  TAPSE: 1 72 cm    PW  E' Sept: 0 06 m/s  E/E' Sept: 11 78  MV A Adis: 0 82 m/s  MV Dec Gentry: 2 94 m/s2  MV DecT: 260 15 ms  MV E Adis: 0 77 m/s  MV E/A Ratio: 0 93  MV PHT: 75 44 ms  MVA By PHT: 2 92 cm2    [employer] Accredited Echocardiography Laboratory    Prepared and electronically signed by    Caden Cedeño MD  Signed [id number] 17:07:47    No results found for this or any previous visit      Results for orders placed during the hospital encounter of 02/26/21    Cardiac catheterization    [address]  [phone]    [facility]    Invasive Cardiovascular Lab Complete Report    Patient: Mina Robles  MR number: [id number]  Account number: [de-identified]  Study date: 2021  Gender: Female  : 1960  Height: 66 9 in  Weight: 213 6 lb  BSA: 2 08 mï¾²    Allergies: PENICILLINS, CEFUROXIME, METAXALONE, INFLUENZA VACCINES, CEPHALEXIN, NUTS, SULFA ANTIBIOTICS, SULFAMETHOXAZOLE-TRIMETHOPRIM, TOPIRAMATE    Diagnostic Cardiologist:  Chava Rosa MD  Interventional Cardiologist:  Chava Rosa MD  Primary Physician:  John Salazar PAC    SUMMARY    CORONARY CIRCULATION:  Mid LAD: There was a discrete 80 % stenosis  It appears amenable to percutaneous intervention  1st diagonal: There was a 40 % stenosis  2nd diagonal: There was a tubular 60 % stenosis  1st obtuse marginal: There was a tubular 50 % stenosis  Mid RCA: There was a discrete 90 % stenosis  An intervention was performed  Distal RCA: There was a tubular 99 % stenosis  This lesion is a likely culprit for the patient's recent myocardial infarction  An intervention was performed  1ST LESION INTERVENTIONS:  A successful balloon angioplasty with stent procedure was performed on the 99 % lesion in the distal RCA  Following intervention there was an excellent angiographic appearance with a 0 % residual stenosis  A Resolute [place] Rx 3 0 x 26mm drug-eluting stent was placed across the lesion and deployed at a maximum inflation pressure of 14 zeferino  2ND LESION INTERVENTIONS:  A successful balloon angioplasty with stent procedure was performed on the 90 % lesion in the mid RCA  Following intervention there was an excellent angiographic appearance with a 0 % residual stenosis  A Resolute [place] Rx 3 0 x 18mm drug-eluting stent was placed across the lesion and deployed at a maximum inflation pressure of 16 zeferino  INDICATIONS:  --  Possible CAD: myocardial infarction with ST elevation (STEMI)  PROCEDURES PERFORMED    --  Right coronary angiography  --  Left coronary angiography  --  Acute Myocardial Infarct  --  Mod Sedation Same Physician Initial 15min    --  Mod Sedation Same Physician Add 15min  --  Coronary Catheterization (w/o [facility])  --  AMI PCI (ZACHARY, PTCRA, PTCA) Single  --  Intervention on distal RCA: balloon angioplasty, stent  --  Intervention on mid RCA: balloon angioplasty, stent  PROCEDURE: The risks and alternatives of the procedures and conscious sedation were explained to the patient and informed consent was obtained  The patient was brought to the cath lab and placed on the table  The planned puncture sites  were prepped and draped in the usual sterile fashion  --  Right radial artery access  After performing an Brett's test to verify adequate ulnar artery supply to the hand, the radial site was prepped  The puncture site was infiltrated with local anesthetic  The vessel was accessed using the  modified Seldinger technique, a wire was advanced into the vessel, and a sheath was advanced over the wire into the vessel  --  Right femoral artery access  The puncture site was infiltrated with local anesthetic  The vessel was accessed using the modified Seldinger technique, a wire was advanced into the vessel, and a sheath was advanced over the wire into the  vessel  --  Right coronary artery angiography  A catheter was advanced over a guidewire into the aorta and positioned in the right coronary artery ostium under fluoroscopic guidance  Angiography was performed  --  Left coronary artery angiography  A catheter was advanced over a guidewire into the aorta and positioned in the left coronary artery ostium under fluoroscopic guidance  Angiography was performed  --  Acute Myocardial Infarct  --  Mod Sedation Same Physician Initial 15min  --  Mod Sedation Same Physician Add 15min  --  Coronary Catheterization (w/o [facility])  LESION #1 INTERVENTION: A successful balloon angioplasty with stent procedure was performed on the 99 % lesion in the distal RCA   Following intervention there was an excellent angiographic appearance with a 0 % residual stenosis  This was  an ACC/AHA type C "high risk" lesion for intervention  There was FABRICE 1 flow before the procedure and FABRICE 3 flow after the procedure  There was no dissection  --  Vessel setup was performed  A Runthrough NS 180cm wire was used to cross the lesion  --  Vessel setup was performed  A 6Fr  Launcher JR 4 0 100cm guiding catheter was used to cannulate the vessel  --  Balloon angioplasty was performed, using a [place] Rx 2 5 x 15mm balloon, with 2 inflations and a maximum inflation pressure of 8 zeferino  --  A Resolute [place] Rx 3 0 x 26mm drug-eluting stent was placed across the lesion and deployed at a maximum inflation pressure of 14 zeferino  LESION #2 INTERVENTION: A successful balloon angioplasty with stent procedure was performed on the 90 % lesion in the mid RCA  Following intervention there was an excellent angiographic appearance with a 0 % residual stenosis  There was  FABRICE 3 flow before the procedure and FABRICE 3 flow after the procedure  There was no dissection  --  Balloon angioplasty was performed, using a [place] Rx 2 5 x 15mm balloon, with 1 inflations and a maximum inflation pressure of 8 zeferino  --  A Resolute [place] Rx 3 0 x 18mm drug-eluting stent was placed across the lesion and deployed at a maximum inflation pressure of 16 zeferino  INTERVENTIONS:  --  AMI PCI (ZACHARY, PTCRA, PTCA) Single  PROCEDURE COMPLETION: The patient tolerated the procedure well and was discharged from the cath lab  TIMING: Test started at 11:51  Test concluded at 12:45  HEMOSTASIS: The sheath was removed  The site was compressed with a Hemoband  device  Hemostasis was obtained  The sheath was removed over a wire and the Angioseal delivery sheath was inserted into the femoral artery  Hemostasis was obtained using a closure device ( Angioseal) deployed through the delivery sheath  MEDICATIONS GIVEN: Versed (2mg/2ml), 2 mg, IV, at 11:52  Fentanyl (1OOmcg/2 ml), 50 mcg, IV, at 11:52   Heparin 1000 units/ml, 4,000 units, IV, at 11:53  1% Lidocaine, 1 ml, subcutaneously, at 11:56  Nitroglycerin (200mcg/ml), 200 mcg, at  11:57  Heparin 1000 units/ml, 6,000 units, IV, at 11:57  Fentanyl (1OOmcg/2 ml), 50 mcg, IV, at 12:06  1% Lidocaine, 10 ml, subcutaneously, at 12:07  Nitroglycerine (200mcg/ml), 200 mcg, at 12:16  Heparin 1000 units/ml, 4,000 units, IV, at  12:17  Versed (2mg/2ml), 1 mg, IV, at 12:28  Fentanyl (1OOmcg/2 ml), 50 mcg, IV, at 12:28  CONTRAST GIVEN: 80 ml Omnipaque (350 mg I /ml)  RADIATION EXPOSURE: Fluoroscopy time: 11 47 min  CORONARY VESSELS:   --  The coronary circulation is right dominant  --  Left main: The vessel was medium sized  Angiography showed mild atherosclerosis  --  Mid LAD: There was a discrete 80 % stenosis  It appears amenable to percutaneous intervention  --  1st diagonal: There was a 40 % stenosis  --  2nd diagonal: There was a tubular 60 % stenosis  --  1st obtuse marginal: There was a tubular 50 % stenosis  --  RCA: The vessel was medium sized and moderately tortuous  Angiography showed moderate atherosclerosis  --  Mid RCA: There was a discrete 90 % stenosis  An intervention was performed  --  Distal RCA: There was a tubular 99 % stenosis  This lesion is a likely culprit for the patient's recent myocardial infarction  An intervention was performed  IMPRESSIONS:  Angulated innominate/aorta angle , unable to access ascending aorta from right RA  There is significant triple vessel coronary artery disease  RECOMMENDATIONS  Patient management should include adding lipid lowering therapy  Patient management to include dual antiplatelet therapy  Patient management should include aggressive medical therapy, smoking cessation, and weight reduction  Patient instructed to return for staged percutaneous intervention in three weeks  DISPOSITION:  The patient left the catheterization laboratory in stable condition      Prepared and signed by    Ingrid Nova MD  Signed 02/26/2021 12:51:36    Study diagram    Angiographic findings  Native coronary lesions:  ï¾·Mid LAD: Lesion 1: discrete, 80 % stenosis  ï¾·D1: Lesion 1: 40 % stenosis  ï¾·D2: Lesion 1: tubular, 60 % stenosis  ï¾·OM1: Lesion 1: tubular, 50 % stenosis  ï¾·Mid RCA: Lesion 1: discrete, 90 % stenosis  ï¾·Distal RCA: Lesion 1: tubular, 99 % stenosis  Intervention results  Native coronary lesions:  ï¾·Successful balloon angioplasty and stent of the 99 % stenosis in distal RCA  Appearance excellent with 0 % residual stenosis  Stent: Resolute [place] Rx 3 0 x 26mm drug-eluting  ï¾·Successful balloon angioplasty and stent of the 90 % stenosis in mid RCA  Appearance excellent with 0 % residual stenosis  Stent: Resolute [place] Rx 3 0 x 18mm drug-eluting  Hemodynamic tables    Pressures:  Baseline  Pressures:  - HR: 48  Pressures:  - Rhythm:  Pressures:  -- Aortic Pressure (S/D/M): 145/65/88    Outputs:  Baseline  Outputs:  -- CALCULATIONS[age]  Outputs:  -- CALCULATIONS: Body Surface Area: 2 08  Outputs:  -- CALCULATIONS: Height in cm: 170 00  Outputs:  -- CALCULATIONS: Sex: Female  Outputs:  -- CALCULATIONS: Weight in k 10    No results found for this or any previous visit

## 2022-11-18 NOTE — PATIENT INSTRUCTIONS
Recommendations:  1  Continue current medications  2  Check pharmacologic nuclear stress test to evaluate for myocardial ischemia  3  Follow up in 4 months

## 2022-11-22 DIAGNOSIS — E11.65 UNCONTROLLED TYPE 2 DIABETES MELLITUS WITH HYPERGLYCEMIA (HCC): Chronic | ICD-10-CM

## 2022-11-22 DIAGNOSIS — F32.9 REACTIVE DEPRESSION (SITUATIONAL): ICD-10-CM

## 2022-11-23 RX ORDER — BLOOD-GLUCOSE TRANSMITTER
EACH MISCELLANEOUS
Qty: 1 EACH | Refills: 0 | Status: SHIPPED | OUTPATIENT
Start: 2022-11-23

## 2022-11-24 RX ORDER — ALPRAZOLAM 0.25 MG/1
0.25 TABLET ORAL 4 TIMES DAILY PRN
Qty: 30 TABLET | Refills: 0 | Status: SHIPPED | OUTPATIENT
Start: 2022-11-24

## 2022-11-29 ENCOUNTER — APPOINTMENT (EMERGENCY)
Dept: CT IMAGING | Facility: HOSPITAL | Age: 62
End: 2022-11-29

## 2022-11-29 ENCOUNTER — HOSPITAL ENCOUNTER (EMERGENCY)
Facility: HOSPITAL | Age: 62
Discharge: HOME | End: 2022-11-29
Attending: EMERGENCY MEDICINE

## 2022-11-29 VITALS
TEMPERATURE: 98.3 F | OXYGEN SATURATION: 98 % | HEART RATE: 89 BPM | WEIGHT: 233.69 LBS | RESPIRATION RATE: 18 BRPM | SYSTOLIC BLOOD PRESSURE: 124 MMHG | BODY MASS INDEX: 35.53 KG/M2 | DIASTOLIC BLOOD PRESSURE: 69 MMHG

## 2022-11-29 DIAGNOSIS — K29.70 GASTRITIS: Primary | ICD-10-CM

## 2022-11-29 LAB
ALBUMIN SERPL BCP-MCNC: 3.4 G/DL (ref 3.5–5)
ALP SERPL-CCNC: 101 U/L (ref 46–116)
ALT SERPL W P-5'-P-CCNC: 12 U/L (ref 12–78)
ANION GAP SERPL CALCULATED.3IONS-SCNC: 8 MMOL/L (ref 4–13)
AST SERPL W P-5'-P-CCNC: 11 U/L (ref 5–45)
BASOPHILS # BLD AUTO: 0.04 THOUSANDS/ÂΜL (ref 0–0.1)
BASOPHILS NFR BLD AUTO: 0 % (ref 0–1)
BILIRUB SERPL-MCNC: 0.52 MG/DL (ref 0.2–1)
BUN SERPL-MCNC: 16 MG/DL (ref 5–25)
CALCIUM ALBUM COR SERPL-MCNC: 9.7 MG/DL (ref 8.3–10.1)
CALCIUM SERPL-MCNC: 9.2 MG/DL (ref 8.3–10.1)
CHLORIDE SERPL-SCNC: 100 MMOL/L (ref 96–108)
CO2 SERPL-SCNC: 27 MMOL/L (ref 21–32)
CREAT SERPL-MCNC: 1.06 MG/DL (ref 0.6–1.3)
EOSINOPHIL # BLD AUTO: 0.14 THOUSAND/ÂΜL (ref 0–0.61)
EOSINOPHIL NFR BLD AUTO: 1 % (ref 0–6)
ERYTHROCYTE [DISTWIDTH] IN BLOOD BY AUTOMATED COUNT: 14.1 % (ref 11.6–15.1)
GFR SERPL CREATININE-BSD FRML MDRD: 57 ML/MIN/1.73SQ M
GLUCOSE SERPL-MCNC: 341 MG/DL (ref 65–140)
HCT VFR BLD AUTO: 39.6 % (ref 34.8–46.1)
HGB BLD-MCNC: 13.1 G/DL (ref 11.5–15.4)
IMM GRANULOCYTES # BLD AUTO: 0.07 THOUSAND/UL (ref 0–0.2)
IMM GRANULOCYTES NFR BLD AUTO: 1 % (ref 0–2)
LIPASE SERPL-CCNC: 157 U/L (ref 73–393)
LYMPHOCYTES # BLD AUTO: 1.26 THOUSANDS/ÂΜL (ref 0.6–4.47)
LYMPHOCYTES NFR BLD AUTO: 11 % (ref 14–44)
MCH RBC QN AUTO: 30.8 PG (ref 26.8–34.3)
MCHC RBC AUTO-ENTMCNC: 33.1 G/DL (ref 31.4–37.4)
MCV RBC AUTO: 93 FL (ref 82–98)
MONOCYTES # BLD AUTO: 0.44 THOUSAND/ÂΜL (ref 0.17–1.22)
MONOCYTES NFR BLD AUTO: 4 % (ref 4–12)
NEUTROPHILS # BLD AUTO: 9.73 THOUSANDS/ÂΜL (ref 1.85–7.62)
NEUTS SEG NFR BLD AUTO: 83 % (ref 43–75)
NRBC BLD AUTO-RTO: 0 /100 WBCS
PLATELET # BLD AUTO: 323 THOUSANDS/UL (ref 149–390)
PMV BLD AUTO: 9.9 FL (ref 8.9–12.7)
POTASSIUM SERPL-SCNC: 4 MMOL/L (ref 3.5–5.3)
PROT SERPL-MCNC: 7.5 G/DL (ref 6.4–8.4)
RBC # BLD AUTO: 4.25 MILLION/UL (ref 3.81–5.12)
SODIUM SERPL-SCNC: 135 MMOL/L (ref 135–147)
WBC # BLD AUTO: 11.68 THOUSAND/UL (ref 4.31–10.16)

## 2022-11-29 RX ORDER — HYDROMORPHONE HCL/PF 1 MG/ML
0.5 SYRINGE (ML) INJECTION ONCE
Status: COMPLETED | OUTPATIENT
Start: 2022-11-29 | End: 2022-11-29

## 2022-11-29 RX ORDER — OMEPRAZOLE 40 MG/1
40 CAPSULE, DELAYED RELEASE ORAL DAILY
Qty: 30 CAPSULE | Refills: 0 | Status: SHIPPED | OUTPATIENT
Start: 2022-11-29

## 2022-11-29 RX ADMIN — HYDROMORPHONE HYDROCHLORIDE 0.5 MG: 1 INJECTION, SOLUTION INTRAMUSCULAR; INTRAVENOUS; SUBCUTANEOUS at 11:02

## 2022-11-29 RX ADMIN — IOHEXOL 100 ML: 350 INJECTION, SOLUTION INTRAVENOUS at 12:07

## 2022-11-29 RX ADMIN — SODIUM CHLORIDE 1000 ML: 0.9 INJECTION, SOLUTION INTRAVENOUS at 11:07

## 2022-11-29 NOTE — ED ATTENDING ATTESTATION
11/29/2022  I, Sherrin Galeazzi, MD, saw and evaluated the patient  I have discussed the patient with the resident/non-physician practitioner and agree with the resident's/non-physician practitioner's findings, Plan of Care, and MDM as documented in the resident's/non-physician practitioner's note, except where noted  All available labs and Radiology studies were reviewed  I was present for key portions of any procedure(s) performed by the resident/non-physician practitioner and I was immediately available to provide assistance  At this point I agree with the current assessment done in the Emergency Department  I have conducted an independent evaluation of this patient a history and physical is as follows:  64 yo F with Crohn's c/o abdominal pain for about 7 days she localizes to upper and lower abdomen, associated with nausea, no vomiting, no diarrhea, no hematochezia, no fever  Pain described as spasm, colicky, exacerbated by movement  VSS   NAD  Chest clear  Abd soft, generalized tenderness, no peritoneal signs    ED workup with labs, imaging, replete fluids, treat symptomatically, d/w GI         ED Course         Critical Care Time  Procedures

## 2022-11-29 NOTE — DISCHARGE INSTRUCTIONS
Take Omeprazole 30-60 minutes prior to eating largest meal   Continue Dicyclomine and Levsin  Schedule appointment with GI  Return to ER if worsening pain or new symptoms

## 2022-11-29 NOTE — Clinical Note
Mady Dawson was seen and treated in our emergency department on 11/29/2022  Diagnosis:     Bianca Cabral  may return to work on return date  She may return on this date: 11/30/2022         If you have any questions or concerns, please don't hesitate to call        Vineet Wing MD    ______________________________           _______________          _______________  [facility] [place]                              Date                                Time

## 2022-11-29 NOTE — ED PROVIDER NOTES
History  Chief Complaint   Patient presents with   • Abdominal Pain     P[t c/o lower abd  Pain that radiates to her back for the past x 7 days  Pt c/o n/d but denies cp/sob or vomiting  Patient 82-year-old female with past medical history of Crohn's disease who presents to the ER with 7 days abdominal and cramping  Patient began left quadrant 7 days ago has worsened last 4 days  Pain radiates to back  Pain worsened with movement and relieved with rest and lying down  Pain after eating a meal   Patient has tried dicyclomine for with no relief  She is taking methotrexate, mesalamine and folic acid  She endorses diarrhea, headache and nausea without vomiting  She denies hematochezia, chest pain, shortness of breath, melena and hematuria  Prior to Admission Medications   Prescriptions Last Dose Informant Patient Reported? Taking? ALPRAZolam (XANAX) 0 25 mg tablet   No No   Sig: Take 1 tablet (0 25 mg total) by mouth 4 (four) times a day as needed for anxiety   Alcohol Swabs 70 % PADS   No No   Sig: Use 4 per day   Blood Glucose Monitoring Suppl (Contour Next Monitor) w/Device KIT   No No   Sig: Use to check blood sugar up to 3 times daily     Continuous Blood Gluc  (Dexcom G6 ) CRISTINA   No No   Sig: Disp 1    Continuous Blood Gluc Sensor (Dexcom G6 Sensor) MISC   No No   Sig: Use 1 sensor every 10 days, disp 90 day supply   Continuous Blood Gluc Transmit (Dexcom G6 Transmitter) MISC   No No   Sig: Use daily as directed for CGM - Change every 3 months   Evolocumab 140 MG/ML SOAJ   No No   Sig: Inject one 1 ml pen every 14 days   Patient not taking: Reported on 10/14/2022   Insulin Pen Needle (Pen Needles) 32G X 5 MM MISC   No No   Sig: Use 4 per day   acetaminophen (TYLENOL) 500 mg tablet   Yes No   Sig: Take 1,000 mg by mouth every 6 (six) hours as needed for mild pain   aspirin (ECOTRIN LOW STRENGTH) 81 mg EC tablet   No No   Sig: Take 1 tablet (81 mg total) by mouth daily carvedilol (COREG) 12 5 mg tablet   No No   Sig: Take 1 tablet (12 5 mg total) by mouth every 12 (twelve) hours   cholecalciferol (VITAMIN D3) 1,000 units tablet   No No   Sig: Take 1 tablet (1,000 Units total) by mouth daily   Patient taking differently: Take 1,000 Units by mouth every 14 (fourteen) days   ezetimibe (ZETIA) 10 mg tablet   No No   Sig: Take 1 tablet (10 mg total) by mouth daily   folic acid (FOLVITE) 1 mg tablet   Yes No   gabapentin (NEURONTIN) 400 mg capsule   No No   Sig: Take 1 capsule (400 mg total) by mouth 2 (two) times a day   Patient not taking: Reported on 2022   glucose blood (Contour Next Test) test strip   No No   Sig: Use as instructed   hyoscyamine (LEVSIN/SL) 0 125 mg SL tablet   No No   Sig: Take 1 tablet (0 125 mg total) by mouth every 6 (six) hours as needed (esophageal spasm)   insulin aspart (NovoLOG FlexPen) 100 UNIT/ML injection pen   No No   Si units with breakfast and dinner, 5 units with lunch plus sliding scale  insulin degludec Glenn Heck FlexTouch) 100 units/mL injection pen   No No   Sig: Inject 18 - 22 units daily     losartan (COZAAR) 25 mg tablet   No No   Sig: Take 1 tablet (25 mg total) by mouth daily   mesalamine (LIALDA) 1 2 g EC tablet   No No   Sig: Take 2 tablets (2 4 g total) by mouth daily with breakfast   Patient taking differently: Take 2,400 mg by mouth in the morning   methotrexate 2 5 MG tablet   Yes No   Sig: Take by mouth once a week   ondansetron (ZOFRAN-ODT) 4 mg disintegrating tablet   No No   Sig: Take 1 tablet (4 mg total) by mouth every 8 (eight) hours as needed for nausea or vomiting   predniSONE 5 mg tablet   Yes No   Sig: Take 10 mg by mouth daily   ticagrelor (BRILINTA) 90 MG   No No   Sig: Take 1 tablet (90 mg total) by mouth every 12 (twelve) hours      Facility-Administered Medications: None       Past Medical History:   Diagnosis Date   • Coronary artery disease    • Crohn's colitis ([facility] )    • Diabetes mellitus ([place] [address] )    • Fibromyalgia    • Gastric ulcer     [facility] GI SURGEON   • HTN (hypertension)    • Hyperlipidemia    • IBD (inflammatory bowel disease) 11/2015    [facility] GI SURGEON   • Migraine    • Myocardial infarction [facility])        Past Surgical History:   Procedure Laterality Date   • BREAST BIOPSY Left     benign   • BREAST BIOPSY Left 10/27/2021    Stereo   • CARDIAC CATHETERIZATION     • CARDIAC CATHETERIZATION Left 3/7/2022    Procedure: Cardiac catheterization;  Surgeon: Dao Driscoll MD;  Location: [facility]ARDI[facility]; Service: Cardiology   • CARDIAC CATHETERIZATION N/A 3/7/2022    Procedure: Cardiac Coronary Angiogram;  Surgeon: Dao Driscoll MD;  Location: [facility]; Service: Cardiology   • CARDIAC CATHETERIZATION N/A 3/7/2022    Procedure: Cardiac pci;  Surgeon: Dao Driscoll MD;  Location: [facility]; Service: Cardiology   • CHOLECYSTECTOMY     • COLONOSCOPY  12/03/2018    internal hemorrhoids, 3mm tubular adenoma polyp transverse colon, bx negative for dysplasia   • COLONOSCOPY  11/2015   • CORONARY STENT PLACEMENT     • DENTAL SURGERY      [place] teeth extraction   • EGD  12/2018    2cm hiatal hernia, gastritis bx shows foci of intestinal metaplasia, negative Hpylori   • EGD  2015    ESOPHAGITIS/MALLHIATUS HERNIA  FOUND   • HYSTERECTOMY     • MAMMO STEREOTACTIC BREAST BIOPSY LEFT (ALL INC) Left 10/27/2021   • TUBAL LIGATION      Bilateral       Family History   Problem Relation Age of Onset   • Coronary artery disease Mother    • Dementia Mother    • Stroke Mother    • No Known Problems Father    • Cervical cancer Sister 36   • Heart disease Brother    • No Known Problems Maternal Grandmother    • No Known Problems Maternal Grandfather    • No Known Problems Paternal Grandmother    • No Known Problems Paternal Grandfather    • Alcohol abuse Neg Hx    • Substance Abuse Neg Hx      I have reviewed and agree with the history as documented      E-Cigarette/Vaping   • E-Cigarette Use Never User E-Cigarette/Vaping Substances   • Nicotine No    • THC No    • CBD No    • Flavoring No      Social History     Tobacco Use   • Smoking status: Every Day     Packs/day: 0 50     Types: Cigarettes   • Smokeless tobacco: Never   Vaping Use   • Vaping Use: Never used   Substance Use Topics   • Alcohol use: Not Currently     Comment: Rarely   • Drug use: Never       Review of Systems   Constitutional: Negative for chills and fever  HENT: Negative for ear pain, rhinorrhea, sneezing and sore throat  Eyes: Negative for pain and visual disturbance  Respiratory: Negative for cough and shortness of breath  Cardiovascular: Negative for chest pain and palpitations  Gastrointestinal: Positive for abdominal pain, diarrhea and nausea  Negative for blood in stool and vomiting  Genitourinary: Negative for dysuria and hematuria  Musculoskeletal: Positive for back pain  Negative for arthralgias  Skin: Negative for color change and rash  Neurological: Positive for headaches  Negative for syncope and light-headedness  All other systems reviewed and are negative  Physical Exam  Physical Exam  Vitals and nursing note reviewed  Constitutional:       General: She is not in acute distress  Appearance: She is well-developed  HENT:      Head: Normocephalic and atraumatic  Nose: Nose normal    Eyes:      Extraocular Movements: Extraocular movements intact  Conjunctiva/sclera: Conjunctivae normal    Cardiovascular:      Rate and Rhythm: Normal rate and regular rhythm  Heart sounds: Normal heart sounds  No murmur heard  Pulmonary:      Effort: Pulmonary effort is normal  No respiratory distress  Breath sounds: Normal breath sounds  Abdominal:      General: Bowel sounds are normal  There is no distension or abdominal bruit  Palpations: Abdomen is soft  Tenderness: There is generalized abdominal tenderness  There is no guarding        Comments: Tenderness worse in LUQ and LLQ Musculoskeletal:         General: No swelling  Cervical back: Neck supple  Skin:     General: Skin is warm and dry  Capillary Refill: Capillary refill takes less than 2 seconds  Neurological:      Mental Status: She is alert and oriented to person, place, and time     Psychiatric:         Mood and Affect: Mood normal          Behavior: Behavior normal          Vital Signs  ED Triage Vitals [11/29/22 0931]   Temperature Pulse Respirations Blood Pressure SpO2   98 3 °F (36 8 °C) 89 18 124/69 98 %      Temp Source Heart Rate Source Patient Position - Orthostatic VS BP Location FiO2 (%)   Oral Monitor Sitting Right arm --      Pain Score       10 - Worst Possible Pain           Vitals:    11/29/22 0931   BP: 124/69   Pulse: 89   Patient Position - Orthostatic VS: Sitting         Visual Acuity      ED Medications  Medications   sodium chloride 0 9 % bolus 1,000 mL (1,000 mL Intravenous New Bag 11/29/22 1107)   HYDROmorphone (DILAUDID) injection 0 5 mg (0 5 mg Intravenous Given 11/29/22 1102)   iohexol (OMNIPAQUE) 350 MG/ML injection (SINGLE-DOSE) 100 mL (100 mL Intravenous Given 11/29/22 1207)       Diagnostic Studies  Results Reviewed     Procedure Component Value Units Date/Time    Comprehensive metabolic panel [[id number]]  (Abnormal) Collected: 11/29/22 1107    Lab Status: Final result Specimen: Blood from Arm, Right Updated: 11/29/22 1150     Sodium 135 mmol/L      Potassium 4 0 mmol/L      Chloride 100 mmol/L      CO2 27 mmol/L      ANION GAP 8 mmol/L      BUN 16 mg/dL      Creatinine 1 06 mg/dL      Glucose 341 mg/dL      Calcium 9 2 mg/dL      Corrected Calcium 9 7 mg/dL      AST 11 U/L      ALT 12 U/L      Alkaline Phosphatase 101 U/L      Total Protein 7 5 g/dL      Albumin 3 4 g/dL      Total Bilirubin 0 52 mg/dL      eGFR 57 ml/min/1 73sq m     Narrative:      [place] guidelines for Chronic Kidney Disease (CKD):   •  Stage 1 with normal or high GFR (GFR > 90 mL/min/1 73 square meters)  •  Stage 2 Mild CKD (GFR = 60-89 mL/min/1 73 square meters)  •  Stage 3A Moderate CKD (GFR = 45-59 mL/min/1 73 square meters)  •  Stage 3B Moderate CKD (GFR = 30-44 mL/min/1 73 square meters)  •  Stage 4 Severe CKD (GFR = 15-29 mL/min/1 73 square meters)  •  Stage 5 End Stage CKD (GFR <15 mL/min/1 73 square meters)  Note: GFR calculation is accurate only with a steady state creatinine    Lipase [[id number]]  (Normal) Collected: 11/29/22 1107    Lab Status: Final result Specimen: Blood from Arm, Right Updated: 11/29/22 1150     Lipase 157 u/L     CBC and differential [[id number]]  (Abnormal) Collected: 11/29/22 1107    Lab Status: Final result Specimen: Blood from Arm, Right Updated: 11/29/22 1117     WBC 11 68 Thousand/uL      RBC 4 25 Million/uL      Hemoglobin 13 1 g/dL      Hematocrit 39 6 %      MCV 93 fL      MCH 30 8 pg      MCHC 33 1 g/dL      RDW 14 1 %      MPV 9 9 fL      Platelets 156 Thousands/uL      nRBC 0 /100 WBCs      Neutrophils Relative 83 %      Immat GRANS % 1 %      Lymphocytes Relative 11 %      Monocytes Relative 4 %      Eosinophils Relative 1 %      Basophils Relative 0 %      Neutrophils Absolute 9 73 Thousands/µL      Immature Grans Absolute 0 07 Thousand/uL      Lymphocytes Absolute 1 26 Thousands/µL      Monocytes Absolute 0 44 Thousand/µL      Eosinophils Absolute 0 14 Thousand/µL      Basophils Absolute 0 04 Thousands/µL                  CT abdomen pelvis with contrast   Final Result by Katja Payne MD (11/29 1318)      No acute inflammatory changes in the abdomen or pelvis  Unchanged fat-containing umbilical and recurrent ventral hernias        Workstation performed: [id number]                    Procedures  Procedures         ED Course                                             MDM  Number of Diagnoses or Management Options  Gastritis  Diagnosis management comments: Patient is 53-year-old female presents with left-sided abdominal pain radiating to the back  She has past history of Crohn's disease  Physical exam with left upper and left lower quadrant tenderness  Lipase normal   CT abdomen pelvis reveals no acute inflammatory changes in the abdomen or pelvis and unchanged fat-containing umbilical and recurrent ventral hernias  Borderline splenomegaly with craniocaudal length of 13 6 cm  Patient may be discharged and schedule follow-up with gastroenterologist   Return to ER if worsening abdominal pain or new symptoms  Discussed findings and plan with patient  Amount and/or Complexity of Data Reviewed  Clinical lab tests: ordered and reviewed    Risk of Complications, Morbidity, and/or Mortality  Presenting problems: low  Diagnostic procedures: low  Management options: low    Patient Progress  Patient progress: stable      Disposition  Final diagnoses:   Gastritis     Time reflects when diagnosis was documented in both MDM as applicable and the Disposition within this note     Time User Action Codes Description Comment    11/29/2022  1:41 PM Cristina Rocha Add [K29 70] Gastritis       ED Disposition     ED Disposition   Discharge    Condition   Stable    Date/Time   Tue Nov 29, 2022  1:41 PM    Comment   Shamir Julia Susan discharge to home/self care  Follow-up Information     Follow up With Specialties Details Why Simón Mckeon MD Gastroenterology In 3 days  [place] [id number]            Patient's Medications   Discharge Prescriptions    OMEPRAZOLE (PRILOSEC) 40 MG CAPSULE    Take 1 capsule (40 mg total) by mouth daily       Start Date: 11/29/2022End Date: --       Order Dose: 40 mg       Quantity: 30 capsule    Refills: 0       No discharge procedures on file      PDMP Review       Value Time User    PDMP Reviewed  Yes 8/15/2022  1:28 PM Maria Luisa Hurd MD          ED Provider  Electronically Signed by           Haylee Elliott PA-C  11/29/22 1329

## 2022-11-30 ENCOUNTER — TELEPHONE (OUTPATIENT)
Dept: NON INVASIVE DIAGNOSTICS | Facility: CLINIC | Age: 62
End: 2022-11-30

## 2022-12-02 ENCOUNTER — HOSPITAL ENCOUNTER (OUTPATIENT)
Dept: NON INVASIVE DIAGNOSTICS | Facility: CLINIC | Age: 62
Discharge: HOME | End: 2022-12-02

## 2022-12-02 VITALS
OXYGEN SATURATION: 100 % | WEIGHT: 233 LBS | HEART RATE: 73 BPM | SYSTOLIC BLOOD PRESSURE: 150 MMHG | HEIGHT: 68 IN | BODY MASS INDEX: 35.31 KG/M2 | DIASTOLIC BLOOD PRESSURE: 88 MMHG

## 2022-12-02 DIAGNOSIS — I25.10 CORONARY ARTERY DISEASE INVOLVING NATIVE CORONARY ARTERY OF NATIVE HEART WITHOUT ANGINA PECTORIS: ICD-10-CM

## 2022-12-02 LAB
BASELINE ST DEPRESSION: 0 MM
CHEST PAIN STATEMENT: NORMAL
MAX DIASTOLIC BP: 88 MMHG
MAX HEART RATE: 113 BPM
MAX HR PERCENT: 70 %
MAX HR: 113 BPM
MAX PREDICTED HEART RATE: 160 BPM
MAX. SYSTOLIC BP: 150 MMHG
NUC STRESS EJECTION FRACTION: 60 %
PROTOCOL NAME: NORMAL
RATE PRESSURE PRODUCT: NORMAL
REASON FOR TERMINATION: NORMAL
SL CV REST NUCLEAR ISOTOPE DOSE: 10.26 MCI
SL CV STRESS NUCLEAR ISOTOPE DOSE: 32.7 MCI
SL CV STRESS RECOVERY BP: NORMAL MMHG
SL CV STRESS RECOVERY HR: 88 BPM
SL CV STRESS RECOVERY O2 SAT: 99 %
STRESS ANGINA INDEX: 0
STRESS BASELINE BP: NORMAL MMHG
STRESS BASELINE HR: 73 BPM
STRESS DUKE TREADMILL SCORE: 3
STRESS O2 SAT REST: 100 %
STRESS PEAK HR: 113 BPM
STRESS POST EXERCISE DUR MIN: 3 MIN
STRESS POST EXERCISE DUR SEC: 0 SEC
STRESS POST O2 SAT PEAK: 99 %
STRESS POST PEAK BP: 150 MMHG
STRESS ST DEPRESSION: 0 MM
STRESS/REST PERFUSION RATIO: 1.01
TARGET HR FORMULA: NORMAL
TEST INDICATION: NORMAL
TIME IN EXERCISE PHASE: NORMAL

## 2022-12-02 RX ADMIN — REGADENOSON 0.4 MG: 0.08 INJECTION, SOLUTION INTRAVENOUS at 13:02

## 2022-12-07 ENCOUNTER — ANESTHESIA (OUTPATIENT)
Dept: GASTROENTEROLOGY | Facility: HOSPITAL | Age: 62
End: 2022-12-07

## 2022-12-07 ENCOUNTER — ANESTHESIA EVENT (OUTPATIENT)
Dept: GASTROENTEROLOGY | Facility: HOSPITAL | Age: 62
End: 2022-12-07

## 2022-12-07 ENCOUNTER — HOSPITAL ENCOUNTER (OUTPATIENT)
Dept: GASTROENTEROLOGY | Facility: HOSPITAL | Age: 62
Setting detail: OUTPATIENT SURGERY
Discharge: HOME | End: 2022-12-07
Attending: INTERNAL MEDICINE

## 2022-12-07 VITALS
TEMPERATURE: 98.7 F | HEART RATE: 69 BPM | OXYGEN SATURATION: 98 % | HEIGHT: 68 IN | RESPIRATION RATE: 18 BRPM | BODY MASS INDEX: 35.31 KG/M2 | DIASTOLIC BLOOD PRESSURE: 60 MMHG | SYSTOLIC BLOOD PRESSURE: 116 MMHG | WEIGHT: 233 LBS

## 2022-12-07 DIAGNOSIS — K50.119 CROHN'S DISEASE OF COLON WITH COMPLICATION (HCC): ICD-10-CM

## 2022-12-07 LAB — GLUCOSE SERPL-MCNC: 179 MG/DL (ref 65–140)

## 2022-12-07 RX ORDER — SODIUM CHLORIDE 9 MG/ML
INJECTION, SOLUTION INTRAVENOUS CONTINUOUS PRN
Status: DISCONTINUED | OUTPATIENT
Start: 2022-12-07 | End: 2022-12-07

## 2022-12-07 RX ORDER — LIDOCAINE HYDROCHLORIDE 10 MG/ML
INJECTION, SOLUTION EPIDURAL; INFILTRATION; INTRACAUDAL; PERINEURAL AS NEEDED
Status: DISCONTINUED | OUTPATIENT
Start: 2022-12-07 | End: 2022-12-07

## 2022-12-07 RX ORDER — PROPOFOL 10 MG/ML
INJECTION, EMULSION INTRAVENOUS AS NEEDED
Status: DISCONTINUED | OUTPATIENT
Start: 2022-12-07 | End: 2022-12-07

## 2022-12-07 RX ADMIN — PROPOFOL 100 MG: 10 INJECTION, EMULSION INTRAVENOUS at 14:29

## 2022-12-07 RX ADMIN — PROPOFOL 50 MG: 10 INJECTION, EMULSION INTRAVENOUS at 14:34

## 2022-12-07 RX ADMIN — SODIUM CHLORIDE: 0.9 INJECTION, SOLUTION INTRAVENOUS at 14:24

## 2022-12-07 RX ADMIN — LIDOCAINE HYDROCHLORIDE 50 MG: 10 INJECTION, SOLUTION EPIDURAL; INFILTRATION; INTRACAUDAL; PERINEURAL at 14:29

## 2022-12-07 RX ADMIN — PROPOFOL 50 MG: 10 INJECTION, EMULSION INTRAVENOUS at 14:35

## 2022-12-07 RX ADMIN — PROPOFOL 100 MG: 10 INJECTION, EMULSION INTRAVENOUS at 14:32

## 2022-12-07 RX ADMIN — PROPOFOL 50 MG: 10 INJECTION, EMULSION INTRAVENOUS at 14:40

## 2022-12-07 NOTE — ANESTHESIA POSTPROCEDURE EVALUATION
Post-Op Assessment Note    CV Status:  Stable  Pain Score: 0    Pain management: adequate     Mental Status:  Alert and awake   Hydration Status:  Euvolemic   PONV Controlled:  Controlled   Airway Patency:  Patent      Post Op Vitals Reviewed: Yes      Staff: Anesthesiologist, CRNA         No notable events documented      BP   126/75   Temp  97   Pulse  60   Resp   16   SpO2   98

## 2022-12-07 NOTE — ANESTHESIA PREPROCEDURE EVALUATION
Procedure:  COLONOSCOPY    Relevant Problems   CARDIO   (+) Acute ST elevation myocardial infarction (STEMI) due to occlusion of mid portion of right coronary artery (HCC)   (+) Coronary artery disease   (+) Essential hypertension   (+) History of PTCA   (+) Migraine without aura and without status migrainosus, not intractable   (+) Mixed hyperlipidemia      ENDO   (+) Type 2 diabetes mellitus (HCC)      GI/HEPATIC   (+) GERD (gastroesophageal reflux disease)      MUSCULOSKELETAL   (+) Fibromyalgia      NEURO/PSYCH   (+) Fibromyalgia   (+) Migraine without aura and without status migrainosus, not intractable      PULMONARY   (+) Shortness of breath      Nervous and Auditory   (+) Peripheral neuropathy      Musculoskeletal and Integument   (+) Hidradenitis suppurativa      Genitourinary   (+) Cystocele with prolapse      Other   (+) Tobacco use disorder, continuous      Lab Results   Component Value Date     01/22/2016    SODIUM 135 11/29/2022    K 4 0 11/29/2022     11/29/2022    CO2 27 11/29/2022    AGAP 8 11/29/2022    BUN 16 11/29/2022    CREATININE 1 06 11/29/2022    GLUC 341 (H) 11/29/2022    GLUF 200 (H) 09/20/2022    CALCIUM 9 2 11/29/2022    AST 11 11/29/2022    ALT 12 11/29/2022    ALKPHOS 101 11/29/2022    PROT 6 9 01/22/2016    TP 7 5 11/29/2022    BILITOT 0 4 01/22/2016    TBILI 0 52 11/29/2022    EGFR 57 11/29/2022     Lab Results   Component Value Date    WBC 11 68 (H) 11/29/2022    HGB 13 1 11/29/2022    HCT 39 6 11/29/2022    MCV 93 11/29/2022     11/29/2022       Physical Exam    Airway    Mallampati score: II  TM Distance: >3 FB  Neck ROM: full     Dental   No notable dental hx     Cardiovascular      Pulmonary      Other Findings        Anesthesia Plan  ASA Score- 3     Anesthesia Type- IV sedation with anesthesia with ASA Monitors  Additional Monitors:   Airway Plan:           Plan Factors-Exercise tolerance (METS): >4 METS  Chart reviewed  EKG reviewed   Imaging results reviewed  Existing labs reviewed  Patient summary reviewed  Induction- intravenous  Postoperative Plan-     Informed Consent- Anesthetic plan and risks discussed with patient  I personally reviewed this patient with the CRNA  Discussed and agreed on the Anesthesia Plan with the CRNA  Zehra Rodriguez

## 2022-12-09 ENCOUNTER — APPOINTMENT (OUTPATIENT)
Dept: LAB | Facility: CLINIC | Age: 62
End: 2022-12-09

## 2022-12-09 DIAGNOSIS — Z12.31 ENCOUNTER FOR SCREENING MAMMOGRAM FOR MALIGNANT NEOPLASM OF BREAST: Primary | ICD-10-CM

## 2022-12-09 DIAGNOSIS — M35.3 POLYMYALGIA RHEUMATICA (HCC): ICD-10-CM

## 2022-12-09 DIAGNOSIS — R14.0 BLOATING: ICD-10-CM

## 2022-12-09 LAB — CRP SERPL QL: 19.1 MG/L

## 2022-12-11 LAB
ENDOMYSIUM IGA SER QL: NEGATIVE
GLIADIN PEPTIDE IGA SER-ACNC: 4 UNITS (ref 0–19)
GLIADIN PEPTIDE IGG SER-ACNC: 3 UNITS (ref 0–19)
IGA SERPL-MCNC: 175 MG/DL (ref 87–352)
TTG IGA SER-ACNC: <2 U/ML (ref 0–3)
TTG IGG SER-ACNC: <2 U/ML (ref 0–5)

## 2022-12-13 ENCOUNTER — HOSPITAL ENCOUNTER (OUTPATIENT)
Dept: MAMMOGRAPHY | Facility: IMAGING CENTER | Age: 62
Discharge: HOME | End: 2022-12-13

## 2022-12-13 VITALS — BODY MASS INDEX: 35.31 KG/M2 | HEIGHT: 68 IN | WEIGHT: 233 LBS

## 2022-12-13 DIAGNOSIS — Z12.31 ENCOUNTER FOR SCREENING MAMMOGRAM FOR MALIGNANT NEOPLASM OF BREAST: ICD-10-CM

## 2022-12-16 LAB — CCP AB SER IA-ACNC: 1.1

## 2022-12-22 DIAGNOSIS — F32.9 REACTIVE DEPRESSION (SITUATIONAL): ICD-10-CM

## 2022-12-22 RX ORDER — ALPRAZOLAM 0.25 MG/1
0.25 TABLET ORAL 4 TIMES DAILY PRN
Qty: 30 TABLET | Refills: 0 | Status: SHIPPED | OUTPATIENT
Start: 2022-12-22

## 2022-12-27 DIAGNOSIS — E11.65 UNCONTROLLED TYPE 2 DIABETES MELLITUS WITH HYPERGLYCEMIA (HCC): ICD-10-CM

## 2022-12-27 RX ORDER — INSULIN DEGLUDEC INJECTION 100 U/ML
INJECTION, SOLUTION SUBCUTANEOUS
Qty: 30 ML | Refills: 0 | Status: SHIPPED | OUTPATIENT
Start: 2022-12-27

## 2022-12-30 ENCOUNTER — OFFICE VISIT (OUTPATIENT)
Dept: FAMILY MEDICINE CLINIC | Facility: CLINIC | Age: 62
End: 2022-12-30

## 2022-12-30 ENCOUNTER — APPOINTMENT (OUTPATIENT)
Dept: LAB | Facility: CLINIC | Age: 62
End: 2022-12-30

## 2022-12-30 VITALS
WEIGHT: 234.2 LBS | BODY MASS INDEX: 35.49 KG/M2 | SYSTOLIC BLOOD PRESSURE: 118 MMHG | DIASTOLIC BLOOD PRESSURE: 70 MMHG | TEMPERATURE: 98.6 F | HEIGHT: 68 IN | HEART RATE: 80 BPM | RESPIRATION RATE: 16 BRPM

## 2022-12-30 DIAGNOSIS — M35.3 POLYMYALGIA RHEUMATICA (HCC): ICD-10-CM

## 2022-12-30 DIAGNOSIS — R30.0 DYSURIA: Primary | ICD-10-CM

## 2022-12-30 DIAGNOSIS — R39.9 URINARY SYMPTOM OR SIGN: ICD-10-CM

## 2022-12-30 DIAGNOSIS — Z79.4 TYPE 2 DIABETES MELLITUS WITHOUT COMPLICATION, WITH LONG-TERM CURRENT USE OF INSULIN (HCC): ICD-10-CM

## 2022-12-30 DIAGNOSIS — Z79.899 ENCOUNTER FOR LONG-TERM (CURRENT) USE OF OTHER MEDICATIONS: ICD-10-CM

## 2022-12-30 DIAGNOSIS — E83.52 HYPERCALCEMIA: ICD-10-CM

## 2022-12-30 DIAGNOSIS — E11.9 TYPE 2 DIABETES MELLITUS WITHOUT COMPLICATION, WITH LONG-TERM CURRENT USE OF INSULIN (HCC): ICD-10-CM

## 2022-12-30 DIAGNOSIS — E11.65 UNCONTROLLED TYPE 2 DIABETES MELLITUS WITH HYPERGLYCEMIA (HCC): ICD-10-CM

## 2022-12-30 LAB
25(OH)D3 SERPL-MCNC: 11.1 NG/ML (ref 30–100)
ALBUMIN SERPL BCP-MCNC: 3.5 G/DL (ref 3.5–5)
ALP SERPL-CCNC: 86 U/L (ref 46–116)
ALT SERPL W P-5'-P-CCNC: 14 U/L (ref 12–78)
ANION GAP SERPL CALCULATED.3IONS-SCNC: 8 MMOL/L (ref 4–13)
AST SERPL W P-5'-P-CCNC: 9 U/L (ref 5–45)
BILIRUB SERPL-MCNC: 0.35 MG/DL (ref 0.2–1)
BUN SERPL-MCNC: 23 MG/DL (ref 5–25)
CALCIUM SERPL-MCNC: 9.4 MG/DL (ref 8.3–10.1)
CHLORIDE SERPL-SCNC: 106 MMOL/L (ref 96–108)
CO2 SERPL-SCNC: 22 MMOL/L (ref 21–32)
CREAT SERPL-MCNC: 0.98 MG/DL (ref 0.6–1.3)
CRP SERPL QL: 22.7 MG/L
ERYTHROCYTE [SEDIMENTATION RATE] IN BLOOD: 54 MM/HOUR (ref 0–29)
GFR SERPL CREATININE-BSD FRML MDRD: 62 ML/MIN/1.73SQ M
GLUCOSE SERPL-MCNC: 257 MG/DL (ref 65–140)
PHOSPHATE SERPL-MCNC: 3.6 MG/DL (ref 2.3–4.1)
POTASSIUM SERPL-SCNC: 4.1 MMOL/L (ref 3.5–5.3)
PROT SERPL-MCNC: 7.3 G/DL (ref 6.4–8.4)
PTH-INTACT SERPL-MCNC: 69.6 PG/ML (ref 18.4–80.1)
SL AMB  POCT GLUCOSE, UA: NORMAL
SL AMB LEUKOCYTE ESTERASE,UA: NORMAL
SL AMB POCT BILIRUBIN,UA: NORMAL
SL AMB POCT BLOOD,UA: NORMAL
SL AMB POCT CLARITY,UA: NORMAL
SL AMB POCT COLOR,UA: YELLOW
SL AMB POCT KETONES,UA: NORMAL
SL AMB POCT NITRITE,UA: NORMAL
SL AMB POCT PH,UA: 5
SL AMB POCT SPECIFIC GRAVITY,UA: 1.01
SL AMB POCT URINE PROTEIN: NORMAL
SL AMB POCT UROBILINOGEN: 0.2
SODIUM SERPL-SCNC: 136 MMOL/L (ref 135–147)

## 2022-12-30 RX ORDER — CIPROFLOXACIN 500 MG/1
500 TABLET, FILM COATED ORAL 2 TIMES DAILY WITH MEALS
Qty: 14 TABLET | Refills: 0 | Status: SHIPPED | OUTPATIENT
Start: 2022-12-30 | End: 2023-01-06

## 2022-12-30 NOTE — PROGRESS NOTES
Assessment/Plan:     Diagnoses and all orders for this visit:    Dysuria  -   advised to increase fluids and start Cipro, will obtain urine culture and call with the results once available, encouraged patient to follow up for persistent or worsening symptoms  -     ciprofloxacin (CIPRO) 500 mg tablet; Take 1 tablet (500 mg total) by mouth 2 (two) times a day with meals for 7 days  -     Urine culture      The treatment plan and possible side effects of new medications were reviewed with the patient today  The patient understands and agrees with the treatment plan  Subjective:   Chief Complaint   Patient presents with   • Urinary Tract Infection      Patient ID: Mady Nieves is a 63 y o  female who presents today with c/o malodorous urine, urinary frequency, urgency and burning with urination for the past several days, no fever or chills, no vaginal discharge, no nausea, no vomiting, no abdominal pain  The following portions of the patient's history were reviewed and updated as appropriate: allergies, current medications, past family history, past medical history, past social history, past surgical history and problem list     Past Medical History:   Diagnosis Date   • Coronary artery disease    • Crohn's colitis ([facility] )    • Diabetes mellitus ([facility] )    • Fibromyalgia    • Gastric ulcer     [facility] GI SURGEON   • HTN (hypertension)    • Hyperlipidemia    • IBD (inflammatory bowel disease) 11/2015    [facility] GI SURGEON   • Migraine    • Myocardial infarction [facility])      Past Surgical History:   Procedure Laterality Date   • CARDIAC CATHETERIZATION     • CARDIAC CATHETERIZATION Left 03/07/2022    Procedure: Cardiac catheterization;  Surgeon: Colette Zaldivar MD;  Location: [facility]; Service: Cardiology   • CARDIAC CATHETERIZATION N/A 03/07/2022    Procedure: Cardiac Coronary Angiogram;  Surgeon: Colette Zaldivar MD;  Location: [facility];   Service: Cardiology   • CARDIAC CATHETERIZATION N/A 03/07/2022    Procedure: Cardiac pci;  Surgeon: Donald Gar MD;  Location: [facility];   Service: Cardiology   • CHOLECYSTECTOMY     • COLONOSCOPY  12/03/2018    internal hemorrhoids, 3mm tubular adenoma polyp transverse colon, bx negative for dysplasia   • COLONOSCOPY  11/2015   • CORONARY STENT PLACEMENT     • DENTAL SURGERY      [place] teeth extraction   • EGD  12/2018    2cm hiatal hernia, gastritis bx shows foci of intestinal metaplasia, negative Hpylori   • EGD  2015    ESOPHAGITIS/MALLHIATUS HERNIA  FOUND   • HYSTERECTOMY     • MAMMO STEREOTACTIC BREAST BIOPSY LEFT (ALL INC) Left 10/27/2021   • TUBAL LIGATION      Bilateral     Family History   Problem Relation Age of Onset   • Coronary artery disease Mother    • Dementia Mother    • Stroke Mother    • No Known Problems Father    • Cervical cancer Sister 36   • Heart disease Brother    • No Known Problems Maternal Grandmother    • No Known Problems Maternal Grandfather    • No Known Problems Paternal Grandmother    • No Known Problems Paternal Grandfather    • Alcohol abuse Neg Hx    • Substance Abuse Neg Hx      Social History     Socioeconomic History   • Marital status: /Civil Union     Spouse name: Not on file   • Number of children: Not on file   • Years of education: Not on file   • Highest education level: Not on file   Occupational History   • Occupation: Coding   Tobacco Use   • Smoking status: Every Day     Packs/day: 0 50     Types: Cigarettes   • Smokeless tobacco: Never   Vaping Use   • Vaping Use: Never used   Substance and Sexual Activity   • Alcohol use: Not Currently     Comment: Rarely   • Drug use: Never   • Sexual activity: Not Currently     Partners: Male   Other Topics Concern   • Not on file   Social History Narrative   • Not on file     Social Determinants of Health     Financial Resource Strain: Not on file   Food Insecurity: Not on file   Transportation Needs: Not on file   Physical Activity: Not on file   Stress: Not on file   Social Connections: Not on file   Intimate Partner Violence: Not on file   Housing Stability: Not on file       Current Outpatient Medications:   •  acetaminophen (TYLENOL) 500 mg tablet, Take 1,000 mg by mouth every 6 (six) hours as needed for mild pain, Disp: , Rfl:   •  Alcohol Swabs 70 % PADS, Use 4 per day, Disp: 400 each, Rfl: 3  •  ALPRAZolam (XANAX) 0 25 mg tablet, Take 1 tablet (0 25 mg total) by mouth 4 (four) times a day as needed for anxiety, Disp: 30 tablet, Rfl: 0  •  aspirin (ECOTRIN LOW STRENGTH) 81 mg EC tablet, Take 1 tablet (81 mg total) by mouth daily, Disp: 90 tablet, Rfl: 0  •  Blood Glucose Monitoring Suppl (Contour Next Monitor) w/Device KIT, Use to check blood sugar up to 3 times daily  , Disp: 1 kit, Rfl: 0  •  carvedilol (COREG) 12 5 mg tablet, Take 1 tablet (12 5 mg total) by mouth every 12 (twelve) hours, Disp: 180 tablet, Rfl: 0  •  cholecalciferol (VITAMIN D3) 1,000 units tablet, Take 1 tablet (1,000 Units total) by mouth daily (Patient taking differently: Take 1,000 Units by mouth every 14 (fourteen) days), Disp: 90 tablet, Rfl: 3  •  ciprofloxacin (CIPRO) 500 mg tablet, Take 1 tablet (500 mg total) by mouth 2 (two) times a day with meals for 7 days, Disp: 14 tablet, Rfl: 0  •  Continuous Blood Gluc  (Dexcom G6 ) CRISTINA, Disp 1 , Disp: 1 Device, Rfl: 1  •  Continuous Blood Gluc Sensor (Dexcom G6 Sensor) MISC, Use 1 sensor every 10 days, disp 90 day supply, Disp: 9 each, Rfl: 3  •  Continuous Blood Gluc Transmit (Dexcom G6 Transmitter) MISC, Use daily as directed for CGM - Change every 3 months, Disp: 1 each, Rfl: 0  •  ezetimibe (ZETIA) 10 mg tablet, Take 1 tablet (10 mg total) by mouth daily, Disp: 90 tablet, Rfl: 0  •  folic acid (FOLVITE) 1 mg tablet, , Disp: , Rfl:   •  gabapentin (NEURONTIN) 400 mg capsule, Take 1 capsule (400 mg total) by mouth 2 (two) times a day, Disp: 90 capsule, Rfl: 1  •  glucose blood (Contour Next Test) test strip, Use as instructed, Disp: 200 strip, Rfl: 2  •  insulin aspart (NovoLOG FlexPen) 100 UNIT/ML injection pen, 8 units with breakfast and dinner, 5 units with lunch plus sliding scale , Disp: 15 mL, Rfl: 3  •  insulin degludec Leiel Constant FlexTouch) 100 units/mL injection pen, Inject 18 - 22 units daily  , Disp: 30 mL, Rfl: 0  •  Insulin Pen Needle (Pen Needles) 32G X 5 MM MISC, Use 4 per day, Disp: 360 each, Rfl: 1  •  losartan (COZAAR) 25 mg tablet, Take 1 tablet (25 mg total) by mouth daily, Disp: 90 tablet, Rfl: 0  •  mesalamine (LIALDA) 1 2 g EC tablet, Take 2 tablets (2 4 g total) by mouth daily with breakfast, Disp: 60 tablet, Rfl: 2  •  methotrexate 2 5 MG tablet, Take by mouth once a week, Disp: , Rfl:   •  omeprazole (PriLOSEC) 40 MG capsule, Take 1 capsule (40 mg total) by mouth daily, Disp: 30 capsule, Rfl: 0  •  ondansetron (ZOFRAN-ODT) 4 mg disintegrating tablet, Take 1 tablet (4 mg total) by mouth every 8 (eight) hours as needed for nausea or vomiting, Disp: 12 tablet, Rfl: 0  •  predniSONE 5 mg tablet, Take 10 mg by mouth daily, Disp: , Rfl:   •  ticagrelor (BRILINTA) 90 MG, Take 1 tablet (90 mg total) by mouth every 12 (twelve) hours, Disp: 180 tablet, Rfl: 0  •  hyoscyamine (LEVSIN/SL) 0 125 mg SL tablet, Take 1 tablet (0 125 mg total) by mouth every 6 (six) hours as needed (esophageal spasm), Disp: 120 tablet, Rfl: 5    Review of Systems   Constitutional: Negative for appetite change, chills, fatigue, fever and unexpected weight change  Respiratory: Negative for cough, shortness of breath and wheezing  Cardiovascular: Negative for chest pain, palpitations and leg swelling  Gastrointestinal: Negative for abdominal pain, blood in stool, nausea and vomiting  Genitourinary: Positive for dysuria, frequency and urgency  Negative for difficulty urinating, flank pain, hematuria, pelvic pain and vaginal discharge  Neurological: Negative for dizziness and headaches           Objective:    Vitals:    12/30/22 1503   BP: 118/70   Pulse: 80   Resp: 16   Temp: 98 6 °F (37 °C)   TempSrc: Oral   Weight: 106 kg (234 lb 3 2 oz)   Height: 5' 8" (1 727 m)        Physical Exam  Constitutional:       General: She is not in acute distress  Cardiovascular:      Rate and Rhythm: Normal rate and regular rhythm  Heart sounds: Normal heart sounds  Pulmonary:      Effort: No respiratory distress  Breath sounds: Normal breath sounds  Abdominal:      Palpations: Abdomen is soft  Tenderness: There is no abdominal tenderness  There is no right CVA tenderness or left CVA tenderness  Neurological:      Mental Status: She is alert and oriented to person, place, and time     Psychiatric:         Mood and Affect: Mood normal          Behavior: Behavior normal           Office Visit on 12/30/2022   Component Date Value Ref Range Status   • LEUKOCYTE ESTERASE,UA 12/30/2022 neg   Final   • NITRITE,UA 12/30/2022 neg   Final   • SL AMB POCT UROBILINOGEN 12/30/2022 0 2   Final   • POCT URINE PROTEIN 12/30/2022 neg   Final   •  PH,UA 12/30/2022 5 0   Final   • BLOOD,UA 12/30/2022 neg   Final   • SPECIFIC GRAVITY,UA 12/30/2022 1 015   Final   • KETONES,UA 12/30/2022 neg   Final   • BILIRUBIN,UA 12/30/2022 neg   Final   • GLUCOSE, UA 12/30/2022 neg   Final   •  COLOR,UA 12/30/2022 Yellow   Final   • CLARITY,UA 12/30/2022 Cloudy   Final

## 2022-12-31 LAB
EST. AVERAGE GLUCOSE BLD GHB EST-MCNC: 189 MG/DL
HBA1C MFR BLD: 8.2 %
HBV SURFACE AG SER QL: NORMAL
HCV AB SER QL: NORMAL

## 2023-01-01 LAB — BACTERIA UR CULT: ABNORMAL

## 2023-01-02 LAB
GAMMA INTERFERON BACKGROUND BLD IA-ACNC: 0.06 IU/ML
M TB IFN-G BLD-IMP: NEGATIVE
M TB IFN-G CD4+ BCKGRND COR BLD-ACNC: -0.01 IU/ML
M TB IFN-G CD4+ BCKGRND COR BLD-ACNC: -0.01 IU/ML
MITOGEN IGNF BCKGRD COR BLD-ACNC: 0.74 IU/ML

## 2023-01-05 ENCOUNTER — TELEPHONE (OUTPATIENT)
Dept: FAMILY MEDICINE CLINIC | Facility: CLINIC | Age: 63
End: 2023-01-05

## 2023-01-05 NOTE — TELEPHONE ENCOUNTER
Patient is calling because she went to a gastro specialist and they want patient to be on cipro for 10 days not 7 for her UTI  Patient is asking for more information on her UTI she was diagnosed with  She stated it is a more severe UTI then normal? Patient wants to know why she has this because its an UTI older gentlemen get  Wants to know how she got this infection

## 2023-01-10 DIAGNOSIS — I25.10 CORONARY ARTERY DISEASE: ICD-10-CM

## 2023-01-10 DIAGNOSIS — E78.2 MODERATE MIXED HYPERLIPIDEMIA NOT REQUIRING STATIN THERAPY: ICD-10-CM

## 2023-01-10 DIAGNOSIS — I21.11 ACUTE ST ELEVATION MYOCARDIAL INFARCTION (STEMI) DUE TO OCCLUSION OF DISTAL PORTION OF RIGHT CORONARY ARTERY (HCC): ICD-10-CM

## 2023-01-12 ENCOUNTER — TELEPHONE (OUTPATIENT)
Dept: FAMILY MEDICINE CLINIC | Facility: CLINIC | Age: 63
End: 2023-01-12

## 2023-01-12 NOTE — TELEPHONE ENCOUNTER
Ada yin  Said her  is now showing signs of having a UTI and she is concerned that the type of bacteria she had is contagious to him  Said she read information on this bacteria and it said that this is a very rare type of bacteria that is usually found in older men  She is supposed to start Humira and isn't supposed to have any kind of infection with it

## 2023-01-13 ENCOUNTER — CLINICAL SUPPORT (OUTPATIENT)
Dept: FAMILY MEDICINE CLINIC | Facility: CLINIC | Age: 63
End: 2023-01-13

## 2023-01-13 DIAGNOSIS — R30.0 DYSURIA: Primary | ICD-10-CM

## 2023-01-15 LAB — BACTERIA UR CULT: NORMAL

## 2023-01-17 ENCOUNTER — OFFICE VISIT (OUTPATIENT)
Dept: ENDOCRINOLOGY | Facility: CLINIC | Age: 63
End: 2023-01-17

## 2023-01-17 VITALS
BODY MASS INDEX: 35.58 KG/M2 | DIASTOLIC BLOOD PRESSURE: 70 MMHG | HEIGHT: 68 IN | HEART RATE: 82 BPM | SYSTOLIC BLOOD PRESSURE: 120 MMHG

## 2023-01-17 DIAGNOSIS — G62.9 PERIPHERAL POLYNEUROPATHY: ICD-10-CM

## 2023-01-17 DIAGNOSIS — E78.2 MIXED HYPERLIPIDEMIA: ICD-10-CM

## 2023-01-17 DIAGNOSIS — I10 ESSENTIAL HYPERTENSION: ICD-10-CM

## 2023-01-17 DIAGNOSIS — E11.65 UNCONTROLLED TYPE 2 DIABETES MELLITUS WITH HYPERGLYCEMIA (HCC): Primary | ICD-10-CM

## 2023-01-17 RX ORDER — BLOOD-GLUCOSE SENSOR
EACH MISCELLANEOUS
Qty: 9 EACH | Refills: 3 | Status: SHIPPED | OUTPATIENT
Start: 2023-01-17

## 2023-01-17 RX ORDER — BLOOD-GLUCOSE TRANSMITTER
EACH MISCELLANEOUS
Qty: 1 EACH | Refills: 1 | Status: SHIPPED | OUTPATIENT
Start: 2023-01-17

## 2023-01-17 RX ORDER — GABAPENTIN 400 MG/1
400 CAPSULE ORAL 3 TIMES DAILY
Qty: 90 CAPSULE | Refills: 1 | Status: SHIPPED | OUTPATIENT
Start: 2023-01-17

## 2023-01-17 NOTE — ASSESSMENT & PLAN NOTE
She has not tolerated statins in the past  Recommend she improve on lifestyle modifications - will check lipid panel

## 2023-01-17 NOTE — ASSESSMENT & PLAN NOTE
HGA1C is improving but remains uncontrolled  No blood sugars to review today she forgot Dexcom and reader  Treatment regimen:   -Continue current medication regimen for now  Unable to titrate insulin due to no blood sugar data  - She is not taking novolog at proper times, advised her to take 15 minutes prior to meals   - Asked her to send BG log/Dexcom report for review  - Advised patient to call Dexcom about inaccurate BGs - this could be related to bad sensor/transmitter  She recently changed sensor last night   - Highly recommend she improve lifestyle modifications as able  Discussed risks/complications associated with uncontrolled diabetes  Advised to adhere to diabetic diet, and recommended staying active/exercising routinely  Keep carbohydrates consistent to limit blood glucose fluctuations  Advised to call if blood sugars less than 70 mg/dl or over 300 mg/dl  Discussed symptoms and treatment of hypoglycemia  Recommended routine follow-up with podiatry and ophthalmology  Ordered blood work to complete prior to next visit    Lab Results   Component Value Date    HGBA1C 8 2 (H) 12/30/2022

## 2023-01-17 NOTE — PROGRESS NOTES
Established Patient Progress Note      Chief Complaint   Patient presents with   • Diabetes Type 2        History of Present Illness:   Rafita Irvin is a 63 y o  female with type 2 diabetes with long term use of insulin since 2017  Last seen in the office 10/14/22  Reports complications of neuropathy  Last A1C was 8 2  Denies recent illness or hospitalizations  Denies recent severe hypoglycemic or severe hyperglycemic episodes  Denies any issues with her current regimen  Home glucose monitoring: are performed regularly with CGM  She uses Dexcom sensor for blood sugar monitoring  She forgot meter today so was unable to download blood sugars  She reports recently Dexcom sensor has been 50-70 points off  She has been off and on prednisone for her Crohn's and RA  She is currently back on prednisone  She adjusts insulin based on prednisone dosage  She is currently taking Prednisone 10 mg daily  She also admits to poor diet  She finds it difficult to eat healthy with Crohn's  Most healthy foods upset her stomach so she finds herself eating more carbs  She also reports increased shooting pains in her feet and legs  She could not tolerate Metformin in the past due to GI side effects, had UTIs with SGLT2  She is complaining of occasional nausea and having abdominal "spasms" that radiate to back       Current regimen:   Tresiba 18 units daily  Novolog 8-10 units twice daily depending on prednisone dosing, not taking before meals     Last Eye Exam: 6/17/22, UTD, no retinopathy   Last Foot Exam: done today      Has hypertension: Taking carvedilol and losartan  Has hyperlipidemia: zetia, does not tolerate statins, Repatha made RA worse   Vitamin D deficiency: Recently started taking 1,000 IU daily    Patient Active Problem List   Diagnosis   • B-complex deficiency   • Fibromyalgia   • GERD (gastroesophageal reflux disease)   • Inflammatory bowel disease (Crohn's disease) ([facility] [address] )   • Migraine without aura and without status migrainosus, not intractable   • Peripheral neuropathy   • Tobacco use disorder, continuous   • Mixed hyperlipidemia   • Hidradenitis suppurativa   • Acute ST elevation myocardial infarction (STEMI) due to occlusion of mid portion of right coronary artery (HCC)   • Type 2 diabetes mellitus ([facility] )   • Essential hypertension   • Coronary artery disease   • Cystocele with prolapse   • Shortness of breath   • Arthralgia   • History of PTCA   • Uncontrolled type 2 diabetes mellitus with hyperglycemia ([facility])   • Generalized abdominal pain   • Crohn's colitis ([facility] )      Past Medical History:   Diagnosis Date   • Coronary artery disease    • Crohn's colitis ([place] [address] )    • Diabetes mellitus ([place] [address] )    • Fibromyalgia    • Gastric ulcer     [facility] GI SURGEON   • HTN (hypertension)    • Hyperlipidemia    • IBD (inflammatory bowel disease) 11/2015    [facility] GI SURGEON   • Migraine    • Myocardial infarction [facility])       Past Surgical History:   Procedure Laterality Date   • CARDIAC CATHETERIZATION     • CARDIAC CATHETERIZATION Left 03/07/2022    Procedure: Cardiac catheterization;  Surgeon: Jens Zazueta MD;  Location: [facility]; Service: Cardiology   • CARDIAC CATHETERIZATION N/A 03/07/2022    Procedure: Cardiac Coronary Angiogram;  Surgeon: Jens Zazueta MD;  [facility]; Service: Cardiology   • CARDIAC CATHETERIZATION N/A 03/07/2022    Procedure: Cardiac pci;  Surgeon: Jens Zazueta MD;  Location: [facility];   Service: Cardiology   • CHOLECYSTECTOMY     • COLONOSCOPY  12/03/2018    internal hemorrhoids, 3mm tubular adenoma polyp transverse colon, bx negative for dysplasia   • COLONOSCOPY  11/2015   • COLONOSCOPY W/ BIOPSIES  2022    ileitis; dr Torres Scale c/w crohns   • CORONARY STENT PLACEMENT     • DENTAL SURGERY      [place] teeth extraction   • EGD  12/2018    2cm hiatal hernia, gastritis bx shows foci of intestinal metaplasia, negative Hpylori   • EGD  2015 ESOPHAGITIS/MALLHIATUS HERNIA  FOUND   • HYSTERECTOMY     • MAMMO STEREOTACTIC BREAST BIOPSY LEFT (ALL INC) Left 10/27/2021   • TUBAL LIGATION      Bilateral      Family History   Problem Relation Age of Onset   • Coronary artery disease Mother    • Dementia Mother    • Stroke Mother    • No Known Problems Father    • Cervical cancer Sister 36   • Heart disease Brother    • No Known Problems Maternal Grandmother    • No Known Problems Maternal Grandfather    • No Known Problems Paternal Grandmother    • No Known Problems Paternal Grandfather    • Alcohol abuse Neg Hx    • Substance Abuse Neg Hx      Social History     Tobacco Use   • Smoking status: Every Day     Packs/day: 0 50     Types: Cigarettes     Start date: 1984   • Smokeless tobacco: Never   • Tobacco comments:     10 cigarettes daily    Substance Use Topics   • Alcohol use: Not Currently     Comment: Rarely     Allergies   Allergen Reactions   • Penicillins Anaphylaxis   • Rosuvastatin Myalgia and Arthralgia   • Atorvastatin Myalgia   • Cefuroxime    • Metaxalone    • Influenza Vaccines Rash   • Keflet [Cephalexin] Rash   • Lyrica [Pregabalin] Swelling     Feet swelling   • Nuts - Food Allergy Itching   • Sulfa Antibiotics Rash   • Sulfamethoxazole-Trimethoprim Hives and Rash   • Topiramate Rash         Current Outpatient Medications:   •  acetaminophen (TYLENOL) 500 mg tablet, Take 1,000 mg by mouth every 6 (six) hours as needed for mild pain, Disp: , Rfl:   •  Alcohol Swabs 70 % PADS, Use 4 per day, Disp: 400 each, Rfl: 3  •  ALPRAZolam (XANAX) 0 25 mg tablet, Take 1 tablet (0 25 mg total) by mouth 4 (four) times a day as needed for anxiety, Disp: 30 tablet, Rfl: 0  •  aspirin (ECOTRIN LOW STRENGTH) 81 mg EC tablet, Take 1 tablet (81 mg total) by mouth daily, Disp: 90 tablet, Rfl: 0  •  Blood Glucose Monitoring Suppl (Contour Next Monitor) w/Device KIT, Use to check blood sugar up to 3 times daily  , Disp: 1 kit, Rfl: 0  •  carvedilol (COREG) 12 5 mg tablet, Take 1 tablet (12 5 mg total) by mouth every 12 (twelve) hours, Disp: 180 tablet, Rfl: 0  •  cholecalciferol (VITAMIN D3) 1,000 units tablet, Take 1 tablet (1,000 Units total) by mouth daily (Patient taking differently: Take 1,000 Units by mouth every 14 (fourteen) days), Disp: 90 tablet, Rfl: 3  •  Continuous Blood Gluc  (Dexcom G6 ) CRISTINA, Disp 1 , Disp: 1 Device, Rfl: 1  •  Continuous Blood Gluc Sensor (Dexcom G6 Sensor) MISC, Use 1 sensor every 10 days, disp 90 day supply, Disp: 9 each, Rfl: 3  •  Continuous Blood Gluc Transmit (Dexcom G6 Transmitter) MISC, Use daily as directed for CGM - Change every 3 months, Disp: 1 each, Rfl: 1  •  ezetimibe (ZETIA) 10 mg tablet, Take 1 tablet (10 mg total) by mouth daily, Disp: 90 tablet, Rfl: 0  •  folic acid (FOLVITE) 1 mg tablet, , Disp: , Rfl:   •  gabapentin (NEURONTIN) 400 mg capsule, Take 1 capsule (400 mg total) by mouth 3 (three) times a day, Disp: 90 capsule, Rfl: 1  •  glucose blood (Contour Next Test) test strip, Use as instructed, Disp: 200 strip, Rfl: 2  •  hyoscyamine (LEVSIN/SL) 0 125 mg SL tablet, Take 1 tablet (0 125 mg total) by mouth every 6 (six) hours as needed (esophageal spasm), Disp: 120 tablet, Rfl: 5  •  insulin aspart (NovoLOG FlexPen) 100 UNIT/ML injection pen, 8 units with breakfast and dinner, 5 units with lunch plus sliding scale , Disp: 15 mL, Rfl: 3  •  insulin degludec Nathalia Branden FlexTouch) 100 units/mL injection pen, Inject 18 - 22 units daily  , Disp: 30 mL, Rfl: 0  •  Insulin Pen Needle (Pen Needles) 32G X 5 MM MISC, Use 4 per day, Disp: 360 each, Rfl: 1  •  losartan (COZAAR) 25 mg tablet, Take 1 tablet (25 mg total) by mouth daily, Disp: 90 tablet, Rfl: 0  •  mesalamine (LIALDA) 1 2 g EC tablet, Take 2 tablets (2 4 g total) by mouth daily with breakfast, Disp: 60 tablet, Rfl: 2  •  omeprazole (PriLOSEC) 40 MG capsule, Take 1 capsule (40 mg total) by mouth daily, Disp: 30 capsule, Rfl: 0  •  ondansetron (ZOFRAN-ODT) 4 mg disintegrating tablet, Take 1 tablet (4 mg total) by mouth every 8 (eight) hours as needed for nausea or vomiting, Disp: 12 tablet, Rfl: 0  •  predniSONE 5 mg tablet, Take 10 mg by mouth daily, Disp: , Rfl:   •  ticagrelor (BRILINTA) 90 MG, Take 1 tablet (90 mg total) by mouth every 12 (twelve) hours, Disp: 180 tablet, Rfl: 0  •  methotrexate 2 5 MG tablet, Take by mouth once a week (Patient not taking: Reported on 1/17/2023), Disp: , Rfl:     Review of Systems   Constitutional: Negative for activity change, appetite change, chills, diaphoresis, fatigue, fever and unexpected weight change  Eyes: Negative for visual disturbance  Respiratory: Negative for chest tightness and shortness of breath  Cardiovascular: Negative for chest pain, palpitations and leg swelling  Gastrointestinal: Positive for abdominal pain and nausea  Negative for constipation, diarrhea and vomiting  Endocrine: Negative for polydipsia, polyphagia and polyuria  Skin: Negative for color change, pallor, rash and wound  Neurological: Positive for numbness  Negative for dizziness, tremors, weakness and light-headedness  All other systems reviewed and are negative  Physical Exam:  Body mass index is 35 58 kg/m²  /70 (BP Location: Left arm, Patient Position: Sitting, Cuff Size: Large)   Pulse 82   Ht 5' 8" (1 727 m)   BMI 35 58 kg/m²    Wt Readings from Last 3 Encounters:   01/03/23 106 kg (234 lb)   12/30/22 106 kg (234 lb 3 2 oz)   12/13/22 106 kg (233 lb)       Physical Exam  Vitals reviewed  Constitutional:       Appearance: Normal appearance  She is obese  HENT:      Head: Normocephalic  Cardiovascular:      Rate and Rhythm: Normal rate and regular rhythm  Pulses: Normal pulses  no weak pulses          Dorsalis pedis pulses are 2+ on the right side and 2+ on the left side  Heart sounds: Normal heart sounds  No murmur heard    Pulmonary:      Effort: Pulmonary effort is normal  No respiratory distress  Breath sounds: Normal breath sounds  No wheezing, rhonchi or rales  Musculoskeletal:      Right lower leg: No edema  Left lower leg: No edema  Feet:      Right foot:      Skin integrity: No ulcer, skin breakdown, erythema, warmth, callus or dry skin  Left foot:      Skin integrity: No ulcer, skin breakdown, erythema, warmth, callus or dry skin  Skin:     General: Skin is warm and dry  Neurological:      Mental Status: She is alert and oriented to person, place, and time  Psychiatric:         Mood and Affect: Mood normal          Behavior: Behavior normal          Thought Content: Thought content normal          Judgment: Judgment normal        Patient's shoes and socks removed  Right Foot/Ankle   Right Foot Inspection  Skin Exam: skin normal and skin intact  No dry skin, no warmth, no callus, no erythema, no maceration, no abnormal color, no pre-ulcer, no ulcer and no callus  Toe Exam: ROM and strength within normal limits  No swelling, no tenderness, erythema and  no right toe deformity    Sensory   Vibration: diminished  Monofilament testing: diminished    Vascular  Capillary refills: < 3 seconds  The right DP pulse is 2+  Left Foot/Ankle  Left Foot Inspection  Skin Exam: skin normal and skin intact  No dry skin, no warmth, no erythema, no maceration, normal color, no pre-ulcer, no ulcer and no callus  Toe Exam: ROM and strength within normal limits  No swelling, no tenderness, no erythema and no left toe deformity  Sensory   Vibration: diminished  Monofilament testing: diminished    Vascular  Capillary refills: < 3 seconds  The left DP pulse is 2+       Assign Risk Category  No deformity present  Loss of protective sensation  No weak pulses  Risk: 1      Labs:   Lab Results   Component Value Date    HGBA1C 8 2 (H) 12/30/2022    HGBA1C 8 5 (H) 09/20/2022    HGBA1C 7 2 (H) 04/07/2022     Lab Results   Component Value Date    CREATININE 0 98 12/30/2022 CREATININE 1 06 11/29/2022    CREATININE 0 85 09/20/2022    BUN 23 12/30/2022     01/22/2016    K 4 1 12/30/2022     12/30/2022    CO2 22 12/30/2022     eGFR   Date Value Ref Range Status   12/30/2022 62 ml/min/1 73sq m Final     Lab Results   Component Value Date    HDL 55 04/07/2022    TRIG 174 (H) 04/07/2022     Lab Results   Component Value Date    ALT 14 12/30/2022    AST 9 12/30/2022    ALKPHOS 86 12/30/2022    BILITOT 0 4 01/22/2016     Lab Results   Component Value Date    FXM1TVFKRDVJ 2 100 08/11/2021    HGF6SYMSNLXK 1 620 11/13/2020     Impression & Plan:    Problem List Items Addressed This Visit        Endocrine    Uncontrolled type 2 diabetes mellitus with hyperglycemia ([facility] [address] ) - Primary     HGA1C is improving but remains uncontrolled  No blood sugars to review today she forgot Dexcom and reader  Treatment regimen:   -Continue current medication regimen for now  Unable to titrate insulin due to no blood sugar data  - She is not taking novolog at proper times, advised her to take 15 minutes prior to meals   - Asked her to send BG log/Dexcom report for review  - Advised patient to call Dexcom about inaccurate BGs - this could be related to bad sensor/transmitter  She recently changed sensor last night   - Highly recommend she improve lifestyle modifications as able  Discussed risks/complications associated with uncontrolled diabetes  Advised to adhere to diabetic diet, and recommended staying active/exercising routinely  Keep carbohydrates consistent to limit blood glucose fluctuations  Advised to call if blood sugars less than 70 mg/dl or over 300 mg/dl  Discussed symptoms and treatment of hypoglycemia  Recommended routine follow-up with podiatry and ophthalmology  Ordered blood work to complete prior to next visit    Lab Results   Component Value Date    HGBA1C 8 2 (H) 12/30/2022            Relevant Medications    gabapentin (NEURONTIN) 400 mg capsule    Continuous Blood Gluc Sensor (Dexcom G6 Sensor) MISC    Continuous Blood Gluc Transmit (Dexcom G6 Transmitter) MISC    Other Relevant Orders    Hemoglobin A1C    Comprehensive metabolic panel       Cardiovascular and Mediastinum    Essential hypertension     BP at goal  Continue current medication regimen  Nervous and Auditory    Peripheral neuropathy     Gabepentin was ordered at previous visit, she reports it has not improved neuropathy symptoms  Will increase dose to three times daily and recommend she continuing taking at least 1 month to notice benefits  Other    Mixed hyperlipidemia     She has not tolerated statins in the past  Recommend she improve on lifestyle modifications - will check lipid panel  Relevant Orders    Lipid panel       Orders Placed This Encounter   Procedures   • Hemoglobin A1C     Standing Status:   Future     Standing Expiration Date:   1/17/2024   • Comprehensive metabolic panel     This is a patient instruction: Patient fasting for 8 hours or longer recommended  Standing Status:   Future     Standing Expiration Date:   1/17/2024   • Lipid panel     This is a patient instruction: This test requires patient fasting for 10-12 hours or longer  Drinking of black coffee or black tea is acceptable  Standing Status:   Future     Standing Expiration Date:   1/17/2024       There are no Patient Instructions on file for this visit  Discussed with the patient and all questioned fully answered  She will call me if any problems arise      MERI Orellana

## 2023-01-17 NOTE — ASSESSMENT & PLAN NOTE
Gabepentin was ordered at previous visit, she reports it has not improved neuropathy symptoms  Will increase dose to three times daily and recommend she continuing taking at least 1 month to notice benefits

## 2023-01-24 DIAGNOSIS — F32.9 REACTIVE DEPRESSION (SITUATIONAL): ICD-10-CM

## 2023-01-25 RX ORDER — ALPRAZOLAM 0.25 MG/1
0.25 TABLET ORAL 4 TIMES DAILY PRN
Qty: 30 TABLET | Refills: 0 | Status: SHIPPED | OUTPATIENT
Start: 2023-01-25

## 2023-02-22 ENCOUNTER — OFFICE VISIT (OUTPATIENT)
Dept: FAMILY MEDICINE CLINIC | Facility: CLINIC | Age: 63
End: 2023-02-22

## 2023-02-22 VITALS
HEIGHT: 68 IN | HEART RATE: 72 BPM | BODY MASS INDEX: 36.77 KG/M2 | SYSTOLIC BLOOD PRESSURE: 122 MMHG | DIASTOLIC BLOOD PRESSURE: 80 MMHG | RESPIRATION RATE: 16 BRPM | WEIGHT: 242.6 LBS

## 2023-02-22 DIAGNOSIS — R39.9 URINARY SYMPTOM OR SIGN: Primary | ICD-10-CM

## 2023-02-22 DIAGNOSIS — E11.65 UNCONTROLLED TYPE 2 DIABETES MELLITUS WITH HYPERGLYCEMIA (HCC): ICD-10-CM

## 2023-02-22 DIAGNOSIS — K50.119 CROHN'S DISEASE OF COLON WITH COMPLICATION (HCC): ICD-10-CM

## 2023-02-22 DIAGNOSIS — E11.00 TYPE 2 DIABETES MELLITUS WITH HYPEROSMOLARITY WITHOUT COMA, UNSPECIFIED WHETHER LONG TERM INSULIN USE (HCC): ICD-10-CM

## 2023-02-22 DIAGNOSIS — Z79.4 TYPE 2 DIABETES MELLITUS WITHOUT COMPLICATION, WITH LONG-TERM CURRENT USE OF INSULIN (HCC): ICD-10-CM

## 2023-02-22 DIAGNOSIS — K50.919 CROHN'S DISEASE WITH COMPLICATION, UNSPECIFIED GASTROINTESTINAL TRACT LOCATION (HCC): ICD-10-CM

## 2023-02-22 DIAGNOSIS — E11.9 TYPE 2 DIABETES MELLITUS WITHOUT COMPLICATION, WITH LONG-TERM CURRENT USE OF INSULIN (HCC): ICD-10-CM

## 2023-02-22 DIAGNOSIS — M06.09 RHEUMATOID ARTHRITIS OF MULTIPLE SITES WITH NEGATIVE RHEUMATOID FACTOR (HCC): ICD-10-CM

## 2023-02-22 DIAGNOSIS — I25.10 CORONARY ARTERY DISEASE INVOLVING NATIVE CORONARY ARTERY OF NATIVE HEART WITHOUT ANGINA PECTORIS: ICD-10-CM

## 2023-02-22 DIAGNOSIS — E78.2 MIXED HYPERLIPIDEMIA: ICD-10-CM

## 2023-02-22 DIAGNOSIS — G62.9 PERIPHERAL POLYNEUROPATHY: ICD-10-CM

## 2023-02-22 DIAGNOSIS — I10 ESSENTIAL HYPERTENSION: ICD-10-CM

## 2023-02-22 DIAGNOSIS — I21.11 ACUTE ST ELEVATION MYOCARDIAL INFARCTION (STEMI) DUE TO OCCLUSION OF MID PORTION OF RIGHT CORONARY ARTERY (HCC): ICD-10-CM

## 2023-02-22 LAB
SL AMB  POCT GLUCOSE, UA: NORMAL
SL AMB LEUKOCYTE ESTERASE,UA: NORMAL
SL AMB POCT BILIRUBIN,UA: NORMAL
SL AMB POCT BLOOD,UA: NORMAL
SL AMB POCT CLARITY,UA: NORMAL
SL AMB POCT COLOR,UA: YELLOW
SL AMB POCT KETONES,UA: NORMAL
SL AMB POCT NITRITE,UA: NORMAL
SL AMB POCT PH,UA: 5
SL AMB POCT SPECIFIC GRAVITY,UA: 1.02
SL AMB POCT URINE PROTEIN: NORMAL
SL AMB POCT UROBILINOGEN: 0.2

## 2023-02-22 RX ORDER — GABAPENTIN 400 MG/1
400 CAPSULE ORAL 4 TIMES DAILY
Start: 2023-02-22

## 2023-02-22 NOTE — PROGRESS NOTES
Assessment/Plan:    1  Urinary symptoms - dip was normal, will send for culture    2  CAD - unhappy at [address], feels they are not listening, will get second opinion    3  Type 2 DM - continue with [facility] and medications as prescribed    4  Hyperlipidemia - under care cardiology, on zetia, cannot tolerate statins or repatha    5  chrons - under care GI, on lialda and levsin, starting humira    6  RA - under care rheum, on humira    7  GERD - on omeprazole, under care GI    Will write letter in support of diability for patient due to multiple medical issues, appointments and medications    F/u as needed    Subjective:   Chief Complaint   Patient presents with   • Wants to apply for disability     Needs a letter from PCP   • Urinary Tract Infection      Patient ID: Oseas Gillette is a 66 y o  female  Patient here complaining of urinary symptoms, malodorous, hard to initiate stream, urinary frequency  Patient would like to be on disability  Has Type 2 DM, CAD s/p MI, chrons, fibromyalgia, seronegative RA vs PMR  Patient is under care rheum, endo, cardio, GI  Patient is on multiple medications, they cause fatigue, she cannot continue to work while taking medications because she is so fatigued      Rheum q 3 months, initiating humira now  GI q 3 months, initiating humira  Cardio - every 4-5 months due to scheduling concerns, multiple medications  Endo - q 3 months, on insulin, long acting and short acting insulin      The following portions of the patient's history were reviewed and updated as appropriate: allergies, current medications, past family history, past medical history, past social history, past surgical history and problem list     Past Medical History:   Diagnosis Date   • Coronary artery disease    • [facility]    • Diabetes mellitus ([facility] )    • Fibromyalgia    • Gastric ulcer     [facility] GI SURGEON   • HTN (hypertension)    • Hyperlipidemia    • IBD (inflammatory bowel disease) 11/2015    [facility] GI SURGEON   • Migraine    • Myocardial infarction [facility])      Past Surgical History:   Procedure Laterality Date   • CARDIAC CATHETERIZATION     • CARDIAC CATHETERIZATION Left 03/07/2022    Procedure: Cardiac catheterization;  Surgeon: Lu Reddy MD;  [facility]; Service: Cardiology   • CARDIAC CATHETERIZATION N/A 03/07/2022    Procedure: Cardiac Coronary Angiogram;  Surgeon: Lu Reddy MD;  [facility]; Service: Cardiology   • CARDIAC CATHETERIZATION N/A 03/07/2022    Procedure: Cardiac pci;  Surgeon: Lu Reddy MD;  [facility];   Service: Cardiology   • CHOLECYSTECTOMY     • COLONOSCOPY  12/03/2018    internal hemorrhoids, 3mm tubular adenoma polyp transverse colon, bx negative for dysplasia   • COLONOSCOPY  11/2015   • COLONOSCOPY W/ BIOPSIES  2022    ileitis; dr [place] c/w crohns   • CORONARY STENT PLACEMENT     • DENTAL SURGERY      [place] teeth extraction   • EGD  12/2018    2cm hiatal hernia, gastritis bx shows foci of intestinal metaplasia, negative Hpylori   • EGD  2015    ESOPHAGITIS/MALLHIATUS HERNIA  FOUND   • HYSTERECTOMY     • MAMMO STEREOTACTIC BREAST BIOPSY LEFT (ALL INC) Left 10/27/2021   • TUBAL LIGATION      Bilateral     Family History   Problem Relation Age of Onset   • Coronary artery disease Mother    • Dementia Mother    • Stroke Mother    • No Known Problems Father    • Cervical cancer Sister 36   • Heart disease Brother    • No Known Problems Maternal Grandmother    • No Known Problems Maternal Grandfather    • No Known Problems Paternal Grandmother    • No Known Problems Paternal Grandfather    • Alcohol abuse Neg Hx    • Substance Abuse Neg Hx      Social History     Socioeconomic History   • Marital status: /Civil Union     Spouse name: Not on file   • Number of children: Not on file   • Years of education: Not on file   • Highest education level: Not on file   Occupational History   • Occupation: Coding   Tobacco Use   • Smoking status: Every Day     Packs/day: 0 50     Types: Cigarettes     Start date: 1984   • Smokeless tobacco: Never   • Tobacco comments:     10 cigarettes daily    Vaping Use   • Vaping Use: Never used   Substance and Sexual Activity   • Alcohol use: Not Currently     Comment: Rarely   • Drug use: Never   • Sexual activity: Not Currently     Partners: Male   Other Topics Concern   • Not on file   Social History Narrative   • Not on file     Social Determinants of Health     Financial Resource Strain: Not on file   Food Insecurity: Not on file   Transportation Needs: Not on file   Physical Activity: Not on file   Stress: Not on file   Social Connections: Not on file   Intimate Partner Violence: Not on file   Housing Stability: Not on file       Current Outpatient Medications:   •  acetaminophen (TYLENOL) 500 mg tablet, Take 1,000 mg by mouth every 6 (six) hours as needed for mild pain, Disp: , Rfl:   •  Alcohol Swabs 70 % PADS, Use 4 per day, Disp: 400 each, Rfl: 3  •  ALPRAZolam (XANAX) 0 25 mg tablet, Take 1 tablet (0 25 mg total) by mouth 4 (four) times a day as needed for anxiety, Disp: 30 tablet, Rfl: 0  •  aspirin (ECOTRIN LOW STRENGTH) 81 mg EC tablet, Take 1 tablet (81 mg total) by mouth daily, Disp: 90 tablet, Rfl: 0  •  carvedilol (COREG) 12 5 mg tablet, Take 1 tablet (12 5 mg total) by mouth every 12 (twelve) hours, Disp: 180 tablet, Rfl: 0  •  cholecalciferol (VITAMIN D3) 1,000 units tablet, Take 1 tablet (1,000 Units total) by mouth daily (Patient taking differently: Take 1,000 Units by mouth 2 (two) times a day), Disp: 90 tablet, Rfl: 3  •  Continuous Blood Gluc  (Dexcom G6 ) CRISTINA, Disp 1 , Disp: 1 Device, Rfl: 1  •  Continuous Blood Gluc Sensor (Dexcom G6 Sensor) MISC, Use 1 sensor every 10 days, disp 90 day supply, Disp: 9 each, Rfl: 3  •  Continuous Blood Gluc Transmit (Dexcom G6 Transmitter) MISC, Use daily as directed for CGM - Change every 3 months, Disp: 1 each, Rfl: 1  •  ezetimibe (ZETIA) 10 mg tablet, Take 1 tablet (10 mg total) by mouth daily, Disp: 90 tablet, Rfl: 0  •  folic acid (FOLVITE) 1 mg tablet, , Disp: , Rfl:   •  gabapentin (NEURONTIN) 400 mg capsule, Take 1 capsule (400 mg total) by mouth 4 (four) times a day, Disp: , Rfl:   •  hyoscyamine (LEVSIN/SL) 0 125 mg SL tablet, Take 1 tablet (0 125 mg total) by mouth every 6 (six) hours as needed (esophageal spasm), Disp: 120 tablet, Rfl: 5  •  insulin aspart (NovoLOG FlexPen) 100 UNIT/ML injection pen, 8 units with breakfast and dinner, 5 units with lunch plus sliding scale , Disp: 15 mL, Rfl: 3  •  insulin degludec [place] FlexTouch) 100 units/mL injection pen, Inject 18 - 22 units daily  , Disp: 30 mL, Rfl: 0  •  Insulin Pen Needle (Pen Needles) 32G X 5 MM MISC, Use 4 per day, Disp: 360 each, Rfl: 1  •  losartan (COZAAR) 25 mg tablet, Take 1 tablet (25 mg total) by mouth daily, Disp: 90 tablet, Rfl: 0  •  mesalamine (LIALDA) 1 2 g EC tablet, Take 2 tablets (2 4 g total) by mouth daily with breakfast, Disp: 60 tablet, Rfl: 2  •  omeprazole (PriLOSEC) 40 MG capsule, Take 1 capsule (40 mg total) by mouth daily (Patient taking differently: Take 40 mg by mouth if needed), Disp: 30 capsule, Rfl: 0  •  ondansetron (ZOFRAN-ODT) 4 mg disintegrating tablet, Take 1 tablet (4 mg total) by mouth every 8 (eight) hours as needed for nausea or vomiting, Disp: 12 tablet, Rfl: 0  •  PB-Hyoscy-Atropine-Scopolamine (DONNATAL PO), Take by mouth, Disp: , Rfl:   •  predniSONE 5 mg tablet, Take 10 mg by mouth daily, Disp: , Rfl:   •  ticagrelor (BRILINTA) 90 MG, Take 1 tablet (90 mg total) by mouth every 12 (twelve) hours, Disp: 180 tablet, Rfl: 0  •  methotrexate 2 5 MG tablet, Take by mouth once a week (Patient not taking: Reported on 1/17/2023), Disp: , Rfl:     Review of Systems   Constitutional: Positive for fatigue  Negative for chills and fever  Eyes: Negative for pain and visual disturbance  Respiratory: Positive for shortness of breath  Negative for cough  Cardiovascular: Negative for chest pain and palpitations  Gastrointestinal: Negative for abdominal pain and vomiting  Genitourinary: Negative for dysuria and hematuria  Musculoskeletal: Positive for arthralgias and myalgias  Negative for back pain  Skin: Negative for color change and rash  Neurological: Negative for seizures and syncope  All other systems reviewed and are negative  Objective:    Vitals:    02/22/23 1228   BP: 122/80   Pulse: 72   Resp: 16   Weight: 110 kg (242 lb 9 6 oz)   Height: 5' 8" (1 727 m)        Physical Exam  Constitutional:       Appearance: Normal appearance  HENT:      Head: Normocephalic and atraumatic  Neurological:      General: No focal deficit present  Mental Status: She is alert and oriented to person, place, and time  Psychiatric:         Mood and Affect: Mood normal          Behavior: Behavior normal          Thought Content:  Thought content normal          Judgment: Judgment normal

## 2023-02-23 LAB — BACTERIA UR CULT: ABNORMAL

## 2023-02-24 DIAGNOSIS — N39.0 URINARY TRACT INFECTION WITHOUT HEMATURIA, SITE UNSPECIFIED: Primary | ICD-10-CM

## 2023-02-24 RX ORDER — NITROFURANTOIN 25; 75 MG/1; MG/1
100 CAPSULE ORAL 2 TIMES DAILY
Qty: 20 CAPSULE | Refills: 0 | Status: SHIPPED | OUTPATIENT
Start: 2023-02-24 | End: 2023-03-06

## 2023-03-03 PROBLEM — E11.00: Status: ACTIVE | Noted: 2023-03-03

## 2023-03-12 DIAGNOSIS — I21.11 ACUTE ST ELEVATION MYOCARDIAL INFARCTION (STEMI) DUE TO OCCLUSION OF MID PORTION OF RIGHT CORONARY ARTERY (HCC): ICD-10-CM

## 2023-03-12 DIAGNOSIS — I21.11 ACUTE ST ELEVATION MYOCARDIAL INFARCTION (STEMI) DUE TO OCCLUSION OF DISTAL PORTION OF RIGHT CORONARY ARTERY (HCC): ICD-10-CM

## 2023-03-12 DIAGNOSIS — I21.19 INFERIOR MI (HCC): ICD-10-CM

## 2023-03-12 DIAGNOSIS — E11.65 UNCONTROLLED TYPE 2 DIABETES MELLITUS WITH HYPERGLYCEMIA (HCC): ICD-10-CM

## 2023-03-13 RX ORDER — GABAPENTIN 400 MG/1
400 CAPSULE ORAL 4 TIMES DAILY
Qty: 120 CAPSULE | Refills: 0 | Status: SHIPPED | OUTPATIENT
Start: 2023-03-13

## 2023-03-13 RX ORDER — CARVEDILOL 12.5 MG/1
12.5 TABLET ORAL EVERY 12 HOURS SCHEDULED
Qty: 180 TABLET | Refills: 0 | Status: SHIPPED | OUTPATIENT
Start: 2023-03-13

## 2023-03-17 ENCOUNTER — TELEPHONE (OUTPATIENT)
Dept: FAMILY MEDICINE CLINIC | Facility: CLINIC | Age: 63
End: 2023-03-17

## 2023-03-17 DIAGNOSIS — R39.9 URINARY SYMPTOM OR SIGN: Primary | ICD-10-CM

## 2023-03-17 NOTE — TELEPHONE ENCOUNTER
Pt called to have urine test redone per doctor orders after a week         Wants to know if doctor can send script to get it done at a [facility]

## 2023-03-18 ENCOUNTER — APPOINTMENT (OUTPATIENT)
Dept: LAB | Facility: CLINIC | Age: 63
End: 2023-03-18

## 2023-03-18 DIAGNOSIS — K50.119 CROHN'S DISEASE OF COLON WITH COMPLICATION (HCC): ICD-10-CM

## 2023-03-18 DIAGNOSIS — E78.2 MIXED HYPERLIPIDEMIA: ICD-10-CM

## 2023-03-18 DIAGNOSIS — R39.9 URINARY SYMPTOM OR SIGN: ICD-10-CM

## 2023-03-18 DIAGNOSIS — E11.65 UNCONTROLLED TYPE 2 DIABETES MELLITUS WITH HYPERGLYCEMIA (HCC): ICD-10-CM

## 2023-03-18 LAB
ALBUMIN SERPL BCP-MCNC: 3.4 G/DL (ref 3.5–5)
ALP SERPL-CCNC: 84 U/L (ref 46–116)
ALT SERPL W P-5'-P-CCNC: 13 U/L (ref 12–78)
AMORPH URATE CRY URNS QL MICRO: ABNORMAL
ANION GAP SERPL CALCULATED.3IONS-SCNC: 4 MMOL/L (ref 4–13)
AST SERPL W P-5'-P-CCNC: 13 U/L (ref 5–45)
BACTERIA UR QL AUTO: ABNORMAL /HPF
BILIRUB SERPL-MCNC: 0.37 MG/DL (ref 0.2–1)
BILIRUB UR QL STRIP: NEGATIVE
BUN SERPL-MCNC: 16 MG/DL (ref 5–25)
CALCIUM ALBUM COR SERPL-MCNC: 9.8 MG/DL (ref 8.3–10.1)
CALCIUM SERPL-MCNC: 9.3 MG/DL (ref 8.3–10.1)
CHLORIDE SERPL-SCNC: 106 MMOL/L (ref 96–108)
CHOLEST SERPL-MCNC: 243 MG/DL
CLARITY UR: ABNORMAL
CO2 SERPL-SCNC: 26 MMOL/L (ref 21–32)
COLOR UR: ABNORMAL
CREAT SERPL-MCNC: 0.89 MG/DL (ref 0.6–1.3)
CRP SERPL QL: 9.3 MG/L
EST. AVERAGE GLUCOSE BLD GHB EST-MCNC: 189 MG/DL
GFR SERPL CREATININE-BSD FRML MDRD: 70 ML/MIN/1.73SQ M
GLUCOSE P FAST SERPL-MCNC: 244 MG/DL (ref 65–99)
GLUCOSE UR STRIP-MCNC: ABNORMAL MG/DL
HBA1C MFR BLD: 8.2 %
HDLC SERPL-MCNC: 50 MG/DL
HGB UR QL STRIP.AUTO: NEGATIVE
HYALINE CASTS #/AREA URNS LPF: ABNORMAL /LPF
KETONES UR STRIP-MCNC: NEGATIVE MG/DL
LDLC SERPL CALC-MCNC: 130 MG/DL (ref 0–100)
LEUKOCYTE ESTERASE UR QL STRIP: ABNORMAL
MUCOUS THREADS UR QL AUTO: ABNORMAL
NITRITE UR QL STRIP: NEGATIVE
NON-SQ EPI CELLS URNS QL MICRO: ABNORMAL /HPF
NONHDLC SERPL-MCNC: 193 MG/DL
PH UR STRIP.AUTO: 6 [PH]
POTASSIUM SERPL-SCNC: 3.7 MMOL/L (ref 3.5–5.3)
PROT SERPL-MCNC: 7.3 G/DL (ref 6.4–8.4)
PROT UR STRIP-MCNC: NEGATIVE MG/DL
RBC #/AREA URNS AUTO: ABNORMAL /HPF
RENAL EPI CELLS #/AREA URNS HPF: PRESENT /[HPF]
SODIUM SERPL-SCNC: 136 MMOL/L (ref 135–147)
SP GR UR STRIP.AUTO: 1.01 (ref 1–1.03)
TRANS CELLS #/AREA URNS HPF: PRESENT /[HPF]
TRIGL SERPL-MCNC: 313 MG/DL
UROBILINOGEN UR STRIP-ACNC: <2 MG/DL
WBC #/AREA URNS AUTO: ABNORMAL /HPF

## 2023-03-19 DIAGNOSIS — F32.9 REACTIVE DEPRESSION (SITUATIONAL): ICD-10-CM

## 2023-03-20 ENCOUNTER — TELEPHONE (OUTPATIENT)
Dept: FAMILY MEDICINE CLINIC | Facility: CLINIC | Age: 63
End: 2023-03-20

## 2023-03-20 LAB — BACTERIA UR CULT: ABNORMAL

## 2023-03-20 RX ORDER — ALPRAZOLAM 0.25 MG/1
0.25 TABLET ORAL 4 TIMES DAILY PRN
Qty: 30 TABLET | Refills: 0 | Status: SHIPPED | OUTPATIENT
Start: 2023-03-20

## 2023-03-20 NOTE — TELEPHONE ENCOUNTER
Patient called said she did her UA and would like the result and meds   She said she knows she has a uti      [employer]      Pt [phone]

## 2023-03-21 ENCOUNTER — TELEPHONE (OUTPATIENT)
Dept: UROLOGY | Facility: AMBULATORY SURGERY CENTER | Age: 63
End: 2023-03-21

## 2023-03-21 DIAGNOSIS — N39.0 URINARY TRACT INFECTION WITHOUT HEMATURIA, SITE UNSPECIFIED: Primary | ICD-10-CM

## 2023-03-21 RX ORDER — NITROFURANTOIN 25; 75 MG/1; MG/1
100 CAPSULE ORAL 2 TIMES DAILY
Qty: 28 CAPSULE | Refills: 0 | Status: SHIPPED | OUTPATIENT
Start: 2023-03-21 | End: 2023-04-04

## 2023-03-21 NOTE — TELEPHONE ENCOUNTER
What is the reason for the patient’s appointment? NP- Recurrent UTI's     Patient can be reached at [phone]    What office location does the patient prefer? [facility] we accept the patient's insurance or is the patient Self-Pay? BC    Has the patient had any previous Urologist(s)? No    Have patient records been requested? No    Has the patient had any outside testing done? No    Does the patient have a personal history of cancer?  No

## 2023-03-27 ENCOUNTER — OFFICE VISIT (OUTPATIENT)
Dept: UROLOGY | Facility: CLINIC | Age: 63
End: 2023-03-27

## 2023-03-27 VITALS
BODY MASS INDEX: 39.41 KG/M2 | RESPIRATION RATE: 16 BRPM | HEART RATE: 93 BPM | SYSTOLIC BLOOD PRESSURE: 122 MMHG | WEIGHT: 245.2 LBS | OXYGEN SATURATION: 98 % | HEIGHT: 66 IN | DIASTOLIC BLOOD PRESSURE: 70 MMHG

## 2023-03-27 DIAGNOSIS — N10 ACUTE PYELONEPHRITIS: Primary | ICD-10-CM

## 2023-03-27 NOTE — PROGRESS NOTES
UROLOGY OUTPATIENT CONSULT NOTE     Name: Marilu Hankins  : 1960  MRN: [id number]  Date of Service: 3/27/2023      CHIEF COMPLAINT/REASON FOR CONSULTATION   Recurrent UTI    History of Present Illness:     Marilu Hankins is a 66 y o  female presenting for evaluation of a persistent urinary tract infections  Patient was referred for evaluation of UTI that has been persistent since December  The patient has a history of Crohn's colitis and underwent colonoscopy 2022  After colonoscopy she developed urinary symptoms consistent with UTI which have not completely resolved  The patient feels that the colonoscopy possibly incited her symptoms  Her initial symptoms were frequency, nocturia, malodorous urine, dysuria, urgency, and hesitancy  Urine culture collected on 2022 grew Aerococcus  She has had 2 urine culture since then that have both grown Aerococcus  She was initially treated with Cipro followed by Macrobid twice daily and is currently on Macrobid daily for 14 days  She is not on the antibiotics her symptoms returned  It is important for her to get this addressed because she takes Humira for her Crohn's which they do not like to administer if she has an active infection  She reports a fever last week of 104 which resolved with taking Advil  She also reports left flank pain and radiating left upper quadrant pain that is sometimes shooting  This pain was more intense around the time that she had the fever but has subsided but not completely resolved  When she has the urge to urinate her body gets hot and the sensation resolves when she urinates  Other urine cultures in our system are as noted below  The patient has not seen a urologist prior  Patient has a anaphylactic allergy to penicillin and also has allergies to Bactrim and cephalosporins      Aerococcus 3/18/23, 23, 22  Pseudomonas 22  Candida and lactobacillus 21     PAST MEDICAL HISTORY: Past Medical History:   Diagnosis Date   • Coronary artery disease    • [facility] )    • [facility]    • Fibromyalgia    • Gastric ulcer     [facility] GI SURGEON   • HTN (hypertension)    • Hyperlipidemia    • IBD (inflammatory bowel disease) 11/2015    [facility] GI SURGEON   • Migraine    • Myocardial infarction [facility])        PAST SURGICAL HISTORY:     Past Surgical History:   Procedure Laterality Date   • CARDIAC CATHETERIZATION     • CARDIAC CATHETERIZATION Left 03/07/2022    Procedure: Cardiac catheterization;  Surgeon: Morene Burkitt, MD;  [facility]; Service: Cardiology   • CARDIAC CATHETERIZATION N/A 03/07/2022    Procedure: Cardiac Coronary Angiogram;  Surgeon: Morene Burkitt, MD;  [facility]; Service: Cardiology   • CARDIAC CATHETERIZATION N/A 03/07/2022    Procedure: Cardiac pci;  Surgeon: Morene Burkitt, MD;  [facility];   Service: Cardiology   • CHOLECYSTECTOMY     • COLONOSCOPY  12/03/2018    internal hemorrhoids, 3mm tubular adenoma polyp transverse colon, bx negative for dysplasia   • COLONOSCOPY  11/2015   • COLONOSCOPY W/ BIOPSIES  2022    ileitis; dr [place] c/w crohns   • CORONARY STENT PLACEMENT     • DENTAL SURGERY      [place] teeth extraction   • EGD  12/2018    2cm hiatal hernia, gastritis bx shows foci of intestinal metaplasia, negative Hpylori   • EGD  2015    ESOPHAGITIS/MALLHIATUS HERNIA  FOUND   • HYSTERECTOMY     • MAMMO STEREOTACTIC BREAST BIOPSY LEFT (ALL INC) Left 10/27/2021   • TUBAL LIGATION      Bilateral       CURRENT MEDICATIONS:     Current Outpatient Medications   Medication Sig Dispense Refill   • acetaminophen (TYLENOL) 500 mg tablet Take 1,000 mg by mouth every 6 (six) hours as needed for mild pain     • Alcohol Swabs 70 % PADS Use 4 per day 400 each 3   • ALPRAZolam (XANAX) 0 25 mg tablet Take 1 tablet (0 25 mg total) by mouth 4 (four) times a day as needed for anxiety 30 tablet 0   • aspirin (ECOTRIN LOW STRENGTH) 81 mg EC tablet Take 1 tablet (81 mg total) by mouth daily 90 tablet 0   • carvedilol (COREG) 12 5 mg tablet Take 1 tablet (12 5 mg total) by mouth every 12 (twelve) hours 180 tablet 0   • cholecalciferol (VITAMIN D3) 1,000 units tablet Take 1 tablet (1,000 Units total) by mouth daily (Patient taking differently: Take 1,000 Units by mouth 2 (two) times a day) 90 tablet 3   • Continuous Blood Gluc  (Dexcom G6 ) CRISTINA Disp 1  1 Device 1   • Continuous Blood Gluc Sensor (Dexcom G6 Sensor) MISC Use 1 sensor every 10 days, disp 90 day supply 9 each 3   • Continuous Blood Gluc Transmit (Dexcom G6 Transmitter) MISC Use daily as directed for CGM - Change every 3 months 1 each 1   • ezetimibe (ZETIA) 10 mg tablet Take 1 tablet (10 mg total) by mouth daily 90 tablet 0   • folic acid (FOLVITE) 1 mg tablet      • gabapentin (NEURONTIN) 400 mg capsule Take 1 capsule (400 mg total) by mouth 4 (four) times a day 120 capsule 0   • insulin aspart (NovoLOG FlexPen) 100 UNIT/ML injection pen 8 units with breakfast and dinner, 5 units with lunch plus sliding scale  15 mL 3   • insulin degludec [place] FlexTouch) 100 units/mL injection pen Inject 18 - 22 units daily   30 mL 0   • Insulin Pen Needle (Pen Needles) 32G X 5 MM MISC Use 4 per day 360 each 1   • losartan (COZAAR) 25 mg tablet Take 1 tablet (25 mg total) by mouth daily 90 tablet 0   • mesalamine (LIALDA) 1 2 g EC tablet Take 2 tablets (2 4 g total) by mouth daily with breakfast 60 tablet 2   • nitrofurantoin (MACROBID) 100 mg capsule Take 1 capsule (100 mg total) by mouth 2 (two) times a day for 14 days 28 capsule 0   • omeprazole (PriLOSEC) 40 MG capsule Take 1 capsule (40 mg total) by mouth daily (Patient taking differently: Take 40 mg by mouth if needed) 30 capsule 0   • ondansetron (ZOFRAN-ODT) 4 mg disintegrating tablet Take 1 tablet (4 mg total) by mouth every 8 (eight) hours as needed for nausea or vomiting 12 tablet 0   • PB-Hyoscy-Atropine-Scopolamine (DONNATAL PO) Take by mouth     • predniSONE 5 mg tablet Take 20 mg by mouth daily     • ticagrelor (BRILINTA) 90 MG Take 1 tablet (90 mg total) by mouth every 12 (twelve) hours 180 tablet 0   • hyoscyamine (LEVSIN/SL) 0 125 mg SL tablet Take 1 tablet (0 125 mg total) by mouth every 6 (six) hours as needed (esophageal spasm) 120 tablet 5     No current facility-administered medications for this visit         ALLERGIES:     Allergies   Allergen Reactions   • Penicillins Anaphylaxis   • Rosuvastatin Myalgia and Arthralgia   • Atorvastatin Myalgia   • Cefuroxime    • Metaxalone    • Influenza Vaccines Rash   • Keflet [Cephalexin] Rash   • Lyrica [Pregabalin] Swelling     Feet swelling   • Nuts - Food Allergy Itching   • Sulfa Antibiotics Rash   • Sulfamethoxazole-Trimethoprim Hives and Rash   • Topiramate Rash       SOCIAL HISTORY:     Social History     Socioeconomic History   • Marital status: /Civil Union     Spouse name: None   • Number of children: None   • Years of education: None   • Highest education level: None   Occupational History   • Occupation: retired   Tobacco Use   • Smoking status: Every Day     Packs/day: 0 50     Types: Cigarettes     Start date: 1984   • Smokeless tobacco: Never   • Tobacco comments:     10 cigarettes daily    Vaping Use   • Vaping Use: Never used   Substance and Sexual Activity   • Alcohol use: Not Currently     Comment: Rarely   • Drug use: Never   • Sexual activity: Not Currently     Partners: Male   Other Topics Concern   • None   Social History Narrative   • None     Social Determinants of Health     Financial Resource Strain: Not on file   Food Insecurity: Not on file   Transportation Needs: Not on file   Physical Activity: Not on file   Stress: Not on file   Social Connections: Not on file   Intimate Partner Violence: Not on file   Housing Stability: Not on file       FAMILY HISTORY:     Family History   Problem Relation Age of Onset   • "Coronary artery disease Mother    • Dementia Mother    • Stroke Mother    • No Known Problems Father    • Cervical cancer Sister 36   • Heart disease Brother    • No Known Problems Maternal Grandmother    • No Known Problems Maternal Grandfather    • No Known Problems Paternal Grandmother    • No Known Problems Paternal Grandfather    • Alcohol abuse Neg Hx    • Substance Abuse Neg Hx        REVIEW OF SYSTEMS:     Review of Systems  Pertinent +/- in HPI  PHYSICAL EXAM:     /70 (BP Location: Left arm, Patient Position: Sitting, Cuff Size: Large)   Pulse 93   Resp 16   Ht 5' 6\" (1 676 m)   Wt 111 kg (245 lb 3 2 oz)   SpO2 98%   BMI 39 58 kg/m²     General:  Healthy appearing female in no acute distress  Head:  Normocephalic, atraumatic  Eyes: no scleral icterus  ENMT: Nares patent, moist mucous membranes  Cardiovascular:  Regular rate  Respiratory:  Patient has unlabored respirations  Abdomen:  Abdomen nondistended  Musculoskeletal: Normal gait  Extremities: No deformities, normal ROM  Neurological: Grossly intact  Psych: Normal affect    LABS:     CBC:   Lab Results   Component Value Date    WBC 11 68 (H) 11/29/2022    HGB 13 1 11/29/2022    HCT 39 6 11/29/2022    MCV 93 11/29/2022     11/29/2022       BMP:   Lab Results   Component Value Date    GLUCOSE 78 01/22/2016    CALCIUM 9 3 03/18/2023     01/22/2016    K 3 7 03/18/2023    CO2 26 03/18/2023     03/18/2023    BUN 16 03/18/2023    CREATININE 0 89 03/18/2023     ASSESSMENT:     66 y o  female referred for evaluation of recurrent UTIs  After discussion with the patient it appears that she more probably has a persistent UTI with Aerococcus rather than recurrent UTIs since she has had the same organism on each check for the past 3 months with persistent symptoms off antibiotics  I explained that this is difficult to treat since she has anaphylaxis to penicillin as well as allergies to both Bactrim and cephalosporins    Her " "history is also consistent with left pyelonephritis  Typical antibiotic agents that cover Aerococcus include penicillins, cephalosporins, and Macrobid  She is currently on Macrobid but unfortunately Macrobid is not effective to treat pyelonephritis  Given her history of high fever and persistent symptoms despite antibiotics I recommend CT scan to evaluate for renal abscess or other structural anomalies to explain her symptoms  Given the type of organism she grew in her multiple allergies I recommend infectious disease consult for antibiotic management  PLAN:     1  CT abdomen pelvis with IV contrast to evaluate for renal abscess or obstruction  2  Infectious disease consult for management of pyelonephritis  3  Patient does not need scheduled follow-up with urology at this time since her current episode is a persistent UTI for which she will be seeing infectious disease  Our office will reach out to her if her CT scan returns abnormal    Antwon Hansen MD  [facility]    Portions of the above record have been created with voice recognition software  Occasional wrong word or \"sound alike\" substitution may have occurred due to the inherent limitations of voice recognition software  Please read the chart carefully and recognize, using context, where substitution may have occurred  For further clarification, please contact me directly       "

## 2023-03-28 ENCOUNTER — TELEPHONE (OUTPATIENT)
Dept: INFECTIOUS DISEASES | Facility: CLINIC | Age: 63
End: 2023-03-28

## 2023-03-28 ENCOUNTER — TELEPHONE (OUTPATIENT)
Dept: OTHER | Facility: OTHER | Age: 63
End: 2023-03-28

## 2023-03-28 NOTE — TELEPHONE ENCOUNTER
I spoke with the patient and let her know the order is only for IV contrast no barium needed  Pt understood

## 2023-03-28 NOTE — TELEPHONE ENCOUNTER
Patient called today regarding Clarification is needed for the CT Scan of Abd and Pelvis with Contrast  The order is not showing Barium, but the Patient's Instructions say Barium  Please call Patient to clarify Instructions

## 2023-03-28 NOTE — TELEPHONE ENCOUNTER
Patient calls the office today regarding referral      Informed patient her referral was received in the office  Informed patient that once a referral is received in the office it is reviewed and someone will get back to her  patient verbalizes understanding at this time       CB: [phone]

## 2023-03-31 ENCOUNTER — TELEPHONE (OUTPATIENT)
Dept: INFECTIOUS DISEASES | Facility: CLINIC | Age: 63
End: 2023-03-31

## 2023-03-31 NOTE — TELEPHONE ENCOUNTER
Pt calls office and states that she is waiting on a call to be scheduled her referral was put into her chart  Informed pt there is no indication of an ID consult needed at this time and our PA has sent medication recommendations over to her PCP and Urology      (see referral for futher notes)    Pt verbalizes understanding

## 2023-04-03 ENCOUNTER — TELEPHONE (OUTPATIENT)
Dept: OTHER | Facility: OTHER | Age: 63
End: 2023-04-03

## 2023-04-03 NOTE — TELEPHONE ENCOUNTER
Patient was referred to ID and they said she did not need to be seen by them and sent recommendations over to Dr Carty Severe  She would like to know what her treatment plan is moving forward    Please call to advise

## 2023-04-07 ENCOUNTER — HOSPITAL ENCOUNTER (OUTPATIENT)
Dept: RADIOLOGY | Facility: HOSPITAL | Age: 63
Discharge: HOME | End: 2023-04-07
Attending: STUDENT IN AN ORGANIZED HEALTH CARE EDUCATION/TRAINING PROGRAM

## 2023-04-07 DIAGNOSIS — I21.19 INFERIOR MI (HCC): ICD-10-CM

## 2023-04-07 DIAGNOSIS — N10 ACUTE PYELONEPHRITIS: ICD-10-CM

## 2023-04-07 DIAGNOSIS — E11.65 TYPE 2 DIABETES MELLITUS WITH HYPERGLYCEMIA, WITHOUT LONG-TERM CURRENT USE OF INSULIN (HCC): ICD-10-CM

## 2023-04-07 DIAGNOSIS — I21.11 ACUTE ST ELEVATION MYOCARDIAL INFARCTION (STEMI) DUE TO OCCLUSION OF MID PORTION OF RIGHT CORONARY ARTERY (HCC): ICD-10-CM

## 2023-04-07 DIAGNOSIS — I21.11 ACUTE ST ELEVATION MYOCARDIAL INFARCTION (STEMI) DUE TO OCCLUSION OF DISTAL PORTION OF RIGHT CORONARY ARTERY (HCC): ICD-10-CM

## 2023-04-07 RX ORDER — LOSARTAN POTASSIUM 25 MG/1
TABLET ORAL
Qty: 90 TABLET | Refills: 0 | Status: SHIPPED | OUTPATIENT
Start: 2023-04-07

## 2023-04-07 RX ADMIN — IOHEXOL 100 ML: 350 INJECTION, SOLUTION INTRAVENOUS at 14:49

## 2023-04-17 NOTE — LETTER
March 8, 2021     Patient: Josef Ha   YOB: 1960   Date of Visit: 3/8/2021       To Whom it May Concern:    Jorge A Watson is under my professional care  She was seen in my office on 3/8/2021  She may return to work on 3/10/21  If you have any questions or concerns, please don't hesitate to call           Sincerely,          MERI Lozano        CC: No Recipients O-Z Flap Text: Because of the full-thickness nature of the defect and location adjacent to important structure(s), a bilateral rotation flap (O to T) was planned. After prep and anesthesia, arcuate incisions were extended from two opposite side of the wound.  Two flaps were created by undermining in the subcutaneous plane. After hemostasis was obtained, the flaps were advanced and sutured in a layered fashion.

## 2023-04-21 DIAGNOSIS — N30.00 ACUTE CYSTITIS WITHOUT HEMATURIA: Primary | ICD-10-CM

## 2023-04-21 RX ORDER — CEFDINIR 300 MG/1
300 CAPSULE ORAL EVERY 12 HOURS SCHEDULED
Qty: 28 CAPSULE | Refills: 0 | Status: SHIPPED | OUTPATIENT
Start: 2023-04-21 | End: 2023-05-05

## 2023-05-02 DIAGNOSIS — F32.9 REACTIVE DEPRESSION (SITUATIONAL): ICD-10-CM

## 2023-05-02 DIAGNOSIS — I25.10 CORONARY ARTERY DISEASE: ICD-10-CM

## 2023-05-02 DIAGNOSIS — E78.2 MODERATE MIXED HYPERLIPIDEMIA NOT REQUIRING STATIN THERAPY: ICD-10-CM

## 2023-05-02 DIAGNOSIS — I21.11 ACUTE ST ELEVATION MYOCARDIAL INFARCTION (STEMI) DUE TO OCCLUSION OF DISTAL PORTION OF RIGHT CORONARY ARTERY (HCC): ICD-10-CM

## 2023-05-03 RX ORDER — ALPRAZOLAM 0.25 MG/1
0.25 TABLET ORAL 4 TIMES DAILY PRN
Qty: 30 TABLET | Refills: 0 | Status: SHIPPED | OUTPATIENT
Start: 2023-05-03

## 2023-05-04 RX ORDER — LOSARTAN POTASSIUM 25 MG/1
25 TABLET ORAL DAILY
Qty: 90 TABLET | Refills: 0 | Status: SHIPPED | OUTPATIENT
Start: 2023-05-04

## 2023-05-15 ENCOUNTER — OFFICE VISIT (OUTPATIENT)
Dept: CARDIOLOGY CLINIC | Facility: CLINIC | Age: 63
End: 2023-05-15

## 2023-05-15 VITALS
BODY MASS INDEX: 40.05 KG/M2 | HEIGHT: 66 IN | WEIGHT: 249.2 LBS | HEART RATE: 94 BPM | SYSTOLIC BLOOD PRESSURE: 98 MMHG | DIASTOLIC BLOOD PRESSURE: 70 MMHG

## 2023-05-15 DIAGNOSIS — I21.11 ACUTE ST ELEVATION MYOCARDIAL INFARCTION (STEMI) DUE TO OCCLUSION OF DISTAL PORTION OF RIGHT CORONARY ARTERY (HCC): ICD-10-CM

## 2023-05-15 DIAGNOSIS — E78.2 MIXED HYPERLIPIDEMIA: ICD-10-CM

## 2023-05-15 DIAGNOSIS — E11.65 UNCONTROLLED TYPE 2 DIABETES MELLITUS WITH HYPERGLYCEMIA (HCC): Chronic | ICD-10-CM

## 2023-05-15 DIAGNOSIS — I21.11 ACUTE ST ELEVATION MYOCARDIAL INFARCTION (STEMI) DUE TO OCCLUSION OF MID PORTION OF RIGHT CORONARY ARTERY (HCC): ICD-10-CM

## 2023-05-15 DIAGNOSIS — I21.19 INFERIOR MI (HCC): ICD-10-CM

## 2023-05-15 DIAGNOSIS — I10 ESSENTIAL HYPERTENSION: ICD-10-CM

## 2023-05-15 DIAGNOSIS — R06.02 SHORTNESS OF BREATH: ICD-10-CM

## 2023-05-15 DIAGNOSIS — I25.10 CORONARY ARTERY DISEASE INVOLVING NATIVE CORONARY ARTERY OF NATIVE HEART WITHOUT ANGINA PECTORIS: Primary | ICD-10-CM

## 2023-05-15 RX ORDER — ADALIMUMAB 40MG/0.4ML
KIT SUBCUTANEOUS
COMMUNITY
Start: 2023-05-09

## 2023-05-15 RX ORDER — CARVEDILOL 6.25 MG/1
6.25 TABLET ORAL EVERY 12 HOURS SCHEDULED
Qty: 180 TABLET | Refills: 3 | Status: SHIPPED | OUTPATIENT
Start: 2023-05-15

## 2023-05-15 RX ORDER — EZETIMIBE 10 MG/1
10 TABLET ORAL DAILY
Qty: 90 TABLET | Refills: 3 | Status: SHIPPED | OUTPATIENT
Start: 2023-05-15

## 2023-05-15 NOTE — PATIENT INSTRUCTIONS
Recommendations:  1  Decrease carvedilol to 6 25mg 2x/day  2  Continue remainder of medications  3  Check PFTs  4  Counseled tobacco cessation  5  Follow up in 6 months

## 2023-05-15 NOTE — PROGRESS NOTES
Cardiology   Trino Davis 66 y o  female MRN: [id number]    Impression:  1  CAD s/p NSTEMI and PCI of RCA/LAD - stable  2  Hypertension - now hypotensive  3  Dyslipidemia - on Zetia  Unable to tolerate statins and Repatha  4  Polymyalgias - may contribute to weakness       Recommendations:  1  Decrease carvedilol to 6 25mg 2x/day  2  Continue remainder of medications  3  Check PFTs  4  Counseled tobacco cessation  5  Follow up in 6 months  HPI: Fabian Mena is a 66y o  year old female with CAD s/p STEMI and PCI of RCA 2/21, PCI of LAD 3/22, hypertension, and dyslipidemia who presents for follow up  Echo 2/21 - EF 60% with no valvular abnormalities  Major complaint is fatigue and tired legs  Is dyspneic, but smokes  Unable to tolerate statins and Repatha  Pharmacologic nuclear stress test 12/22 no ischemia  Has rash on legs              Review of Systems   Constitutional: Positive for fatigue  HENT: Negative  Eyes: Negative  Respiratory: Positive for shortness of breath  Negative for chest tightness  Cardiovascular: Negative for chest pain, palpitations and leg swelling  Gastrointestinal: Negative  Endocrine: Negative  Genitourinary: Negative  Musculoskeletal: Negative  Skin: Positive for rash  Allergic/Immunologic: Negative  Neurological: Negative  Hematological: Negative  Psychiatric/Behavioral: Negative  All other systems reviewed and are negative          Past Medical History:   Diagnosis Date   • Coronary artery disease    • Crohn's colitis ([facility] [address] )    • Diabetes mellitus ([facility] [address] )    • Fibromyalgia    • Gastric ulcer     [facility]   • HTN (hypertension)    • Hyperlipidemia    • IBD (inflammatory bowel disease) 11/2015    [facility]   • Migraine    • Myocardial infarction [facility])      Past Surgical History:   Procedure Laterality Date   • CARDIAC CATHETERIZATION     • CARDIAC CATHETERIZATION Left 03/07/2022    Procedure: Cardiac catheterization;  Surgeon: Abiel Maria MD;  Location: [facility]ARD[facility]; Service: Cardiology   • CARDIAC CATHETERIZATION N/A 03/07/2022    Procedure: Cardiac Coronary Angiogram;  Surgeon: Abiel Maria MD;  Location: [facility]; Service: Cardiology   • CARDIAC CATHETERIZATION N/A 03/07/2022    Procedure: Cardiac pci;  Surgeon: Abiel Maria MD;  Location: [facility]ARD[facility]; Service: Cardiology   • CHOLECYSTECTOMY     • COLONOSCOPY  12/03/2018    internal hemorrhoids, 3mm tubular adenoma polyp transverse colon, bx negative for dysplasia   • COLONOSCOPY  11/2015   • COLONOSCOPY W/ BIOPSIES  2022    ileitis; dr Jesse Jasso c/w crohns   • CORONARY STENT PLACEMENT     • DENTAL SURGERY      [place] teeth extraction   • EGD  12/2018    2cm hiatal hernia, gastritis bx shows foci of intestinal metaplasia, negative Hpylori   • EGD  2015    ESOPHAGITIS/MALLHIATUS HERNIA  FOUND   • HYSTERECTOMY     • MAMMO STEREOTACTIC BREAST BIOPSY LEFT (ALL INC) Left 10/27/2021   • TUBAL LIGATION      Bilateral     Social History     Substance and Sexual Activity   Alcohol Use Not Currently    Comment: Rarely     Social History     Substance and Sexual Activity   Drug Use Never     Social History     Tobacco Use   Smoking Status Every Day   • Packs/day: 0 50   • Types: Cigarettes   • Start date: 1984   Smokeless Tobacco Never   Tobacco Comments    10 cigarettes daily      Family History   Problem Relation Age of Onset   • Coronary artery disease Mother    • Dementia Mother    • Stroke Mother    • No Known Problems Father    • Cervical cancer Sister 36   • Heart disease Brother    • No Known Problems Maternal Grandmother    • No Known Problems Maternal Grandfather    • No Known Problems Paternal Grandmother    • No Known Problems Paternal Grandfather    • Alcohol abuse Neg Hx    • Substance Abuse Neg Hx        Allergies:   Allergies   Allergen Reactions   • Penicillins Anaphylaxis and Hives   • Rosuvastatin Myalgia and Arthralgia • Atorvastatin Myalgia   • Cefuroxime    • Metaxalone    • Influenza Vaccines Rash   • Keflet [Cephalexin] Rash   • Lyrica [Pregabalin] Swelling     Feet swelling   • Nuts - Food Allergy Itching   • Sulfa Antibiotics Rash   • Topiramate Rash       Medications:     Current Outpatient Medications:   •  acetaminophen (TYLENOL) 500 mg tablet, Take 1,000 mg by mouth every 6 (six) hours as needed for mild pain, Disp: , Rfl:   •  Alcohol Swabs 70 % PADS, Use 4 per day, Disp: 400 each, Rfl: 3  •  ALPRAZolam (XANAX) 0 25 mg tablet, Take 1 tablet (0 25 mg total) by mouth 4 (four) times a day as needed for anxiety, Disp: 30 tablet, Rfl: 0  •  aspirin (ECOTRIN LOW STRENGTH) 81 mg EC tablet, Take 1 tablet (81 mg total) by mouth daily, Disp: 90 tablet, Rfl: 0  •  carvedilol (COREG) 12 5 mg tablet, Take 1 tablet (12 5 mg total) by mouth every 12 (twelve) hours, Disp: 180 tablet, Rfl: 0  •  cholecalciferol (VITAMIN D3) 1,000 units tablet, Take 1 tablet (1,000 Units total) by mouth daily (Patient taking differently: Take 1,000 Units by mouth 2 (two) times a day), Disp: 90 tablet, Rfl: 3  •  Continuous Blood Gluc  (Dexcom G6 ) CRISTINA, Disp 1 , Disp: 1 Device, Rfl: 1  •  Continuous Blood Gluc Sensor (Dexcom G6 Sensor) MISC, Use 1 sensor every 10 days, disp 90 day supply, Disp: 9 each, Rfl: 3  •  Continuous Blood Gluc Transmit (Dexcom G6 Transmitter) MISC, Use daily as directed for CGM - Change every 3 months, Disp: 1 each, Rfl: 1  •  ezetimibe (ZETIA) 10 mg tablet, Take 1 tablet (10 mg total) by mouth daily, Disp: 90 tablet, Rfl: 0  •  gabapentin (NEURONTIN) 400 mg capsule, Take 1 capsule (400 mg total) by mouth 4 (four) times a day, Disp: 120 capsule, Rfl: 0  •  hyoscyamine (LEVSIN/SL) 0 125 mg SL tablet, Take 1 tablet (0 125 mg total) by mouth every 6 (six) hours as needed (esophageal spasm), Disp: 120 tablet, Rfl: 5  •  insulin aspart (NovoLOG FlexPen) 100 UNIT/ML injection pen, 8 units with breakfast and dinner, 5 units with lunch plus sliding scale , Disp: 15 mL, Rfl: 3  •  insulin degludec Yasir Gola FlexTouch) 100 units/mL injection pen, Inject 18 - 22 units daily  , Disp: 30 mL, Rfl: 0  •  Insulin Pen Needle (Pen Needles) 32G X 5 MM MISC, Use 4 per day, Disp: 360 each, Rfl: 1  •  losartan (COZAAR) 25 mg tablet, Take 1 tablet (25 mg total) by mouth daily, Disp: 90 tablet, Rfl: 0  •  mesalamine (LIALDA) 1 2 g EC tablet, Take 2 tablets (2 4 g total) by mouth daily with breakfast, Disp: 60 tablet, Rfl: 2  •  omeprazole (PriLOSEC) 40 MG capsule, Take 1 capsule (40 mg total) by mouth daily (Patient taking differently: Take 40 mg by mouth if needed), Disp: 30 capsule, Rfl: 0  •  ondansetron (ZOFRAN-ODT) 4 mg disintegrating tablet, Take 1 tablet (4 mg total) by mouth every 8 (eight) hours as needed for nausea or vomiting, Disp: 12 tablet, Rfl: 0  •  PB-Hyoscy-Atropine-Scopolamine (DONNATAL PO), Take by mouth, Disp: , Rfl:   •  predniSONE 5 mg tablet, Take 10 mg by mouth daily, Disp: , Rfl:   •  ticagrelor (BRILINTA) 90 MG, Take 1 tablet (90 mg total) by mouth every 12 (twelve) hours, Disp: 180 tablet, Rfl: 0  •  folic acid (FOLVITE) 1 mg tablet, , Disp: , Rfl:   •  Humira Pen 40 MG/0 4ML PNKT, , Disp: , Rfl:       Wt Readings from Last 3 Encounters:   05/15/23 113 kg (249 lb 3 2 oz)   04/17/23 114 kg (251 lb 12 8 oz)   03/27/23 111 kg (245 lb 3 2 oz)     Temp Readings from Last 3 Encounters:   03/14/23 98 °F (36 7 °C) (Temporal)   01/03/23 97 8 °F (36 6 °C) (Temporal)   12/30/22 98 6 °F (37 °C) (Oral)     BP Readings from Last 3 Encounters:   05/15/23 98/70   04/17/23 120/74   03/27/23 122/70     Pulse Readings from Last 3 Encounters:   05/15/23 94   04/17/23 85   03/27/23 93         Physical Exam  HENT:      Head: Atraumatic  Mouth/Throat:      Mouth: Mucous membranes are moist    Eyes:      Extraocular Movements: Extraocular movements intact  Cardiovascular:      Rate and Rhythm: Normal rate and regular rhythm  Heart sounds: Normal heart sounds  Pulmonary:      Effort: Pulmonary effort is normal       Breath sounds: Normal breath sounds  Abdominal:      General: Abdomen is flat  Musculoskeletal:         General: Normal range of motion  Cervical back: Normal range of motion  Skin:     Findings: Rash present  Neurological:      General: No focal deficit present  Mental Status: She is alert and oriented to person, place, and time  Psychiatric:         Mood and Affect: Mood normal          Behavior: Behavior normal            Laboratory Studies:  CMP:  Lab Results   Component Value Date     2016    K 3 7 2023     2023    CO2 26 2023    BUN 16 2023    CREATININE 0 89 2023    GLUCOSE 78 2016    AST 13 2023    ALT 13 2023    BILITOT 0 4 2016    EGFR 70 2023       Lipid Profile:   No results found for: CHOL  Lab Results   Component Value Date    HDL 50 2023     Lab Results   Component Value Date    LDLCALC 130 (H) 2023     Lab Results   Component Value Date    TRIG 313 (H) 2023       Cardiac testing:   EKG reviewed personally:   Results for orders placed during the hospital encounter of 21    Echo Complete with Contrast if Indicated    Narrative  [address]  [phone]    Transthoracic Echocardiogram  2D, M-mode, Doppler, and Color Doppler    Study date:  2021    Patient: Jaxon Villanueva  MR number: [id number]  Account number: [de-identified]  : 1960  Age: 63 years  Gender: Female  Status: Inpatient  Location: Bedside  Height: 66 in  Weight: 213 6 lb  BP: 161/ 81 mmHg    Indications: Assess left ventricular function  Diagnoses: I21 3 - ST elevation (STEMI) myocardial infarction of unspecified site    Sonographer:  TODD De León  Primary Physician:  Lauren Armando [facility]  Referring Physician:  Tone Mcdonald MD  Group: [id number]  Interpreting Physician:  Carlitos Walters MD    SUMMARY    LEFT VENTRICLE:  Systolic function was normal  Ejection fraction was estimated to be 60 %  There was akinesis of the basal-mid inferior wall(s)  RIGHT VENTRICLE:  The size was normal   Systolic function was normal     HISTORY: PRIOR HISTORY: Myocardial infarction, Dyslipidemia, Current everyday smoker, Obesity    PROCEDURE: The procedure was performed at the bedside  This was a routine study  The transthoracic approach was used  The study included complete 2D imaging, M-mode, complete spectral Doppler, and color Doppler  The heart rate was 55 bpm,  at the start of the study  Images were obtained from the parasternal, apical, subcostal, and suprasternal notch acoustic windows  Image quality was adequate  LEFT VENTRICLE: Size was normal  Systolic function was normal  Ejection fraction was estimated to be 60 %  There was akinesis of the basal-mid inferior wall(s)  Wall thickness was normal  DOPPLER: Left ventricular diastolic function  parameters were normal     RIGHT VENTRICLE: The size was normal  Systolic function was normal  Wall thickness was normal     LEFT ATRIUM: Size was normal     RIGHT ATRIUM: Size was normal     MITRAL VALVE: Valve structure was normal  There was normal leaflet separation  DOPPLER: The transmitral velocity was within the normal range  There was no evidence for stenosis  There was trace regurgitation  AORTIC VALVE: The valve was trileaflet  Leaflets exhibited normal thickness and normal cuspal separation  DOPPLER: Transaortic velocity was within the normal range  There was no evidence for stenosis  There was no significant  regurgitation  TRICUSPID VALVE: The valve structure was normal  There was normal leaflet separation  DOPPLER: The transtricuspid velocity was within the normal range  There was no evidence for stenosis  There was trace regurgitation      PULMONIC VALVE: Leaflets exhibited normal thickness, no calcification, and normal cuspal separation  DOPPLER: The transpulmonic velocity was within the normal range  There was trace regurgitation  PERICARDIUM: There was no pericardial effusion  The pericardium was normal in appearance  AORTA: The root exhibited normal size  SYSTEMIC VEINS: IVC: The inferior vena cava was normal in size  Respirophasic changes were normal     SYSTEM MEASUREMENT TABLES    2D  %FS: 35 61 %  Ao Diam: 3 35 cm  EDV(Teich): 111 59 ml  EF Biplane: 54 33 %  EF(Teich): 64 94 %  ESV(Teich): 39 13 ml  IVSd: 0 99 cm  LA Area: 16 16 cm2  LA Diam: 3 69 cm  LVEDV MOD A2C: 97 35 ml  LVEDV MOD A4C: 86 07 ml  LVEDV MOD BP: 94 47 ml  LVEF MOD A2C: 49 73 %  LVEF MOD A4C: 59 76 %  LVESV MOD A2C: 48 93 ml  LVESV MOD A4C: 34 63 ml  LVESV MOD BP: 43 14 ml  LVIDd: 4 88 cm  LVIDs: 3 14 cm  LVLd A2C: 8 07 cm  LVLd A4C: 7 51 cm  LVLs A2C: 7 03 cm  LVLs A4C: 6 32 cm  LVPWd: 0 93 cm  RA Area: 11 08 cm2  RVIDd: 3 16 cm  SV MOD A2C: 48 42 ml  SV MOD A4C: 51 43 ml  SV(Teich): 72 46 ml    MM  TAPSE: 1 72 cm    PW  E' Sept: 0 06 m/s  E/E' Sept: 11 78  MV A Adis: 0 82 m/s  MV Dec [place]: 2 94 m/s2  MV DecT: 260 15 ms  MV E Adis: 0 77 m/s  MV E/A Ratio: 0 93  MV PHT: 75 44 ms  MVA By PHT: 2 92 cm2    [employer] Accredited Echocardiography Laboratory    Prepared and electronically signed by    Oneyda Aggarwal MD  Signed [id number] 45:85:35    No results found for this or any previous visit      Results for orders placed during the hospital encounter of 21    Cardiac catheterization    [facility] [address]  [address]  [phone]    [facility]    Invasive Cardiovascular Lab Complete Report    Patient: Julianne Vidal  MR number: [id number]  Account number: [de-identified]  Study date: 2021  Gender: Female  : 1960  Height: 66 9 in  Weight: 213 6 lb  BSA: 2 08 mï¾²    Allergies: PENICILLINS, CEFUROXIME, METAXALONE, INFLUENZA VACCINES, CEPHALEXIN, NUTS, SULFA ANTIBIOTICS, SULFAMETHOXAZOLE-TRIMETHOPRIM, TOPIRAMATE    Diagnostic Cardiologist:  Brendan Rose MD  Interventional Cardiologist:  Brendan Rose MD  Primary Physician:  Leisa Beauchamp PAC    SUMMARY    CORONARY CIRCULATION:  Mid LAD: There was a discrete 80 % stenosis  It appears amenable to percutaneous intervention  1st diagonal: There was a 40 % stenosis  2nd diagonal: There was a tubular 60 % stenosis  1st obtuse marginal: There was a tubular 50 % stenosis  Mid RCA: There was a discrete 90 % stenosis  An intervention was performed  Distal RCA: There was a tubular 99 % stenosis  This lesion is a likely culprit for the patient's recent myocardial infarction  An intervention was performed  1ST LESION INTERVENTIONS:  A successful balloon angioplasty with stent procedure was performed on the 99 % lesion in the distal RCA  Following intervention there was an excellent angiographic appearance with a 0 % residual stenosis  A Resolute [place] Rx 3 0 x 26mm drug-eluting stent was placed across the lesion and deployed at a maximum inflation pressure of 14 zeferino  2ND LESION INTERVENTIONS:  A successful balloon angioplasty with stent procedure was performed on the 90 % lesion in the mid RCA  Following intervention there was an excellent angiographic appearance with a 0 % residual stenosis  A Resolute Ismael Rx 3 0 x 18mm drug-eluting stent was placed across the lesion and deployed at a maximum inflation pressure of 16 zeferino  INDICATIONS:  --  Possible CAD: myocardial infarction with ST elevation (STEMI)  PROCEDURES PERFORMED    --  Right coronary angiography  --  Left coronary angiography  --  Acute Myocardial Infarct  --  Mod Sedation Same Physician Initial 15min  --  Mod Sedation Same Physician Add 15min  --  Coronary Catheterization (w/o [facility])  --  AMI PCI (ZACHARY, PTCRA, PTCA) Single    --  Intervention on distal RCA: balloon angioplasty, "stent  --  Intervention on mid RCA: balloon angioplasty, stent  PROCEDURE: The risks and alternatives of the procedures and conscious sedation were explained to the patient and informed consent was obtained  The patient was brought to the cath lab and placed on the table  The planned puncture sites  were prepped and draped in the usual sterile fashion  --  Right radial artery access  After performing an Brett's test to verify adequate ulnar artery supply to the hand, the radial site was prepped  The puncture site was infiltrated with local anesthetic  The vessel was accessed using the  modified Seldinger technique, a wire was advanced into the vessel, and a sheath was advanced over the wire into the vessel  --  Right femoral artery access  The puncture site was infiltrated with local anesthetic  The vessel was accessed using the modified Seldinger technique, a wire was advanced into the vessel, and a sheath was advanced over the wire into the  vessel  --  Right coronary artery angiography  A catheter was advanced over a guidewire into the aorta and positioned in the right coronary artery ostium under fluoroscopic guidance  Angiography was performed  --  Left coronary artery angiography  A catheter was advanced over a guidewire into the aorta and positioned in the left coronary artery ostium under fluoroscopic guidance  Angiography was performed  --  Acute Myocardial Infarct  --  Mod Sedation Same Physician Initial 15min  --  Mod Sedation Same Physician Add 15min  --  Coronary Catheterization (w/o [facility])  LESION #1 INTERVENTION: A successful balloon angioplasty with stent procedure was performed on the 99 % lesion in the distal RCA  Following intervention there was an excellent angiographic appearance with a 0 % residual stenosis  This was  an ACC/AHA type C \"high risk\" lesion for intervention  There was FABRICE 1 flow before the procedure and FABRICE 3 flow after the procedure   There was no " dissection  --  Vessel setup was performed  A Runthrough NS 180cm wire was used to cross the lesion  --  Vessel setup was performed  A 6Fr  Launcher JR 4 0 100cm guiding catheter was used to cannulate the vessel  --  Balloon angioplasty was performed, using a Trek Rx 2 5 x 15mm balloon, with 2 inflations and a maximum inflation pressure of 8 zeferino  --  A Resolute Ismael Rx 3 0 x 26mm drug-eluting stent was placed across the lesion and deployed at a maximum inflation pressure of 14 zeferino  LESION #2 INTERVENTION: A successful balloon angioplasty with stent procedure was performed on the 90 % lesion in the mid RCA  Following intervention there was an excellent angiographic appearance with a 0 % residual stenosis  There was  FABRICE 3 flow before the procedure and FABRICE 3 flow after the procedure  There was no dissection  --  Balloon angioplasty was performed, using a Trek Rx 2 5 x 15mm balloon, with 1 inflations and a maximum inflation pressure of 8 zeferino  --  A Resolute Ismael Rx 3 0 x 18mm drug-eluting stent was placed across the lesion and deployed at a maximum inflation pressure of 16 zeferino  INTERVENTIONS:  --  AMI PCI (ZACHARY, PTCRA, PTCA) Single  PROCEDURE COMPLETION: The patient tolerated the procedure well and was discharged from the cath lab  TIMING: Test started at 11:51  Test concluded at 12:45  HEMOSTASIS: The sheath was removed  The site was compressed with a Hemoband  device  Hemostasis was obtained  The sheath was removed over a wire and the Angioseal delivery sheath was inserted into the femoral artery  Hemostasis was obtained using a closure device ( Angioseal) deployed through the delivery sheath  MEDICATIONS GIVEN: Versed (2mg/2ml), 2 mg, IV, at 11:52  Fentanyl (1OOmcg/2 ml), 50 mcg, IV, at 11:52  Heparin 1000 units/ml, 4,000 units, IV, at 11:53  1% Lidocaine, 1 ml, subcutaneously, at 11:56  Nitroglycerin (200mcg/ml), 200 mcg, at  11:57  Heparin 1000 units/ml, 6,000 units, IV, at 11:57  Fentanyl (1OOmcg/2 ml), 50 mcg, IV, at 12:06  1% Lidocaine, 10 ml, subcutaneously, at 12:07  Nitroglycerine (200mcg/ml), 200 mcg, at 12:16  Heparin 1000 units/ml, 4,000 units, IV, at  12:17  Versed (2mg/2ml), 1 mg, IV, at 12:28  Fentanyl (1OOmcg/2 ml), 50 mcg, IV, at 12:28  CONTRAST GIVEN: 80 ml Omnipaque (350 mg I /ml)  RADIATION EXPOSURE: Fluoroscopy time: 11 47 min  CORONARY VESSELS:   --  The coronary circulation is right dominant  --  Left main: The vessel was medium sized  Angiography showed mild atherosclerosis  --  Mid LAD: There was a discrete 80 % stenosis  It appears amenable to percutaneous intervention  --  1st diagonal: There was a 40 % stenosis  --  2nd diagonal: There was a tubular 60 % stenosis  --  1st obtuse marginal: There was a tubular 50 % stenosis  --  RCA: The vessel was medium sized and moderately tortuous  Angiography showed moderate atherosclerosis  --  Mid RCA: There was a discrete 90 % stenosis  An intervention was performed  --  Distal RCA: There was a tubular 99 % stenosis  This lesion is a likely culprit for the patient's recent myocardial infarction  An intervention was performed  IMPRESSIONS:  Angulated innominate/aorta angle , unable to access ascending aorta from right RA  There is significant triple vessel coronary artery disease  RECOMMENDATIONS  Patient management should include adding lipid lowering therapy  Patient management to include dual antiplatelet therapy  Patient management should include aggressive medical therapy, smoking cessation, and weight reduction  Patient instructed to return for staged percutaneous intervention in three weeks  DISPOSITION:  The patient left the catheterization laboratory in stable condition  Prepared and signed by    Dotty Mcardle, MD  Signed 02/26/2021 12:51:36    Study diagram    Angiographic findings  Native coronary lesions:  ï¾·Mid LAD: Lesion 1: discrete, 80 % stenosis  ï¾·D1: Lesion 1: 40 % stenosis    ï¾·D2: Lesion 1: tubular, 60 % stenosis  ï¾·OM1: Lesion 1: tubular, 50 % stenosis  ï¾·Mid RCA: Lesion 1: discrete, 90 % stenosis  ï¾·Distal RCA: Lesion 1: tubular, 99 % stenosis  Intervention results  Native coronary lesions:  ï¾·Successful balloon angioplasty and stent of the 99 % stenosis in distal RCA  Appearance excellent with 0 % residual stenosis  Stent: Resolute Ismael Rx 3 0 x 26mm drug-eluting  ï¾·Successful balloon angioplasty and stent of the 90 % stenosis in mid RCA  Appearance excellent with 0 % residual stenosis  Stent: Resolute Ismael Rx 3 0 x 18mm drug-eluting  Hemodynamic tables    Pressures:  Baseline  Pressures:  - HR: 48  Pressures:  - Rhythm:  Pressures:  -- Aortic Pressure (S/D/M): 145/65/88    Outputs:  Baseline  Outputs:  -- CALCULATIONS[age]  Outputs:  -- CALCULATIONS: Body Surface Area: 2 08  Outputs:  -- CALCULATIONS: Height in cm: 170 00  Outputs:  -- CALCULATIONS: Sex: Female  Outputs:  -- CALCULATIONS: Weight in k 10    No results found for this or any previous visit

## 2023-05-31 ENCOUNTER — HOSPITAL ENCOUNTER (OUTPATIENT)
Dept: PULMONOLOGY | Facility: HOSPITAL | Age: 63
Discharge: HOME | End: 2023-05-31
Attending: INTERNAL MEDICINE
Payer: COMMERCIAL

## 2023-05-31 DIAGNOSIS — R06.02 SHORTNESS OF BREATH: ICD-10-CM

## 2023-05-31 PROCEDURE — 94060 EVALUATION OF WHEEZING: CPT

## 2023-05-31 PROCEDURE — 94060 EVALUATION OF WHEEZING: CPT | Performed by: INTERNAL MEDICINE

## 2023-05-31 PROCEDURE — 94726 PLETHYSMOGRAPHY LUNG VOLUMES: CPT

## 2023-05-31 PROCEDURE — 94760 N-INVAS EAR/PLS OXIMETRY 1: CPT

## 2023-05-31 PROCEDURE — 94729 DIFFUSING CAPACITY: CPT | Performed by: INTERNAL MEDICINE

## 2023-05-31 PROCEDURE — 94726 PLETHYSMOGRAPHY LUNG VOLUMES: CPT | Performed by: INTERNAL MEDICINE

## 2023-05-31 PROCEDURE — 94729 DIFFUSING CAPACITY: CPT

## 2023-05-31 RX ORDER — ALBUTEROL SULFATE 2.5 MG/3ML
2.5 SOLUTION RESPIRATORY (INHALATION) ONCE
Status: COMPLETED | OUTPATIENT
Start: 2023-05-31 | End: 2023-05-31

## 2023-05-31 RX ADMIN — ALBUTEROL SULFATE 2.5 MG: 2.5 SOLUTION RESPIRATORY (INHALATION) at 10:32

## 2023-06-12 DIAGNOSIS — F32.9 REACTIVE DEPRESSION (SITUATIONAL): ICD-10-CM

## 2023-06-12 RX ORDER — ALPRAZOLAM 0.25 MG/1
0.25 TABLET ORAL 4 TIMES DAILY PRN
Qty: 30 TABLET | Refills: 0 | Status: SHIPPED | OUTPATIENT
Start: 2023-06-12

## 2023-06-14 LAB
LEFT EYE DIABETIC RETINOPATHY: NORMAL
RIGHT EYE DIABETIC RETINOPATHY: NORMAL

## 2023-06-14 PROCEDURE — 2023F DILAT RTA XM W/O RTNOPTHY: CPT | Performed by: INTERNAL MEDICINE

## 2023-07-05 ENCOUNTER — APPOINTMENT (OUTPATIENT)
Dept: LAB | Facility: CLINIC | Age: 63
End: 2023-07-05
Payer: COMMERCIAL

## 2023-07-05 DIAGNOSIS — M06.9 RHEUMATOID ARTHRITIS, INVOLVING UNSPECIFIED SITE, UNSPECIFIED WHETHER RHEUMATOID FACTOR PRESENT (HCC): ICD-10-CM

## 2023-07-05 DIAGNOSIS — E55.9 VITAMIN D DEFICIENCY: ICD-10-CM

## 2023-07-05 DIAGNOSIS — Z79.899 ENCOUNTER FOR LONG-TERM (CURRENT) USE OF MEDICATIONS: ICD-10-CM

## 2023-07-05 DIAGNOSIS — E11.65 UNCONTROLLED TYPE 2 DIABETES MELLITUS WITH HYPERGLYCEMIA (HCC): ICD-10-CM

## 2023-07-05 LAB
25(OH)D3 SERPL-MCNC: 21 NG/ML (ref 30–100)
ALBUMIN SERPL BCP-MCNC: 3.2 G/DL (ref 3.5–5)
ALP SERPL-CCNC: 85 U/L (ref 46–116)
ALT SERPL W P-5'-P-CCNC: 17 U/L (ref 12–78)
ANION GAP SERPL CALCULATED.3IONS-SCNC: 5 MMOL/L
AST SERPL W P-5'-P-CCNC: 23 U/L (ref 5–45)
BASOPHILS # BLD AUTO: 0.07 THOUSANDS/ÂΜL (ref 0–0.1)
BASOPHILS NFR BLD AUTO: 1 % (ref 0–1)
BILIRUB SERPL-MCNC: 0.36 MG/DL (ref 0.2–1)
BUN SERPL-MCNC: 17 MG/DL (ref 5–25)
CALCIUM ALBUM COR SERPL-MCNC: 9.5 MG/DL (ref 8.3–10.1)
CALCIUM SERPL-MCNC: 8.9 MG/DL (ref 8.3–10.1)
CHLORIDE SERPL-SCNC: 112 MMOL/L (ref 96–108)
CO2 SERPL-SCNC: 20 MMOL/L (ref 21–32)
CREAT SERPL-MCNC: 0.9 MG/DL (ref 0.6–1.3)
CRP SERPL QL: 4.6 MG/L
EOSINOPHIL # BLD AUTO: 0.31 THOUSAND/ÂΜL (ref 0–0.61)
EOSINOPHIL NFR BLD AUTO: 3 % (ref 0–6)
ERYTHROCYTE [DISTWIDTH] IN BLOOD BY AUTOMATED COUNT: 13.3 % (ref 11.6–15.1)
ERYTHROCYTE [SEDIMENTATION RATE] IN BLOOD: 21 MM/HOUR (ref 0–29)
GFR SERPL CREATININE-BSD FRML MDRD: 69 ML/MIN/1.73SQ M
GLUCOSE SERPL-MCNC: 337 MG/DL (ref 65–140)
HCT VFR BLD AUTO: 42.4 % (ref 34.8–46.1)
HGB BLD-MCNC: 13.8 G/DL (ref 11.5–15.4)
IMM GRANULOCYTES # BLD AUTO: 0.03 THOUSAND/UL (ref 0–0.2)
IMM GRANULOCYTES NFR BLD AUTO: 0 % (ref 0–2)
LYMPHOCYTES # BLD AUTO: 2.65 THOUSANDS/ÂΜL (ref 0.6–4.47)
LYMPHOCYTES NFR BLD AUTO: 27 % (ref 14–44)
MCH RBC QN AUTO: 30.1 PG (ref 26.8–34.3)
MCHC RBC AUTO-ENTMCNC: 32.5 G/DL (ref 31.4–37.4)
MCV RBC AUTO: 93 FL (ref 82–98)
MONOCYTES # BLD AUTO: 0.74 THOUSAND/ÂΜL (ref 0.17–1.22)
MONOCYTES NFR BLD AUTO: 8 % (ref 4–12)
NEUTROPHILS # BLD AUTO: 6.13 THOUSANDS/ÂΜL (ref 1.85–7.62)
NEUTS SEG NFR BLD AUTO: 61 % (ref 43–75)
NRBC BLD AUTO-RTO: 0 /100 WBCS
PLATELET # BLD AUTO: 293 THOUSANDS/UL (ref 149–390)
PMV BLD AUTO: 10.5 FL (ref 8.9–12.7)
POTASSIUM SERPL-SCNC: 4.2 MMOL/L (ref 3.5–5.3)
PROT SERPL-MCNC: 7.1 G/DL (ref 6.4–8.4)
RBC # BLD AUTO: 4.58 MILLION/UL (ref 3.81–5.12)
SODIUM SERPL-SCNC: 137 MMOL/L (ref 135–147)
WBC # BLD AUTO: 9.93 THOUSAND/UL (ref 4.31–10.16)

## 2023-07-05 PROCEDURE — 86140 C-REACTIVE PROTEIN: CPT

## 2023-07-05 PROCEDURE — 82306 VITAMIN D 25 HYDROXY: CPT

## 2023-07-05 PROCEDURE — 36415 COLL VENOUS BLD VENIPUNCTURE: CPT

## 2023-07-05 PROCEDURE — 83036 HEMOGLOBIN GLYCOSYLATED A1C: CPT

## 2023-07-05 PROCEDURE — 85025 COMPLETE CBC W/AUTO DIFF WBC: CPT

## 2023-07-05 PROCEDURE — 80053 COMPREHEN METABOLIC PANEL: CPT

## 2023-07-05 PROCEDURE — 85652 RBC SED RATE AUTOMATED: CPT

## 2023-07-07 LAB
EST. AVERAGE GLUCOSE BLD GHB EST-MCNC: 209 MG/DL
HBA1C MFR BLD: 8.9 %

## 2023-07-25 ENCOUNTER — APPOINTMENT (OUTPATIENT)
Dept: LAB | Facility: CLINIC | Age: 63
End: 2023-07-25
Payer: COMMERCIAL

## 2023-07-25 DIAGNOSIS — K50.119 CROHN'S DISEASE OF COLON WITH COMPLICATION (HCC): ICD-10-CM

## 2023-07-25 DIAGNOSIS — E11.65 UNCONTROLLED TYPE 2 DIABETES MELLITUS WITH HYPERGLYCEMIA (HCC): ICD-10-CM

## 2023-07-25 LAB
CREAT UR-MCNC: 130 MG/DL
MICROALBUMIN UR-MCNC: 9.3 MG/L (ref 0–20)
MICROALBUMIN/CREAT 24H UR: 7 MG/G CREATININE (ref 0–30)

## 2023-07-25 PROCEDURE — 83993 ASSAY FOR CALPROTECTIN FECAL: CPT

## 2023-07-25 PROCEDURE — 82043 UR ALBUMIN QUANTITATIVE: CPT

## 2023-07-25 PROCEDURE — 82570 ASSAY OF URINE CREATININE: CPT

## 2023-07-25 RX ORDER — GABAPENTIN 400 MG/1
400 CAPSULE ORAL 4 TIMES DAILY
Qty: 120 CAPSULE | Refills: 3 | Status: SHIPPED | OUTPATIENT
Start: 2023-07-25

## 2023-07-26 LAB — CALPROTECTIN STL-MCNT: 61 UG/G (ref 0–120)

## 2023-08-05 DIAGNOSIS — I21.19 INFERIOR MI (HCC): ICD-10-CM

## 2023-08-05 DIAGNOSIS — F32.9 REACTIVE DEPRESSION (SITUATIONAL): ICD-10-CM

## 2023-08-05 DIAGNOSIS — E11.65 TYPE 2 DIABETES MELLITUS WITH HYPERGLYCEMIA, WITHOUT LONG-TERM CURRENT USE OF INSULIN (HCC): ICD-10-CM

## 2023-08-05 DIAGNOSIS — I21.11 ACUTE ST ELEVATION MYOCARDIAL INFARCTION (STEMI) DUE TO OCCLUSION OF MID PORTION OF RIGHT CORONARY ARTERY (HCC): ICD-10-CM

## 2023-08-05 DIAGNOSIS — I21.11 ACUTE ST ELEVATION MYOCARDIAL INFARCTION (STEMI) DUE TO OCCLUSION OF DISTAL PORTION OF RIGHT CORONARY ARTERY (HCC): ICD-10-CM

## 2023-08-07 RX ORDER — ALPRAZOLAM 0.25 MG/1
0.25 TABLET ORAL 4 TIMES DAILY PRN
Qty: 30 TABLET | Refills: 0 | Status: SHIPPED | OUTPATIENT
Start: 2023-08-07

## 2023-08-07 RX ORDER — LOSARTAN POTASSIUM 25 MG/1
25 TABLET ORAL DAILY
Qty: 90 TABLET | Refills: 3 | Status: SHIPPED | OUTPATIENT
Start: 2023-08-07

## 2023-08-25 ENCOUNTER — OFFICE VISIT (OUTPATIENT)
Dept: FAMILY MEDICINE CLINIC | Facility: CLINIC | Age: 63
End: 2023-08-25
Payer: COMMERCIAL

## 2023-08-25 VITALS
HEART RATE: 105 BPM | RESPIRATION RATE: 16 BRPM | WEIGHT: 250 LBS | SYSTOLIC BLOOD PRESSURE: 132 MMHG | HEIGHT: 66 IN | BODY MASS INDEX: 40.18 KG/M2 | OXYGEN SATURATION: 98 % | DIASTOLIC BLOOD PRESSURE: 80 MMHG | TEMPERATURE: 97.3 F

## 2023-08-25 DIAGNOSIS — R05.1 ACUTE COUGH: Primary | ICD-10-CM

## 2023-08-25 DIAGNOSIS — Z00.00 ROUTINE ADULT HEALTH MAINTENANCE: ICD-10-CM

## 2023-08-25 DIAGNOSIS — G43.009 MIGRAINE WITHOUT AURA AND WITHOUT STATUS MIGRAINOSUS, NOT INTRACTABLE: ICD-10-CM

## 2023-08-25 DIAGNOSIS — J40 BRONCHITIS: ICD-10-CM

## 2023-08-25 DIAGNOSIS — J02.9 SORE THROAT: ICD-10-CM

## 2023-08-25 DIAGNOSIS — K50.119 CROHN'S DISEASE OF COLON WITH COMPLICATION (HCC): ICD-10-CM

## 2023-08-25 DIAGNOSIS — E11.65 UNCONTROLLED TYPE 2 DIABETES MELLITUS WITH HYPERGLYCEMIA (HCC): ICD-10-CM

## 2023-08-25 PROBLEM — G47.33 OSA (OBSTRUCTIVE SLEEP APNEA): Status: ACTIVE | Noted: 2023-07-26

## 2023-08-25 PROBLEM — Z78.9 STATIN INTOLERANCE: Status: ACTIVE | Noted: 2023-06-08

## 2023-08-25 PROBLEM — Z72.0 TOBACCO ABUSE: Status: ACTIVE | Noted: 2023-06-08

## 2023-08-25 LAB
S PYO AG THROAT QL: NEGATIVE
SARS-COV-2 AG UPPER RESP QL IA: NEGATIVE
VALID CONTROL: NORMAL

## 2023-08-25 PROCEDURE — 99214 OFFICE O/P EST MOD 30 MIN: CPT | Performed by: PHYSICIAN ASSISTANT

## 2023-08-25 PROCEDURE — 87811 SARS-COV-2 COVID19 W/OPTIC: CPT | Performed by: PHYSICIAN ASSISTANT

## 2023-08-25 PROCEDURE — 87880 STREP A ASSAY W/OPTIC: CPT | Performed by: PHYSICIAN ASSISTANT

## 2023-08-25 RX ORDER — AZITHROMYCIN 250 MG/1
TABLET, FILM COATED ORAL
Qty: 6 TABLET | Refills: 0 | Status: SHIPPED | OUTPATIENT
Start: 2023-08-25 | End: 2023-08-28

## 2023-08-25 RX ORDER — SUMATRIPTAN 50 MG/1
50 TABLET, FILM COATED ORAL ONCE AS NEEDED
Qty: 10 TABLET | Refills: 5 | Status: SHIPPED | OUTPATIENT
Start: 2023-08-25

## 2023-08-25 RX ORDER — PREDNISONE 10 MG/1
TABLET ORAL
Qty: 20 TABLET | Refills: 0 | Status: SHIPPED | OUTPATIENT
Start: 2023-08-25

## 2023-08-25 RX ORDER — ALBUTEROL SULFATE 90 UG/1
2 AEROSOL, METERED RESPIRATORY (INHALATION) EVERY 6 HOURS PRN
Qty: 18 G | Refills: 5 | Status: SHIPPED | OUTPATIENT
Start: 2023-08-25

## 2023-08-25 RX ORDER — ROSUVASTATIN CALCIUM 10 MG/1
TABLET, COATED ORAL
COMMUNITY
Start: 2023-06-15

## 2023-08-25 RX ORDER — ONDANSETRON 4 MG/1
4 TABLET, ORALLY DISINTEGRATING ORAL EVERY 8 HOURS PRN
Qty: 12 TABLET | Refills: 0 | Status: SHIPPED | OUTPATIENT
Start: 2023-08-25

## 2023-08-25 NOTE — PROGRESS NOTES
Assessment/Plan:    1. Bronchitis - smoker, on humira. Will start prednisone [id number], zpack, albuterol 2 INH prn, fluids, rest. Work on quitting smoking. 2. Chron's - on Humira    3. Type 2 DM - watch sugars with prednisone    F/u as needed      Subjective:   Chief Complaint   Patient presents with   • Cough     Cough, congestion, sore throat, headache for 3 days   Has tried Mucinex and robitussin without much relief   • Medication Refill     Requesting refill of zofran and sumatriptan (not on active med list)       Patient ID: William Mcclendon is a 66 y.o. female. Patient here complaining of 3-4 days of coughing, congestion. Coughin chest, rattling, wheezing, cannot bring mucus up. Use to have inhalers but . Sore throat initially but has improved. Sinus congestion headaches. Denies fever, chills. The following portions of the patient's history were reviewed and updated as appropriate: allergies, current medications, past family history, past medical history, past social history, past surgical history and problem list.    Past Medical History:   Diagnosis Date   • Coronary artery disease    • Crohn's colitis ([address])    • Diabetes mellitus ([address])    • Fibromyalgia    • Gastric ulcer     [facility] GI SURGEON   • HTN (hypertension)    • Hyperlipidemia    • IBD (inflammatory bowel disease) 2015    [facility] GI SURGEON   • Migraine    • Myocardial infarction [facility])      Past Surgical History:   Procedure Laterality Date   • CARDIAC CATHETERIZATION     • CARDIAC CATHETERIZATION Left 2022    Procedure: Cardiac catheterization;  Surgeon: Beka Jang MD;  [facility]; Service: Cardiology   • CARDIAC CATHETERIZATION N/A 2022    Procedure: Cardiac Coronary Angiogram;  Surgeon: Beka Jang MD;  [facility];   Service: Cardiology   • CARDIAC CATHETERIZATION N/A 2022    Procedure: Cardiac pci;  Surgeon: Beka Jang MD;  [facility];   Service: Cardiology   • CHOLECYSTECTOMY     • COLONOSCOPY  12/03/2018    internal hemorrhoids, 3mm tubular adenoma polyp transverse colon, bx negative for dysplasia   • COLONOSCOPY  11/2015   • COLONOSCOPY W/ BIOPSIES  2022    ileitis; dr Melonie Bhakta c/w crohns   • CORONARY STENT PLACEMENT     • DENTAL SURGERY      [place] teeth extraction   • EGD  12/2018    2cm hiatal hernia, gastritis bx shows foci of intestinal metaplasia, negative Hpylori   • EGD  2015    ESOPHAGITIS/MALLHIATUS HERNIA  FOUND   • HYSTERECTOMY     • MAMMO STEREOTACTIC BREAST BIOPSY LEFT (ALL INC) Left 10/27/2021   • TUBAL LIGATION      Bilateral     Family History   Problem Relation Age of Onset   • Coronary artery disease Mother    • Dementia Mother    • Stroke Mother    • No Known Problems Father    • Cervical cancer Sister 36   • Heart disease Brother    • No Known Problems Maternal Grandmother    • No Known Problems Maternal Grandfather    • No Known Problems Paternal Grandmother    • No Known Problems Paternal Grandfather    • Alcohol abuse Neg Hx    • Substance Abuse Neg Hx      Social History     Socioeconomic History   • Marital status: /Civil Union     Spouse name: Not on file   • Number of children: Not on file   • Years of education: Not on file   • Highest education level: Not on file   Occupational History   • Occupation: retired   Tobacco Use   • Smoking status: Every Day     Packs/day: 0.50     Types: Cigarettes     Start date: 1984   • Smokeless tobacco: Never   • Tobacco comments:     10 cigarettes daily    Vaping Use   • Vaping Use: Never used   Substance and Sexual Activity   • Alcohol use: Not Currently     Comment: Rarely   • Drug use: Never   • Sexual activity: Not Currently     Partners: Male   Other Topics Concern   • Not on file   Social History Narrative   • Not on file     Social Determinants of Health     Financial Resource Strain: Not on file   Food Insecurity: Not on file   Transportation Needs: Not on file   Physical Activity: Not on file   Stress: Not on file   Social Connections: Not on file   Intimate Partner Violence: Not on file   Housing Stability: Not on file       Current Outpatient Medications:   •  acetaminophen (TYLENOL) 500 mg tablet, Take 1,000 mg by mouth every 6 (six) hours as needed for mild pain, Disp: , Rfl:   •  Alcohol Swabs 70 % PADS, Use 4 per day, Disp: 400 each, Rfl: 3  •  ALPRAZolam (XANAX) 0.25 mg tablet, Take 1 tablet (0.25 mg total) by mouth 4 (four) times a day as needed for anxiety, Disp: 30 tablet, Rfl: 0  •  aspirin (ECOTRIN LOW STRENGTH) 81 mg EC tablet, Take 1 tablet (81 mg total) by mouth daily, Disp: 90 tablet, Rfl: 0  •  carvedilol (COREG) 6.25 mg tablet, Take 1 tablet (6.25 mg total) by mouth every 12 (twelve) hours, Disp: 180 tablet, Rfl: 3  •  cholecalciferol (VITAMIN D3) 1,000 units tablet, Take 1 tablet (1,000 Units total) by mouth daily (Patient taking differently: Take 1,000 Units by mouth 2 (two) times a day), Disp: 90 tablet, Rfl: 3  •  Continuous Blood Gluc  (Dexcom G6 ) CRISTINA, Disp 1 , Disp: 1 Device, Rfl: 1  •  Continuous Blood Gluc Sensor (Dexcom G6 Sensor) MISC, Use 1 sensor every 10 days, disp 90 day supply, Disp: 9 each, Rfl: 3  •  Continuous Blood Gluc Transmit (Dexcom G6 Transmitter) MISC, Use daily as directed for CGM - Change every 3 months, Disp: 1 each, Rfl: 1  •  gabapentin (NEURONTIN) 400 mg capsule, Take 1 capsule (400 mg total) by mouth 4 (four) times a day, Disp: 120 capsule, Rfl: 3  •  Humira Pen 40 MG/0.4ML PNKT, , Disp: , Rfl:   •  hyoscyamine (LEVSIN/SL) 0.125 mg SL tablet, Take 1 tablet (0.125 mg total) by mouth every 6 (six) hours as needed (esophageal spasm), Disp: 120 tablet, Rfl: 5  •  insulin aspart (NovoLOG FlexPen) 100 UNIT/ML injection pen, 8 units with breakfast and dinner, 5 units with lunch plus sliding scale., Disp: 15 mL, Rfl: 3  •  insulin degludec Jared Brood FlexTouch) 100 units/mL injection pen, Inject 18 - 22 units daily. , Disp: 30 mL, Rfl: 0  •  Insulin Pen Needle (Pen Needles) 32G X 5 MM MISC, Use 4 per day, Disp: 360 each, Rfl: 1  •  losartan (COZAAR) 25 mg tablet, Take 1 tablet (25 mg total) by mouth daily, Disp: 90 tablet, Rfl: 3  •  mesalamine (LIALDA) 1.2 g EC tablet, Take 2 tablets (2.4 g total) by mouth daily with breakfast, Disp: 60 tablet, Rfl: 2  •  omeprazole (PriLOSEC) 40 MG capsule, Take 1 capsule (40 mg total) by mouth daily (Patient taking differently: Take 40 mg by mouth if needed), Disp: 30 capsule, Rfl: 0  •  ondansetron (ZOFRAN-ODT) 4 mg disintegrating tablet, Take 1 tablet (4 mg total) by mouth every 8 (eight) hours as needed for nausea or vomiting, Disp: 12 tablet, Rfl: 0  •  PB-Hyoscy-Atropine-Scopolamine (DONNATAL PO), Take by mouth, Disp: , Rfl:   •  predniSONE 5 mg tablet, Take 20 mg by mouth daily, Disp: , Rfl:   •  rosuvastatin (CRESTOR) 10 MG tablet, TAKE 1 TABLET BY MOUTH EVERY DAY AT NIGHT, Disp: , Rfl:   •  ticagrelor (BRILINTA) 90 MG, Take 1 tablet (90 mg total) by mouth every 12 (twelve) hours, Disp: 180 tablet, Rfl: 0  •  ezetimibe (ZETIA) 10 mg tablet, Take 1 tablet (10 mg total) by mouth daily (Patient not taking: Reported on 8/25/2023), Disp: 90 tablet, Rfl: 3  •  folic acid (FOLVITE) 1 mg tablet, , Disp: , Rfl:     Review of Systems          Objective:    Vitals:    08/25/23 1056   BP: 132/80   Pulse: 105   Resp: 16   Temp: (!) 97.3 °F (36.3 °C)   TempSrc: Temporal   SpO2: 98%   Weight: 113 kg (250 lb)   Height: 5' 6" (1.676 m)        Physical Exam  Constitutional:       Appearance: Normal appearance. HENT:      Head: Normocephalic and atraumatic. Right Ear: Tympanic membrane, ear canal and external ear normal.      Left Ear: Tympanic membrane, ear canal and external ear normal.      Nose: Congestion and rhinorrhea present. Mouth/Throat:      Mouth: Mucous membranes are moist.   Eyes:      Extraocular Movements: Extraocular movements intact. Conjunctiva/sclera: Conjunctivae normal.      Pupils: Pupils are equal, round, and reactive to light. Cardiovascular:      Rate and Rhythm: Normal rate and regular rhythm. Pulses: Normal pulses. Heart sounds: Normal heart sounds. Pulmonary:      Effort: Pulmonary effort is normal.      Breath sounds: Wheezing and rhonchi present. No rales. Musculoskeletal:      Cervical back: Normal range of motion and neck supple. Lymphadenopathy:      Cervical: No cervical adenopathy. Neurological:      General: No focal deficit present. Mental Status: She is alert and oriented to person, place, and time. Psychiatric:         Mood and Affect: Mood normal.         Behavior: Behavior normal.         Thought Content: Thought content normal.         Judgment: Judgment normal.           BMI Counseling: Body mass index is 40.35 kg/m². The BMI is above normal. Nutrition recommendations include reducing portion sizes.

## 2023-08-28 ENCOUNTER — TELEPHONE (OUTPATIENT)
Dept: FAMILY MEDICINE CLINIC | Facility: CLINIC | Age: 63
End: 2023-08-28

## 2023-08-28 DIAGNOSIS — R05.1 ACUTE COUGH: Primary | ICD-10-CM

## 2023-08-28 RX ORDER — HYDROCODONE POLISTIREX AND CHLORPHENIRAMINE POLISTIREX 10; 8 MG/5ML; MG/5ML
5 SUSPENSION, EXTENDED RELEASE ORAL EVERY 12 HOURS PRN
Qty: 60 ML | Refills: 0 | Status: SHIPPED | OUTPATIENT
Start: 2023-08-28

## 2023-08-28 RX ORDER — AZITHROMYCIN 250 MG/1
250 TABLET, FILM COATED ORAL EVERY 24 HOURS
Qty: 5 TABLET | Refills: 0 | Status: SHIPPED | OUTPATIENT
Start: 2023-08-28 | End: 2023-09-02

## 2023-08-28 NOTE — TELEPHONE ENCOUNTER
Pt is calling she is not feeling any better. Symptoms are   Cough not improving    She is taking the zpac today was the last day.     Pt asking if you can send something else she is exhausted she can not sleep     Please send to [facility]

## 2023-08-29 ENCOUNTER — HOSPITAL ENCOUNTER (OUTPATIENT)
Dept: RADIOLOGY | Facility: HOSPITAL | Age: 63
Discharge: HOME | End: 2023-08-29
Payer: COMMERCIAL

## 2023-08-29 DIAGNOSIS — R05.1 ACUTE COUGH: ICD-10-CM

## 2023-08-29 PROCEDURE — 71046 X-RAY EXAM CHEST 2 VIEWS: CPT

## 2023-09-01 ENCOUNTER — TELEPHONE (OUTPATIENT)
Dept: FAMILY MEDICINE CLINIC | Facility: CLINIC | Age: 63
End: 2023-09-01

## 2023-09-01 DIAGNOSIS — J40 BRONCHITIS: Primary | ICD-10-CM

## 2023-09-01 RX ORDER — ALBUTEROL SULFATE 2.5 MG/3ML
2.5 SOLUTION RESPIRATORY (INHALATION) EVERY 6 HOURS PRN
Qty: 180 ML | Refills: 5 | Status: SHIPPED | OUTPATIENT
Start: 2023-09-01

## 2023-09-01 NOTE — TELEPHONE ENCOUNTER
----- Message from Sarah Nichole PA-C sent at 9/1/2023 10:43 AM EDT -----  Please let patient know her CXR is normal, how is she feeling?

## 2023-09-01 NOTE — TELEPHONE ENCOUNTER
I called and s/w the pt, she said that she still has a horrible cough and not breaking up in her chest. Pt said that she sent you a separate message regarding the cough and now possibly having a UTI.

## 2023-09-05 DIAGNOSIS — R39.9 URINARY SYMPTOM OR SIGN: Primary | ICD-10-CM

## 2023-09-06 ENCOUNTER — APPOINTMENT (OUTPATIENT)
Dept: LAB | Facility: CLINIC | Age: 63
End: 2023-09-06
Payer: COMMERCIAL

## 2023-09-06 DIAGNOSIS — E11.9 TYPE 2 DIABETES MELLITUS WITHOUT COMPLICATION, WITH LONG-TERM CURRENT USE OF INSULIN (HCC): ICD-10-CM

## 2023-09-06 DIAGNOSIS — Z79.4 TYPE 2 DIABETES MELLITUS WITHOUT COMPLICATION, WITH LONG-TERM CURRENT USE OF INSULIN (HCC): ICD-10-CM

## 2023-09-06 DIAGNOSIS — K50.119 CROHN'S DISEASE OF COLON WITH COMPLICATION (HCC): ICD-10-CM

## 2023-09-06 DIAGNOSIS — R39.9 URINARY SYMPTOM OR SIGN: ICD-10-CM

## 2023-09-06 LAB
BACTERIA UR QL AUTO: ABNORMAL /HPF
BILIRUB UR QL STRIP: NEGATIVE
CLARITY UR: ABNORMAL
COLOR UR: YELLOW
EST. AVERAGE GLUCOSE BLD GHB EST-MCNC: 243 MG/DL
GLUCOSE UR STRIP-MCNC: ABNORMAL MG/DL
HBA1C MFR BLD: 10.1 %
HGB UR QL STRIP.AUTO: ABNORMAL
KETONES UR STRIP-MCNC: NEGATIVE MG/DL
LEUKOCYTE ESTERASE UR QL STRIP: ABNORMAL
NITRITE UR QL STRIP: POSITIVE
NON-SQ EPI CELLS URNS QL MICRO: ABNORMAL /HPF
PH UR STRIP.AUTO: 6 [PH]
PROT UR STRIP-MCNC: ABNORMAL MG/DL
RBC #/AREA URNS AUTO: ABNORMAL /HPF
SP GR UR STRIP.AUTO: 1.01 (ref 1–1.03)
UROBILINOGEN UR STRIP-ACNC: <2 MG/DL
WBC #/AREA URNS AUTO: ABNORMAL /HPF
WBC CLUMPS # UR AUTO: PRESENT /UL

## 2023-09-06 PROCEDURE — 36415 COLL VENOUS BLD VENIPUNCTURE: CPT

## 2023-09-06 PROCEDURE — 83036 HEMOGLOBIN GLYCOSYLATED A1C: CPT

## 2023-09-06 PROCEDURE — 87086 URINE CULTURE/COLONY COUNT: CPT

## 2023-09-06 PROCEDURE — 87186 SC STD MICRODIL/AGAR DIL: CPT

## 2023-09-06 PROCEDURE — 81001 URINALYSIS AUTO W/SCOPE: CPT

## 2023-09-06 PROCEDURE — 87077 CULTURE AEROBIC IDENTIFY: CPT

## 2023-09-07 ENCOUNTER — TELEPHONE (OUTPATIENT)
Dept: FAMILY MEDICINE CLINIC | Facility: CLINIC | Age: 63
End: 2023-09-07

## 2023-09-07 DIAGNOSIS — N39.0 URINARY TRACT INFECTION WITHOUT HEMATURIA, SITE UNSPECIFIED: Primary | ICD-10-CM

## 2023-09-07 RX ORDER — CIPROFLOXACIN 250 MG/1
250 TABLET, FILM COATED ORAL EVERY 12 HOURS SCHEDULED
Qty: 20 TABLET | Refills: 0 | Status: SHIPPED | OUTPATIENT
Start: 2023-09-07 | End: 2023-09-17

## 2023-09-07 NOTE — TELEPHONE ENCOUNTER
Patient called and and has a few issues. She said she needs a script for a nebulizer sent to [facility]. Can you please put an order in? Also, she has a very bad UTI. Her symptoms are frequency and pain with urination. She would like something for that. She does not want to come in for an appointment, because she said you are aware of this problem.

## 2023-09-08 DIAGNOSIS — J40 BRONCHITIS: Primary | ICD-10-CM

## 2023-09-08 LAB — BACTERIA UR CULT: ABNORMAL

## 2023-09-08 RX ORDER — ALBUTEROL SULFATE 2.5 MG/3ML
2.5 SOLUTION RESPIRATORY (INHALATION) EVERY 6 HOURS PRN
Qty: 180 ML | Refills: 5 | Status: SHIPPED | OUTPATIENT
Start: 2023-09-08

## 2023-09-08 NOTE — TELEPHONE ENCOUNTER
Patient does not have a nebulizer. Script has to go to [facility] for the equipment. Please fax script to [phone]    If you send back after 12 please send back to [facility].      I let pt know script for cipro sent to pharmacy for UTI

## 2023-09-08 NOTE — TELEPHONE ENCOUNTER
Faxed script for nebulizer sent to [facility]    Spoke to patient and let her know script sent to [facility]    Paperwork in the faxed bin

## 2023-09-21 DIAGNOSIS — F32.9 REACTIVE DEPRESSION (SITUATIONAL): ICD-10-CM

## 2023-09-21 DIAGNOSIS — E11.65 UNCONTROLLED TYPE 2 DIABETES MELLITUS WITH HYPERGLYCEMIA (HCC): ICD-10-CM

## 2023-09-21 RX ORDER — PROCHLORPERAZINE 25 MG/1
SUPPOSITORY RECTAL
Qty: 9 EACH | Refills: 0 | Status: SHIPPED | OUTPATIENT
Start: 2023-09-21

## 2023-09-21 RX ORDER — INSULIN ASPART 100 [IU]/ML
INJECTION, SOLUTION INTRAVENOUS; SUBCUTANEOUS
Qty: 15 ML | Refills: 0 | Status: SHIPPED | OUTPATIENT
Start: 2023-09-21

## 2023-09-21 RX ORDER — PROCHLORPERAZINE 25 MG/1
SUPPOSITORY RECTAL
Qty: 1 EACH | Refills: 0 | Status: SHIPPED | OUTPATIENT
Start: 2023-09-21

## 2023-09-21 RX ORDER — ALPRAZOLAM 0.25 MG/1
0.25 TABLET ORAL 4 TIMES DAILY PRN
Qty: 30 TABLET | Refills: 0 | Status: SHIPPED | OUTPATIENT
Start: 2023-09-21

## 2023-09-21 RX ORDER — INSULIN DEGLUDEC INJECTION 100 U/ML
INJECTION, SOLUTION SUBCUTANEOUS
Qty: 30 ML | Refills: 0 | Status: SHIPPED | OUTPATIENT
Start: 2023-09-21

## 2023-11-28 ENCOUNTER — TELEPHONE (OUTPATIENT)
Dept: FAMILY MEDICINE CLINIC | Facility: CLINIC | Age: 63
End: 2023-11-28

## 2023-11-28 NOTE — TELEPHONE ENCOUNTER
Marco Antonio Mccullough came into the office to drop off social security paperwork for Hansa Kaufman to fill out. Pt spoke with Avery Love and was told to drop off the paperwork. Will leave on top of brown folder.

## 2023-11-29 DIAGNOSIS — E11.65 UNCONTROLLED TYPE 2 DIABETES MELLITUS WITH HYPERGLYCEMIA (HCC): ICD-10-CM

## 2023-11-29 RX ORDER — PROCHLORPERAZINE 25 MG/1
SUPPOSITORY RECTAL
Qty: 1 EACH | Refills: 0 | Status: SHIPPED | OUTPATIENT
Start: 2023-11-29

## 2023-11-29 RX ORDER — PROCHLORPERAZINE 25 MG/1
SUPPOSITORY RECTAL
Qty: 1 EACH | Refills: 1 | Status: SHIPPED | OUTPATIENT
Start: 2023-11-29

## 2023-12-12 ENCOUNTER — OFFICE VISIT (OUTPATIENT)
Dept: FAMILY MEDICINE CLINIC | Facility: CLINIC | Age: 63
End: 2023-12-12
Payer: COMMERCIAL

## 2023-12-12 VITALS
HEART RATE: 100 BPM | SYSTOLIC BLOOD PRESSURE: 130 MMHG | RESPIRATION RATE: 16 BRPM | DIASTOLIC BLOOD PRESSURE: 80 MMHG | TEMPERATURE: 97.8 F | OXYGEN SATURATION: 96 %

## 2023-12-12 DIAGNOSIS — E78.2 MIXED HYPERLIPIDEMIA: ICD-10-CM

## 2023-12-12 DIAGNOSIS — I50.32 CHRONIC DIASTOLIC HF (HEART FAILURE) (HCC): Primary | ICD-10-CM

## 2023-12-12 DIAGNOSIS — I25.10 CORONARY ARTERY DISEASE INVOLVING NATIVE CORONARY ARTERY OF NATIVE HEART WITHOUT ANGINA PECTORIS: ICD-10-CM

## 2023-12-12 DIAGNOSIS — I10 ESSENTIAL HYPERTENSION: ICD-10-CM

## 2023-12-12 DIAGNOSIS — K50.119 CROHN'S DISEASE OF COLON WITH COMPLICATION (HCC): ICD-10-CM

## 2023-12-12 DIAGNOSIS — Z12.31 ENCOUNTER FOR SCREENING MAMMOGRAM FOR BREAST CANCER: ICD-10-CM

## 2023-12-12 DIAGNOSIS — E11.65 UNCONTROLLED TYPE 2 DIABETES MELLITUS WITH HYPERGLYCEMIA (HCC): ICD-10-CM

## 2023-12-12 DIAGNOSIS — M79.7 FIBROMYALGIA: ICD-10-CM

## 2023-12-12 DIAGNOSIS — G47.33 OSA (OBSTRUCTIVE SLEEP APNEA): ICD-10-CM

## 2023-12-12 DIAGNOSIS — G62.9 PERIPHERAL POLYNEUROPATHY: ICD-10-CM

## 2023-12-12 DIAGNOSIS — E11.00 TYPE 2 DIABETES MELLITUS WITH HYPEROSMOLARITY WITHOUT COMA, UNSPECIFIED WHETHER LONG TERM INSULIN USE (HCC): ICD-10-CM

## 2023-12-12 DIAGNOSIS — E78.5 DYSLIPIDEMIA: ICD-10-CM

## 2023-12-12 DIAGNOSIS — K50.919 CROHN'S DISEASE WITH COMPLICATION, UNSPECIFIED GASTROINTESTINAL TRACT LOCATION (HCC): ICD-10-CM

## 2023-12-12 DIAGNOSIS — E66.01 CLASS 3 SEVERE OBESITY DUE TO EXCESS CALORIES WITH SERIOUS COMORBIDITY AND BODY MASS INDEX (BMI) OF 40.0 TO 44.9 IN ADULT (HCC): ICD-10-CM

## 2023-12-12 PROBLEM — J40 BRONCHITIS: Status: RESOLVED | Noted: 2023-09-08 | Resolved: 2023-12-12

## 2023-12-12 PROCEDURE — 99214 OFFICE O/P EST MOD 30 MIN: CPT | Performed by: PHYSICIAN ASSISTANT

## 2023-12-12 NOTE — PROGRESS NOTES
Assessment/Plan:    1. CAD - under care [facility], Dr Puente    2. CHF - under care [facility] Dr Puente     3. Type 2 DM - continue with [facility] and medications as prescribed     4. Hyperlipidemia - under care cardiology, on zetia, cannot tolerate statins or repatha     5. chrons - under care GI, on lialda and levsin, starting humira     6. RA - under care rheum, prednisone on humira     7. GERD - on omeprazole, under care GI    8. Peripheral neuropathy - on gabapentin    Disability paperwork completed today with patient    F/u yearly and as needed    Subjective:   Chief Complaint   Patient presents with    form    Medication Refill     Needs refill of xanax and gabapentin    Eye Problem     Left eye red for the last week  Requesting antibiotic ointment      Patient ID: Lashawn Davis is a 63 y.o. female.    Patient here to have diability paperwork completed. Has a history of CAD with stent, CHF, Type 2 Dm insulin dependent with peripheral neuropathy, RA, chron's that require prednisone that affect her diabetes, hyperlipidemia, DARYL, GERD. Sees cardiology, endocrinology, gastroenterology, rheumatology. Is in chronic pain, limited in ability to sit and stand. Has shortness of breath on exertion. Has strict diet limiting salt, sugars. Due to her multiple medical issues, appointments, lab work, testing and declining health she cannot hold a full time job.        The following portions of the patient's history were reviewed and updated as appropriate: allergies, current medications, past family history, past medical history, past social history, past surgical history, and problem list.    Past Medical History:   Diagnosis Date    Coronary artery disease     Crohn's colitis (HCC)     Diabetes mellitus (HCC)     Fibromyalgia     Gastric ulcer     [facility] GI SURGEON    HTN (hypertension)     Hyperlipidemia     IBD (inflammatory bowel disease) 11/2015    [facility] GI SURGEON    Migraine     Myocardial infarction (HCC)      Past  Surgical History:   Procedure Laterality Date    CARDIAC CATHETERIZATION      CARDIAC CATHETERIZATION Left 03/07/2022    Procedure: Cardiac catheterization;  Surgeon: Jasen Hayden MD;  [facility];  Service: Cardiology    CARDIAC CATHETERIZATION N/A 03/07/2022    Procedure: Cardiac Coronary Angiogram;  Surgeon: Jasen Hayden MD;  [facility];  Service: Cardiology    CARDIAC CATHETERIZATION N/A 03/07/2022    Procedure: Cardiac pci;  Surgeon: Jasen Hayden MD;  [facility];  Service: Cardiology    CHOLECYSTECTOMY      COLONOSCOPY  12/03/2018    internal hemorrhoids, 3mm tubular adenoma polyp transverse colon, bx negative for dysplasia    COLONOSCOPY  11/2015    COLONOSCOPY W/ BIOPSIES  2022    ileitis; dr rao c/w crohns    CORONARY STENT PLACEMENT      DENTAL SURGERY      [address] teeth extraction    EGD  12/2018    2cm hiatal hernia, gastritis bx shows foci of intestinal metaplasia, negative Hpylori    EGD  2015    ESOPHAGITIS/MALLHIATUS HERNIA  FOUND    HYSTERECTOMY      MAMMO STEREOTACTIC BREAST BIOPSY LEFT (ALL INC) Left 10/27/2021    TUBAL LIGATION      Bilateral     Family History   Problem Relation Age of Onset    Coronary artery disease Mother     Dementia Mother     Stroke Mother     No Known Problems Father     Cervical cancer Sister 40    Heart disease Brother     No Known Problems Maternal Grandmother     No Known Problems Maternal Grandfather     No Known Problems Paternal Grandmother     No Known Problems Paternal Grandfather     Alcohol abuse Neg Hx     Substance Abuse Neg Hx      Social History     Socioeconomic History    Marital status: /Civil Union     Spouse name: Not on file    Number of children: Not on file    Years of education: Not on file    Highest education level: Not on file   Occupational History    Occupation: retired   Tobacco Use    Smoking status: Every Day     Packs/day: 0.50     Types: Cigarettes     Start date: 1984     Smokeless tobacco: Never    Tobacco comments:     10 cigarettes daily    Vaping Use    Vaping Use: Never used   Substance and Sexual Activity    Alcohol use: Not Currently     Comment: Rarely    Drug use: Never    Sexual activity: Not Currently     Partners: Male   Other Topics Concern    Not on file   Social History Narrative    Not on file     Social Determinants of Health     Financial Resource Strain: Not on file   Food Insecurity: Not on file   Transportation Needs: Not on file   Physical Activity: Not on file   Stress: Not on file   Social Connections: Not on file   Intimate Partner Violence: Not on file   Housing Stability: Not on file       Current Outpatient Medications:     acetaminophen (TYLENOL) 500 mg tablet, Take 1,000 mg by mouth every 6 (six) hours as needed for mild pain, Disp: , Rfl:     albuterol (Ventolin HFA) 90 mcg/act inhaler, Inhale 2 puffs every 6 (six) hours as needed for wheezing, Disp: 18 g, Rfl: 5    Alcohol Swabs 70 % PADS, Use 4 per day, Disp: 400 each, Rfl: 3    ALPRAZolam (XANAX) 0.25 mg tablet, Take 1 tablet (0.25 mg total) by mouth 4 (four) times a day as needed for anxiety, Disp: 30 tablet, Rfl: 0    aspirin (ECOTRIN LOW STRENGTH) 81 mg EC tablet, Take 1 tablet (81 mg total) by mouth daily, Disp: 90 tablet, Rfl: 0    carvedilol (COREG) 6.25 mg tablet, Take 1 tablet (6.25 mg total) by mouth every 12 (twelve) hours, Disp: 180 tablet, Rfl: 3    cholecalciferol (VITAMIN D3) 1,000 units tablet, Take 1 tablet (1,000 Units total) by mouth daily (Patient taking differently: Take 1,000 Units by mouth 2 (two) times a day), Disp: 90 tablet, Rfl: 3    Continuous Blood Gluc  (Dexcom G6 ) CRISTINA, Disp 1 , Disp: 1 each, Rfl: 1    Continuous Blood Gluc Sensor (Dexcom G6 Sensor) MISC, Use 1 sensor every 10 days, disp 90 day supply, Disp: 9 each, Rfl: 0    Continuous Blood Gluc Transmit (Dexcom G6 Transmitter) MISC, Use daily as directed for CGM - Change every 3 months,  Disp: 1 each, Rfl: 0    gabapentin (NEURONTIN) 400 mg capsule, Take 1 capsule (400 mg total) by mouth 4 (four) times a day, Disp: 120 capsule, Rfl: 0    Humira Pen 40 MG/0.4ML PNKT, , Disp: , Rfl:     hyoscyamine (LEVSIN/SL) 0.125 mg SL tablet, Take 1 tablet (0.125 mg total) by mouth every 6 (six) hours as needed (esophageal spasm), Disp: 120 tablet, Rfl: 5    insulin aspart (NovoLOG FlexPen) 100 UNIT/ML injection pen, 8 units with breakfast and dinner, 5 units with lunch plus sliding scale., Disp: 15 mL, Rfl: 0    insulin degludec (Tresiba FlexTouch) 100 units/mL injection pen, Inject 18 - 22 units daily., Disp: 30 mL, Rfl: 0    Insulin Pen Needle (Pen Needles) 32G X 5 MM MISC, Use 4 per day, Disp: 360 each, Rfl: 1    losartan (COZAAR) 25 mg tablet, Take 1 tablet (25 mg total) by mouth daily, Disp: 90 tablet, Rfl: 3    mesalamine (LIALDA) 1.2 g EC tablet, Take 2 tablets (2.4 g total) by mouth daily with breakfast, Disp: 60 tablet, Rfl: 2    omeprazole (PriLOSEC) 40 MG capsule, Take 1 capsule (40 mg total) by mouth daily (Patient taking differently: Take 40 mg by mouth if needed), Disp: 30 capsule, Rfl: 0    ondansetron (ZOFRAN-ODT) 4 mg disintegrating tablet, Take 1 tablet (4 mg total) by mouth every 8 (eight) hours as needed for nausea or vomiting, Disp: 12 tablet, Rfl: 0    PB-Hyoscy-Atropine-Scopolamine (DONNATAL PO), Take by mouth, Disp: , Rfl:     predniSONE 5 mg tablet, Take 5 mg by mouth daily, Disp: , Rfl:     rosuvastatin (CRESTOR) 10 MG tablet, TAKE 1 TABLET BY MOUTH EVERY DAY AT NIGHT, Disp: , Rfl:     SUMAtriptan (IMITREX) 50 mg tablet, Take 1 tablet (50 mg total) by mouth once as needed for migraine for up to 1 dose may repeat in 2 hours if necessary, Disp: 10 tablet, Rfl: 5    albuterol (2.5 mg/3 mL) 0.083 % nebulizer solution, Take 3 mL (2.5 mg total) by nebulization every 6 (six) hours as needed for wheezing or shortness of breath (Patient not taking: Reported on 12/12/2023), Disp: 180 mL, Rfl: 5     albuterol (2.5 mg/3 mL) 0.083 % nebulizer solution, Take 3 mL (2.5 mg total) by nebulization every 6 (six) hours as needed for wheezing or shortness of breath (Patient not taking: Reported on 12/12/2023), Disp: 180 mL, Rfl: 5    ezetimibe (ZETIA) 10 mg tablet, Take 1 tablet (10 mg total) by mouth daily (Patient not taking: Reported on 8/25/2023), Disp: 90 tablet, Rfl: 3    folic acid (FOLVITE) 1 mg tablet, , Disp: , Rfl:     Hydrocod Francisco-Chlorphe Francisco ER (TUSSIONEX) 10-8 mg/5 mL ER suspension, Take 5 mL by mouth every 12 (twelve) hours as needed for cough Max Daily Amount: 10 mL (Patient not taking: Reported on 12/12/2023), Disp: 60 mL, Rfl: 0    predniSONE 10 mg tablet, Take 4 tabs on day 1,2 with food, 3 tabs on day 3,4 with food, 2 tabs on day 5,6 with food, 1 tab on day 7,8 with food (Patient not taking: Reported on 12/12/2023), Disp: 20 tablet, Rfl: 0    ticagrelor (BRILINTA) 90 MG, Take 1 tablet (90 mg total) by mouth every 12 (twelve) hours (Patient not taking: Reported on 12/12/2023), Disp: 180 tablet, Rfl: 0    Review of Systems   Constitutional:  Positive for fatigue.   Respiratory:  Positive for apnea and shortness of breath.    Cardiovascular:  Positive for chest pain and leg swelling.   Gastrointestinal:  Positive for diarrhea.   Musculoskeletal:  Positive for arthralgias.   Neurological:  Positive for dizziness, weakness, numbness and headaches.             Objective:    Vitals:    12/12/23 1407   BP: 130/80   Pulse: 100   Resp: 16   Temp: 97.8 °F (36.6 °C)   TempSrc: Temporal   SpO2: 96%        Physical Exam  Constitutional:       Appearance: Normal appearance.   HENT:      Head: Normocephalic and atraumatic.   Neurological:      General: No focal deficit present.      Mental Status: She is oriented to person, place, and time.   Psychiatric:         Mood and Affect: Mood normal.         Behavior: Behavior normal.         Thought Content: Thought content normal.         Judgment: Judgment normal.

## 2023-12-18 PROBLEM — R06.02 SHORTNESS OF BREATH: Status: RESOLVED | Noted: 2021-10-25 | Resolved: 2023-12-18

## 2023-12-18 PROBLEM — E11.9 TYPE 2 DIABETES MELLITUS (HCC): Status: RESOLVED | Noted: 2021-02-27 | Resolved: 2023-12-18

## 2023-12-19 ENCOUNTER — PATIENT MESSAGE (OUTPATIENT)
Dept: FAMILY MEDICINE CLINIC | Facility: CLINIC | Age: 63
End: 2023-12-19

## 2023-12-19 DIAGNOSIS — H00.019 HORDEOLUM EXTERNUM, UNSPECIFIED LATERALITY: Primary | ICD-10-CM

## 2023-12-19 RX ORDER — ERYTHROMYCIN 5 MG/G
0.5 OINTMENT OPHTHALMIC EVERY 6 HOURS SCHEDULED
Qty: 3.5 G | Refills: 0 | Status: SHIPPED | OUTPATIENT
Start: 2023-12-19

## 2023-12-24 DIAGNOSIS — E11.65 UNCONTROLLED TYPE 2 DIABETES MELLITUS WITH HYPERGLYCEMIA (HCC): ICD-10-CM

## 2023-12-26 RX ORDER — PROCHLORPERAZINE 25 MG/1
SUPPOSITORY RECTAL
Qty: 3 EACH | Refills: 1 | Status: SHIPPED | OUTPATIENT
Start: 2023-12-26

## 2023-12-30 DIAGNOSIS — E11.65 UNCONTROLLED TYPE 2 DIABETES MELLITUS WITH HYPERGLYCEMIA (HCC): ICD-10-CM

## 2023-12-30 DIAGNOSIS — F32.9 REACTIVE DEPRESSION (SITUATIONAL): ICD-10-CM

## 2024-01-02 RX ORDER — GABAPENTIN 400 MG/1
400 CAPSULE ORAL 4 TIMES DAILY
Qty: 120 CAPSULE | Refills: 0 | Status: SHIPPED | OUTPATIENT
Start: 2024-01-02

## 2024-01-02 RX ORDER — ALPRAZOLAM 0.25 MG/1
0.25 TABLET ORAL 4 TIMES DAILY PRN
Qty: 30 TABLET | Refills: 0 | Status: SHIPPED | OUTPATIENT
Start: 2024-01-02

## 2024-01-03 ENCOUNTER — OFFICE VISIT (OUTPATIENT)
Dept: ENDOCRINOLOGY | Facility: CLINIC | Age: 64
End: 2024-01-03
Payer: COMMERCIAL

## 2024-01-03 VITALS
SYSTOLIC BLOOD PRESSURE: 106 MMHG | HEART RATE: 107 BPM | HEIGHT: 66 IN | BODY MASS INDEX: 40.15 KG/M2 | WEIGHT: 249.8 LBS | DIASTOLIC BLOOD PRESSURE: 74 MMHG

## 2024-01-03 DIAGNOSIS — E11.65 UNCONTROLLED TYPE 2 DIABETES MELLITUS WITH HYPERGLYCEMIA (HCC): Primary | ICD-10-CM

## 2024-01-03 DIAGNOSIS — E78.2 MIXED HYPERLIPIDEMIA: ICD-10-CM

## 2024-01-03 DIAGNOSIS — I10 ESSENTIAL HYPERTENSION: ICD-10-CM

## 2024-01-03 LAB — SL AMB POCT HEMOGLOBIN AIC: 11.5 (ref ?–6.5)

## 2024-01-03 PROCEDURE — 99214 OFFICE O/P EST MOD 30 MIN: CPT

## 2024-01-03 PROCEDURE — 83036 HEMOGLOBIN GLYCOSYLATED A1C: CPT

## 2024-01-03 RX ORDER — INSULIN DEGLUDEC INJECTION 100 U/ML
INJECTION, SOLUTION SUBCUTANEOUS
Qty: 30 ML | Refills: 1 | Status: SHIPPED | OUTPATIENT
Start: 2024-01-03

## 2024-01-03 RX ORDER — INSULIN ASPART 100 [IU]/ML
INJECTION, SOLUTION INTRAVENOUS; SUBCUTANEOUS
Qty: 15 ML | Refills: 0 | Status: SHIPPED | OUTPATIENT
Start: 2024-01-03

## 2024-01-03 RX ORDER — PROCHLORPERAZINE 25 MG/1
SUPPOSITORY RECTAL
Qty: 9 EACH | Refills: 2 | Status: SHIPPED | OUTPATIENT
Start: 2024-01-03

## 2024-01-03 NOTE — ASSESSMENT & PLAN NOTE
HGA1C now severely elevated. No BG data to review today. Her diabetes control continues to worsen. She has been hesitant to start new medication and has not been checking BGs regularly.   Treatment regimen:   - Discussed trying GLP-1 and she is agreeing to trial Ozempic. I have provided her with sample - advised her to start with 0.25 mg weekly for 1 month and then increase to 0.5 mg weekly.  - Increase Tresiba to 22 units daily and continue with Novolog at current dose. Difficult to titrate insulin without CGM data.  - Recommend she restart on Dexcom, she has supplies. She will send CGM report in 2 weeks.   - Recommend she focus on lifestyle modifications, she declines MNT  follow up at this time.   - Patient has poorly controlled diabetes and has lack of insight into her condition. Patient was counseled heavily regarding the significance of maintaining glycemic control to further prevent any diabetic complications including retinopathy, neuropathy, nephropathy as well as the risk of having heart disease.     Advised to adhere to diabetic diet, and recommended staying active/exercising routinely.  Keep carbohydrates consistent to limit blood glucose fluctuations.  Advised to call if blood sugars less than 70 mg/dl or over 300 mg/dl.   Discussed symptoms and treatment of hypoglycemia.    Recommended routine follow-up with podiatry and ophthalmology.   Ordered blood work to complete prior to next visit.   Lab Results   Component Value Date    HGBA1C 11.5 (A) 01/03/2024

## 2024-01-03 NOTE — PROGRESS NOTES
Established Patient Progress Note      Chief Complaint   Patient presents with    Diabetes Type 2        Impression & Plan:    Problem List Items Addressed This Visit          Endocrine    Uncontrolled type 2 diabetes mellitus with hyperglycemia (HCC) - Primary     HGA1C now severely elevated. No BG data to review today. Her diabetes control continues to worsen. She has been hesitant to start new medication and has not been checking BGs regularly.   Treatment regimen:   - Discussed trying GLP-1 and she is agreeing to trial Ozempic. I have provided her with sample - advised her to start with 0.25 mg weekly for 1 month and then increase to 0.5 mg weekly.  - Increase Tresiba to 22 units daily and continue with Novolog at current dose. Difficult to titrate insulin without CGM data.  - Recommend she restart on Dexcom, she has supplies. She will send CGM report in 2 weeks.   - Recommend she focus on lifestyle modifications, she declines MNT  follow up at this time.   - Patient has poorly controlled diabetes and has lack of insight into her condition. Patient was counseled heavily regarding the significance of maintaining glycemic control to further prevent any diabetic complications including retinopathy, neuropathy, nephropathy as well as the risk of having heart disease.     Advised to adhere to diabetic diet, and recommended staying active/exercising routinely.  Keep carbohydrates consistent to limit blood glucose fluctuations.  Advised to call if blood sugars less than 70 mg/dl or over 300 mg/dl.   Discussed symptoms and treatment of hypoglycemia.    Recommended routine follow-up with podiatry and ophthalmology.   Ordered blood work to complete prior to next visit.   Lab Results   Component Value Date    HGBA1C 11.5 (A) 01/03/2024            Relevant Medications    Continuous Blood Gluc Sensor (Dexcom G6 Sensor) MISC    insulin aspart (NovoLOG FlexPen) 100 UNIT/ML injection pen    insulin degludec (Tresiba FlexTouch)  100 units/mL injection pen    Other Relevant Orders    POCT hemoglobin A1c (Completed)    Comprehensive metabolic panel       Cardiovascular and Mediastinum    Essential hypertension     BP at goal. Continue current medication regimen.              Other    Mixed hyperlipidemia     She is tolerating statin without any issues. Continue statin.             History of Present Illness:   Lashawn Davis is a 63 y.o. female with type 2 diabetes with long term use of insulin since 2017. Last seen in the office 4/17/23. Reports complications of neuropathy. POCT A1C was 11.2. Denies recent illness or hospitalizations. Denies recent severe hypoglycemic or severe hyperglycemic episodes. Denies any issues with her current regimen. Home glucose monitoring: are performed regularly with CGM.  She has not used CGM past several weeks and does not have any BG data with her today.    She could not tolerate Metformin in the past due to GI side effects, had UTIs with SGLT2. Admits to unhealthy diet and not watching her carb intake in last few months.      Current regimen:   Tresiba 18-20 units daily   Novolog 10-6-10 before meals      Last Eye Exam: 6/14/23, UTD  Last Foot Exam: done today      Has hypertension: Taking carvedilol and losartan  Has hyperlipidemia: rosuvastatin  Vitamin D deficiency: Taking 2,000 IU daily    Patient Active Problem List   Diagnosis    B-complex deficiency    Fibromyalgia    GERD (gastroesophageal reflux disease)    Inflammatory bowel disease (Crohn's disease) ([facility])    Migraine without aura and without status migrainosus, not intractable    Peripheral neuropathy    Tobacco use disorder, continuous    Mixed hyperlipidemia    Hidradenitis suppurativa    Acute ST elevation myocardial infarction (STEMI) due to occlusion of mid portion of right coronary artery (HCC)    Essential hypertension    Coronary artery disease    Cystocele with prolapse    Arthralgia    History of PTCA    Uncontrolled type 2 diabetes  mellitus with hyperglycemia (HCC)    Generalized abdominal pain    Crohn's colitis (HCC)    Type 2 diabetes mellitus with hyperosmolarity without coma, unspecified whether long term insulin use (HCC)    Dyslipidemia    DARYL (obstructive sleep apnea)    Statin intolerance    Tobacco abuse    Class 3 severe obesity due to excess calories with serious comorbidity and body mass index (BMI) of 40.0 to 44.9 in adult (HCC)    Chronic diastolic HF (heart failure) (HCC)      Past Medical History:   Diagnosis Date    Coronary artery disease     Crohn's colitis (HCC)     Diabetes mellitus (HCC)     Fibromyalgia     Gastric ulcer     [facility] GI SURGEON    HTN (hypertension)     Hyperlipidemia     IBD (inflammatory bowel disease) 11/2015    [facility] GI SURGEON    Migraine     Myocardial infarction (HCC)       Past Surgical History:   Procedure Laterality Date    CARDIAC CATHETERIZATION      CARDIAC CATHETERIZATION Left 03/07/2022    Procedure: Cardiac catheterization;  Surgeon: Jasen Hayden MD;  Location: [facility];  Service: Cardiology    CARDIAC CATHETERIZATION N/A 03/07/2022    Procedure: Cardiac Coronary Angiogram;  Surgeon: Jasen Hayden MD;  Location: [facility];  Service: Cardiology    CARDIAC CATHETERIZATION N/A 03/07/2022    Procedure: Cardiac pci;  Surgeon: Jasen Hayden MD;  Location: [facility];  Service: Cardiology    CHOLECYSTECTOMY      COLONOSCOPY  12/03/2018    internal hemorrhoids, 3mm tubular adenoma polyp transverse colon, bx negative for dysplasia    COLONOSCOPY  11/2015    COLONOSCOPY W/ BIOPSIES  2022    ileitis; dr rao c/w crohns    CORONARY STENT PLACEMENT      DENTAL SURGERY      [place] teeth extraction    EGD  12/2018    2cm hiatal hernia, gastritis bx shows foci of intestinal metaplasia, negative Hpylori    EGD  2015    ESOPHAGITIS/MALLHIATUS HERNIA  FOUND    HYSTERECTOMY      MAMMO STEREOTACTIC BREAST BIOPSY LEFT (ALL INC) Left 10/27/2021    TUBAL LIGATION       Bilateral      Family History   Problem Relation Age of Onset    Coronary artery disease Mother     Dementia Mother     Stroke Mother     No Known Problems Father     Cervical cancer Sister 40    Heart disease Brother     No Known Problems Maternal Grandmother     No Known Problems Maternal Grandfather     No Known Problems Paternal Grandmother     No Known Problems Paternal Grandfather     Alcohol abuse Neg Hx     Substance Abuse Neg Hx      Social History     Tobacco Use    Smoking status: Every Day     Current packs/day: 0.50     Average packs/day: 0.5 packs/day for 40.0 years (20.0 ttl pk-yrs)     Types: Cigarettes     Start date: 1984    Smokeless tobacco: Never    Tobacco comments:     10 cigarettes daily    Substance Use Topics    Alcohol use: Not Currently     Comment: Rarely     Allergies   Allergen Reactions    Penicillins Anaphylaxis and Hives    Rosuvastatin Myalgia and Arthralgia    Atorvastatin Myalgia    Cefuroxime     Metaxalone     Influenza Vaccines Rash    Keflet [Cephalexin] Rash    Lyrica [Pregabalin] Swelling     Feet swelling    Nuts - Food Allergy Itching    Sulfa Antibiotics Rash    Topiramate Rash         Current Outpatient Medications:     acetaminophen (TYLENOL) 500 mg tablet, Take 1,000 mg by mouth every 6 (six) hours as needed for mild pain, Disp: , Rfl:     albuterol (Ventolin HFA) 90 mcg/act inhaler, Inhale 2 puffs every 6 (six) hours as needed for wheezing, Disp: 18 g, Rfl: 5    Alcohol Swabs 70 % PADS, Use 4 per day, Disp: 400 each, Rfl: 3    ALPRAZolam (XANAX) 0.25 mg tablet, Take 1 tablet (0.25 mg total) by mouth 4 (four) times a day as needed for anxiety, Disp: 30 tablet, Rfl: 0    aspirin (ECOTRIN LOW STRENGTH) 81 mg EC tablet, Take 1 tablet (81 mg total) by mouth daily, Disp: 90 tablet, Rfl: 0    carvedilol (COREG) 6.25 mg tablet, Take 1 tablet (6.25 mg total) by mouth every 12 (twelve) hours, Disp: 180 tablet, Rfl: 3    cholecalciferol (VITAMIN D3) 1,000 units tablet, Take 1  tablet (1,000 Units total) by mouth daily (Patient taking differently: Take 1,000 Units by mouth 2 (two) times a day), Disp: 90 tablet, Rfl: 3    Continuous Blood Gluc  (Dexcom G6 ) CRISTINA, DISP 1 , Disp: 3 each, Rfl: 1    Continuous Blood Gluc Sensor (Dexcom G6 Sensor) MISC, Use 1 sensor every 10 days, disp 90 day supply, Disp: 9 each, Rfl: 2    Continuous Blood Gluc Transmit (Dexcom G6 Transmitter) MISC, Use daily as directed for CGM - Change every 3 months, Disp: 1 each, Rfl: 0    gabapentin (NEURONTIN) 400 mg capsule, Take 1 capsule (400 mg total) by mouth 4 (four) times a day, Disp: 120 capsule, Rfl: 0    Humira Pen 40 MG/0.4ML PNKT, , Disp: , Rfl:     hyoscyamine (LEVSIN/SL) 0.125 mg SL tablet, Take 1 tablet (0.125 mg total) by mouth every 6 (six) hours as needed (esophageal spasm), Disp: 120 tablet, Rfl: 5    insulin aspart (NovoLOG FlexPen) 100 UNIT/ML injection pen, 10 units before breakfast and dinner and 6 units before lunch., Disp: 15 mL, Rfl: 0    insulin degludec (Tresiba FlexTouch) 100 units/mL injection pen, Inject 22 units daily., Disp: 30 mL, Rfl: 1    Insulin Pen Needle (Pen Needles) 32G X 5 MM MISC, Use 4 per day, Disp: 360 each, Rfl: 1    losartan (COZAAR) 25 mg tablet, Take 1 tablet (25 mg total) by mouth daily, Disp: 90 tablet, Rfl: 3    omeprazole (PriLOSEC) 40 MG capsule, Take 1 capsule (40 mg total) by mouth daily (Patient taking differently: Take 40 mg by mouth if needed), Disp: 30 capsule, Rfl: 0    ondansetron (ZOFRAN-ODT) 4 mg disintegrating tablet, Take 1 tablet (4 mg total) by mouth every 8 (eight) hours as needed for nausea or vomiting, Disp: 12 tablet, Rfl: 0    PB-Hyoscy-Atropine-Scopolamine (DONNATAL PO), Take by mouth, Disp: , Rfl:     predniSONE 5 mg tablet, Take 10 mg by mouth daily, Disp: , Rfl:     rosuvastatin (CRESTOR) 10 MG tablet, TAKE 1 TABLET BY MOUTH EVERY DAY AT NIGHT, Disp: , Rfl:     SUMAtriptan (IMITREX) 50 mg tablet, Take 1 tablet (50 mg  total) by mouth once as needed for migraine for up to 1 dose may repeat in 2 hours if necessary, Disp: 10 tablet, Rfl: 5    albuterol (2.5 mg/3 mL) 0.083 % nebulizer solution, Take 3 mL (2.5 mg total) by nebulization every 6 (six) hours as needed for wheezing or shortness of breath (Patient not taking: Reported on 12/12/2023), Disp: 180 mL, Rfl: 5    albuterol (2.5 mg/3 mL) 0.083 % nebulizer solution, Take 3 mL (2.5 mg total) by nebulization every 6 (six) hours as needed for wheezing or shortness of breath (Patient not taking: Reported on 12/12/2023), Disp: 180 mL, Rfl: 5    erythromycin (ILOTYCIN) ophthalmic ointment, Administer 0.5 inches to both eyes every 6 (six) hours (Patient not taking: Reported on 1/3/2024), Disp: 3.5 g, Rfl: 0    ezetimibe (ZETIA) 10 mg tablet, Take 1 tablet (10 mg total) by mouth daily (Patient not taking: Reported on 8/25/2023), Disp: 90 tablet, Rfl: 3    folic acid (FOLVITE) 1 mg tablet, , Disp: , Rfl:     Hydrocod Francisco-Chlorphe Francisco ER (TUSSIONEX) 10-8 mg/5 mL ER suspension, Take 5 mL by mouth every 12 (twelve) hours as needed for cough Max Daily Amount: 10 mL (Patient not taking: Reported on 12/12/2023), Disp: 60 mL, Rfl: 0    mesalamine (LIALDA) 1.2 g EC tablet, Take 2 tablets (2.4 g total) by mouth daily with breakfast (Patient not taking: Reported on 12/27/2023), Disp: 60 tablet, Rfl: 2    predniSONE 10 mg tablet, Take 4 tabs on day 1,2 with food, 3 tabs on day 3,4 with food, 2 tabs on day 5,6 with food, 1 tab on day 7,8 with food (Patient not taking: Reported on 12/12/2023), Disp: 20 tablet, Rfl: 0    ticagrelor (BRILINTA) 90 MG, Take 1 tablet (90 mg total) by mouth every 12 (twelve) hours (Patient not taking: Reported on 12/12/2023), Disp: 180 tablet, Rfl: 0    Review of Systems   Constitutional:  Negative for activity change, appetite change, chills, diaphoresis, fatigue, fever and unexpected weight change.   Eyes:  Negative for visual disturbance.   Respiratory:  Negative for  "chest tightness and shortness of breath.    Cardiovascular:  Negative for chest pain, palpitations and leg swelling.   Gastrointestinal:  Negative for abdominal pain, constipation, diarrhea, nausea and vomiting.   Endocrine: Negative for polydipsia, polyphagia and polyuria.   Musculoskeletal:  Positive for arthralgias.   Skin:  Negative for color change, pallor, rash and wound.   Neurological:  Positive for numbness (b/l feet). Negative for dizziness, tremors, weakness and light-headedness.   All other systems reviewed and are negative.      Physical Exam:  Body mass index is 40.32 kg/m².  /74   Pulse (!) 107   Ht 5' 6\" (1.676 m)   Wt 113 kg (249 lb 12.8 oz)   BMI 40.32 kg/m²    Wt Readings from Last 3 Encounters:   01/03/24 113 kg (249 lb 12.8 oz)   12/27/23 112 kg (246 lb)   08/25/23 113 kg (250 lb)       Physical Exam  Vitals reviewed.   Constitutional:       Appearance: Normal appearance. She is obese.   Cardiovascular:      Rate and Rhythm: Normal rate and regular rhythm.      Pulses: Normal pulses. no weak pulses          Dorsalis pedis pulses are 2+ on the right side and 2+ on the left side.      Heart sounds: Normal heart sounds.   Pulmonary:      Effort: Pulmonary effort is normal.      Breath sounds: Normal breath sounds.   Feet:      Right foot:      Skin integrity: No ulcer, skin breakdown, erythema, warmth, callus or dry skin.      Left foot:      Skin integrity: No ulcer, skin breakdown, erythema, warmth, callus or dry skin.   Neurological:      Mental Status: She is alert and oriented to person, place, and time.   Psychiatric:         Mood and Affect: Mood normal.         Thought Content: Thought content normal.       Patient's shoes and socks removed.    Right Foot/Ankle   Right Foot Inspection  Skin Exam: skin normal and skin intact. No dry skin, no warmth, no callus, no erythema, no maceration, no abnormal color, no pre-ulcer, no ulcer and no callus.     Toe Exam: ROM and strength within " "normal limits. No swelling, no tenderness, erythema and  no right toe deformity    Sensory   Vibration: diminished  Monofilament testing: diminished    Vascular  Capillary refills: < 3 seconds  The right DP pulse is 2+.     Left Foot/Ankle  Left Foot Inspection  Skin Exam: skin normal and skin intact. No dry skin, no warmth, no erythema, no maceration, normal color, no pre-ulcer, no ulcer and no callus.     Toe Exam: ROM and strength within normal limits. No swelling, no tenderness, no erythema and no left toe deformity.     Sensory   Vibration: diminished  Monofilament testing: diminished    Vascular  Capillary refills: < 3 seconds  The left DP pulse is 2+.     Assign Risk Category  No deformity present  Loss of protective sensation  No weak pulses  Risk: 1      Labs:   Lab Results   Component Value Date    HGBA1C 11.5 (A) 01/03/2024    HGBA1C 10.1 (H) 09/06/2023    HGBA1C 8.9 (H) 07/05/2023     Lab Results   Component Value Date    CREATININE 0.90 07/05/2023    CREATININE 0.89 03/18/2023    CREATININE 0.98 12/30/2022    BUN 17 07/05/2023     01/22/2016    K 4.2 07/05/2023     (H) 07/05/2023    CO2 20 (L) 07/05/2023     eGFR   Date Value Ref Range Status   07/05/2023 69 ml/min/1.73sq m Final     Lab Results   Component Value Date    HDL 50 03/18/2023    TRIG 313 (H) 03/18/2023     Lab Results   Component Value Date    ALT 17 07/05/2023    AST 23 07/05/2023    ALKPHOS 85 07/05/2023    BILITOT 0.4 01/22/2016     Lab Results   Component Value Date    GDH8GSIQQYUH 2.100 08/11/2021    XNZ5IRGFPJIM 1.620 11/13/2020     No results found for: \"FREET4\", \"TSI\"    Orders Placed This Encounter   Procedures    Comprehensive metabolic panel     This is a patient instruction: Patient fasting for 8 hours or longer recommended.     Standing Status:   Future     Standing Expiration Date:   1/3/2025    POCT hemoglobin A1c       There are no Patient Instructions on file for this visit.    Discussed with the patient and all " questioned fully answered. She will call me if any problems arise.    MERI Hernandez

## 2024-01-29 ENCOUNTER — TRANSCRIBE ORDERS (OUTPATIENT)
Dept: LAB | Facility: CLINIC | Age: 64
End: 2024-01-29

## 2024-01-29 ENCOUNTER — APPOINTMENT (OUTPATIENT)
Dept: LAB | Facility: CLINIC | Age: 64
End: 2024-01-29
Payer: COMMERCIAL

## 2024-01-29 DIAGNOSIS — M06.89 OTHER SPECIFIED RHEUMATOID ARTHRITIS, MULTIPLE SITES (HCC): ICD-10-CM

## 2024-01-29 DIAGNOSIS — I50.32 CHRONIC DIASTOLIC HF (HEART FAILURE) (HCC): ICD-10-CM

## 2024-01-29 DIAGNOSIS — Z79.899 LONG TERM USE OF DRUG: ICD-10-CM

## 2024-01-29 DIAGNOSIS — Z78.9 STATIN INTOLERANCE: ICD-10-CM

## 2024-01-29 DIAGNOSIS — Z78.9 STATIN INTOLERANCE: Primary | ICD-10-CM

## 2024-01-29 DIAGNOSIS — I10 ESSENTIAL HYPERTENSION, BENIGN: ICD-10-CM

## 2024-01-29 DIAGNOSIS — I25.119 ATHEROSCLEROSIS OF NATIVE CORONARY ARTERY WITH ANGINA PECTORIS, UNSPECIFIED WHETHER NATIVE OR TRANSPLANTED HEART (HCC): ICD-10-CM

## 2024-01-29 DIAGNOSIS — Z72.0 TOBACCO ABUSE: ICD-10-CM

## 2024-01-29 DIAGNOSIS — Z79.899 LONG TERM USE OF DRUG: Primary | ICD-10-CM

## 2024-01-29 LAB
ALBUMIN SERPL BCP-MCNC: 3.9 G/DL (ref 3.5–5)
ALP SERPL-CCNC: 90 U/L (ref 34–104)
ALT SERPL W P-5'-P-CCNC: 8 U/L (ref 7–52)
ANION GAP SERPL CALCULATED.3IONS-SCNC: 5 MMOL/L
AST SERPL W P-5'-P-CCNC: 17 U/L (ref 13–39)
BASOPHILS # BLD AUTO: 0.07 THOUSANDS/ÂΜL (ref 0–0.1)
BASOPHILS NFR BLD AUTO: 1 % (ref 0–1)
BILIRUB SERPL-MCNC: 0.57 MG/DL (ref 0.2–1)
BUN SERPL-MCNC: 14 MG/DL (ref 5–25)
CALCIUM SERPL-MCNC: 9.2 MG/DL (ref 8.4–10.2)
CHLORIDE SERPL-SCNC: 104 MMOL/L (ref 96–108)
CHOLEST SERPL-MCNC: 170 MG/DL
CO2 SERPL-SCNC: 26 MMOL/L (ref 21–32)
CREAT SERPL-MCNC: 0.62 MG/DL (ref 0.6–1.3)
CRP SERPL QL: 7.3 MG/L
EOSINOPHIL # BLD AUTO: 0.3 THOUSAND/ÂΜL (ref 0–0.61)
EOSINOPHIL NFR BLD AUTO: 4 % (ref 0–6)
ERYTHROCYTE [DISTWIDTH] IN BLOOD BY AUTOMATED COUNT: 13.2 % (ref 11.6–15.1)
ERYTHROCYTE [SEDIMENTATION RATE] IN BLOOD: 24 MM/HOUR (ref 0–29)
GFR SERPL CREATININE-BSD FRML MDRD: 96 ML/MIN/1.73SQ M
GLUCOSE P FAST SERPL-MCNC: 200 MG/DL (ref 65–99)
HCT VFR BLD AUTO: 47.2 % (ref 34.8–46.1)
HDLC SERPL-MCNC: 40 MG/DL
HGB BLD-MCNC: 15.6 G/DL (ref 11.5–15.4)
IMM GRANULOCYTES # BLD AUTO: 0.01 THOUSAND/UL (ref 0–0.2)
IMM GRANULOCYTES NFR BLD AUTO: 0 % (ref 0–2)
LDLC SERPL CALC-MCNC: 90 MG/DL (ref 0–100)
LYMPHOCYTES # BLD AUTO: 2.37 THOUSANDS/ÂΜL (ref 0.6–4.47)
LYMPHOCYTES NFR BLD AUTO: 31 % (ref 14–44)
MCH RBC QN AUTO: 28.8 PG (ref 26.8–34.3)
MCHC RBC AUTO-ENTMCNC: 33.1 G/DL (ref 31.4–37.4)
MCV RBC AUTO: 87 FL (ref 82–98)
MONOCYTES # BLD AUTO: 0.57 THOUSAND/ÂΜL (ref 0.17–1.22)
MONOCYTES NFR BLD AUTO: 7 % (ref 4–12)
NEUTROPHILS # BLD AUTO: 4.36 THOUSANDS/ÂΜL (ref 1.85–7.62)
NEUTS SEG NFR BLD AUTO: 57 % (ref 43–75)
NONHDLC SERPL-MCNC: 130 MG/DL
NRBC BLD AUTO-RTO: 0 /100 WBCS
PLATELET # BLD AUTO: 283 THOUSANDS/UL (ref 149–390)
PMV BLD AUTO: 10.6 FL (ref 8.9–12.7)
POTASSIUM SERPL-SCNC: 4.3 MMOL/L (ref 3.5–5.3)
PROT SERPL-MCNC: 7.2 G/DL (ref 6.4–8.4)
RBC # BLD AUTO: 5.42 MILLION/UL (ref 3.81–5.12)
SODIUM SERPL-SCNC: 135 MMOL/L (ref 135–147)
TRIGL SERPL-MCNC: 202 MG/DL
WBC # BLD AUTO: 7.68 THOUSAND/UL (ref 4.31–10.16)

## 2024-01-29 PROCEDURE — 86140 C-REACTIVE PROTEIN: CPT

## 2024-01-29 PROCEDURE — 80061 LIPID PANEL: CPT

## 2024-01-29 PROCEDURE — 80053 COMPREHEN METABOLIC PANEL: CPT

## 2024-01-29 PROCEDURE — 36415 COLL VENOUS BLD VENIPUNCTURE: CPT

## 2024-01-29 PROCEDURE — 85025 COMPLETE CBC W/AUTO DIFF WBC: CPT

## 2024-01-29 PROCEDURE — 85652 RBC SED RATE AUTOMATED: CPT

## 2024-02-04 DIAGNOSIS — F32.9 REACTIVE DEPRESSION (SITUATIONAL): ICD-10-CM

## 2024-02-05 RX ORDER — ALPRAZOLAM 0.25 MG/1
0.25 TABLET ORAL 4 TIMES DAILY PRN
Qty: 30 TABLET | Refills: 0 | Status: SHIPPED | OUTPATIENT
Start: 2024-02-05

## 2024-02-28 DIAGNOSIS — E11.65 UNCONTROLLED TYPE 2 DIABETES MELLITUS WITH HYPERGLYCEMIA (HCC): ICD-10-CM

## 2024-02-28 RX ORDER — PROCHLORPERAZINE 25 MG/1
SUPPOSITORY RECTAL
Qty: 1 EACH | Refills: 0 | Status: SHIPPED | OUTPATIENT
Start: 2024-02-28

## 2024-03-12 DIAGNOSIS — E11.65 UNCONTROLLED TYPE 2 DIABETES MELLITUS WITH HYPERGLYCEMIA (HCC): ICD-10-CM

## 2024-03-12 DIAGNOSIS — F32.9 REACTIVE DEPRESSION (SITUATIONAL): ICD-10-CM

## 2024-03-12 RX ORDER — ALPRAZOLAM 0.25 MG/1
0.25 TABLET ORAL 4 TIMES DAILY PRN
Qty: 30 TABLET | Refills: 0 | Status: SHIPPED | OUTPATIENT
Start: 2024-03-12

## 2024-03-13 RX ORDER — GABAPENTIN 400 MG/1
400 CAPSULE ORAL 4 TIMES DAILY
Qty: 120 CAPSULE | Refills: 6 | Status: SHIPPED | OUTPATIENT
Start: 2024-03-13

## 2024-03-14 ENCOUNTER — TELEPHONE (OUTPATIENT)
Dept: FAMILY MEDICINE CLINIC | Facility: CLINIC | Age: 64
End: 2024-03-14

## 2024-03-14 ENCOUNTER — APPOINTMENT (EMERGENCY)
Dept: RADIOLOGY | Facility: HOSPITAL | Age: 64
DRG: 287 | End: 2024-03-14
Payer: COMMERCIAL

## 2024-03-14 ENCOUNTER — HOSPITAL ENCOUNTER (INPATIENT)
Facility: HOSPITAL | Age: 64
LOS: 2 days | Discharge: HOME | DRG: 287 | End: 2024-03-16
Attending: EMERGENCY MEDICINE | Admitting: INTERNAL MEDICINE
Payer: COMMERCIAL

## 2024-03-14 DIAGNOSIS — Z00.00 ROUTINE ADULT HEALTH MAINTENANCE: ICD-10-CM

## 2024-03-14 DIAGNOSIS — R07.9 CHEST PAIN: ICD-10-CM

## 2024-03-14 DIAGNOSIS — E11.00 TYPE 2 DIABETES MELLITUS WITH HYPEROSMOLARITY WITHOUT COMA, UNSPECIFIED WHETHER LONG TERM INSULIN USE (HCC): Primary | ICD-10-CM

## 2024-03-14 DIAGNOSIS — R79.89 ELEVATED TROPONIN: ICD-10-CM

## 2024-03-14 DIAGNOSIS — R06.00 DYSPNEA: ICD-10-CM

## 2024-03-14 DIAGNOSIS — G43.009 MIGRAINE WITHOUT AURA AND WITHOUT STATUS MIGRAINOSUS, NOT INTRACTABLE: ICD-10-CM

## 2024-03-14 DIAGNOSIS — K04.7 TOOTH ABSCESS: ICD-10-CM

## 2024-03-14 DIAGNOSIS — I21.4 NSTEMI (NON-ST ELEVATED MYOCARDIAL INFARCTION) (HCC): ICD-10-CM

## 2024-03-14 DIAGNOSIS — R11.2 NAUSEA VOMITING AND DIARRHEA: ICD-10-CM

## 2024-03-14 DIAGNOSIS — R73.9 HYPERGLYCEMIA: ICD-10-CM

## 2024-03-14 DIAGNOSIS — K04.7 DENTAL ABSCESS: ICD-10-CM

## 2024-03-14 DIAGNOSIS — E11.10 DKA (DIABETIC KETOACIDOSIS) (HCC): ICD-10-CM

## 2024-03-14 DIAGNOSIS — R19.7 NAUSEA VOMITING AND DIARRHEA: ICD-10-CM

## 2024-03-14 LAB
2HR DELTA HS TROPONIN: -110 NG/L
4HR DELTA HS TROPONIN: -52 NG/L
ALBUMIN SERPL BCP-MCNC: 3.8 G/DL (ref 3.5–5)
ALP SERPL-CCNC: 99 U/L (ref 34–104)
ALT SERPL W P-5'-P-CCNC: 9 U/L (ref 7–52)
ANION GAP SERPL CALCULATED.3IONS-SCNC: 11 MMOL/L (ref 4–13)
ANION GAP SERPL CALCULATED.3IONS-SCNC: 12 MMOL/L (ref 4–13)
ANION GAP SERPL CALCULATED.3IONS-SCNC: 13 MMOL/L (ref 4–13)
APTT PPP: 32 SECONDS (ref 23–37)
AST SERPL W P-5'-P-CCNC: 15 U/L (ref 13–39)
ATRIAL RATE: 124 BPM
ATRIAL RATE: 90 BPM
ATRIAL RATE: 91 BPM
B-OH-BUTYR SERPL-MCNC: 0.39 MMOL/L (ref 0.02–0.27)
BACTERIA UR QL AUTO: ABNORMAL /HPF
BASE EX.OXY STD BLDV CALC-SCNC: 24.6 % (ref 60–80)
BASE EXCESS BLDV CALC-SCNC: -1.8 MMOL/L
BASOPHILS # BLD AUTO: 0.05 THOUSANDS/ÂΜL (ref 0–0.1)
BASOPHILS NFR BLD AUTO: 0 % (ref 0–1)
BILIRUB SERPL-MCNC: 0.68 MG/DL (ref 0.2–1)
BILIRUB UR QL STRIP: NEGATIVE
BUDDING YEAST: PRESENT
BUN SERPL-MCNC: 13 MG/DL (ref 5–25)
BUN SERPL-MCNC: 15 MG/DL (ref 5–25)
BUN SERPL-MCNC: 16 MG/DL (ref 5–25)
CALCIUM SERPL-MCNC: 8.5 MG/DL (ref 8.4–10.2)
CALCIUM SERPL-MCNC: 8.9 MG/DL (ref 8.4–10.2)
CALCIUM SERPL-MCNC: 9.3 MG/DL (ref 8.4–10.2)
CARDIAC TROPONIN I PNL SERPL HS: 489 NG/L
CARDIAC TROPONIN I PNL SERPL HS: 547 NG/L
CARDIAC TROPONIN I PNL SERPL HS: 599 NG/L
CHLORIDE SERPL-SCNC: 100 MMOL/L (ref 96–108)
CHLORIDE SERPL-SCNC: 101 MMOL/L (ref 96–108)
CHLORIDE SERPL-SCNC: 104 MMOL/L (ref 96–108)
CLARITY UR: ABNORMAL
CO2 SERPL-SCNC: 18 MMOL/L (ref 21–32)
CO2 SERPL-SCNC: 20 MMOL/L (ref 21–32)
CO2 SERPL-SCNC: 20 MMOL/L (ref 21–32)
COLOR UR: YELLOW
CREAT SERPL-MCNC: 0.78 MG/DL (ref 0.6–1.3)
CREAT SERPL-MCNC: 0.84 MG/DL (ref 0.6–1.3)
CREAT SERPL-MCNC: 0.9 MG/DL (ref 0.6–1.3)
D DIMER PPP FEU-MCNC: 1.37 UG/ML FEU
EOSINOPHIL # BLD AUTO: 0.12 THOUSAND/ÂΜL (ref 0–0.61)
EOSINOPHIL NFR BLD AUTO: 1 % (ref 0–6)
ERYTHROCYTE [DISTWIDTH] IN BLOOD BY AUTOMATED COUNT: 14 % (ref 11.6–15.1)
GFR SERPL CREATININE-BSD FRML MDRD: 68 ML/MIN/1.73SQ M
GFR SERPL CREATININE-BSD FRML MDRD: 74 ML/MIN/1.73SQ M
GFR SERPL CREATININE-BSD FRML MDRD: 81 ML/MIN/1.73SQ M
GLUCOSE SERPL-MCNC: 188 MG/DL (ref 65–140)
GLUCOSE SERPL-MCNC: 197 MG/DL (ref 65–140)
GLUCOSE SERPL-MCNC: 210 MG/DL (ref 65–140)
GLUCOSE SERPL-MCNC: 323 MG/DL (ref 65–140)
GLUCOSE SERPL-MCNC: 329 MG/DL (ref 65–140)
GLUCOSE SERPL-MCNC: 383 MG/DL (ref 65–140)
GLUCOSE UR STRIP-MCNC: ABNORMAL MG/DL
HCO3 BLDV-SCNC: 24.6 MMOL/L (ref 24–30)
HCT VFR BLD AUTO: 47.6 % (ref 34.8–46.1)
HGB BLD-MCNC: 16.2 G/DL (ref 11.5–15.4)
HGB UR QL STRIP.AUTO: ABNORMAL
IMM GRANULOCYTES # BLD AUTO: 0.06 THOUSAND/UL (ref 0–0.2)
IMM GRANULOCYTES NFR BLD AUTO: 1 % (ref 0–2)
INR PPP: 1.17 (ref 0.84–1.19)
KETONES UR STRIP-MCNC: ABNORMAL MG/DL
LEUKOCYTE ESTERASE UR QL STRIP: NEGATIVE
LYMPHOCYTES # BLD AUTO: 3.1 THOUSANDS/ÂΜL (ref 0.6–4.47)
LYMPHOCYTES NFR BLD AUTO: 27 % (ref 14–44)
MAGNESIUM SERPL-MCNC: 1.4 MG/DL (ref 1.9–2.7)
MAGNESIUM SERPL-MCNC: 1.9 MG/DL (ref 1.9–2.7)
MCH RBC QN AUTO: 29.2 PG (ref 26.8–34.3)
MCHC RBC AUTO-ENTMCNC: 34 G/DL (ref 31.4–37.4)
MCV RBC AUTO: 86 FL (ref 82–98)
MONOCYTES # BLD AUTO: 0.77 THOUSAND/ÂΜL (ref 0.17–1.22)
MONOCYTES NFR BLD AUTO: 7 % (ref 4–12)
NEUTROPHILS # BLD AUTO: 7.56 THOUSANDS/ÂΜL (ref 1.85–7.62)
NEUTS SEG NFR BLD AUTO: 64 % (ref 43–75)
NITRITE UR QL STRIP: NEGATIVE
NON-SQ EPI CELLS URNS QL MICRO: ABNORMAL /HPF
NRBC BLD AUTO-RTO: 0 /100 WBCS
O2 CT BLDV-SCNC: 5.2 ML/DL
P AXIS: 51 DEGREES
P AXIS: 56 DEGREES
P AXIS: 62 DEGREES
PCO2 BLDV: 47.9 MM HG (ref 42–50)
PH BLDV: 7.33 [PH] (ref 7.3–7.4)
PH UR STRIP.AUTO: 6 [PH] (ref 4.5–8)
PLATELET # BLD AUTO: 306 THOUSANDS/UL (ref 149–390)
PMV BLD AUTO: 11 FL (ref 8.9–12.7)
PO2 BLDV: 17.7 MM HG (ref 35–45)
POTASSIUM SERPL-SCNC: 3.9 MMOL/L (ref 3.5–5.3)
POTASSIUM SERPL-SCNC: 3.9 MMOL/L (ref 3.5–5.3)
POTASSIUM SERPL-SCNC: 4.1 MMOL/L (ref 3.5–5.3)
PR INTERVAL: 144 MS
PR INTERVAL: 148 MS
PR INTERVAL: 148 MS
PROT SERPL-MCNC: 7.1 G/DL (ref 6.4–8.4)
PROT UR STRIP-MCNC: NEGATIVE MG/DL
PROTHROMBIN TIME: 14.8 SECONDS (ref 11.6–14.5)
QRS AXIS: 22 DEGREES
QRS AXIS: 33 DEGREES
QRS AXIS: 82 DEGREES
QRSD INTERVAL: 84 MS
QT INTERVAL: 320 MS
QT INTERVAL: 406 MS
QT INTERVAL: 414 MS
QTC INTERVAL: 459 MS
QTC INTERVAL: 499 MS
QTC INTERVAL: 506 MS
RBC # BLD AUTO: 5.54 MILLION/UL (ref 3.81–5.12)
RBC #/AREA URNS AUTO: ABNORMAL /HPF
SODIUM SERPL-SCNC: 131 MMOL/L (ref 135–147)
SODIUM SERPL-SCNC: 133 MMOL/L (ref 135–147)
SODIUM SERPL-SCNC: 135 MMOL/L (ref 135–147)
SP GR UR STRIP.AUTO: 1.01 (ref 1–1.03)
T WAVE AXIS: 136 DEGREES
T WAVE AXIS: 154 DEGREES
T WAVE AXIS: 164 DEGREES
UROBILINOGEN UR QL STRIP.AUTO: 0.2 E.U./DL
VENTRICULAR RATE: 124 BPM
VENTRICULAR RATE: 90 BPM
VENTRICULAR RATE: 91 BPM
WBC # BLD AUTO: 11.66 THOUSAND/UL (ref 4.31–10.16)
WBC #/AREA URNS AUTO: ABNORMAL /HPF

## 2024-03-14 PROCEDURE — 93005 ELECTROCARDIOGRAM TRACING: CPT

## 2024-03-14 PROCEDURE — 71275 CT ANGIOGRAPHY CHEST: CPT

## 2024-03-14 PROCEDURE — 96376 TX/PRO/DX INJ SAME DRUG ADON: CPT

## 2024-03-14 PROCEDURE — 96361 HYDRATE IV INFUSION ADD-ON: CPT

## 2024-03-14 PROCEDURE — 85025 COMPLETE CBC W/AUTO DIFF WBC: CPT

## 2024-03-14 PROCEDURE — 99285 EMERGENCY DEPT VISIT HI MDM: CPT

## 2024-03-14 PROCEDURE — 82010 KETONE BODYS QUAN: CPT

## 2024-03-14 PROCEDURE — 85730 THROMBOPLASTIN TIME PARTIAL: CPT

## 2024-03-14 PROCEDURE — 84484 ASSAY OF TROPONIN QUANT: CPT

## 2024-03-14 PROCEDURE — 82805 BLOOD GASES W/O2 SATURATION: CPT

## 2024-03-14 PROCEDURE — 96366 THER/PROPH/DIAG IV INF ADDON: CPT

## 2024-03-14 PROCEDURE — 80048 BASIC METABOLIC PNL TOTAL CA: CPT

## 2024-03-14 PROCEDURE — 36415 COLL VENOUS BLD VENIPUNCTURE: CPT

## 2024-03-14 PROCEDURE — 82948 REAGENT STRIP/BLOOD GLUCOSE: CPT

## 2024-03-14 PROCEDURE — 85610 PROTHROMBIN TIME: CPT

## 2024-03-14 PROCEDURE — 81001 URINALYSIS AUTO W/SCOPE: CPT

## 2024-03-14 PROCEDURE — 93010 ELECTROCARDIOGRAM REPORT: CPT | Performed by: INTERNAL MEDICINE

## 2024-03-14 PROCEDURE — 96368 THER/DIAG CONCURRENT INF: CPT

## 2024-03-14 PROCEDURE — 96375 TX/PRO/DX INJ NEW DRUG ADDON: CPT

## 2024-03-14 PROCEDURE — 99223 1ST HOSP IP/OBS HIGH 75: CPT | Performed by: INTERNAL MEDICINE

## 2024-03-14 PROCEDURE — 85379 FIBRIN DEGRADATION QUANT: CPT

## 2024-03-14 PROCEDURE — 80053 COMPREHEN METABOLIC PANEL: CPT

## 2024-03-14 PROCEDURE — 83735 ASSAY OF MAGNESIUM: CPT

## 2024-03-14 PROCEDURE — 96365 THER/PROPH/DIAG IV INF INIT: CPT

## 2024-03-14 RX ORDER — ACETAMINOPHEN 325 MG/1
650 TABLET ORAL ONCE
Status: COMPLETED | OUTPATIENT
Start: 2024-03-14 | End: 2024-03-14

## 2024-03-14 RX ORDER — SODIUM CHLORIDE, SODIUM GLUCONATE, SODIUM ACETATE, POTASSIUM CHLORIDE, MAGNESIUM CHLORIDE, SODIUM PHOSPHATE, DIBASIC, AND POTASSIUM PHOSPHATE .53; .5; .37; .037; .03; .012; .00082 G/100ML; G/100ML; G/100ML; G/100ML; G/100ML; G/100ML; G/100ML
1000 INJECTION, SOLUTION INTRAVENOUS ONCE
Status: COMPLETED | OUTPATIENT
Start: 2024-03-14 | End: 2024-03-14

## 2024-03-14 RX ORDER — DEXTROSE AND SODIUM CHLORIDE 5; .9 G/100ML; G/100ML
75 INJECTION, SOLUTION INTRAVENOUS CONTINUOUS
Status: DISCONTINUED | OUTPATIENT
Start: 2024-03-14 | End: 2024-03-14

## 2024-03-14 RX ORDER — NICOTINE 21 MG/24HR
1 PATCH, TRANSDERMAL 24 HOURS TRANSDERMAL DAILY
Status: DISCONTINUED | OUTPATIENT
Start: 2024-03-15 | End: 2024-03-16 | Stop reason: HOSPADM

## 2024-03-14 RX ORDER — POTASSIUM CHLORIDE 20 MEQ/1
40 TABLET, EXTENDED RELEASE ORAL ONCE
Status: COMPLETED | OUTPATIENT
Start: 2024-03-14 | End: 2024-03-14

## 2024-03-14 RX ORDER — INSULIN LISPRO 100 [IU]/ML
1-6 INJECTION, SOLUTION INTRAVENOUS; SUBCUTANEOUS
Status: DISCONTINUED | OUTPATIENT
Start: 2024-03-15 | End: 2024-03-16 | Stop reason: HOSPADM

## 2024-03-14 RX ORDER — HEPARIN SODIUM 10000 [USP'U]/100ML
3-20 INJECTION, SOLUTION INTRAVENOUS
Status: DISCONTINUED | OUTPATIENT
Start: 2024-03-14 | End: 2024-03-15

## 2024-03-14 RX ORDER — SODIUM CHLORIDE, SODIUM GLUCONATE, SODIUM ACETATE, POTASSIUM CHLORIDE, MAGNESIUM CHLORIDE, SODIUM PHOSPHATE, DIBASIC, AND POTASSIUM PHOSPHATE .53; .5; .37; .037; .03; .012; .00082 G/100ML; G/100ML; G/100ML; G/100ML; G/100ML; G/100ML; G/100ML
75 INJECTION, SOLUTION INTRAVENOUS CONTINUOUS
Status: DISCONTINUED | OUTPATIENT
Start: 2024-03-14 | End: 2024-03-14

## 2024-03-14 RX ORDER — INSULIN LISPRO 100 [IU]/ML
1-5 INJECTION, SOLUTION INTRAVENOUS; SUBCUTANEOUS
Status: DISCONTINUED | OUTPATIENT
Start: 2024-03-14 | End: 2024-03-16 | Stop reason: HOSPADM

## 2024-03-14 RX ORDER — AMOXICILLIN AND CLAVULANATE POTASSIUM 875; 125 MG/1; MG/1
1 TABLET, FILM COATED ORAL ONCE
Status: COMPLETED | OUTPATIENT
Start: 2024-03-14 | End: 2024-03-14

## 2024-03-14 RX ORDER — SODIUM CHLORIDE 9 MG/ML
50 INJECTION, SOLUTION INTRAVENOUS CONTINUOUS
Status: DISCONTINUED | OUTPATIENT
Start: 2024-03-14 | End: 2024-03-16 | Stop reason: HOSPADM

## 2024-03-14 RX ORDER — ACETAMINOPHEN 325 MG/1
650 TABLET ORAL EVERY 6 HOURS PRN
Status: DISCONTINUED | OUTPATIENT
Start: 2024-03-14 | End: 2024-03-16 | Stop reason: HOSPADM

## 2024-03-14 RX ORDER — HEPARIN SODIUM 1000 [USP'U]/ML
4000 INJECTION, SOLUTION INTRAVENOUS; SUBCUTANEOUS EVERY 6 HOURS PRN
Status: DISCONTINUED | OUTPATIENT
Start: 2024-03-14 | End: 2024-03-15

## 2024-03-14 RX ORDER — HEPARIN SODIUM 1000 [USP'U]/ML
2000 INJECTION, SOLUTION INTRAVENOUS; SUBCUTANEOUS EVERY 6 HOURS PRN
Status: DISCONTINUED | OUTPATIENT
Start: 2024-03-14 | End: 2024-03-15

## 2024-03-14 RX ORDER — AMOXICILLIN AND CLAVULANATE POTASSIUM 875; 125 MG/1; MG/1
1 TABLET, FILM COATED ORAL EVERY 12 HOURS SCHEDULED
Status: DISCONTINUED | OUTPATIENT
Start: 2024-03-15 | End: 2024-03-16 | Stop reason: HOSPADM

## 2024-03-14 RX ORDER — ONDANSETRON 2 MG/ML
4 INJECTION INTRAMUSCULAR; INTRAVENOUS EVERY 6 HOURS PRN
Status: DISCONTINUED | OUTPATIENT
Start: 2024-03-14 | End: 2024-03-16 | Stop reason: HOSPADM

## 2024-03-14 RX ORDER — ASPIRIN 325 MG
325 TABLET ORAL ONCE
Status: COMPLETED | OUTPATIENT
Start: 2024-03-14 | End: 2024-03-14

## 2024-03-14 RX ORDER — MAGNESIUM SULFATE HEPTAHYDRATE 40 MG/ML
2 INJECTION, SOLUTION INTRAVENOUS ONCE
Status: COMPLETED | OUTPATIENT
Start: 2024-03-14 | End: 2024-03-14

## 2024-03-14 RX ORDER — ONDANSETRON 2 MG/ML
4 INJECTION INTRAMUSCULAR; INTRAVENOUS ONCE
Status: COMPLETED | OUTPATIENT
Start: 2024-03-14 | End: 2024-03-14

## 2024-03-14 RX ORDER — NITROGLYCERIN 0.4 MG/1
0.4 TABLET SUBLINGUAL ONCE
Status: COMPLETED | OUTPATIENT
Start: 2024-03-14 | End: 2024-03-14

## 2024-03-14 RX ORDER — HEPARIN SODIUM 1000 [USP'U]/ML
4000 INJECTION, SOLUTION INTRAVENOUS; SUBCUTANEOUS ONCE
Status: COMPLETED | OUTPATIENT
Start: 2024-03-14 | End: 2024-03-14

## 2024-03-14 RX ORDER — SODIUM CHLORIDE 9 MG/ML
3 INJECTION INTRAVENOUS
Status: DISCONTINUED | OUTPATIENT
Start: 2024-03-14 | End: 2024-03-16 | Stop reason: HOSPADM

## 2024-03-14 RX ADMIN — SODIUM CHLORIDE 50 ML/HR: 0.9 INJECTION, SOLUTION INTRAVENOUS at 21:51

## 2024-03-14 RX ADMIN — ASPIRIN 325 MG ORAL TABLET 325 MG: 325 PILL ORAL at 17:44

## 2024-03-14 RX ADMIN — MAGNESIUM SULFATE HEPTAHYDRATE 2 G: 40 INJECTION, SOLUTION INTRAVENOUS at 18:35

## 2024-03-14 RX ADMIN — ONDANSETRON 4 MG: 2 INJECTION INTRAMUSCULAR; INTRAVENOUS at 21:48

## 2024-03-14 RX ADMIN — DEXTROSE AND SODIUM CHLORIDE 75 ML/HR: 5; .9 INJECTION, SOLUTION INTRAVENOUS at 21:27

## 2024-03-14 RX ADMIN — SODIUM CHLORIDE 1000 ML: 0.9 INJECTION, SOLUTION INTRAVENOUS at 16:35

## 2024-03-14 RX ADMIN — ONDANSETRON 4 MG: 2 INJECTION INTRAMUSCULAR; INTRAVENOUS at 16:35

## 2024-03-14 RX ADMIN — SODIUM CHLORIDE 11.3 UNITS/HR: 9 INJECTION, SOLUTION INTRAVENOUS at 21:26

## 2024-03-14 RX ADMIN — HEPARIN SODIUM 11.1 UNITS/KG/HR: 10000 INJECTION, SOLUTION INTRAVENOUS at 18:47

## 2024-03-14 RX ADMIN — ACETAMINOPHEN 650 MG: 325 TABLET, FILM COATED ORAL at 21:48

## 2024-03-14 RX ADMIN — AMOXICILLIN AND CLAVULANATE POTASSIUM 1 TABLET: 875; 125 TABLET, FILM COATED ORAL at 16:37

## 2024-03-14 RX ADMIN — NITROGLYCERIN 0.4 MG: 0.4 TABLET SUBLINGUAL at 17:43

## 2024-03-14 RX ADMIN — HEPARIN SODIUM 4000 UNITS: 1000 INJECTION INTRAVENOUS; SUBCUTANEOUS at 18:45

## 2024-03-14 RX ADMIN — POTASSIUM CHLORIDE 40 MEQ: 1500 TABLET, EXTENDED RELEASE ORAL at 18:34

## 2024-03-14 RX ADMIN — SODIUM CHLORIDE, SODIUM GLUCONATE, SODIUM ACETATE, POTASSIUM CHLORIDE, MAGNESIUM CHLORIDE, SODIUM PHOSPHATE, DIBASIC, AND POTASSIUM PHOSPHATE 1000 ML: .53; .5; .37; .037; .03; .012; .00082 INJECTION, SOLUTION INTRAVENOUS at 18:36

## 2024-03-14 RX ADMIN — INSULIN HUMAN 11.3 UNITS: 100 INJECTION, SOLUTION PARENTERAL at 18:42

## 2024-03-14 RX ADMIN — IOHEXOL 69 ML: 350 INJECTION, SOLUTION INTRAVENOUS at 19:45

## 2024-03-14 NOTE — Clinical Note
Post procedure Ada is fully awake alert responsive and appropriate.  Intermittently complaining during groin access and closure resolved now.  C/o localized discomfort right groin which is slowly resolving.  Conversation initiated, speech clear.  FSC, FLORES.  Respirations eupenic, O2 off and tolerated.  Readied for transfer.  Spoke with DO Sinai and findings discussed, questions answered..

## 2024-03-14 NOTE — Clinical Note
Unable to obtain femoral access after multiple attempts using ultrasound guidance c/o localized tenderness right groin with access attempts and medicated for the same

## 2024-03-14 NOTE — ED PROVIDER NOTES
History  Chief Complaint   Patient presents with    Dental Pain     Pt began with dental swelling and pain yesterday.      Dizziness     Yesterday she began with dizziness and chest pain.  Also noted nausea and one episode of vomiting.  Also noted that she does sometimes SOB.       64-year-old female presents with multiple complaints.  Patient has a history of insulin-dependent diabetes, prior MI, Crohn's disease, and sleep apnea.  Starting yesterday patient reports upper right dental swelling.  Patient also reports nausea, 1 episode of vomiting, and multiple episodes of nonbloody diarrhea.  Patient also reports chest discomfort that is described as left of midline, exertional, pleuritic, and constant.  Patient reports dyspnea that is exertional.  Patient also reports that palpitations.  Patient called her dentist today but was told she needs to be medically healthy before they will see her in the office.        Prior to Admission Medications   Prescriptions Last Dose Informant Patient Reported? Taking?   ALPRAZolam (XANAX) 0.25 mg tablet   No No   Sig: Take 1 tablet (0.25 mg total) by mouth 4 (four) times a day as needed for anxiety   Alcohol Swabs 70 % PADS  Self No No   Sig: Use 4 per day   Continuous Blood Gluc  (Dexcom G6 ) CRISTINA   No No   Sig: DISP 1    Continuous Blood Gluc Sensor (Dexcom G6 Sensor) MISC   No No   Sig: Use 1 sensor every 10 days, disp 90 day supply   Continuous Blood Gluc Transmit (Dexcom G6 Transmitter) MISC   No No   Sig: USE DAILY AS DIRECTED FOR CGM - CHANGE EVERY 3 MONTHS   Humira, 2 Pen, 40 MG/0.4ML PNKT   No No   Sig: Inject 40 mg under the skin every 14 (fourteen) days   Hydrocod Francisco-Chlorphe Francisco ER (TUSSIONEX) 10-8 mg/5 mL ER suspension   No No   Sig: Take 5 mL by mouth every 12 (twelve) hours as needed for cough Max Daily Amount: 10 mL   Patient not taking: Reported on 12/12/2023   Insulin Pen Needle (Pen Needles) 32G X 5 MM MISC  Self No No   Sig: Use 4 per  day   PB-Hyoscy-Atropine-Scopolamine (DONNATAL PO)  Self Yes No   Sig: Take by mouth   SUMAtriptan (IMITREX) 50 mg tablet   No No   Sig: Take 1 tablet (50 mg total) by mouth once as needed for migraine for up to 1 dose may repeat in 2 hours if necessary   acetaminophen (TYLENOL) 500 mg tablet  Self Yes No   Sig: Take 1,000 mg by mouth every 6 (six) hours as needed for mild pain   albuterol (2.5 mg/3 mL) 0.083 % nebulizer solution   No No   Sig: Take 3 mL (2.5 mg total) by nebulization every 6 (six) hours as needed for wheezing or shortness of breath   Patient not taking: Reported on 12/12/2023   albuterol (2.5 mg/3 mL) 0.083 % nebulizer solution   No No   Sig: Take 3 mL (2.5 mg total) by nebulization every 6 (six) hours as needed for wheezing or shortness of breath   Patient not taking: Reported on 12/12/2023   albuterol (Ventolin HFA) 90 mcg/act inhaler   No No   Sig: Inhale 2 puffs every 6 (six) hours as needed for wheezing   aspirin (ECOTRIN LOW STRENGTH) 81 mg EC tablet  Self No No   Sig: Take 1 tablet (81 mg total) by mouth daily   carvedilol (COREG) 6.25 mg tablet   No No   Sig: Take 1 tablet (6.25 mg total) by mouth every 12 (twelve) hours   cholecalciferol (VITAMIN D3) 1,000 units tablet  Self No No   Sig: Take 1 tablet (1,000 Units total) by mouth daily   Patient taking differently: Take 1,000 Units by mouth 2 (two) times a day   erythromycin (ILOTYCIN) ophthalmic ointment   No No   Sig: Administer 0.5 inches to both eyes every 6 (six) hours   Patient not taking: Reported on 1/3/2024   ezetimibe (ZETIA) 10 mg tablet   No No   Sig: Take 1 tablet (10 mg total) by mouth daily   Patient not taking: Reported on 8/25/2023   folic acid (FOLVITE) 1 mg tablet  Self Yes No   Patient not taking: Reported on 4/17/2023   gabapentin (NEURONTIN) 400 mg capsule   No No   Sig: Take 1 capsule (400 mg total) by mouth 4 (four) times a day   hyoscyamine (LEVSIN/SL) 0.125 mg SL tablet   No No   Sig: TAKE 1 TABLET (0.125 MG  TOTAL) BY MOUTH EVERY 6 (SIX) HOURS AS NEEDED (ESOPHAGEAL SPASM)   insulin aspart (NovoLOG FlexPen) 100 UNIT/ML injection pen   No No   Sig: 10 units before breakfast and dinner and 6 units before lunch.   insulin degludec (Tresiba FlexTouch) 100 units/mL injection pen   No No   Sig: Inject 22 units daily.   losartan (COZAAR) 25 mg tablet   No No   Sig: Take 1 tablet (25 mg total) by mouth daily   mesalamine (LIALDA) 1.2 g EC tablet  Self No No   Sig: Take 2 tablets (2.4 g total) by mouth daily with breakfast   Patient not taking: Reported on 12/27/2023   omeprazole (PriLOSEC) 40 MG capsule  Self No No   Sig: Take 1 capsule (40 mg total) by mouth daily   Patient taking differently: Take 40 mg by mouth if needed   ondansetron (ZOFRAN-ODT) 4 mg disintegrating tablet   No No   Sig: Take 1 tablet (4 mg total) by mouth every 8 (eight) hours as needed for nausea or vomiting   predniSONE 10 mg tablet   No No   Sig: Take 4 tabs on day 1,2 with food, 3 tabs on day 3,4 with food, 2 tabs on day 5,6 with food, 1 tab on day 7,8 with food   Patient not taking: Reported on 12/12/2023   predniSONE 5 mg tablet  Self Yes No   Sig: Take 10 mg by mouth daily   rosuvastatin (CRESTOR) 10 MG tablet   Yes No   Sig: TAKE 1 TABLET BY MOUTH EVERY DAY AT NIGHT   ticagrelor (BRILINTA) 90 MG  Self No No   Sig: Take 1 tablet (90 mg total) by mouth every 12 (twelve) hours   Patient not taking: Reported on 12/12/2023      Facility-Administered Medications: None       Past Medical History:   Diagnosis Date    Coronary artery disease     Crohn's colitis (HCC)     Diabetes mellitus (HCC)     Fibromyalgia     Gastric ulcer     [facility] GI SURGEON    HTN (hypertension)     Hyperlipidemia     IBD (inflammatory bowel disease) 11/2015    [facility] GI SURGEON    Migraine     Myocardial infarction (HCC)        Past Surgical History:   Procedure Laterality Date    CARDIAC CATHETERIZATION      CARDIAC CATHETERIZATION Left 03/07/2022    Procedure: Cardiac  catheterization;  Surgeon: Jasen Hayden MD;  Location: [facility];  Service: Cardiology    CARDIAC CATHETERIZATION N/A 03/07/2022    Procedure: Cardiac Coronary Angiogram;  Surgeon: Jasen Hayden MD;  Location: [facility];  Service: Cardiology    CARDIAC CATHETERIZATION N/A 03/07/2022    Procedure: Cardiac pci;  Surgeon: Jasen Hayden MD;  Location: [facility];  Service: Cardiology    CHOLECYSTECTOMY      COLONOSCOPY  12/03/2018    internal hemorrhoids, 3mm tubular adenoma polyp transverse colon, bx negative for dysplasia    COLONOSCOPY  11/2015    COLONOSCOPY W/ BIOPSIES  2022    ileitis; dr rao c/w crohns    CORONARY STENT PLACEMENT      DENTAL SURGERY      [place] teeth extraction    EGD  12/2018    2cm hiatal hernia, gastritis bx shows foci of intestinal metaplasia, negative Hpylori    EGD  2015    ESOPHAGITIS/MALLHIATUS HERNIA  FOUND    HYSTERECTOMY      MAMMO STEREOTACTIC BREAST BIOPSY LEFT (ALL INC) Left 10/27/2021    TUBAL LIGATION      Bilateral       Family History   Problem Relation Age of Onset    Coronary artery disease Mother     Dementia Mother     Stroke Mother     No Known Problems Father     Cervical cancer Sister 40    Heart disease Brother     No Known Problems Maternal Grandmother     No Known Problems Maternal Grandfather     No Known Problems Paternal Grandmother     No Known Problems Paternal Grandfather     Alcohol abuse Neg Hx     Substance Abuse Neg Hx      I have reviewed and agree with the history as documented.    E-Cigarette/Vaping    E-Cigarette Use Never User      E-Cigarette/Vaping Substances    Nicotine No     THC No     CBD No     Flavoring No      Social History     Tobacco Use    Smoking status: Every Day     Current packs/day: 0.50     Average packs/day: 0.5 packs/day for 40.2 years (20.1 ttl pk-yrs)     Types: Cigarettes     Start date: 1984    Smokeless tobacco: Never    Tobacco comments:     10 cigarettes daily    Vaping Use    Vaping status:  Never Used   Substance Use Topics    Alcohol use: Not Currently     Comment: Rarely    Drug use: Never        Review of Systems   Constitutional:  Negative for chills and fever.   HENT:  Positive for dental problem. Negative for ear pain and sore throat.    Eyes:  Negative for pain and visual disturbance.   Respiratory:  Positive for shortness of breath. Negative for cough.    Cardiovascular:  Positive for chest pain and palpitations.   Gastrointestinal:  Positive for abdominal pain, diarrhea, nausea and vomiting.   Genitourinary:  Negative for dysuria and hematuria.   Musculoskeletal:  Negative for arthralgias and back pain.   Skin:  Negative for color change and rash.   Neurological:  Negative for seizures and syncope.   All other systems reviewed and are negative.      Physical Exam  ED Triage Vitals   Temperature Pulse Respirations Blood Pressure SpO2   03/14/24 1552 03/14/24 1547 03/14/24 1547 03/14/24 1552 03/14/24 1547   98 °F (36.7 °C) (!) 121 20 94/78 98 %      Temp Source Heart Rate Source Patient Position - Orthostatic VS BP Location FiO2 (%)   03/14/24 1552 03/14/24 1613 03/14/24 1613 03/14/24 1613 --   Oral Monitor Sitting Left arm       Pain Score       --                    Orthostatic Vital Signs  Vitals:    03/14/24 1552 03/14/24 1613 03/14/24 1730 03/14/24 1745   BP: 94/78 114/69 125/63 122/62   Pulse:  103 (!) 108 92   Patient Position - Orthostatic VS:  Sitting Sitting Sitting       Physical Exam  Vitals and nursing note reviewed.   Constitutional:       General: She is not in acute distress.     Appearance: Normal appearance. She is ill-appearing. She is not toxic-appearing or diaphoretic.      Comments: Flushed   HENT:      Head: Normocephalic and atraumatic.      Right Ear: External ear normal.      Left Ear: External ear normal.      Nose: Nose normal.      Mouth/Throat:      Mouth: Mucous membranes are moist.      Dentition: Gingival swelling present. No dental tenderness or dental  abscesses.      Comments: Gingival edema adjacent to tooth #5.  No fluctuance.  Eyes:      General:         Right eye: No discharge.         Left eye: No discharge.      Conjunctiva/sclera: Conjunctivae normal.   Cardiovascular:      Rate and Rhythm: Regular rhythm. Tachycardia present.      Pulses: Normal pulses.      Heart sounds: Normal heart sounds. No murmur heard.     No friction rub.   Pulmonary:      Effort: Pulmonary effort is normal. No respiratory distress.      Breath sounds: Normal breath sounds. No wheezing, rhonchi or rales.   Abdominal:      General: Bowel sounds are normal. There is no distension.      Palpations: Abdomen is soft.      Tenderness: There is no abdominal tenderness. There is no guarding.   Musculoskeletal:         General: Normal range of motion.      Cervical back: Normal range of motion and neck supple.      Right lower leg: No edema.      Left lower leg: No edema.   Skin:     General: Skin is warm and dry.      Capillary Refill: Capillary refill takes less than 2 seconds.      Coloration: Skin is not pale.   Neurological:      Mental Status: She is alert and oriented to person, place, and time.   Psychiatric:         Mood and Affect: Mood normal.         Behavior: Behavior normal.         ED Medications  Medications   heparin (porcine) 25,000 units in 0.45% NaCl 250 mL infusion (premix) (11.1 Units/kg/hr × 90 kg (Order-Specific) Intravenous New Bag 3/14/24 4097)   heparin (porcine) injection 4,000 Units (has no administration in time range)   heparin (porcine) injection 2,000 Units (has no administration in time range)   sodium chloride (PF) 0.9 % injection 3 mL (has no administration in time range)   ondansetron (ZOFRAN) injection 4 mg (has no administration in time range)   nicotine (NICODERM CQ) 14 mg/24hr TD 24 hr patch 1 patch (has no administration in time range)   acetaminophen (TYLENOL) tablet 650 mg (has no administration in time range)   ondansetron (ZOFRAN) injection 4  mg (has no administration in time range)   sodium chloride 0.9 % infusion (has no administration in time range)   insulin lispro (HumALOG/ADMELOG) 100 units/mL subcutaneous injection 1-6 Units (has no administration in time range)   insulin lispro (HumALOG/ADMELOG) 100 units/mL subcutaneous injection 1-5 Units (has no administration in time range)   acetaminophen (TYLENOL) tablet 650 mg (has no administration in time range)   amoxicillin-clavulanate (AUGMENTIN) 875-125 mg per tablet 1 tablet (has no administration in time range)   ondansetron (ZOFRAN) injection 4 mg (4 mg Intravenous Given 3/14/24 1635)   amoxicillin-clavulanate (AUGMENTIN) 875-125 mg per tablet 1 tablet (1 tablet Oral Given 3/14/24 1637)   sodium chloride 0.9 % bolus 1,000 mL (0 mL Intravenous Stopped 3/14/24 1835)   aspirin tablet 325 mg (325 mg Oral Given 3/14/24 1744)   nitroglycerin (NITROSTAT) SL tablet 0.4 mg (0.4 mg Sublingual Given 3/14/24 1743)   potassium chloride (Klor-Con M20) CR tablet 40 mEq (40 mEq Oral Given 3/14/24 1834)   insulin regular (HumuLIN R,NovoLIN R) injection 11.3 Units (11.3 Units Intravenous Given 3/14/24 1842)   magnesium sulfate 2 g/50 mL IVPB (premix) 2 g (0 g Intravenous Stopped 3/14/24 2035)   multi-electrolyte (ISOLYTE-S PH 7.4) bolus 1,000 mL (0 mL Intravenous Stopped 3/14/24 2103)   heparin (porcine) injection 4,000 Units (4,000 Units Intravenous Given 3/14/24 1845)   iohexol (OMNIPAQUE) 350 MG/ML injection (MULTI-DOSE) 100 mL (69 mL Intravenous Given 3/14/24 1945)       Diagnostic Studies  Results Reviewed       Procedure Component Value Units Date/Time    Basic metabolic panel [[id number]] Collected: 03/14/24 2125    Lab Status: In process Specimen: Blood from Arm, Left Updated: 03/14/24 2138    HS Troponin I 4hr [[id number]] Collected: 03/14/24 2125    Lab Status: In process Specimen: Blood from Arm, Left Updated: 03/14/24 2130    Magnesium [[id number]] Collected: 03/14/24 2125    Lab Status: In process  Specimen: Blood from Arm, Left Updated: 03/14/24 2130    Fingerstick Glucose (POCT) [[id number]]  (Abnormal) Collected: 03/14/24 2030    Lab Status: Final result Specimen: Blood Updated: 03/14/24 2031     POC Glucose 210 mg/dl     Beta Hydroxybutyrate [[id number]]  (Abnormal) Collected: 03/14/24 1819    Lab Status: Final result Specimen: Blood from Arm, Right Updated: 03/14/24 2022     Beta- Hydroxybutyrate 0.39 mmol/L     Urine Microscopic [[id number]]  (Abnormal) Collected: 03/14/24 1850    Lab Status: Final result Specimen: Urine, Clean Catch Updated: 03/14/24 1938     RBC, UA Innumerable /hpf      WBC, UA 1-2 /hpf      Epithelial Cells Occasional /hpf      Bacteria, UA None Seen /hpf      Budding Yeast Present    Basic metabolic panel [[id number]]  (Abnormal) Collected: 03/14/24 1843    Lab Status: Final result Specimen: Blood from Arm, Left Updated: 03/14/24 1921     Sodium 133 mmol/L      Potassium 3.9 mmol/L      Chloride 101 mmol/L      CO2 20 mmol/L      ANION GAP 12 mmol/L      BUN 15 mg/dL      Creatinine 0.90 mg/dL      Glucose 323 mg/dL      Calcium 8.9 mg/dL      eGFR 68 ml/min/1.73sq m     Narrative:      National Kidney Disease Foundation guidelines for Chronic Kidney Disease (CKD):     Stage 1 with normal or high GFR (GFR > 90 mL/min/1.73 square meters)    Stage 2 Mild CKD (GFR = 60-89 mL/min/1.73 square meters)    Stage 3A Moderate CKD (GFR = 45-59 mL/min/1.73 square meters)    Stage 3B Moderate CKD (GFR = 30-44 mL/min/1.73 square meters)    Stage 4 Severe CKD (GFR = 15-29 mL/min/1.73 square meters)    Stage 5 End Stage CKD (GFR <15 mL/min/1.73 square meters)  Note: GFR calculation is accurate only with a steady state creatinine    APTT [[id number]]  (Normal) Collected: 03/14/24 1819    Lab Status: Final result Specimen: Blood from Arm, Right Updated: 03/14/24 1904     PTT 32 seconds     Protime-INR [[id number]]  (Abnormal) Collected: 03/14/24 1819    Lab Status: Final result Specimen: Blood from Arm,  Right Updated: 03/14/24 1904     Protime 14.8 seconds      INR 1.17    HS Troponin I 2hr [[id number]]  (Abnormal) Collected: 03/14/24 1819    Lab Status: Final result Specimen: Blood from Arm, Right Updated: 03/14/24 1858     hs TnI 2hr 489 ng/L      Delta 2hr hsTnI -110 ng/L     Urine Macroscopic, POC [[id number]]  (Abnormal) Collected: 03/14/24 1850    Lab Status: Final result Specimen: Urine Updated: 03/14/24 1851     Color, UA Yellow     Clarity, UA Slightly Cloudy     pH, UA 6.0     Leukocytes, UA Negative     Nitrite, UA Negative     Protein, UA Negative mg/dl      Glucose,  (1/2%) mg/dl      Ketones, UA 15 (1+) mg/dl      Urobilinogen, UA 0.2 E.U./dl      Bilirubin, UA Negative     Occult Blood, UA Trace     Specific Gravity, UA 1.015    Narrative:      CLINITEK RESULT    Fingerstick Glucose (POCT) [[id number]]  (Abnormal) Collected: 03/14/24 1840    Lab Status: Final result Specimen: Blood Updated: 03/14/24 1841     POC Glucose 329 mg/dl     Blood gas, venous [[id number]]  (Abnormal) Collected: 03/14/24 1831    Lab Status: Final result Specimen: Blood from Arm, Right Updated: 03/14/24 1838     pH, Naeem 7.329     pCO2, Naeem 47.9 mm Hg      pO2, Naeem 17.7 mm Hg      HCO3, Naeem 24.6 mmol/L      Base Excess, Naeem -1.8 mmol/L      O2 Content, Naeem 5.2 ml/dL      O2 HGB, VENOUS 24.6 %     APTT six (6) hours after Heparin bolus/drip initiation or dosing change [[id number]]     Lab Status: No result Specimen: Blood     D-dimer, quantitative [[id number]]  (Abnormal) Collected: 03/14/24 1635    Lab Status: Final result Specimen: Blood from Arm, Right Updated: 03/14/24 1723     D-Dimer, Quant 1.37 ug/ml FEU     Narrative:      In the evaluation for possible pulmonary embolism, in the appropriate (Well's Score of 4 or less) patient, the age adjusted d-dimer cutoff for this patient can be calculated as:    Age x 0.01 (in ug/mL) for Age-adjusted D-dimer exclusion threshold for a patient over 50 years.    HS Troponin 0hr (reflex  protocol) [[id number]]  (Abnormal) Collected: 03/14/24 1635    Lab Status: Final result Specimen: Blood from Arm, Right Updated: 03/14/24 1716     hs TnI 0hr 599 ng/L     Comprehensive metabolic panel [[id number]]  (Abnormal) Collected: 03/14/24 1635    Lab Status: Final result Specimen: Blood from Arm, Right Updated: 03/14/24 1709     Sodium 131 mmol/L      Potassium 3.9 mmol/L      Chloride 100 mmol/L      CO2 18 mmol/L      ANION GAP 13 mmol/L      BUN 16 mg/dL      Creatinine 0.84 mg/dL      Glucose 383 mg/dL      Calcium 9.3 mg/dL      AST 15 U/L      ALT 9 U/L      Alkaline Phosphatase 99 U/L      Total Protein 7.1 g/dL      Albumin 3.8 g/dL      Total Bilirubin 0.68 mg/dL      eGFR 74 ml/min/1.73sq m     Narrative:      National Kidney Disease Foundation guidelines for Chronic Kidney Disease (CKD):     Stage 1 with normal or high GFR (GFR > 90 mL/min/1.73 square meters)    Stage 2 Mild CKD (GFR = 60-89 mL/min/1.73 square meters)    Stage 3A Moderate CKD (GFR = 45-59 mL/min/1.73 square meters)    Stage 3B Moderate CKD (GFR = 30-44 mL/min/1.73 square meters)    Stage 4 Severe CKD (GFR = 15-29 mL/min/1.73 square meters)    Stage 5 End Stage CKD (GFR <15 mL/min/1.73 square meters)  Note: GFR calculation is accurate only with a steady state creatinine    Magnesium [[id number]]  (Abnormal) Collected: 03/14/24 1635    Lab Status: Final result Specimen: Blood from Arm, Right Updated: 03/14/24 1709     Magnesium 1.4 mg/dL     CBC and differential [[id number]]  (Abnormal) Collected: 03/14/24 1635    Lab Status: Final result Specimen: Blood from Arm, Right Updated: 03/14/24 1653     WBC 11.66 Thousand/uL      RBC 5.54 Million/uL      Hemoglobin 16.2 g/dL      Hematocrit 47.6 %      MCV 86 fL      MCH 29.2 pg      MCHC 34.0 g/dL      RDW 14.0 %      MPV 11.0 fL      Platelets 306 Thousands/uL      nRBC 0 /100 WBCs      Neutrophils Relative 64 %      Immature Grans % 1 %      Lymphocytes Relative 27 %      Monocytes Relative  7 %      Eosinophils Relative 1 %      Basophils Relative 0 %      Neutrophils Absolute 7.56 Thousands/µL      Absolute Immature Grans 0.06 Thousand/uL      Absolute Lymphocytes 3.10 Thousands/µL      Absolute Monocytes 0.77 Thousand/µL      Eosinophils Absolute 0.12 Thousand/µL      Basophils Absolute 0.05 Thousands/µL                    CTA ED chest PE Study   Final Result by Guilherme Benson DO (03/14 2103)      No pulmonary embolus or other acute abnormality                  Workstation performed: [id number]               Procedures  Procedures      ED Course                             SBIRT 20yo+      Flowsheet Row Most Recent Value   Initial Alcohol Screen: US AUDIT-C     1. How often do you have a drink containing alcohol? 0 Filed at: 03/14/2024 1615   2. How many drinks containing alcohol do you have on a typical day you are drinking?  0 Filed at: 03/14/2024 1615   3a. Male UNDER 65: How often do you have five or more drinks on one occasion? 0 Filed at: 03/14/2024 1615   3b. FEMALE Any Age, or MALE 65+: How often do you have 4 or more drinks on one occassion? 0 Filed at: 03/14/2024 1615   Audit-C Score 0 Filed at: 03/14/2024 1615   NICOLE: How many times in the past year have you...    Used an illegal drug or used a prescription medication for non-medical reasons? Never Filed at: 03/14/2024 1615                  Medical Decision Making  64-year-old female presents with dental pain, chest pain, and GI symptoms.  Patient is also diabetic and states she has not taken her insulin for the past several days.  Appears dry on exam.  Will order labs to assess for ACS, PE, DKA.    Elevated troponin with ongoing chest pain and new T wave changes.  Cardiology consulted.    Amount and/or Complexity of Data Reviewed  Labs: ordered.  Radiology: ordered.    Risk  OTC drugs.  Prescription drug management.  Decision regarding hospitalization.          Disposition  Final diagnoses:   Elevated troponin   Chest pain   Hyperglycemia    Dental abscess   Dyspnea   Nausea vomiting and diarrhea   DKA (diabetic ketoacidosis) (HCC)     Time reflects when diagnosis was documented in both MDM as applicable and the Disposition within this note       Time User Action Codes Description Comment    3/14/2024  5:49 PM Spencer Mccollum [R79.89] Elevated troponin     3/14/2024  5:49 PM Spencer Mccollum [R07.9] Chest pain     3/14/2024  6:34 PM Spencer Mccollum Add [R73.9] Hyperglycemia     3/14/2024  6:34 PM Spencer Mccollum Add [K04.7] Dental abscess     3/14/2024  6:34 PM Spencer Mccollum Add [R06.00] Dyspnea     3/14/2024  6:35 PM Spencer Mccollum Add [R11.2,  R19.7] Nausea vomiting and diarrhea     3/14/2024  9:20 PM Spencer Mccollum [E11.10] DKA (diabetic ketoacidosis) (HCC)     3/14/2024  9:20 PM Spencer Mccollum Modify [R79.89] Elevated troponin     3/14/2024  9:20 PM Spencer Mccollum Modify [R07.9] Chest pain           ED Disposition       ED Disposition   Admit    Condition   Stable    Date/Time   Thu Mar 14, 2024 2120    Comment   Case was discussed with [facility] and the patient's admission status was agreed to be Admission Status: inpatient status to the service of Dr. Yao .               Follow-up Information    None         Patient's Medications   Discharge Prescriptions    No medications on file     No discharge procedures on file.    PDMP Review         Value Time User    PDMP Reviewed  Yes 3/12/2024  6:01 PM Virgie Rose PA-C             ED Provider  Attending physically available and evaluated Lashawn Davis. I managed the patient along with the ED Attending.    Electronically Signed by           Spencer Mccollum MD  03/14/24 0876

## 2024-03-14 NOTE — Clinical Note
Left heart catheterization: A 5fr fr4 catheter was advanced over a guidewire into the ascending aorta.  After recording ascending aortic pressure, the catheter was advanced across the aortic valve and left ventricular pressure was recorded.   The catheter was   pulled back across the aortic valve and into the ascending aorta and pullback pressures were obtained.

## 2024-03-14 NOTE — ED ATTENDING ATTESTATION
3/14/2024  IEstelita MD, saw and evaluated the patient. I have discussed the patient with the resident/non-physician practitioner and agree with the resident's/non-physician practitioner's findings, Plan of Care, and MDM as documented in the resident's/non-physician practitioner's note, except where noted. All available labs and Radiology studies were reviewed.  I was present for key portions of any procedure(s) performed by the resident/non-physician practitioner and I was immediately available to provide assistance.       At this point I agree with the current assessment done in the Emergency Department.  I have conducted an independent evaluation of this patient a history and physical is as follows:    Chief Complaint   Patient presents with    Dental Pain     Pt began with dental swelling and pain yesterday.      Dizziness     Yesterday she began with dizziness and chest pain.  Also noted nausea and one episode of vomiting.  Also noted that she does sometimes SOB.       64-year-old female with past medical history of coronary artery disease with prior stents, diabetes mellitus, Crohn's disease, presenting with several concerns.  Patient reports that over the past day, her right cheek swelled up and there is swelling to the right upper gumline.  There is no significant pain, however, her face does feel tender.  She has not had any fevers with this.  She tried to follow-up with her dentist, however, due to several other symptoms, she was referred to emergency department.  Patient reports that since yesterday, she has had central chest pain associated with shortness of breath.  Chest pain persisted to today.  She has also had intermittent palpitations.  Lastly, over the past day, patient has had nausea, vomiting, as well as diarrhea.  She has pain in her bilateral lower quadrants of the abdomen which is similar to prior pain due to Crohn's.  Patient feels overall weak and dehydrated.    ED Triage Vitals    Temperature Pulse Respirations Blood Pressure SpO2   03/14/24 1552 03/14/24 1547 03/14/24 1547 03/14/24 1552 03/14/24 1547   98 °F (36.7 °C) (!) 121 20 94/78 98 %      Temp Source Heart Rate Source Patient Position - Orthostatic VS BP Location FiO2 (%)   03/14/24 1552 03/14/24 1613 03/14/24 1613 03/14/24 1613 --   Oral Monitor Sitting Left arm       Pain Score       --                CONSTITUTIONAL: female appearing stated age resting in bed, in no acute distress  HEENT: atraumatic, normocephalic. Sclera anicteric, conjunctiva are not injected.   there is subtle fullness and erythema of right cheek and examination of right upper gingiva overlying premolars is edematous though not fluctuant.  There is no tenderness with percussion of upper teeth.  Somewhat tacky oral mucosa.  No trismus.  Floor mouth is soft.  CARDIOVASCULAR/CHEST: Tachycardic, regular, no M/R/G. 2+ radial pulses  PULMONARY: Breathing comfortably on RA. Breath sounds are equal and clear to auscultation  ABDOMEN: non-distended. BS present, normoactive.  Tender to palpation in bilateral lower quadrants.  MSK: moves all extremities, no deformities, no peripheral edema, no calf asymmetry  NEURO: Awake, alert, and oriented x 3. Face symmetric. Moves all extremities spontaneously. No focal neurologic deficits  SKIN: Warm, appears well-perfused  MENTAL STATUS: Normal affect    Labs Reviewed   CBC AND DIFFERENTIAL - Abnormal       Result Value Ref Range Status    WBC 11.66 (*) 4.31 - 10.16 Thousand/uL Final    RBC 5.54 (*) 3.81 - 5.12 Million/uL Final    Hemoglobin 16.2 (*) 11.5 - 15.4 g/dL Final    Hematocrit 47.6 (*) 34.8 - 46.1 % Final    MCV 86  82 - 98 fL Final    MCH 29.2  26.8 - 34.3 pg Final    MCHC 34.0  31.4 - 37.4 g/dL Final    RDW 14.0  11.6 - 15.1 % Final    MPV 11.0  8.9 - 12.7 fL Final    Platelets 306  149 - 390 Thousands/uL Final    nRBC 0  /100 WBCs Final    Neutrophils Relative 64  43 - 75 % Final    Immature Grans % 1  0 - 2 % Final     Lymphocytes Relative 27  14 - 44 % Final    Monocytes Relative 7  4 - 12 % Final    Eosinophils Relative 1  0 - 6 % Final    Basophils Relative 0  0 - 1 % Final    Neutrophils Absolute 7.56  1.85 - 7.62 Thousands/µL Final    Absolute Immature Grans 0.06  0.00 - 0.20 Thousand/uL Final    Absolute Lymphocytes 3.10  0.60 - 4.47 Thousands/µL Final    Absolute Monocytes 0.77  0.17 - 1.22 Thousand/µL Final    Eosinophils Absolute 0.12  0.00 - 0.61 Thousand/µL Final    Basophils Absolute 0.05  0.00 - 0.10 Thousands/µL Final   COMPREHENSIVE METABOLIC PANEL - Abnormal    Sodium 131 (*) 135 - 147 mmol/L Final    Potassium 3.9  3.5 - 5.3 mmol/L Final    Chloride 100  96 - 108 mmol/L Final    CO2 18 (*) 21 - 32 mmol/L Final    ANION GAP 13  4 - 13 mmol/L Final    BUN 16  5 - 25 mg/dL Final    Creatinine 0.84  0.60 - 1.30 mg/dL Final    Comment: Standardized to IDMS reference method    Glucose 383 (*) 65 - 140 mg/dL Final    Comment: If the patient is fasting, the ADA then defines impaired fasting glucose as > 100 mg/dL and diabetes as > or equal to 123 mg/dL.    Calcium 9.3  8.4 - 10.2 mg/dL Final    AST 15  13 - 39 U/L Final    ALT 9  7 - 52 U/L Final    Comment: Specimen collection should occur prior to Sulfasalazine administration due to the potential for falsely depressed results.     Alkaline Phosphatase 99  34 - 104 U/L Final    Total Protein 7.1  6.4 - 8.4 g/dL Final    Albumin 3.8  3.5 - 5.0 g/dL Final    Total Bilirubin 0.68  0.20 - 1.00 mg/dL Final    Comment: Use of this assay is not recommended for patients undergoing treatment with eltrombopag due to the potential for falsely elevated results.  N-acetyl-p-benzoquinone imine (metabolite of Acetaminophen) will generate erroneously low results in samples for patients that have taken an overdose of Acetaminophen.    eGFR 74  ml/min/1.73sq m Final    Narrative:     National Kidney Disease Foundation guidelines for Chronic Kidney Disease (CKD):     Stage 1 with normal or  "high GFR (GFR > 90 mL/min/1.73 square meters)    Stage 2 Mild CKD (GFR = 60-89 mL/min/1.73 square meters)    Stage 3A Moderate CKD (GFR = 45-59 mL/min/1.73 square meters)    Stage 3B Moderate CKD (GFR = 30-44 mL/min/1.73 square meters)    Stage 4 Severe CKD (GFR = 15-29 mL/min/1.73 square meters)    Stage 5 End Stage CKD (GFR <15 mL/min/1.73 square meters)  Note: GFR calculation is accurate only with a steady state creatinine   HS TROPONIN I 0HR - Abnormal    hs TnI 0hr 599 (*) \"Refer to ACS Flowchart\"- see link ng/L Final    Comment:                                              Initial (time 0) result  If >=50 ng/L, Myocardial injury suggested ;  Type of myocardial injury and treatment strategy  to be determined.  If 5-49 ng/L, a delta result at 2 hours and or 4 hours will be needed to further evaluate.  If <4 ng/L, and chest pain has been >3 hours since onset, patient may qualify for discharge based on the HEART score in the ED.  If <5 ng/L and <3hours since onset of chest pain, a delta result at 2 hours will be needed to further evaluate.    HS Troponin 99th Percentile URL of a Health Population=12 ng/L with a 95% Confidence Interval of 8-18 ng/L.    Second Troponin (time 2 hours)  If calculated delta >= 20 ng/L,  Myocardial injury suggested ; Type of myocardial injury and treatment strategy to be determined.  If 5-49 ng/L and the calculated delta is 5-19 ng/L, consult medical service for evaluation.  Continue evaluation for ischemia on ecg and other possible etiology and repeat hs troponin at 4 hours.  If delta is <5 ng/L at 2 hours, consider discharge based on risk stratification via the HEART score (if in ED), or FABRICE risk score in IP/Observation.    HS Troponin 99th Percentile URL of a Health Population=12 ng/L with a 95% Confidence Interval of 8-18 ng/L.   D-DIMER, QUANTITATIVE - Abnormal    D-Dimer, Quant 1.37 (*) <0.50 ug/ml FEU Final    Comment: Reference and upper limits to exclude DVT and PE are the " "same.  Do not use to exclude if clinical symptoms are present.  Pregnant women:  1st trimester:  <0.22 - 1.06 ug/ml FEU  2nd trimester:  <0.22 - 1.88 ug/ml FEU  3rd trimester:   0.24 - 3.28 ug/ml FEU    Note: Normal ranges may not apply to patients who are transgender, non-binary, or whose legal sex, sex at birth, and gender identity differ.      Narrative:     In the evaluation for possible pulmonary embolism, in the appropriate (Well's Score of 4 or less) patient, the age adjusted d-dimer cutoff for this patient can be calculated as:    Age x 0.01 (in ug/mL) for Age-adjusted D-dimer exclusion threshold for a patient over 50 years.   MAGNESIUM - Abnormal    Magnesium 1.4 (*) 1.9 - 2.7 mg/dL Final   HS TROPONIN I 2HR - Abnormal    hs TnI 2hr 489 (*) \"Refer to ACS Flowchart\"- see link ng/L Final    Comment:                                              Initial (time 0) result  If >=50 ng/L, Myocardial injury suggested ;  Type of myocardial injury and treatment strategy  to be determined.  If 5-49 ng/L, a delta result at 2 hours and or 4 hours will be needed to further evaluate.  If <4 ng/L, and chest pain has been >3 hours since onset, patient may qualify for discharge based on the HEART score in the ED.  If <5 ng/L and <3hours since onset of chest pain, a delta result at 2 hours will be needed to further evaluate.    HS Troponin 99th Percentile URL of a Health Population=12 ng/L with a 95% Confidence Interval of 8-18 ng/L.    Second Troponin (time 2 hours)  If calculated delta >= 20 ng/L,  Myocardial injury suggested ; Type of myocardial injury and treatment strategy to be determined.  If 5-49 ng/L and the calculated delta is 5-19 ng/L, consult medical service for evaluation.  Continue evaluation for ischemia on ecg and other possible etiology and repeat hs troponin at 4 hours.  If delta is <5 ng/L at 2 hours, consider discharge based on risk stratification via the HEART score (if in ED), or FABRICE risk score in " "IP/Observation.    HS Troponin 99th Percentile URL of a Health Population=12 ng/L with a 95% Confidence Interval of 8-18 ng/L.    Delta 2hr hsTnI -110  <20 ng/L Final   PROTIME-INR - Abnormal    Protime 14.8 (*) 11.6 - 14.5 seconds Final    INR 1.17  0.84 - 1.19 Final   BETA HYDROXYBUTYRATE - Abnormal    Beta- Hydroxybutyrate 0.39 (*) 0.02 - 0.27 mmol/L Final   BLOOD GAS, VENOUS - Abnormal    pH, Naeem 7.329  7.300 - 7.400 Final    pCO2, Naeem 47.9  42.0 - 50.0 mm Hg Final    pO2, Naeem 17.7 (*) 35.0 - 45.0 mm Hg Final    HCO3, Naeem 24.6  24 - 30 mmol/L Final    Base Excess, Naeem -1.8  mmol/L Final    O2 Content, Naeem 5.2  ml/dL Final    O2 HGB, VENOUS 24.6 (*) 60.0 - 80.0 % Final   BASIC METABOLIC PANEL - Abnormal    Sodium 133 (*) 135 - 147 mmol/L Final    Potassium 3.9  3.5 - 5.3 mmol/L Final    Chloride 101  96 - 108 mmol/L Final    CO2 20 (*) 21 - 32 mmol/L Final    ANION GAP 12  4 - 13 mmol/L Final    BUN 15  5 - 25 mg/dL Final    Creatinine 0.90  0.60 - 1.30 mg/dL Final    Comment: Standardized to IDMS reference method    Glucose 323 (*) 65 - 140 mg/dL Final    Comment: If the patient is fasting, the ADA then defines impaired fasting glucose as > 100 mg/dL and diabetes as > or equal to 123 mg/dL.    Calcium 8.9  8.4 - 10.2 mg/dL Final    eGFR 68  ml/min/1.73sq m Final    Narrative:     National Kidney Disease Foundation guidelines for Chronic Kidney Disease (CKD):     Stage 1 with normal or high GFR (GFR > 90 mL/min/1.73 square meters)    Stage 2 Mild CKD (GFR = 60-89 mL/min/1.73 square meters)    Stage 3A Moderate CKD (GFR = 45-59 mL/min/1.73 square meters)    Stage 3B Moderate CKD (GFR = 30-44 mL/min/1.73 square meters)    Stage 4 Severe CKD (GFR = 15-29 mL/min/1.73 square meters)    Stage 5 End Stage CKD (GFR <15 mL/min/1.73 square meters)  Note: GFR calculation is accurate only with a steady state creatinine   HS TROPONIN I 4HR - Abnormal    hs TnI 4hr 547 (*) \"Refer to ACS Flowchart\"- see link ng/L Final    " Comment:                                              Initial (time 0) result  If >=50 ng/L, Myocardial injury suggested ;  Type of myocardial injury and treatment strategy  to be determined.  If 5-49 ng/L, a delta result at 2 hours and or 4 hours will be needed to further evaluate.  If <4 ng/L, and chest pain has been >3 hours since onset, patient may qualify for discharge based on the HEART score in the ED.  If <5 ng/L and <3hours since onset of chest pain, a delta result at 2 hours will be needed to further evaluate.    HS Troponin 99th Percentile URL of a Health Population=12 ng/L with a 95% Confidence Interval of 8-18 ng/L.    Second Troponin (time 2 hours)  If calculated delta >= 20 ng/L,  Myocardial injury suggested ; Type of myocardial injury and treatment strategy to be determined.  If 5-49 ng/L and the calculated delta is 5-19 ng/L, consult medical service for evaluation.  Continue evaluation for ischemia on ecg and other possible etiology and repeat hs troponin at 4 hours.  If delta is <5 ng/L at 2 hours, consider discharge based on risk stratification via the HEART score (if in ED), or FABRICE risk score in IP/Observation.    HS Troponin 99th Percentile URL of a Health Population=12 ng/L with a 95% Confidence Interval of 8-18 ng/L.    Delta 4hr hsTnI -52  <20 ng/L Final   URINE MICROSCOPIC - Abnormal    RBC, UA Innumerable (*) None Seen, 1-2 /hpf Final    WBC, UA 1-2  None Seen, 1-2 /hpf Final    Epithelial Cells Occasional  None Seen, Occasional /hpf Final    Bacteria, UA None Seen  None Seen, Occasional /hpf Final    Budding Yeast Present   Final   BASIC METABOLIC PANEL - Abnormal    Sodium 135  135 - 147 mmol/L Final    Potassium 4.1  3.5 - 5.3 mmol/L Final    Chloride 104  96 - 108 mmol/L Final    CO2 20 (*) 21 - 32 mmol/L Final    ANION GAP 11  4 - 13 mmol/L Final    BUN 13  5 - 25 mg/dL Final    Creatinine 0.78  0.60 - 1.30 mg/dL Final    Comment: Standardized to IDMS reference method    Glucose 197  (*) 65 - 140 mg/dL Final    Comment: If the patient is fasting, the ADA then defines impaired fasting glucose as > 100 mg/dL and diabetes as > or equal to 123 mg/dL.    Calcium 8.5  8.4 - 10.2 mg/dL Final    eGFR 81  ml/min/1.73sq m Final    Narrative:     National Kidney Disease Foundation guidelines for Chronic Kidney Disease (CKD):     Stage 1 with normal or high GFR (GFR > 90 mL/min/1.73 square meters)    Stage 2 Mild CKD (GFR = 60-89 mL/min/1.73 square meters)    Stage 3A Moderate CKD (GFR = 45-59 mL/min/1.73 square meters)    Stage 3B Moderate CKD (GFR = 30-44 mL/min/1.73 square meters)    Stage 4 Severe CKD (GFR = 15-29 mL/min/1.73 square meters)    Stage 5 End Stage CKD (GFR <15 mL/min/1.73 square meters)  Note: GFR calculation is accurate only with a steady state creatinine   POCT GLUCOSE - Abnormal    POC Glucose 329 (*) 65 - 140 mg/dl Final   URINE MACROSCOPIC, POC - Abnormal    Color, UA Yellow   Final    Clarity, UA Slightly Cloudy   Final    pH, UA 6.0  4.5 - 8.0 Final    Leukocytes, UA Negative  Negative Final    Nitrite, UA Negative  Negative Final    Protein, UA Negative  Negative mg/dl Final    Glucose,  (1/2%) (*) Negative mg/dl Final    Ketones, UA 15 (1+) (*) Negative mg/dl Final    Urobilinogen, UA 0.2  0.2, 1.0 E.U./dl E.U./dl Final    Bilirubin, UA Negative  Negative Final    Occult Blood, UA Trace (*) Negative Final    Specific Gravity, UA 1.015  1.003 - 1.030 Final    Narrative:     CLINITEK RESULT   POCT GLUCOSE - Abnormal    POC Glucose 210 (*) 65 - 140 mg/dl Final   POCT GLUCOSE - Abnormal    POC Glucose 188 (*) 65 - 140 mg/dl Final   APTT - Normal    PTT 32  23 - 37 seconds Final    Comment: Therapeutic Heparin Range =  60-90 seconds   MAGNESIUM - Normal    Magnesium 1.9  1.9 - 2.7 mg/dL Final   APTT     CTA ED chest PE Study   Final Result      No pulmonary embolus or other acute abnormality                  Workstation performed: [id number]               ED Course     Medications    heparin (porcine) 25,000 units in 0.45% NaCl 250 mL infusion (premix) (13.1 Units/kg/hr × 90 kg (Order-Specific) Intravenous Rate/Dose Change 3/15/24 0125)   heparin (porcine) injection 4,000 Units (has no administration in time range)   heparin (porcine) injection 2,000 Units (2,000 Units Intravenous Given 3/15/24 0123)   sodium chloride (PF) 0.9 % injection 3 mL (has no administration in time range)   nicotine (NICODERM CQ) 14 mg/24hr TD 24 hr patch 1 patch (has no administration in time range)   acetaminophen (TYLENOL) tablet 650 mg (has no administration in time range)   ondansetron (ZOFRAN) injection 4 mg (has no administration in time range)   sodium chloride 0.9 % infusion (50 mL/hr Intravenous New Bag 3/14/24 2151)   insulin lispro (HumALOG/ADMELOG) 100 units/mL subcutaneous injection 1-6 Units (has no administration in time range)   insulin lispro (HumALOG/ADMELOG) 100 units/mL subcutaneous injection 1-5 Units (1 Units Subcutaneous Given 3/15/24 0036)   albuterol (PROVENTIL HFA,VENTOLIN HFA) inhaler 2 puff (has no administration in time range)   aspirin (ECOTRIN LOW STRENGTH) EC tablet 81 mg (has no administration in time range)   ALPRAZolam (XANAX) tablet 0.25 mg (has no administration in time range)   carvedilol (COREG) tablet 6.25 mg (6.25 mg Oral Given 3/15/24 0035)   gabapentin (NEURONTIN) capsule 400 mg (400 mg Oral Given 3/15/24 0034)   insulin glargine (LANTUS) subcutaneous injection 22 Units 0.22 mL (22 Units Subcutaneous Given 3/15/24 0036)   losartan (COZAAR) tablet 25 mg (has no administration in time range)   pantoprazole (PROTONIX) EC tablet 40 mg (has no administration in time range)   pravastatin (PRAVACHOL) tablet 80 mg (has no administration in time range)   amoxicillin-clavulanate (AUGMENTIN) 875-125 mg per tablet 1 tablet (has no administration in time range)   ondansetron (ZOFRAN) injection 4 mg (4 mg Intravenous Given 3/14/24 7521)   amoxicillin-clavulanate (AUGMENTIN) 875-125 mg per  tablet 1 tablet (1 tablet Oral Given 3/14/24 1637)   sodium chloride 0.9 % bolus 1,000 mL (0 mL Intravenous Stopped 3/14/24 1835)   aspirin tablet 325 mg (325 mg Oral Given 3/14/24 1744)   nitroglycerin (NITROSTAT) SL tablet 0.4 mg (0.4 mg Sublingual Given 3/14/24 1743)   potassium chloride (Klor-Con M20) CR tablet 40 mEq (40 mEq Oral Given 3/14/24 1834)   insulin regular (HumuLIN R,NovoLIN R) injection 11.3 Units (11.3 Units Intravenous Given 3/14/24 1842)   magnesium sulfate 2 g/50 mL IVPB (premix) 2 g (0 g Intravenous Stopped 3/14/24 2035)   multi-electrolyte (ISOLYTE-S PH 7.4) bolus 1,000 mL (0 mL Intravenous Stopped 3/14/24 2103)   heparin (porcine) injection 4,000 Units (4,000 Units Intravenous Given 3/14/24 1845)   iohexol (OMNIPAQUE) 350 MG/ML injection (MULTI-DOSE) 100 mL (69 mL Intravenous Given 3/14/24 1945)   ondansetron (ZOFRAN) injection 4 mg (4 mg Intravenous Given 3/14/24 2148)   acetaminophen (TYLENOL) tablet 650 mg (650 mg Oral Given 3/14/24 2148)     64-year-old female presenting with chest pain, shortness of breath, palpitations, nausea, vomiting, diarrhea, abdominal pain, as well as right-sided facial swelling in setting of swelling of right upper premolar gingiva.  Vital signs reviewed, afebrile, tachycardic, within normal limits otherwise.  Concerning patient's chest pain, differential diagnosis includes ACS, PE, pneumonia, viral illness, versus other etiology of symptoms.  Concerning patient's nausea, vomiting, diarrhea, differential diagnosis includes viral gastroenteritis, pancreatitis, cholecystitis, enteritis, colitis, exacerbation of Crohn's disease, versus another etiology of symptoms.  Concerning patient's dental discomfort/gingival swelling, differential diagnosis includes gingival/.  Buccal abscess with some extension of infection to right cheek soft tissues.    Initial EKG is with tachycardia, repeat EKG to my interpretation is concerning for acute ischemia, though it does not reach  criteria for STEMI.  Case discussed with cardiology and cardiology will assess the patient at bedside.  Full dose aspirin administered.  Sublingual nitroglycerin administered.    Labs reveal hyperglycemia of 383, mild elevation of creatinine of 0.84, up from 0.62 and not reaching criteria for NICHOLE.  Magnesium is low, 1.4, in keeping with nausea, vomiting, and diarrhea.  Similarly, patient is mildly acidotic with bicarb of 18 according to her CMP.  This is likely due to GI losses, though given patient's diabetes mellitus, alternate etiologies such as diabetic and acidosis are differential.  CBC is with leukocytosis of 11.66, no significant change from prior otherwise.  Patient's initial troponin is elevated, 599.  Her D-dimer is also elevated, 1.37.  VBG pH is 7.32 without low bicarb.  Beta hydroxybutyrate is mildly elevated, likely due to dehydration.    Patient treated with IV fluids.  She is started on heparin given concern for acute coronary syndrome.  We did pursue CT chest angiography given elevated D-dimer, thankfully, patient does not have a PE.  Patient admitted to internal medicine for further evaluation treatment.  Cardiology is on board.    Concerning patient's odontogenic infection, she received a dose of oral antibiotic and will receive IV antibiotics while admitted.    Critical Care Time  ECG 12 Lead Documentation Only    Date/Time: 3/14/2024 4:28 PM    Performed by: Estelita Montano MD  Authorized by: Estelita Montano MD    Comments:      Sinus tachycardia, ventricular 124, RI interval 144, QRS 84, QTc 459, patient has T wave inversions in the anterior precordial leads V2 and V3, as well as significant T wave inversions in high lateral leads I and aVL which are new compared to prior EKG dated 3/7/2022.  ECG 12 Lead Documentation Only    Date/Time: 3/14/2024 5:32 PM    Performed by: Estelita Montano MD  Authorized by: Estelita Montano MD    Comments:      Normal sinus rhythm, ventricular rate 91, RI is 148, QRS  84, QTc 499, patient has persistent T wave inversions of anterior precordial leads V2 and V3, as well as V4 and V5 as well as high lateral leads of 1 and aVL.  No STEMI.  Concern for acute ischemia.  CriticalCare Time    Date/Time: 3/14/2024 6:00 PM    Performed by: Estelita Montano MD  Authorized by: Estelita Montano MD    Critical care provider statement:     Critical care time (minutes):  35    Critical care time was exclusive of:  Separately billable procedures and treating other patients and teaching time    Critical care was necessary to treat or prevent imminent or life-threatening deterioration of the following conditions:  Cardiac failure and dehydration    Critical care was time spent personally by me on the following activities:  Obtaining history from patient or surrogate, examination of patient, ordering and review of laboratory studies, ordering and review of radiographic studies, review of old charts, re-evaluation of patient's condition, evaluation of patient's response to treatment, development of treatment plan with patient or surrogate and discussions with consultants

## 2024-03-14 NOTE — Clinical Note
Ill-appearing cooperative and appropriate.  Color pale, warm and dry without complaints current chest pain.  Tolerated minimal activity without SOB.  Intermittent nausea persists.  Spoke with Oh, fellowand informed consent obtained for procedure.

## 2024-03-14 NOTE — TELEPHONE ENCOUNTER
Ada called and asked what she should do.  Has an abscessed tooth.  She has also had vomiting and diarrhea for several days.  Now her face is swollen and she is shaking. Just feels horrible and sounds weak on the phone. Says she has doxycyclene that she can take but can only take it with food and can't keep anything down.  Asking what she should do and whether she should go to the ER.  I told her I was almost certain that you would agree she needs to go to the ER because she may need IV fluids but she wanted me to check with you.

## 2024-03-15 ENCOUNTER — APPOINTMENT (INPATIENT)
Dept: NON INVASIVE DIAGNOSTICS | Facility: HOSPITAL | Age: 64
DRG: 287 | End: 2024-03-15
Payer: COMMERCIAL

## 2024-03-15 PROBLEM — K52.9 GASTROENTERITIS: Status: ACTIVE | Noted: 2024-03-15

## 2024-03-15 PROBLEM — E87.20 METABOLIC ACIDOSIS: Status: ACTIVE | Noted: 2024-03-15

## 2024-03-15 LAB
ALBUMIN SERPL BCP-MCNC: 3.3 G/DL (ref 3.5–5)
ALP SERPL-CCNC: 76 U/L (ref 34–104)
ALT SERPL W P-5'-P-CCNC: 5 U/L (ref 7–52)
ANION GAP SERPL CALCULATED.3IONS-SCNC: 9 MMOL/L (ref 4–13)
AORTIC ROOT: 3 CM
APICAL FOUR CHAMBER EJECTION FRACTION: 46 %
APTT PPP: 48 SECONDS (ref 23–37)
APTT PPP: 62 SECONDS (ref 23–37)
ASCENDING AORTA: 2.9 CM
AST SERPL W P-5'-P-CCNC: 15 U/L (ref 13–39)
ATRIAL RATE: 70 BPM
BASOPHILS # BLD AUTO: 0.04 THOUSANDS/ÂΜL (ref 0–0.1)
BASOPHILS NFR BLD AUTO: 0 % (ref 0–1)
BILIRUB DIRECT SERPL-MCNC: 0.05 MG/DL (ref 0–0.2)
BILIRUB SERPL-MCNC: 0.48 MG/DL (ref 0.2–1)
BSA FOR ECHO PROCEDURE: 2.23 M2
BUN SERPL-MCNC: 12 MG/DL (ref 5–25)
CALCIUM SERPL-MCNC: 8.2 MG/DL (ref 8.4–10.2)
CARDIAC TROPONIN I PNL SERPL HS: 309 NG/L (ref 8–18)
CHLORIDE SERPL-SCNC: 108 MMOL/L (ref 96–108)
CO2 SERPL-SCNC: 18 MMOL/L (ref 21–32)
CREAT SERPL-MCNC: 0.72 MG/DL (ref 0.6–1.3)
E WAVE DECELERATION TIME: 163 MS
E/A RATIO: 1.34
EOSINOPHIL # BLD AUTO: 0.21 THOUSAND/ÂΜL (ref 0–0.61)
EOSINOPHIL NFR BLD AUTO: 2 % (ref 0–6)
ERYTHROCYTE [DISTWIDTH] IN BLOOD BY AUTOMATED COUNT: 14.3 % (ref 11.6–15.1)
FRACTIONAL SHORTENING: 28 % (ref 28–44)
GFR SERPL CREATININE-BSD FRML MDRD: 90 ML/MIN/1.73SQ M
GLUCOSE SERPL-MCNC: 170 MG/DL (ref 65–140)
GLUCOSE SERPL-MCNC: 174 MG/DL (ref 65–140)
GLUCOSE SERPL-MCNC: 189 MG/DL (ref 65–140)
GLUCOSE SERPL-MCNC: 190 MG/DL (ref 65–140)
GLUCOSE SERPL-MCNC: 201 MG/DL (ref 65–140)
GLUCOSE SERPL-MCNC: 212 MG/DL (ref 65–140)
HCT VFR BLD AUTO: 40.7 % (ref 34.8–46.1)
HGB BLD-MCNC: 13.3 G/DL (ref 11.5–15.4)
IMM GRANULOCYTES # BLD AUTO: 0.03 THOUSAND/UL (ref 0–0.2)
IMM GRANULOCYTES NFR BLD AUTO: 0 % (ref 0–2)
INR PPP: 1.17 (ref 0.84–1.19)
INTERVENTRICULAR SEPTUM IN DIASTOLE (PARASTERNAL SHORT AXIS VIEW): 1.2 CM
INTERVENTRICULAR SEPTUM: 1.2 CM (ref 0.6–1.1)
LAAS-AP2: 17.4 CM2
LAAS-AP4: 17.9 CM2
LEFT ATRIUM SIZE: 3.3 CM
LEFT ATRIUM VOLUME (MOD BIPLANE): 50 ML
LEFT ATRIUM VOLUME INDEX (MOD BIPLANE): 22.4 ML/M2
LEFT INTERNAL DIMENSION IN SYSTOLE: 3.4 CM (ref 2.1–4)
LEFT VENTRICULAR INTERNAL DIMENSION IN DIASTOLE: 4.7 CM (ref 3.5–6)
LEFT VENTRICULAR POSTERIOR WALL IN END DIASTOLE: 1.2 CM
LEFT VENTRICULAR STROKE VOLUME: 37 ML
LVSV (TEICH): 37 ML
LYMPHOCYTES # BLD AUTO: 3.41 THOUSANDS/ÂΜL (ref 0.6–4.47)
LYMPHOCYTES NFR BLD AUTO: 38 % (ref 14–44)
MAGNESIUM SERPL-MCNC: 1.7 MG/DL (ref 1.9–2.7)
MCH RBC QN AUTO: 29.2 PG (ref 26.8–34.3)
MCHC RBC AUTO-ENTMCNC: 32.7 G/DL (ref 31.4–37.4)
MCV RBC AUTO: 90 FL (ref 82–98)
MONOCYTES # BLD AUTO: 0.65 THOUSAND/ÂΜL (ref 0.17–1.22)
MONOCYTES NFR BLD AUTO: 7 % (ref 4–12)
MV E'TISSUE VEL-LAT: 7 CM/S
MV E'TISSUE VEL-SEP: 6 CM/S
MV PEAK A VEL: 0.7 M/S
MV PEAK E VEL: 94 CM/S
MV STENOSIS PRESSURE HALF TIME: 47 MS
MV VALVE AREA P 1/2 METHOD: 4.68
NEUTROPHILS # BLD AUTO: 4.76 THOUSANDS/ÂΜL (ref 1.85–7.62)
NEUTS SEG NFR BLD AUTO: 53 % (ref 43–75)
NRBC BLD AUTO-RTO: 0 /100 WBCS
P AXIS: 51 DEGREES
PLATELET # BLD AUTO: 224 THOUSANDS/UL (ref 149–390)
PMV BLD AUTO: 10.7 FL (ref 8.9–12.7)
POTASSIUM SERPL-SCNC: 4.1 MMOL/L (ref 3.5–5.3)
PR INTERVAL: 150 MS
PROT SERPL-MCNC: 5.8 G/DL (ref 6.4–8.4)
PROTHROMBIN TIME: 14.8 SECONDS (ref 11.6–14.5)
QRS AXIS: 40 DEGREES
QRSD INTERVAL: 98 MS
QT INTERVAL: 504 MS
QTC INTERVAL: 544 MS
RA PRESSURE ESTIMATED: 3 MMHG
RBC # BLD AUTO: 4.55 MILLION/UL (ref 3.81–5.12)
RIGHT ATRIUM AREA SYSTOLE A4C: 11.4 CM2
RIGHT VENTRICLE ID DIMENSION: 3.3 CM
RV PSP: 25 MMHG
SL CV LEFT ATRIUM LENGTH A2C: 5 CM
SL CV LV EF: 45
SL CV PED ECHO LEFT VENTRICLE DIASTOLIC VOLUME (MOD BIPLANE) 2D: 84 ML
SL CV PED ECHO LEFT VENTRICLE SYSTOLIC VOLUME (MOD BIPLANE) 2D: 47 ML
SODIUM SERPL-SCNC: 135 MMOL/L (ref 135–147)
T WAVE AXIS: 136 DEGREES
TR MAX PG: 22 MMHG
TR PEAK VELOCITY: 2.4 M/S
TRICUSPID ANNULAR PLANE SYSTOLIC EXCURSION: 2.6 CM
TRICUSPID VALVE PEAK REGURGITATION VELOCITY: 2.35 M/S
VENTRICULAR RATE: 70 BPM
WBC # BLD AUTO: 9.1 THOUSAND/UL (ref 4.31–10.16)

## 2024-03-15 PROCEDURE — 93306 TTE W/DOPPLER COMPLETE: CPT

## 2024-03-15 PROCEDURE — 93005 ELECTROCARDIOGRAM TRACING: CPT

## 2024-03-15 PROCEDURE — 99152 MOD SED SAME PHYS/QHP 5/>YRS: CPT | Performed by: INTERNAL MEDICINE

## 2024-03-15 PROCEDURE — 84484 ASSAY OF TROPONIN QUANT: CPT

## 2024-03-15 PROCEDURE — 99232 SBSQ HOSP IP/OBS MODERATE 35: CPT | Performed by: INTERNAL MEDICINE

## 2024-03-15 PROCEDURE — 83735 ASSAY OF MAGNESIUM: CPT | Performed by: INTERNAL MEDICINE

## 2024-03-15 PROCEDURE — C1769 GUIDE WIRE: HCPCS | Performed by: INTERNAL MEDICINE

## 2024-03-15 PROCEDURE — 80048 BASIC METABOLIC PNL TOTAL CA: CPT | Performed by: INTERNAL MEDICINE

## 2024-03-15 PROCEDURE — 82948 REAGENT STRIP/BLOOD GLUCOSE: CPT

## 2024-03-15 PROCEDURE — C1760 CLOSURE DEV, VASC: HCPCS | Performed by: INTERNAL MEDICINE

## 2024-03-15 PROCEDURE — 85610 PROTHROMBIN TIME: CPT | Performed by: INTERNAL MEDICINE

## 2024-03-15 PROCEDURE — 99223 1ST HOSP IP/OBS HIGH 75: CPT | Performed by: INTERNAL MEDICINE

## 2024-03-15 PROCEDURE — B2111ZZ FLUOROSCOPY OF MULTIPLE CORONARY ARTERIES USING LOW OSMOLAR CONTRAST: ICD-10-PCS | Performed by: INTERNAL MEDICINE

## 2024-03-15 PROCEDURE — 85730 THROMBOPLASTIN TIME PARTIAL: CPT | Performed by: INTERNAL MEDICINE

## 2024-03-15 PROCEDURE — 85025 COMPLETE CBC W/AUTO DIFF WBC: CPT | Performed by: INTERNAL MEDICINE

## 2024-03-15 PROCEDURE — 4A023N7 MEASUREMENT OF CARDIAC SAMPLING AND PRESSURE, LEFT HEART, PERCUTANEOUS APPROACH: ICD-10-PCS | Performed by: INTERNAL MEDICINE

## 2024-03-15 PROCEDURE — 93458 L HRT ARTERY/VENTRICLE ANGIO: CPT | Performed by: INTERNAL MEDICINE

## 2024-03-15 PROCEDURE — 93306 TTE W/DOPPLER COMPLETE: CPT | Performed by: INTERNAL MEDICINE

## 2024-03-15 PROCEDURE — 93010 ELECTROCARDIOGRAM REPORT: CPT | Performed by: INTERNAL MEDICINE

## 2024-03-15 PROCEDURE — 80076 HEPATIC FUNCTION PANEL: CPT | Performed by: INTERNAL MEDICINE

## 2024-03-15 PROCEDURE — 85730 THROMBOPLASTIN TIME PARTIAL: CPT | Performed by: EMERGENCY MEDICINE

## 2024-03-15 PROCEDURE — 99153 MOD SED SAME PHYS/QHP EA: CPT | Performed by: INTERNAL MEDICINE

## 2024-03-15 PROCEDURE — C1894 INTRO/SHEATH, NON-LASER: HCPCS | Performed by: INTERNAL MEDICINE

## 2024-03-15 DEVICE — ANGIO-SEAL VIP VASCULAR CLOSURE DEVICE
Type: IMPLANTABLE DEVICE | Site: GROIN | Status: FUNCTIONAL
Brand: ANGIO-SEAL

## 2024-03-15 RX ORDER — FENTANYL CITRATE 50 UG/ML
INJECTION, SOLUTION INTRAMUSCULAR; INTRAVENOUS CODE/TRAUMA/SEDATION MEDICATION
Status: DISCONTINUED | OUTPATIENT
Start: 2024-03-15 | End: 2024-03-15 | Stop reason: HOSPADM

## 2024-03-15 RX ORDER — OXYCODONE HYDROCHLORIDE 5 MG/1
5 TABLET ORAL ONCE
Status: COMPLETED | OUTPATIENT
Start: 2024-03-15 | End: 2024-03-15

## 2024-03-15 RX ORDER — GABAPENTIN 400 MG/1
400 CAPSULE ORAL 4 TIMES DAILY
Status: DISCONTINUED | OUTPATIENT
Start: 2024-03-15 | End: 2024-03-16 | Stop reason: HOSPADM

## 2024-03-15 RX ORDER — ONDANSETRON 2 MG/ML
INJECTION INTRAMUSCULAR; INTRAVENOUS CODE/TRAUMA/SEDATION MEDICATION
Status: DISCONTINUED | OUTPATIENT
Start: 2024-03-15 | End: 2024-03-15 | Stop reason: HOSPADM

## 2024-03-15 RX ORDER — CARVEDILOL 6.25 MG/1
6.25 TABLET ORAL EVERY 12 HOURS SCHEDULED
Status: DISCONTINUED | OUTPATIENT
Start: 2024-03-15 | End: 2024-03-16 | Stop reason: HOSPADM

## 2024-03-15 RX ORDER — ALBUTEROL SULFATE 90 UG/1
2 AEROSOL, METERED RESPIRATORY (INHALATION) EVERY 6 HOURS PRN
Status: DISCONTINUED | OUTPATIENT
Start: 2024-03-15 | End: 2024-03-16 | Stop reason: HOSPADM

## 2024-03-15 RX ORDER — LIDOCAINE HYDROCHLORIDE 10 MG/ML
INJECTION, SOLUTION EPIDURAL; INFILTRATION; INTRACAUDAL; PERINEURAL CODE/TRAUMA/SEDATION MEDICATION
Status: DISCONTINUED | OUTPATIENT
Start: 2024-03-15 | End: 2024-03-15 | Stop reason: HOSPADM

## 2024-03-15 RX ORDER — PANTOPRAZOLE SODIUM 40 MG/1
40 TABLET, DELAYED RELEASE ORAL
Status: DISCONTINUED | OUTPATIENT
Start: 2024-03-15 | End: 2024-03-16 | Stop reason: HOSPADM

## 2024-03-15 RX ORDER — ALPRAZOLAM 0.25 MG/1
0.25 TABLET ORAL 4 TIMES DAILY PRN
Status: DISCONTINUED | OUTPATIENT
Start: 2024-03-15 | End: 2024-03-16 | Stop reason: HOSPADM

## 2024-03-15 RX ORDER — MIDAZOLAM HYDROCHLORIDE 2 MG/2ML
INJECTION, SOLUTION INTRAMUSCULAR; INTRAVENOUS CODE/TRAUMA/SEDATION MEDICATION
Status: DISCONTINUED | OUTPATIENT
Start: 2024-03-15 | End: 2024-03-15 | Stop reason: HOSPADM

## 2024-03-15 RX ORDER — MAGNESIUM SULFATE HEPTAHYDRATE 40 MG/ML
2 INJECTION, SOLUTION INTRAVENOUS ONCE
Status: COMPLETED | OUTPATIENT
Start: 2024-03-15 | End: 2024-03-15

## 2024-03-15 RX ORDER — INSULIN GLARGINE 100 [IU]/ML
22 INJECTION, SOLUTION SUBCUTANEOUS
Status: DISCONTINUED | OUTPATIENT
Start: 2024-03-15 | End: 2024-03-16 | Stop reason: HOSPADM

## 2024-03-15 RX ORDER — SODIUM CHLORIDE 9 MG/ML
50 INJECTION, SOLUTION INTRAVENOUS CONTINUOUS
Status: DISPENSED | OUTPATIENT
Start: 2024-03-15 | End: 2024-03-15

## 2024-03-15 RX ORDER — HEPARIN SODIUM 5000 [USP'U]/ML
5000 INJECTION, SOLUTION INTRAVENOUS; SUBCUTANEOUS EVERY 8 HOURS SCHEDULED
Status: DISCONTINUED | OUTPATIENT
Start: 2024-03-16 | End: 2024-03-16 | Stop reason: HOSPADM

## 2024-03-15 RX ORDER — LOSARTAN POTASSIUM 25 MG/1
25 TABLET ORAL DAILY
Status: DISCONTINUED | OUTPATIENT
Start: 2024-03-15 | End: 2024-03-16 | Stop reason: HOSPADM

## 2024-03-15 RX ORDER — PRAVASTATIN SODIUM 80 MG/1
80 TABLET ORAL
Status: DISCONTINUED | OUTPATIENT
Start: 2024-03-15 | End: 2024-03-16 | Stop reason: HOSPADM

## 2024-03-15 RX ADMIN — AMOXICILLIN AND CLAVULANATE POTASSIUM 1 TABLET: 875; 125 TABLET, FILM COATED ORAL at 21:15

## 2024-03-15 RX ADMIN — INSULIN LISPRO 2 UNITS: 100 INJECTION, SOLUTION INTRAVENOUS; SUBCUTANEOUS at 21:15

## 2024-03-15 RX ADMIN — GABAPENTIN 400 MG: 400 CAPSULE ORAL at 21:15

## 2024-03-15 RX ADMIN — CARVEDILOL 6.25 MG: 6.25 TABLET, FILM COATED ORAL at 09:01

## 2024-03-15 RX ADMIN — GABAPENTIN 400 MG: 400 CAPSULE ORAL at 00:34

## 2024-03-15 RX ADMIN — PANTOPRAZOLE SODIUM 40 MG: 40 TABLET, DELAYED RELEASE ORAL at 05:13

## 2024-03-15 RX ADMIN — LOSARTAN POTASSIUM 25 MG: 25 TABLET, FILM COATED ORAL at 08:58

## 2024-03-15 RX ADMIN — CARVEDILOL 6.25 MG: 6.25 TABLET, FILM COATED ORAL at 21:15

## 2024-03-15 RX ADMIN — INSULIN GLARGINE 22 UNITS: 100 INJECTION, SOLUTION SUBCUTANEOUS at 21:15

## 2024-03-15 RX ADMIN — HEPARIN SODIUM 2000 UNITS: 1000 INJECTION INTRAVENOUS; SUBCUTANEOUS at 01:23

## 2024-03-15 RX ADMIN — GABAPENTIN 400 MG: 400 CAPSULE ORAL at 12:24

## 2024-03-15 RX ADMIN — AMOXICILLIN AND CLAVULANATE POTASSIUM 1 TABLET: 875; 125 TABLET, FILM COATED ORAL at 09:01

## 2024-03-15 RX ADMIN — INSULIN GLARGINE 22 UNITS: 100 INJECTION, SOLUTION SUBCUTANEOUS at 00:36

## 2024-03-15 RX ADMIN — MAGNESIUM SULFATE HEPTAHYDRATE 2 G: 40 INJECTION, SOLUTION INTRAVENOUS at 08:54

## 2024-03-15 RX ADMIN — NICOTINE 1 PATCH: 14 PATCH, EXTENDED RELEASE TRANSDERMAL at 09:08

## 2024-03-15 RX ADMIN — PRAVASTATIN SODIUM 80 MG: 80 TABLET ORAL at 18:09

## 2024-03-15 RX ADMIN — ASPIRIN 81 MG: 81 TABLET, COATED ORAL at 08:58

## 2024-03-15 RX ADMIN — GABAPENTIN 400 MG: 400 CAPSULE ORAL at 18:09

## 2024-03-15 RX ADMIN — ACETAMINOPHEN 650 MG: 325 TABLET, FILM COATED ORAL at 05:12

## 2024-03-15 RX ADMIN — INSULIN LISPRO 1 UNITS: 100 INJECTION, SOLUTION INTRAVENOUS; SUBCUTANEOUS at 00:36

## 2024-03-15 RX ADMIN — CARVEDILOL 6.25 MG: 6.25 TABLET, FILM COATED ORAL at 00:35

## 2024-03-15 RX ADMIN — OXYCODONE HYDROCHLORIDE 5 MG: 5 TABLET ORAL at 06:18

## 2024-03-15 RX ADMIN — PERFLUTREN 0.6 ML/MIN: 6.52 INJECTION, SUSPENSION INTRAVENOUS at 08:26

## 2024-03-15 RX ADMIN — GABAPENTIN 400 MG: 400 CAPSULE ORAL at 09:01

## 2024-03-15 RX ADMIN — SODIUM CHLORIDE 50 ML/HR: 0.9 INJECTION, SOLUTION INTRAVENOUS at 18:08

## 2024-03-15 RX ADMIN — INSULIN LISPRO 2 UNITS: 100 INJECTION, SOLUTION INTRAVENOUS; SUBCUTANEOUS at 06:19

## 2024-03-15 NOTE — ASSESSMENT & PLAN NOTE
Lab Results   Component Value Date    HGBA1C 11.5 (A) 01/03/2024   Noted to be hyperglycemic in the ER with a mildly elevated anion gap however normal bicarb, anion gap is since normalized.  Was initiated on insulin drip in the ER.  Discontinued and resume home insulin  Continue home long-acting insulin 22 units  Will hold mealtime insulin today, patient has been n.p.o. all day  Continue sliding scale

## 2024-03-15 NOTE — DISCHARGE INSTR - AVS FIRST PAGE
1. Please see the post cardiac catheterization dishcarge instructions.   No heavy lifting, greater than 10 lbs. or strenuous  activity for 48 hrs.    2.Remove band aid tomorrow.  Shower and wash area- groin gently with soap and water- beginning tomorrow. Rinse and pat dry.  Apply new water seal band aid.  Repeat this process for 5 days. No powders, creams lotions or antibiotic ointments  for 5 days.  No tub baths, hot tubs or swimming for 5 days.     3. Please call our office ([phone]) if you have any fever, redness, swelling, discharge from your groin access site.    4.No driving for 1 day    5. Angioseal Booklet      On this hospital admission you had elevated troponin and some EKG changes and underwent cardiac catheterization which revealed nonobstructive coronary artery disease, no stents or any intervention indicated.  Your ultrasound showed that you have a mild cardiomyopathy most likely stress cardiomyopathy in the setting of GI illness.  Continue your current medications with Coreg, losartan, aspirin, statin.  Follow-up with cardiology, you will need a repeat ultrasound in about a month.  You had a tooth ache.  CAT scan showed a small abscess.  Continue Augmentin twice daily for total of 7 days, you have 5 more days left.  On Monday please call maxillofacial surgery to schedule an appointment.

## 2024-03-15 NOTE — CONSULTS
Consultation - Cardiology Team One  Lashawn Davis 64 y.o. female MRN: [id number]  Unit/Bed#: [facility] 420-01 Encounter: [id number]    Inpatient consult to Cardiology  Consult performed by: MERI Stewart  Consult ordered by: Estelita Montano MD      Physician Requesting Consult: La Nena Hamlin MD  Reason for Consult / Principal Problem: NSTEMI    Assessment    Chest pain with elevated troponin  Possible NSTEMI vs demand from gastroenteritis  Presented with N/V/D, CP x 2 days  Chest pain similar to previous MI but less severe  Hs troponin 599-->489-->547-->309  ECG- NSR with new ST/T wave abnormality  On IV heparin, ASA, statin, carvedilol    CAD with inferior wall MI 02/2021 s/p PCI of mid and distal RCA; staged PCI of mid LAD 03/2021, PCI of distal RCA 2022 after abnormal stress test  On ASA, rosuvastatin, and carvedilol as an outpatient  Has had intermittent exertional CP but increased frequency the past 2 days- see above    HTN- initially a bit hypotensive which improved with IVF, now normotensive last /70.  Home Rx: carvedilol 6.25 mg BID, losartan 25 mg daily- both continued here    HLD- last LDL 90. Prior statin intolerance as well as Repatha intolerance but reports tolerating rosuvastatin 10 mg daily as an outpatient    Tobacco abuse- 1/2 ppd     Type 2 DM, poorly controlled- A1c 11.5%    Plan  NPO for cardiac cath later today  Continue IV heparin, ASA, statin, and beta blocker  Follow up echo results  Encouraged smoking cessation    History of Present Illness   HPI: Lashawn Davis is a 64 y.o. year old female with CAD s/p STEMI and PCI of RCA/LAD, HTN, HLD, and tobacco abuse who follows with cardiologist Dr. Mason and was last seen in the office in October 2023. Her last ischemic evaluation was in 12/2022 via nuclear stress test which did not have diagnostic evidence for perfusion abnormality.     She presented to the ED on 3/14/24 with c/o vomiting, diarrhea, and nausea for 2 days. She also  reported right sided facial swelling and exertional chest pain. Her GI symptoms have resolved and she has mild residual facial swelling. She reports chest pressure at time of exam but reports that it is mild. She notes that the quality of the pain is similar to her previous heart attack but less severe. Hs troponin 599-->489-->547-->309. ECG with new ST/T wave changes. BP well controlled (slightly hypotensive initially s/p IVF) and maintaining adequate O2 sats on room air. She was started on IV heparin and an echocardiogram performed earlier today. Cardiology consulted for further evaluation and management of her possible NSTEMI.    EKG reviewed personally: Sinus tachycardia with ST/T wave changes compared to previous ECGs    Telemetry reviewed personally: NSR    Review of Systems   Constitutional: Positive for decreased appetite. Negative for chills, malaise/fatigue and weight gain.   Cardiovascular:  Positive for chest pain and dyspnea on exertion. Negative for leg swelling, orthopnea, palpitations and syncope.   Respiratory:  Negative for cough, shortness of breath, sleep disturbances due to breathing and sputum production.    Gastrointestinal:  Positive for nausea and vomiting. Negative for bloating.   Neurological:  Negative for dizziness, light-headedness and weakness.   Psychiatric/Behavioral:  Negative for altered mental status.    All other systems reviewed and are negative.    Historical Information   Past Medical History:   Diagnosis Date    Coronary artery disease     Crohn's colitis (HCC)     Diabetes mellitus (HCC)     Fibromyalgia     Gastric ulcer     [facility]    HTN (hypertension)     Hyperlipidemia     IBD (inflammatory bowel disease) 11/2015    [facility]    Migraine     Myocardial infarction (HCC)      Past Surgical History:   Procedure Laterality Date    CARDIAC CATHETERIZATION      CARDIAC CATHETERIZATION Left 03/07/2022    Procedure: Cardiac catheterization;  Surgeon: Jasen  SABINA Hayden MD;  Location: [facility];  Service: Cardiology    CARDIAC CATHETERIZATION N/A 03/07/2022    Procedure: Cardiac Coronary Angiogram;  Surgeon: Jasen Hayden MD;  Location: [facility];  Service: Cardiology    CARDIAC CATHETERIZATION N/A 03/07/2022    Procedure: Cardiac pci;  Surgeon: Jasen Hayden MD;  Location: [facility];  Service: Cardiology    CHOLECYSTECTOMY      COLONOSCOPY  12/03/2018    internal hemorrhoids, 3mm tubular adenoma polyp transverse colon, bx negative for dysplasia    COLONOSCOPY  11/2015    COLONOSCOPY W/ BIOPSIES  2022    ileitis; dr rao c/w crohns    CORONARY STENT PLACEMENT      DENTAL SURGERY      [place] teeth extraction    EGD  12/2018    2cm hiatal hernia, gastritis bx shows foci of intestinal metaplasia, negative Hpylori    EGD  2015    ESOPHAGITIS/MALLHIATUS HERNIA  FOUND    HYSTERECTOMY      MAMMO STEREOTACTIC BREAST BIOPSY LEFT (ALL INC) Left 10/27/2021    TUBAL LIGATION      Bilateral     Social History     Substance and Sexual Activity   Alcohol Use Not Currently    Comment: Rarely     Social History     Substance and Sexual Activity   Drug Use Never     Social History     Tobacco Use   Smoking Status Every Day    Current packs/day: 0.50    Average packs/day: 0.5 packs/day for 40.2 years (20.1 ttl pk-yrs)    Types: Cigarettes    Start date: 1984   Smokeless Tobacco Never   Tobacco Comments    10 cigarettes daily      Family History:   Family History   Problem Relation Age of Onset    Coronary artery disease Mother     Dementia Mother     Stroke Mother     No Known Problems Father     Cervical cancer Sister 40    Heart disease Brother     No Known Problems Maternal Grandmother     No Known Problems Maternal Grandfather     No Known Problems Paternal Grandmother     No Known Problems Paternal Grandfather     Alcohol abuse Neg Hx     Substance Abuse Neg Hx        Meds/Allergies   all current active meds have been reviewed and current meds:    Current Facility-Administered Medications   Medication Dose Route Frequency    acetaminophen (TYLENOL) tablet 650 mg  650 mg Oral Q6H PRN    albuterol (PROVENTIL HFA,VENTOLIN HFA) inhaler 2 puff  2 puff Inhalation Q6H PRN    ALPRAZolam (XANAX) tablet 0.25 mg  0.25 mg Oral 4x Daily PRN    amoxicillin-clavulanate (AUGMENTIN) 875-125 mg per tablet 1 tablet  1 tablet Oral Q12H LOPEZ    aspirin (ECOTRIN LOW STRENGTH) EC tablet 81 mg  81 mg Oral Daily    carvedilol (COREG) tablet 6.25 mg  6.25 mg Oral Q12H LOPEZ    gabapentin (NEURONTIN) capsule 400 mg  400 mg Oral 4x Daily    heparin (porcine) 25,000 units in 0.45% NaCl 250 mL infusion (premix)  3-20 Units/kg/hr (Order-Specific) Intravenous Titrated    heparin (porcine) injection 2,000 Units  2,000 Units Intravenous Q6H PRN    heparin (porcine) injection 4,000 Units  4,000 Units Intravenous Q6H PRN    insulin glargine (LANTUS) subcutaneous injection 22 Units 0.22 mL  22 Units Subcutaneous HS    insulin lispro (HumALOG/ADMELOG) 100 units/mL subcutaneous injection 1-5 Units  1-5 Units Subcutaneous HS    insulin lispro (HumALOG/ADMELOG) 100 units/mL subcutaneous injection 1-6 Units  1-6 Units Subcutaneous TID AC    losartan (COZAAR) tablet 25 mg  25 mg Oral Daily    magnesium sulfate 2 g/50 mL IVPB (premix) 2 g  2 g Intravenous Once    nicotine (NICODERM CQ) 14 mg/24hr TD 24 hr patch 1 patch  1 patch Transdermal Daily    ondansetron (ZOFRAN) injection 4 mg  4 mg Intravenous Q6H PRN    pantoprazole (PROTONIX) EC tablet 40 mg  40 mg Oral Early Morning    pravastatin (PRAVACHOL) tablet 80 mg  80 mg Oral Daily With Dinner    sodium chloride (PF) 0.9 % injection 3 mL  3 mL Intravenous Q1H PRN    sodium chloride 0.9 % infusion  50 mL/hr Intravenous Continuous     heparin (porcine), 3-20 Units/kg/hr (Order-Specific), Last Rate: 13.1 Units/kg/hr (03/15/24 0125)  sodium chloride, 50 mL/hr, Last Rate: 50 mL/hr (03/14/24 3405)        Allergies   Allergen Reactions    Penicillins  "Anaphylaxis and Hives    Rosuvastatin Myalgia and Arthralgia    Atorvastatin Myalgia    Cefuroxime     Metaxalone     Influenza Vaccines Rash    Keflet [Cephalexin] Rash    Lyrica [Pregabalin] Swelling     Feet swelling    Nuts - Food Allergy Itching    Sulfa Antibiotics Rash    Topiramate Rash       Objective   Vitals: Blood pressure 116/70, pulse 89, temperature 98.2 °F (36.8 °C), resp. rate 18, height 5' 8\" (1.727 m), weight 112 kg (246 lb), SpO2 94%, not currently breastfeeding., Body mass index is 37.4 kg/m².,     Systolic (24hrs), Av , Min:94 , Max:125     Diastolic (24hrs), Av, Min:61, Max:78        Intake/Output Summary (Last 24 hours) at 3/15/2024 0831  Last data filed at 3/15/2024 0548  Gross per 24 hour   Intake 378.33 ml   Output 850 ml   Net -471.67 ml     Wt Readings from Last 3 Encounters:   03/15/24 112 kg (246 lb)   24 113 kg (249 lb 12.8 oz)   23 112 kg (246 lb)     Invasive Devices       Peripheral Intravenous Line  Duration             Peripheral IV 24 Left;Proximal;Ventral (anterior) Forearm <1 day    Peripheral IV 24 Left;Ventral (anterior) Forearm <1 day    Peripheral IV 24 Right Antecubital <1 day                    Physical Exam  Vitals reviewed.   Constitutional:       General: She is not in acute distress.     Appearance: She is obese.   Neck:      Vascular: No hepatojugular reflux or JVD.   Cardiovascular:      Rate and Rhythm: Normal rate and regular rhythm.      Pulses: Normal pulses.      Heart sounds: Normal heart sounds. No murmur heard.     No friction rub. No gallop.   Pulmonary:      Effort: Pulmonary effort is normal. No respiratory distress.      Breath sounds: No rales.      Comments: Room air  Abdominal:      General: Bowel sounds are normal. There is no distension.      Palpations: Abdomen is soft.      Tenderness: There is no abdominal tenderness.   Musculoskeletal:         General: No tenderness. Normal range of motion.      Cervical " "back: Neck supple.      Right lower leg: No edema.      Left lower leg: No edema.   Skin:     General: Skin is warm and dry.      Capillary Refill: Capillary refill takes less than 2 seconds.      Findings: No erythema.   Neurological:      Mental Status: She is alert and oriented to person, place, and time.   Psychiatric:         Mood and Affect: Mood normal.         LABORATORY RESULTS:      CBC with diff:   Results from last 7 days   Lab Units 03/15/24  0626 03/14/24  1635   WBC Thousand/uL 9.10 11.66*   HEMOGLOBIN g/dL 13.3 16.2*   HEMATOCRIT % 40.7 47.6*   MCV fL 90 86   PLATELETS Thousands/uL 224 306   RBC Million/uL 4.55 5.54*   MCH pg 29.2 29.2   MCHC g/dL 32.7 34.0   RDW % 14.3 14.0   MPV fL 10.7 11.0   NRBC AUTO /100 WBCs 0 0       CMP:  Results from last 7 days   Lab Units 03/15/24  0626 03/14/24 2125 03/14/24 1843 03/14/24  1635   POTASSIUM mmol/L 4.1 4.1 3.9 3.9   CHLORIDE mmol/L 108 104 101 100   CO2 mmol/L 18* 20* 20* 18*   BUN mg/dL 12 13 15 16   CREATININE mg/dL 0.72 0.78 0.90 0.84   CALCIUM mg/dL 8.2* 8.5 8.9 9.3   AST U/L 15  --   --  15   ALT U/L 5*  --   --  9   ALK PHOS U/L 76  --   --  99   EGFR ml/min/1.73sq m 90 81 68 74       BMP:  Results from last 7 days   Lab Units 03/15/24  0626 03/14/24 2125 03/14/24 1843 03/14/24  1635   POTASSIUM mmol/L 4.1 4.1 3.9 3.9   CHLORIDE mmol/L 108 104 101 100   CO2 mmol/L 18* 20* 20* 18*   BUN mg/dL 12 13 15 16   CREATININE mg/dL 0.72 0.78 0.90 0.84   CALCIUM mg/dL 8.2* 8.5 8.9 9.3       Lab Results   Component Value Date    CREATININE 0.72 03/15/2024    CREATININE 0.78 03/14/2024    CREATININE 0.90 03/14/2024       No results found for: \"NTBNP\"       Results from last 7 days   Lab Units 03/15/24  0626 03/14/24  2125 03/14/24  1635   MAGNESIUM mg/dL 1.7* 1.9 1.4*                     Results from last 7 days   Lab Units 03/15/24  0626 03/14/24  1819   INR  1.17 1.17     Lipid Profile:   No results found for: \"CHOL\"  Lab Results   Component Value Date    " HDL 40 (L) 2024    HDL 50 2023    HDL 55 2022     Lab Results   Component Value Date    LDLCALC 90 2024    LDLCALC 130 (H) 2023    LDLCALC 21 2022     Lab Results   Component Value Date    TRIG 202 (H) 2024    TRIG 313 (H) 2023    TRIG 174 (H) 2022       Cardiac testing:   Results for orders placed during the hospital encounter of 21    Echo Complete with Contrast if Indicated    Narrative  [address] [address]  [phone]    Transthoracic Echocardiogram  2D, M-mode, Doppler, and Color Doppler    Study date:  2021    Patient: KT ZURITA  MR number: [id number]  Account number: [id number]  : 1960  Age: 61 years  Gender: Female  Status: Inpatient  Location: Bedside  Height: 66 in  Weight: 213.6 lb  BP: 161/ 81 mmHg    Indications: Assess left ventricular function.    Diagnoses: I21.3 - ST elevation (STEMI) myocardial infarction of unspecified site    Sonographer:  TODD Herring  Primary Physician:  Virgie MANDEL  Referring Physician:  Guru Guevara MD  Group:  [facility]  Interpreting Physician:  Gilda Garcia MD    SUMMARY    LEFT VENTRICLE:  Systolic function was normal. Ejection fraction was estimated to be 60 %.  There was akinesis of the basal-mid inferior wall(s).    RIGHT VENTRICLE:  The size was normal.  Systolic function was normal.    HISTORY: PRIOR HISTORY: Myocardial infarction, Dyslipidemia, Current everyday smoker, Obesity    PROCEDURE: The procedure was performed at the bedside. This was a routine study. The transthoracic approach was used. The study included complete 2D imaging, M-mode, complete spectral Doppler, and color Doppler. The heart rate was 55 bpm,  at the start of the study. Images were obtained from the parasternal, apical, subcostal, and suprasternal notch acoustic windows. Image quality was adequate.    LEFT  VENTRICLE: Size was normal. Systolic function was normal. Ejection fraction was estimated to be 60 %. There was akinesis of the basal-mid inferior wall(s). Wall thickness was normal. DOPPLER: Left ventricular diastolic function  parameters were normal.    RIGHT VENTRICLE: The size was normal. Systolic function was normal. Wall thickness was normal.    LEFT ATRIUM: Size was normal.    RIGHT ATRIUM: Size was normal.    MITRAL VALVE: Valve structure was normal. There was normal leaflet separation. DOPPLER: The transmitral velocity was within the normal range. There was no evidence for stenosis. There was trace regurgitation.    AORTIC VALVE: The valve was trileaflet. Leaflets exhibited normal thickness and normal cuspal separation. DOPPLER: Transaortic velocity was within the normal range. There was no evidence for stenosis. There was no significant  regurgitation.    TRICUSPID VALVE: The valve structure was normal. There was normal leaflet separation. DOPPLER: The transtricuspid velocity was within the normal range. There was no evidence for stenosis. There was trace regurgitation.    PULMONIC VALVE: Leaflets exhibited normal thickness, no calcification, and normal cuspal separation. DOPPLER: The transpulmonic velocity was within the normal range. There was trace regurgitation.    PERICARDIUM: There was no pericardial effusion. The pericardium was normal in appearance.    AORTA: The root exhibited normal size.    SYSTEMIC VEINS: IVC: The inferior vena cava was normal in size. Respirophasic changes were normal.    SYSTEM MEASUREMENT TABLES    2D  %FS: 35.61 %  Ao Diam: 3.35 cm  EDV(Teich): 111.59 ml  EF Biplane: 54.33 %  EF(Teich): 64.94 %  ESV(Teich): 39.13 ml  IVSd: 0.99 cm  LA Area: 16.16 cm2  LA Diam: 3.69 cm  LVEDV MOD A2C: 97.35 ml  LVEDV MOD A4C: 86.07 ml  LVEDV MOD BP: 94.47 ml  LVEF MOD A2C: 49.73 %  LVEF MOD A4C: 59.76 %  LVESV MOD A2C: 48.93 ml  LVESV MOD A4C: 34.63 ml  LVESV MOD BP: 43.14 ml  LVIDd: 4.88  cm  LVIDs: 3.14 cm  LVLd A2C: 8.07 cm  LVLd A4C: 7.51 cm  LVLs A2C: 7.03 cm  LVLs A4C: 6.32 cm  LVPWd: 0.93 cm  RA Area: 11.08 cm2  RVIDd: 3.16 cm  SV MOD A2C: 48.42 ml  SV MOD A4C: 51.43 ml  SV(Teich): 72.46 ml    MM  TAPSE: 1.72 cm    PW  E' Sept: 0.06 m/s  E/E' Sept: 11.78  MV A Adis: 0.82 m/s  MV Dec [place]: 2.94 m/s2  MV DecT: 260.15 ms  MV E Adis: 0.77 m/s  MV E/A Ratio: 0.93  MV PHT: 75.44 ms  MVA By PHT: 2.92 cm2    [employer] Accredited Echocardiography Laboratory    Prepared and electronically signed by    Gilda Garcia MD  Signed 26-Feb-2021 16:26:28    No results found for this or any previous visit.    No valid procedures specified.  No results found for this or any previous visit.      Imaging: I have personally reviewed pertinent reports.   and I have personally reviewed pertinent films in PACS  CTA ED chest PE Study    Result Date: 3/14/2024  Narrative: CTA - CHEST WITH IV CONTRAST - PULMONARY ANGIOGRAM INDICATION: r/o PE. COMPARISON: Multiple priors most recently 8/29/2023 TECHNIQUE: CTA examination of the chest was performed using angiographic technique according to a protocol specifically tailored to evaluate for pulmonary embolism. Multiplanar 2D reformatted images were created from the source data. In addition, coronal  3D MIP postprocessing was performed on the acquisition scanner. Radiation dose length product (DLP) for this visit: 567.82 mGy-cm . This examination, like all CT scans performed in the [facility], was performed utilizing techniques to minimize radiation dose exposure, including the use of iterative reconstruction and automated exposure control. IV Contrast: 69 mL of iohexol (OMNIPAQUE) FINDINGS: PULMONARY ARTERIAL TREE: No pulmonary embolus is seen. LUNGS: Few scattered subpleural nodules are stable since 2022, benign.. There is no tracheal or endobronchial lesion. PLEURA: Unremarkable. HEART/GREAT VESSELS: Coronary artery atherosclerosis. No  thoracic aortic aneurysm. MEDIASTINUM AND VIKRAM: Unremarkable. CHEST WALL AND LOWER NECK: Unremarkable. VISUALIZED STRUCTURES IN THE UPPER ABDOMEN: No acute abnormality or suspicious mass OSSEOUS STRUCTURES: Spinal degenerative changes are noted. No acute fracture or destructive osseous lesion. Healing/healed bilateral rib fractures.     Impression: No pulmonary embolus or other acute abnormality Workstation performed: [id number]     Thank you for allowing us to participate in this patient's care. This pt will follow up with Dr. Logan once discharged.     Counseling / Coordination of Care  Total floor / unit time spent today 45 minutes.  Greater than 50% of total time was spent with the patient and / or family counseling and / or coordination of care.  A description of the counseling / coordination of care: Review of history, current assessment, development of a plan.    Code Status: Level 1 - Full Code    ** Please Note: Dragon 360 Dictation voice to text software may have been used in the creation of this document. **

## 2024-03-15 NOTE — ASSESSMENT & PLAN NOTE
Normal anion gap metabolic acidosis suspect starvation, diarrhea  Continue gentle hydration  Recheck labs tomorrow

## 2024-03-15 NOTE — UTILIZATION REVIEW
Initial Clinical Review    Admission: Date/Time/Statement:   Admission Orders (From admission, onward)       Ordered        03/14/24 2121  INPATIENT ADMISSION  Once                          Orders Placed This Encounter   Procedures    INPATIENT ADMISSION     Standing Status:   Standing     Number of Occurrences:   1     Order Specific Question:   Level of Care     Answer:   Level 2 Stepdown / HOT [14]     Order Specific Question:   Estimated length of stay     Answer:   More than 2 Midnights     Order Specific Question:   Certification     Answer:   I certify that inpatient services are medically necessary for this patient for a duration of greater than two midnights. See H&P and MD Progress Notes for additional information about the patient's course of treatment.     ED Arrival Information       Expected   -    Arrival   3/14/2024 15:39    Acuity   Emergent              Means of arrival   Walk-In    Escorted by   Family Member    Service   Hospitalist    Admission type   Emergency              Arrival complaint   vomiting             Chief Complaint   Patient presents with    Dental Pain     Pt began with dental swelling and pain yesterday.      Dizziness     Yesterday she began with dizziness and chest pain.  Also noted nausea and one episode of vomiting.  Also noted that she does sometimes SOB.         Initial Presentation: 64 y.o. female to ED via walk-in from home   Present to ED with chest pain. Endorses discomfort worse with exertion also reports shortness of breath. Also endorses nausea with multiple episodes of vomiting and diarrhea for 2 days   PMHX: CAD s/p PCI of RCA in 2021 and PCI of LAD in 2022 , type 2 diabetes, hypertension, hyperlipidemia   Admitted to Level 2 Stepdown / HOT with DX: NSTEMI (non-ST elevated myocardial infarction)   on exam: Tachy; tachypnea; hypotensive; Initial EKG showed no signs of acute ischemia; Troponin noted to be 599, 489  PLAN: cont ivf; monitor / trend TROPs;  Cardiopulmonary monitoring; start Heparin gtt; NPO after MN; f/u echo; cardiology consult; Accuchecks with [facility]        Date: 3/15/24    Day 2  CARDIOLOGY CONSULT   Chest pain with elevated troponin: Possible type I NSTEMI.  For further delineation with cardiac catheterization today considering new ST and T wave changes seen on EKG.  Continue medical management with IV heparin, aspirin, statin, and beta-blocker for the time being.  Will review echocardiogram, once available.  CAD with prior inferior wall MI, status post PCI of mid and distal RCA along with staged PCI of the mid LAD with further PCI of distal RCA a year later: Continued on aspirin, statin, beta-blocker.  Will further evaluate coronary status with catheterization today.    Elevated troponin with EKG changes; Echo showed ejection fraction 45% from 1 appreciated, pattern of wall motion abnormalities may suggest a stress cardiomyopathy, diastolic function is abnormal for days, mild to moderate MR.  Compared to prior study in 2021 reduced left ventricular systolic function with hypokinesis of the anterior septal lateral and apical walls  Underwent cardiac cath today, unchanged CAD compared to 3/7/2022.  Mild diffuse nonobstructive CAD, patent stents LAD and RCA    Plan: cont ivf; cont heparin gtt - stopped after cardiac cath; monitor labs; rec'd Mg Sulf iv x1; Cardiopulmonary monitoring; Accuchecks with [facility]              ED Triage Vitals   Temperature Pulse Respirations Blood Pressure SpO2   03/14/24 1552 03/14/24 1547 03/14/24 1547 03/14/24 1552 03/14/24 1547   98 °F (36.7 °C) (!) 121 20 94/78 98 %      Temp Source Heart Rate Source Patient Position - Orthostatic VS BP Location FiO2 (%)   03/14/24 1552 03/14/24 1613 03/14/24 1613 03/14/24 1613 --   Oral Monitor Sitting Left arm       Pain Score       03/14/24 2148       4          Wt Readings from Last 1 Encounters:   03/15/24 112 kg (246 lb)     Additional Vital Signs:   Date/Time Temp Pulse Resp BP MAP  (mmHg) SpO2 Calculated FIO2 (%) - Nasal Cannula Nasal Cannula O2 Flow Rate (L/min) O2 Device Patient Position - Orthostatic VS   03/15/24 1425 -- 69 -- 134/73 -- 93 % 28 2 L/min Nasal cannula --   03/15/24 13:29:10 -- -- -- -- -- -- 28 2 L/min Nasal cannula --   03/15/24 10:52:30 98.4 °F (36.9 °C) 69 -- 111/60 77 94 % -- -- -- --   03/15/24 0824 -- 89 -- 116/70 -- -- -- -- -- --   03/15/24 0800 -- -- -- -- -- 92 % -- -- None (Room air) --   03/15/24 02:16:45 98.2 °F (36.8 °C) 89 -- 116/70 85 94 % -- -- -- --   03/15/24 0000 -- -- -- -- -- -- -- -- None (Room air) --   24 23:03:29 98.1 °F (36.7 °C) 84 18 120/67 85 97 % -- -- -- --   24 2244 -- 90 18 106/61 77 94 % -- -- None (Room air) Sitting   24 1745 -- 92 18 122/62 86 94 % -- -- None (Room air) Sitting   24 1730 -- 108 Abnormal  28 Abnormal  125/63 86 96 % -- -- None (Room air) Sitting   24 1613 -- 103 22 114/69 86 98 % -- -- None (Room air) Sitting   24 1552 98 °F (36.7 °C) -- -- 94/78 -- -- -- -- -- --   24 1547 -- 121 Abnormal  20 -- -- 98 %           EK24 15:49   Sinus tachycardia  Anterior infarct , age undetermined  Marked T wave abnormality, consider anterolateral ischemia  Abnormal ECG  When compared with ECG of 07-MAR-2022 08:56,  Vent. rate has increased BY  64 BPM  Anterior infarct is now Present  T wave inversion now evident in Anterolateral leads    24 17:30   Normal sinus rhythm  Anterior infarct (cited on or before 14-MAR-2024)  T wave abnormality, consider anterolateral ischemia  Abnormal ECG  When compared with ECG of 14-MAR-2024 15:49,  Serial changes of Anterior infarct Present    24 18:00   Normal sinus rhythm  Low voltage QRS  Anterior infarct (cited on or before 14-MAR-2024)  T wave abnormality, consider anterolateral ischemia  Prolonged QT  Abnormal ECG      Pertinent Labs/Diagnostic Test Results:   CTA ED chest PE Study   Final Result by Guilherme Benson DO ([id number])      No  pulmonary embolus or other acute abnormality                  Workstation performed: [id number]              Results from last 7 days   Lab Units 03/15/24  0626 03/14/24  1635   WBC Thousand/uL 9.10 11.66*   HEMOGLOBIN g/dL 13.3 16.2*   HEMATOCRIT % 40.7 47.6*   PLATELETS Thousands/uL 224 306   NEUTROS ABS Thousands/µL 4.76 7.56        Results from last 7 days   Lab Units 03/15/24  0626 03/14/24 2125 03/14/24 1843 03/14/24  1635   SODIUM mmol/L 135 135 133* 131*   POTASSIUM mmol/L 4.1 4.1 3.9 3.9   CHLORIDE mmol/L 108 104 101 100   CO2 mmol/L 18* 20* 20* 18*   ANION GAP mmol/L 9 11 12 13   BUN mg/dL 12 13 15 16   CREATININE mg/dL 0.72 0.78 0.90 0.84   EGFR ml/min/1.73sq m 90 81 68 74   CALCIUM mg/dL 8.2* 8.5 8.9 9.3   MAGNESIUM mg/dL 1.7* 1.9  --  1.4*     Results from last 7 days   Lab Units 03/15/24  0626 03/14/24  1635   AST U/L 15 15   ALT U/L 5* 9   ALK PHOS U/L 76 99   TOTAL PROTEIN g/dL 5.8* 7.1   ALBUMIN g/dL 3.3* 3.8   TOTAL BILIRUBIN mg/dL 0.48 0.68   BILIRUBIN DIRECT mg/dL 0.05  --      Results from last 7 days   Lab Units 03/15/24  1050 03/15/24  0550 03/15/24  0025 03/14/24 2157 03/14/24 2030 03/14/24  1840   POC GLUCOSE mg/dl 174* 190* 201* 188* 210* 329*     Results from last 7 days   Lab Units 03/15/24  0626 03/14/24 2125 03/14/24 1843 03/14/24  1635   GLUCOSE RANDOM mg/dL 189* 197* 323* 383*        Beta- Hydroxybutyrate   Date Value Ref Range Status   03/14/2024 0.39 (H) 0.02 - 0.27 mmol/L Final         Results from last 7 days   Lab Units 03/14/24  1831   PH ALLISON  7.329   PCO2 ALLISON mm Hg 47.9   PO2 ALLISON mm Hg 17.7*   HCO3 ALLISON mmol/L 24.6   BASE EXC ALLISON mmol/L -1.8   O2 CONTENT ALLISON ml/dL 5.2   O2 HGB, VENOUS % 24.6*        Results from last 7 days   Lab Units 03/14/24 2125 03/14/24  1819 03/14/24  1635   HS TNI 0HR ng/L  --   --  599*   HS TNI 2HR ng/L  --  489*  --    HSTNI D2 ng/L  --  -110  --    HS TNI 4HR ng/L 547*  --   --    HSTNI D4 ng/L -52  --   --      Results from last 7 days   Lab  Units 03/14/24  1635   D-DIMER QUANTITATIVE ug/ml FEU 1.37*     Results from last 7 days   Lab Units 03/15/24  0626 03/15/24  0052 03/14/24  1819   PROTIME seconds 14.8*  --  14.8*   INR  1.17  --  1.17   PTT seconds 62* 48* 32        Results from last 7 days   Lab Units 03/14/24  1850   CLARITY UA  Slightly Cloudy   COLOR UA  Yellow   SPEC GRAV UA  1.015   PH UA  6.0   GLUCOSE UA mg/dl 500 (1/2%)*   KETONES UA mg/dl 15 (1+)*   BLOOD UA  Trace*   PROTEIN UA mg/dl Negative   NITRITE UA  Negative   BILIRUBIN UA  Negative   UROBILINOGEN UA E.U./dl 0.2   LEUKOCYTES UA  Negative   WBC UA /hpf 1-2   RBC UA /hpf Innumerable*   BACTERIA UA /hpf None Seen   EPITHELIAL CELLS WET PREP /hpf Occasional          ED Treatment:   Medication Administration from 03/14/2024 1539 to 03/14/2024 2252         Date/Time Order Dose Route Action     03/14/2024 1635 EDT ondansetron (ZOFRAN) injection 4 mg 4 mg Intravenous Given     03/14/2024 1637 EDT amoxicillin-clavulanate (AUGMENTIN) 875-125 mg per tablet 1 tablet 1 tablet Oral Given     03/14/2024 1635 EDT sodium chloride 0.9 % bolus 1,000 mL 1,000 mL Intravenous New Bag     03/14/2024 1744 EDT aspirin tablet 325 mg 325 mg Oral Given     03/14/2024 1743 EDT nitroglycerin (NITROSTAT) SL tablet 0.4 mg 0.4 mg Sublingual Given     03/14/2024 1834 EDT potassium chloride (Klor-Con M20) CR tablet 40 mEq 40 mEq Oral Given     03/14/2024 1842 EDT insulin regular (HumuLIN R,NovoLIN R) injection 11.3 Units 11.3 Units Intravenous Given     03/14/2024 1835 EDT magnesium sulfate 2 g/50 mL IVPB (premix) 2 g 2 g Intravenous New Bag     03/14/2024 1836 EDT multi-electrolyte (ISOLYTE-S PH 7.4) bolus 1,000 mL 1,000 mL Intravenous New Bag     03/14/2024 1845 EDT heparin (porcine) injection 4,000 Units 4,000 Units Intravenous Given     03/14/2024 1847 EDT heparin (porcine) 25,000 units in 0.45% NaCl 250 mL infusion (premix) 11.1 Units/kg/hr Intravenous New Bag     03/14/2024 2126 EDT insulin regular (HumuLIN  R,NovoLIN R) 1 Units/mL in sodium chloride 0.9 % 100 mL infusion 11.3 Units/hr Intravenous New Bag     03/14/2024 1945 EDT iohexol (OMNIPAQUE) 350 MG/ML injection (MULTI-DOSE) 100 mL 69 mL Intravenous Given     03/14/2024 2127 EDT dextrose 5 % and sodium chloride 0.9 % infusion 75 mL/hr Intravenous New Bag     03/14/2024 2148 EDT ondansetron (ZOFRAN) injection 4 mg 4 mg Intravenous Given     03/14/2024 2151 EDT sodium chloride 0.9 % infusion 50 mL/hr Intravenous New Bag     03/14/2024 2148 EDT acetaminophen (TYLENOL) tablet 650 mg 650 mg Oral Given            Present on Admission:   GERD (gastroesophageal reflux disease)   Type 2 diabetes mellitus without complication, without long-term current use of insulin ([facility]ly [facility])   Mixed hyperlipidemia   Essential hypertension   Coronary artery disease      Admitting Diagnosis: Dental abscess [K04.7]  Vomiting [R11.10]  Chest pain [R07.9]  Dyspnea [R06.00]  DKA (diabetic ketoacidosis) (Former[facility]) [E11.10]  Hyperglycemia [R73.9]  Elevated troponin [R79.89]  Nausea vomiting and diarrhea [R11.2, R19.7]    Age/Sex: 64 y.o. female    Admission Orders: SCDs; tele monitoring; I/O; Daily wts; Cardiopulmonary monitoring; NPO    Scheduled Medications:  amoxicillin-clavulanate, 1 tablet, Oral, Q12H LOPEZ  aspirin, 81 mg, Oral, Daily  carvedilol, 6.25 mg, Oral, Q12H LOPEZ  gabapentin, 400 mg, Oral, 4x Daily  [START ON 3/16/2024] heparin (porcine), 5,000 Units, Subcutaneous, Q8H LOPEZ  insulin glargine, 22 Units, Subcutaneous, HS  insulin lispro, 1-5 Units, Subcutaneous, HS  insulin lispro, 1-6 Units, Subcutaneous, TID AC  losartan, 25 mg, Oral, Daily  nicotine, 1 patch, Transdermal, Daily  pantoprazole, 40 mg, Oral, Early Morning  pravastatin, 80 mg, Oral, Daily With Dinner    magnesium sulfate 2 g/50 mL IVPB (premix) 2 g  Dose: 2 g  Freq: Once Route: IV  Last Dose: Stopped (03/15/24 1216)  Start: 03/15/24 0815 End: 03/15/24 1216       Continuous IV Infusions:  sodium chloride, 50 mL/hr, Intravenous,  Continuous  heparin (porcine) 25,000 units in 0.45% NaCl 250 mL infusion (premix)  Rate: 2.7-18 mL/hr Dose: 3-20 Units/kg/hr         PRN Meds:  acetaminophen, 650 mg, Oral, Q6H PRN  (3/15 recd x3 so far today)  albuterol, 2 puff, Inhalation, Q6H PRN  ALPRAZolam, 0.25 mg, Oral, 4x Daily PRN  ondansetron, 4 mg, Intravenous, Q6H PRN  sodium chloride (PF), 3 mL, Intravenous, Q1H PRN  oxyCODONE (ROXICODONE) IR tablet 5 mg  Dose: 5 mg  Freq: Once Route: PO  Start: 03/15/24 0600 End: 03/15/24 0618       IP CONSULT TO CARDIOLOGY    Network Utilization Review Department  ATTENTION: Please call with any questions or concerns to [phone] and carefully listen to the prompts so that you are directed to the right person. All voicemails are confidential.   For Discharge needs, contact Care Management DC Support Team at [phone] opt. [phone]  Send all requests for admission clinical reviews, approved or denied determinations and any other requests to dedicated fax number below belonging to the campus where the patient is receiving treatment. List of dedicated fax numbers for the Facilities:  FACILITY NAME UR FAX NUMBER   ADMISSION DENIALS (Administrative/Medical Necessity) [phone]   DISCHARGE SUPPORT TEAM (NETWORK) [phone]   PARENT CHILD HEALTH (Maternity/NICU/Pediatrics) [phone]   [facility] [phone]   [facility] [phone]   [facility] [phone]   [facility] [phone]   [facility] [phone]   [facility] [phone]   [facility] [phone]   [facility] [phone]   [facility] [phone]   [facility] [phone]   [facility] [phone]   [facility]  [phone]

## 2024-03-15 NOTE — ASSESSMENT & PLAN NOTE
Nausea with multiple episodes of vomiting and diarrhea for 2 days, resolved yesterday  Suspect gastroenteritis  Supportive care

## 2024-03-15 NOTE — ASSESSMENT & PLAN NOTE
64-year-old female with history of coronary artery disease, hypertension, type 2 diabetes initially presented with chest pain, nausea vomiting and diarrhea.  Patient reports chest pain that began the day before that is worse with exertion  Elevated troponin with EKG changes  Echo showed ejection fraction 45% from 1 appreciated, pattern of wall motion abnormalities may suggest a stress cardiomyopathy, diastolic function is abnormal for days, mild to moderate MR.  Compared to prior study in 2021 reduced left ventricular systolic function with hypokinesis of the anterior septal lateral and apical walls  On aspirin, statin, beta-blocker, initiated heparin drip  Underwent cardiac cath today, unchanged CAD compared to 3/7/2022.  Mild diffuse nonobstructive CAD, patent stents LAD and RCA  Heparin drip discontinued  Continuous telemetry monitoring

## 2024-03-15 NOTE — H&P
[facility]  H&P  Name: Lashawn Davis 64 y.o. female I MRN: [id number]  Unit/Bed#: ED 24 I Date of Admission: 3/14/2024   Date of Service: 3/14/2024 I Hospital Day: 0      Assessment/Plan   * NSTEMI (non-ST elevated myocardial infarction) (HCC)  Assessment & Plan  64-year-old female with history of coronary artery disease, hypertension, type 2 diabetes initially presented with chest pain.  Patient reports chest pain that began yesterday that is worse with exertion  Initial EKG showed no signs of acute ischemia  Troponin noted to be 599, 489, will check 1 more troponin  Continuous telemetry monitoring  Continue heparin drip initiated in the emergency department  Case discussed with cardiology  Anticipate likely cath tomorrow  N.p.o. after midnight  Will check echo  Follow-up formal cardiology consult    Dental infection  Assessment & Plan  Reports dental pain and swelling  Initiated on Augmentin in the ER will continue    Coronary artery disease  Assessment & Plan  History of coronary artery disease  Status post PCI of RCA in 2021 and PCI of LAD in 2022  Continue home aspirin, Brilinta, beta-blocker, statin    Essential hypertension  Assessment & Plan  BP currently controlled  Continue Coreg and losartan    Mixed hyperlipidemia  Assessment & Plan  Continue statin    Type 2 diabetes mellitus without complication, without long-term current use of insulin ([facility])  Assessment & Plan    Lab Results   Component Value Date    HGBA1C 11.5 (A) 01/03/2024   Noted to be hyperglycemic in the ER with a mildly elevated anion gap however normal bicarb, anion gap is since normalized.  Was initiated on insulin drip in the ER.  Will discontinue and resume home insulin  Continue home long-acting insulin 22 units  Hold mealtime insulin as will be npo after midnight  Sliding-scale insulin    GERD (gastroesophageal reflux disease)  Assessment & Plan  Continue PPI           VTE Prophylaxis: Heparin Drip   / sequential compression device   Code Status: full code  POLST: There is no POLST form on file for this patient (pre-hospital)  Discussion with family: pt, declined family update at this time    Anticipated Length of Stay:  Patient will be admitted on an Inpatient basis with an anticipated length of stay of  > 2 midnights.   Justification for Hospital Stay: NSTEMI    Total Time for Visit, including Counseling / Coordination of Care: 60 minutes.  Greater than 50% of this total time spent on direct patient counseling and coordination of care.    Chief Complaint:    Chest pain    History of Present Illness:    Lashawn Davis is a 64 y.o. female with past medical history significant coronary disease status post PCI, type 2 diabetes, hypertension, hyperlipidemia initially presented with chest pain.  Patient reports chest pain that began yesterday.  Reports chest discomfort worse with exertion also reports shortness of breath.  Reports some nausea as well.  Denies any other complaints.  Denies fevers, chills, dysuria, abdominal pain, cough, or any other complaints at this time    Review of Systems:    Review of Systems   Constitutional:  Negative for activity change, appetite change, chills, diaphoresis, fever and unexpected weight change.   HENT:  Negative for congestion, facial swelling and rhinorrhea.    Eyes:  Negative for photophobia and visual disturbance.   Respiratory:  Positive for shortness of breath. Negative for cough and wheezing.    Cardiovascular:  Positive for chest pain. Negative for palpitations.   Gastrointestinal:  Positive for nausea. Negative for abdominal pain, blood in stool, constipation, diarrhea and vomiting.   Genitourinary:  Negative for decreased urine volume, difficulty urinating, dysuria, flank pain, frequency, hematuria and urgency.   Musculoskeletal:  Negative for arthralgias, back pain, joint swelling and myalgias.   Neurological:  Negative for dizziness, syncope, facial asymmetry,  light-headedness, numbness and headaches.   Psychiatric/Behavioral:  Negative for confusion and decreased concentration. The patient is not nervous/anxious.        Past Medical and Surgical History:     Past Medical History:   Diagnosis Date    Coronary artery disease     Crohn's colitis (HCC)     Diabetes mellitus (HCC)     Fibromyalgia     Gastric ulcer     [facility]    HTN (hypertension)     Hyperlipidemia     IBD (inflammatory bowel disease) 11/2015    [facility]    Migraine     Myocardial infarction (HCC)        Past Surgical History:   Procedure Laterality Date    CARDIAC CATHETERIZATION      CARDIAC CATHETERIZATION Left 03/07/2022    Procedure: Cardiac catheterization;  Surgeon: Jasen Hayden MD;  Location: [facility];  Service: Cardiology    CARDIAC CATHETERIZATION N/A 03/07/2022    Procedure: Cardiac Coronary Angiogram;  Surgeon: Jasen Hayden MD;  Location: [facility];  Service: Cardiology    CARDIAC CATHETERIZATION N/A 03/07/2022    Procedure: Cardiac pci;  Surgeon: Jasen Hayden MD;  Location: [facility];  Service: Cardiology    CHOLECYSTECTOMY      COLONOSCOPY  12/03/2018    internal hemorrhoids, 3mm tubular adenoma polyp transverse colon, bx negative for dysplasia    COLONOSCOPY  11/2015    COLONOSCOPY W/ BIOPSIES  2022    ileitis; dr rao c/w crohns    CORONARY STENT PLACEMENT      DENTAL SURGERY      [place] teeth extraction    EGD  12/2018    2cm hiatal hernia, gastritis bx shows foci of intestinal metaplasia, negative Hpylori    EGD  2015    ESOPHAGITIS/MALLHIATUS HERNIA  FOUND    HYSTERECTOMY      MAMMO STEREOTACTIC BREAST BIOPSY LEFT (ALL INC) Left 10/27/2021    TUBAL LIGATION      Bilateral       Meds/Allergies:    Prior to Admission medications    Medication Sig Start Date End Date Taking? Authorizing Provider   acetaminophen (TYLENOL) 500 mg tablet Take 1,000 mg by mouth every 6 (six) hours as needed for mild pain    Historical Provider, MD    albuterol (2.5 mg/3 mL) 0.083 % nebulizer solution Take 3 mL (2.5 mg total) by nebulization every 6 (six) hours as needed for wheezing or shortness of breath  Patient not taking: Reported on 12/12/2023 9/1/23   Virgie Rose PA-C   albuterol (2.5 mg/3 mL) 0.083 % nebulizer solution Take 3 mL (2.5 mg total) by nebulization every 6 (six) hours as needed for wheezing or shortness of breath  Patient not taking: Reported on 12/12/2023 9/8/23   Virgie Rose PA-C   albuterol (Ventolin HFA) 90 mcg/act inhaler Inhale 2 puffs every 6 (six) hours as needed for wheezing 8/25/23   Virgie Rose PA-C   Alcohol Swabs 70 % PADS Use 4 per day 1/12/22   MERI Diggs   ALPRAZolam (XANAX) 0.25 mg tablet Take 1 tablet (0.25 mg total) by mouth 4 (four) times a day as needed for anxiety 3/12/24   Virgie Rose PA-C   aspirin (ECOTRIN LOW STRENGTH) 81 mg EC tablet Take 1 tablet (81 mg total) by mouth daily 11/4/22   Ambrosio Arizmendi MD   carvedilol (COREG) 6.25 mg tablet Take 1 tablet (6.25 mg total) by mouth every 12 (twelve) hours 5/15/23   Cr Logan MD   cholecalciferol (VITAMIN D3) 1,000 units tablet Take 1 tablet (1,000 Units total) by mouth daily  Patient taking differently: Take 1,000 Units by mouth 2 (two) times a day 2/28/21   Guru Guevara MD   Continuous Blood Gluc  (Dexcom G6 ) CRISTINA DISP 1  12/26/23   MERI Del Rio   Continuous Blood Gluc Sensor (Dexcom G6 Sensor) MISC Use 1 sensor every 10 days, disp 90 day supply 1/3/24   MERI Del Rio   Continuous Blood Gluc Transmit (Dexcom G6 Transmitter) MISC USE DAILY AS DIRECTED FOR CGM - CHANGE EVERY 3 MONTHS 2/28/24   MERI Del Rio   erythromycin (ILOTYCIN) ophthalmic ointment Administer 0.5 inches to both eyes every 6 (six) hours  Patient not taking: Reported on 1/3/2024 12/19/23   Virgie Rose PA-C   ezetimibe (ZETIA) 10 mg tablet Take 1  tablet (10 mg total) by mouth daily  Patient not taking: Reported on 8/25/2023 5/15/23   Cr Logan MD   folic acid (FOLVITE) 1 mg tablet  6/17/22   Philomena Farley MD   gabapentin (NEURONTIN) 400 mg capsule Take 1 capsule (400 mg total) by mouth 4 (four) times a day 3/13/24   Virgie Rose PA-C   Humira, 2 Pen, 40 MG/0.4ML PNKT Inject 40 mg under the skin every 14 (fourteen) days 1/22/24   MERI Wilhelm   Hydrocod Francisco-Chlorphe Francisco ER (TUSSIONEX) 10-8 mg/5 mL ER suspension Take 5 mL by mouth every 12 (twelve) hours as needed for cough Max Daily Amount: 10 mL  Patient not taking: Reported on 12/12/2023 8/28/23   Virgie Rose PA-C   hyoscyamine (LEVSIN/SL) 0.125 mg SL tablet TAKE 1 TABLET (0.125 MG TOTAL) BY MOUTH EVERY 6 (SIX) HOURS AS NEEDED (ESOPHAGEAL SPASM) 1/22/24   Ruben Mondragon MD   insulin aspart (NovoLOG FlexPen) 100 UNIT/ML injection pen 10 units before breakfast and dinner and 6 units before lunch. 1/3/24   MERI Del Rio   insulin degludec (Tresiba FlexTouch) 100 units/mL injection pen Inject 22 units daily. 1/3/24   MERI Del Rio   Insulin Pen Needle (Pen Needles) 32G X 5 MM MISC Use 4 per day 9/1/21   Braden Osborn PA-C   losartan (COZAAR) 25 mg tablet Take 1 tablet (25 mg total) by mouth daily 8/7/23   Cr Logan MD   mesalamine (LIALDA) 1.2 g EC tablet Take 2 tablets (2.4 g total) by mouth daily with breakfast  Patient not taking: Reported on 12/27/2023 9/1/21   Ruben Mondragon MD   omeprazole (PriLOSEC) 40 MG capsule Take 1 capsule (40 mg total) by mouth daily  Patient taking differently: Take 40 mg by mouth if needed 11/29/22   Oma Ambriz PA-C   ondansetron (ZOFRAN-ODT) 4 mg disintegrating tablet Take 1 tablet (4 mg total) by mouth every 8 (eight) hours as needed for nausea or vomiting 8/25/23   Virgie Rose PA-C   PB-Hyoscy-Atropine-Scopolamine (DONNATAL PO) Take by mouth    Historical Provider, MD    predniSONE 10 mg tablet Take 4 tabs on day 1,2 with food, 3 tabs on day 3,4 with food, 2 tabs on day 5,6 with food, 1 tab on day 7,8 with food  Patient not taking: Reported on 12/12/2023 8/25/23   Virgie Rose PA-C   predniSONE 5 mg tablet Take 10 mg by mouth daily    Historical Provider, MD   rosuvastatin (CRESTOR) 10 MG tablet TAKE 1 TABLET BY MOUTH EVERY DAY AT NIGHT 6/15/23   Historical Provider, MD   SUMAtriptan (IMITREX) 50 mg tablet Take 1 tablet (50 mg total) by mouth once as needed for migraine for up to 1 dose may repeat in 2 hours if necessary 8/25/23   Virgie Rose PA-C   ticagrelor (BRILINTA) 90 MG Take 1 tablet (90 mg total) by mouth every 12 (twelve) hours  Patient not taking: Reported on 12/12/2023 5/2/23   Cr Logan MD     I have reviewed home medications with patient personally.    Allergies:   Allergies   Allergen Reactions    Penicillins Anaphylaxis and Hives    Rosuvastatin Myalgia and Arthralgia    Atorvastatin Myalgia    Cefuroxime     Metaxalone     Influenza Vaccines Rash    Keflet [Cephalexin] Rash    Lyrica [Pregabalin] Swelling     Feet swelling    Nuts - Food Allergy Itching    Sulfa Antibiotics Rash    Topiramate Rash       Social History:     Marital Status: /Civil Union     Patient Pre-hospital Living Situation: home  Patient Pre-hospital Level of Mobility: independent  Patient Pre-hospital Diet Restrictions: dm, cardiac  Substance Use History:   Social History     Substance and Sexual Activity   Alcohol Use Not Currently    Comment: Rarely     Social History     Tobacco Use   Smoking Status Every Day    Current packs/day: 0.50    Average packs/day: 0.5 packs/day for 40.2 years (20.1 ttl pk-yrs)    Types: Cigarettes    Start date: 1984   Smokeless Tobacco Never   Tobacco Comments    10 cigarettes daily      Social History     Substance and Sexual Activity   Drug Use Never       Family History:    Family History   Problem Relation Age of Onset     Coronary artery disease Mother     Dementia Mother     Stroke Mother     No Known Problems Father     Cervical cancer Sister 40    Heart disease Brother     No Known Problems Maternal Grandmother     No Known Problems Maternal Grandfather     No Known Problems Paternal Grandmother     No Known Problems Paternal Grandfather     Alcohol abuse Neg Hx     Substance Abuse Neg Hx        Physical Exam:     Vitals:   Blood Pressure: 122/62 (03/14/24 1745)  Pulse: 92 (03/14/24 1745)  Temperature: 98 °F (36.7 °C) (03/14/24 1552)  Temp Source: Oral (03/14/24 1552)  Respirations: 18 (03/14/24 1745)  SpO2: 94 % (03/14/24 1745)    Physical Exam  Constitutional:       General: She is not in acute distress.     Appearance: She is well-developed. She is not diaphoretic.   HENT:      Head: Normocephalic and atraumatic.      Nose: Nose normal.      Mouth/Throat:      Pharynx: No oropharyngeal exudate.   Eyes:      General: No scleral icterus.        Right eye: No discharge.         Left eye: No discharge.      Conjunctiva/sclera: Conjunctivae normal.   Neck:      Thyroid: No thyromegaly.      Vascular: No JVD.   Cardiovascular:      Rate and Rhythm: Normal rate and regular rhythm.      Heart sounds: Normal heart sounds. No murmur heard.     No friction rub. No gallop.   Pulmonary:      Effort: Pulmonary effort is normal. No respiratory distress.      Breath sounds: Normal breath sounds. No wheezing or rales.   Chest:      Chest wall: No tenderness.   Abdominal:      General: Bowel sounds are normal. There is no distension.      Palpations: Abdomen is soft.      Tenderness: There is no abdominal tenderness. There is no guarding or rebound.   Musculoskeletal:         General: No tenderness or deformity. Normal range of motion.      Cervical back: Normal range of motion and neck supple.   Skin:     General: Skin is warm and dry.      Findings: No erythema or rash.   Neurological:      Mental Status: She is alert and oriented to person,  place, and time. Mental status is at baseline.      Cranial Nerves: No cranial nerve deficit.      Sensory: No sensory deficit.      Motor: No abnormal muscle tone.      Coordination: Coordination normal.           Additional Data:     Lab Results: I have personally reviewed pertinent reports.      Results from last 7 days   Lab Units 03/14/24  1635   WBC Thousand/uL 11.66*   HEMOGLOBIN g/dL 16.2*   HEMATOCRIT % 47.6*   PLATELETS Thousands/uL 306   NEUTROS PCT % 64   LYMPHS PCT % 27   MONOS PCT % 7   EOS PCT % 1     Results from last 7 days   Lab Units 03/14/24  1843 03/14/24  1635   SODIUM mmol/L 133* 131*   POTASSIUM mmol/L 3.9 3.9   CHLORIDE mmol/L 101 100   CO2 mmol/L 20* 18*   BUN mg/dL 15 16   CREATININE mg/dL 0.90 0.84   ANION GAP mmol/L 12 13   CALCIUM mg/dL 8.9 9.3   ALBUMIN g/dL  --  3.8   TOTAL BILIRUBIN mg/dL  --  0.68   ALK PHOS U/L  --  99   ALT U/L  --  9   AST U/L  --  15   GLUCOSE RANDOM mg/dL 323* 383*     Results from last 7 days   Lab Units 03/14/24  1819   INR  1.17     Results from last 7 days   Lab Units 03/14/24  2030 03/14/24  1840   POC GLUCOSE mg/dl 210* 329*               Imaging: I have personally reviewed pertinent reports.      CTA ED chest PE Study   Final Result by Guilherme Benson DO (03/14 2103)      No pulmonary embolus or other acute abnormality                  Workstation performed: [id number]             EKG, Pathology, and Other Studies Reviewed on Admission:   EKG: reviewed    [facility] Epic Records Reviewed: Yes     ** Please Note: This note has been constructed using a voice recognition system. **

## 2024-03-15 NOTE — PROGRESS NOTES
[facility]  Progress Note  Name: Lashawn Davis I  MRN: [id number]  Unit/Bed#: PPHP 420-01 I Date of Admission: 3/14/2024   Date of Service: 3/15/2024 I Hospital Day: 1    Assessment/Plan   * NSTEMI (non-ST elevated myocardial infarction) (HCC)  Assessment & Plan  64-year-old female with history of coronary artery disease, hypertension, type 2 diabetes initially presented with chest pain, nausea vomiting and diarrhea.  Patient reports chest pain that began the day before that is worse with exertion  Elevated troponin with EKG changes  Echo showed ejection fraction 45% from 1 appreciated, pattern of wall motion abnormalities may suggest a stress cardiomyopathy, diastolic function is abnormal for days, mild to moderate MR.  Compared to prior study in 2021 reduced left ventricular systolic function with hypokinesis of the anterior septal lateral and apical walls  On aspirin, statin, beta-blocker, initiated heparin drip  Underwent cardiac cath today, unchanged CAD compared to 3/7/2022.  Mild diffuse nonobstructive CAD, patent stents LAD and RCA  Heparin drip discontinued  Continuous telemetry monitoring    Coronary artery disease  Assessment & Plan  History of coronary artery disease  Status post PCI of RCA in 2021 and PCI of LAD in 2022  Continue home aspirin, beta-blocker, statin  Patient states she has been off Brilinta for a few months, last time refilled in August 2023 for a 90-day supply    Gastroenteritis  Assessment & Plan  Nausea with multiple episodes of vomiting and diarrhea for 2 days, resolved yesterday  Suspect gastroenteritis  Supportive care    Dental infection  Assessment & Plan  Reports dental pain and swelling for the past 3 days, unable to be seen by dentist due to viral gastroenteritis  Initiated on Augmentin in the ER will continue she states she is feeling much better, swelling improved  Continue to monitor tomorrow, consider CT if not improving    Metabolic  acidosis  Assessment & Plan  Normal anion gap metabolic acidosis suspect starvation, diarrhea  Continue gentle hydration  Recheck labs tomorrow    Essential hypertension  Assessment & Plan  BP currently controlled  Continue Coreg and losartan    Mixed hyperlipidemia  Assessment & Plan  Continue statin    Type 2 diabetes mellitus without complication, without long-term current use of insulin ([facility])  Assessment & Plan    Lab Results   Component Value Date    HGBA1C 11.5 (A) 01/03/2024   Noted to be hyperglycemic in the ER with a mildly elevated anion gap however normal bicarb, anion gap is since normalized.  Was initiated on insulin drip in the ER.  Discontinued and resume home insulin  Continue home long-acting insulin 22 units  Will hold mealtime insulin today, patient has been n.p.o. all day  Continue sliding scale      GERD (gastroesophageal reflux disease)  Assessment & Plan  Continue PPI               VTE Pharmacologic Prophylaxis: VTE Score: 5 High Risk (Score >/= 5) - Pharmacological DVT Prophylaxis Ordered: heparin. Sequential Compression Devices Ordered.    Mobility:   Basic Mobility Inpatient Raw Score: 19  JH-HLM Goal: 6: Walk 10 steps or more  JH-HLM Achieved: 6: Walk 10 steps or more  JH-HLM Goal achieved. Continue to encourage appropriate mobility.    Patient Centered Rounds: I performed bedside rounds with nursing staff today.   Discussions with Specialists or Other Care Team Provider: Nursing, case management    Education and Discussions with Family / Patient: Updated  () via phone.    Total Time Spent on Date of Encounter in care of patient:  mins. This time was spent on one or more of the following: performing physical exam; counseling and coordination of care; obtaining or reviewing history; documenting in the medical record; reviewing/ordering tests, medications or procedures; communicating with other healthcare professionals and discussing with patient's  family/caregivers.    Current Length of Stay: 1 day(s)  Current Patient Status: Inpatient   Certification Statement: The patient will continue to require additional inpatient hospital stay due to awaiting medical optimization by cardiology  Discharge Plan: Anticipate discharge in 24-48 hrs to home.    Code Status: Level 1 - Full Code    Subjective:   Patient was seen evaluated bedside, denies chest pain palpitation shortness of breath, still nauseous, but no vomiting or diarrhea.    Objective:     Vitals:   Temp (24hrs), Av.2 °F (36.8 °C), Min:98 °F (36.7 °C), Max:98.4 °F (36.9 °C)    Temp:  [98 °F (36.7 °C)-98.4 °F (36.9 °C)] 98.4 °F (36.9 °C)  HR:  [] 69  Resp:  [18-28] 18  BP: ()/(60-78) 134/73  SpO2:  [92 %-98 %] 93 %  Body mass index is 37.4 kg/m².     Input and Output Summary (last 24 hours):     Intake/Output Summary (Last 24 hours) at 3/15/2024 1536  Last data filed at 3/15/2024 0548  Gross per 24 hour   Intake 378.33 ml   Output 850 ml   Net -471.67 ml       Physical Exam:   Physical Exam  Constitutional:       Appearance: She is obese.   HENT:      Head: Atraumatic.   Cardiovascular:      Rate and Rhythm: Normal rate and regular rhythm.      Heart sounds: No murmur heard.  Pulmonary:      Effort: Pulmonary effort is normal. No respiratory distress.      Breath sounds: Normal breath sounds. No wheezing.   Abdominal:      General: Bowel sounds are normal. There is no distension.      Palpations: Abdomen is soft.      Tenderness: There is no abdominal tenderness.   Musculoskeletal:         General: No swelling.      Cervical back: Neck supple.   Skin:     General: Skin is warm and dry.   Neurological:      General: No focal deficit present.      Mental Status: She is alert.   Psychiatric:         Mood and Affect: Mood normal.          Additional Data:     Labs:  Results from last 7 days   Lab Units 03/15/24  0626   WBC Thousand/uL 9.10   HEMOGLOBIN g/dL 13.3   HEMATOCRIT % 40.7   PLATELETS  Thousands/uL 224   NEUTROS PCT % 53   LYMPHS PCT % 38   MONOS PCT % 7   EOS PCT % 2     Results from last 7 days   Lab Units 03/15/24  0626   SODIUM mmol/L 135   POTASSIUM mmol/L 4.1   CHLORIDE mmol/L 108   CO2 mmol/L 18*   BUN mg/dL 12   CREATININE mg/dL 0.72   ANION GAP mmol/L 9   CALCIUM mg/dL 8.2*   ALBUMIN g/dL 3.3*   TOTAL BILIRUBIN mg/dL 0.48   ALK PHOS U/L 76   ALT U/L 5*   AST U/L 15   GLUCOSE RANDOM mg/dL 189*     Results from last 7 days   Lab Units 03/15/24  0626   INR  1.17     Results from last 7 days   Lab Units 03/15/24  1050 03/15/24  0550 03/15/24  0025 03/14/24  2157 03/14/24  2030 03/14/24  1840   POC GLUCOSE mg/dl 174* 190* 201* 188* 210* 329*               Lines/Drains:  Invasive Devices       Peripheral Intravenous Line  Duration             Peripheral IV 03/14/24 Left;Proximal;Ventral (anterior) Forearm <1 day    Peripheral IV 03/14/24 Left;Ventral (anterior) Forearm <1 day    Peripheral IV 03/14/24 Right Antecubital <1 day                      Telemetry:  Telemetry Orders (From admission, onward)               24 Hour Telemetry Monitoring  Continuous x 24 Hours (Telem)        Expiring   Question:  Reason for 24 Hour Telemetry  Answer:  PCI/EP study (including pacer and ICD implementation), Cardiac surgery, MI, abnormal cardiac cath, and chest pain- rule out MI                     Telemetry Reviewed: Normal Sinus Rhythm  Indication for Continued Telemetry Use: Post PCI/EP Study             Imaging: Reviewed radiology reports from this admission including: procedure reports and ECHO    Recent Cultures (last 7 days):         Last 24 Hours Medication List:   Current Facility-Administered Medications   Medication Dose Route Frequency Provider Last Rate    acetaminophen  650 mg Oral Q6H PRN Adele BhupinderekMERI      albuterol  2 puff Inhalation Q6H PRN Adele KaurcsekLISANP      ALPRAZolam  0.25 mg Oral 4x Daily PRN Adele Grecsek CRNP      amoxicillin-clavulanate  1 tablet Oral Q12H LOPEZ  Adele Grecsek, CRNP      aspirin  81 mg Oral Daily Adele Grecsek, CRNP      carvedilol  6.25 mg Oral Q12H LOPEZ Adele Grecsek, CRNP      gabapentin  400 mg Oral 4x Daily Adele Grecsek, CRNP      [START ON 3/16/2024] heparin (porcine)  5,000 Units Subcutaneous Q8H LOPEZ La Nena Hamlin MD      insulin glargine  22 Units Subcutaneous HS Adele Grecsek, CRNP      insulin lispro  1-5 Units Subcutaneous HS Adele Grecsek, CRNP      insulin lispro  1-6 Units Subcutaneous TID AC Adele Grecsek, CRNP      losartan  25 mg Oral Daily Adele Grecsek, CRNP      nicotine  1 patch Transdermal Daily Adele Grecsek, CRNP      ondansetron  4 mg Intravenous Q6H PRN Adele Grecsek, CRNP      pantoprazole  40 mg Oral Early Morning Adele Grecsek, CRNP      pravastatin  80 mg Oral Daily With Dinner Adele Grecsek, CRNP      sodium chloride (PF)  3 mL Intravenous Q1H PRN Adele Grecsek, CRNP      sodium chloride  50 mL/hr Intravenous Continuous Adele Grecsek, CRNP 50 mL/hr (03/14/24 2151)    sodium chloride  50 mL/hr Intravenous Continuous Adele Grecsek, CRNP 50 mL/hr (03/15/24 1444)        Today, Patient Was Seen By: La Nena Hamlin MD    **Please Note: This note may have been constructed using a voice recognition system.**

## 2024-03-15 NOTE — ASSESSMENT & PLAN NOTE
64-year-old female with history of coronary artery disease, hypertension, type 2 diabetes initially presented with chest pain.  Patient reports chest pain that began yesterday that is worse with exertion  Initial EKG showed no signs of acute ischemia  Troponin noted to be 599, 489, will check 1 more troponin  Continuous telemetry monitoring  Continue heparin drip initiated in the emergency department  Case discussed with cardiology  Anticipate likely cath tomorrow  N.p.o. after midnight  Will check echo  Follow-up formal cardiology consult

## 2024-03-15 NOTE — ASSESSMENT & PLAN NOTE
History of coronary artery disease  Status post PCI of RCA in 2021 and PCI of LAD in 2022  Continue home aspirin, beta-blocker, statin  Patient states she has been off Brilinta for a few months, last time refilled in August 2023 for a 90-day supply

## 2024-03-15 NOTE — ASSESSMENT & PLAN NOTE
Lab Results   Component Value Date    HGBA1C 11.5 (A) 01/03/2024   Noted to be hyperglycemic in the ER with a mildly elevated anion gap however normal bicarb, anion gap is since normalized.  Was initiated on insulin drip in the ER.  Will discontinue and resume home insulin  Continue home long-acting insulin 22 units  Continue mealtime insulin  Sliding-scale insulin

## 2024-03-15 NOTE — CASE MANAGEMENT
Case Management Assessment & Discharge Planning Note    Patient name Lashawn Davis  [facility] MRN [id number]  : 1960 Date 3/15/2024       Current Admission Date: 3/14/2024  Current Admission Diagnosis:NSTEMI (non-ST elevated myocardial infarction) ([facility])   Patient Active Problem List    Diagnosis Date Noted    NSTEMI (non-ST elevated myocardial infarction) ([facility]) 2024    Dental infection 2024    Chronic diastolic HF (heart failure) ([facility]) 10/31/2023    DARYL (obstructive sleep apnea) 2023    Statin intolerance 2023    Tobacco abuse 2023    Type 2 diabetes mellitus with hyperosmolarity without coma, unspecified whether long term insulin use ([facility]) 2023    Crohn's colitis ([facility])     Generalized abdominal pain 2022    Uncontrolled type 2 diabetes mellitus with hyperglycemia ([facility]) 2022    History of PTCA 2021    Arthralgia 2021    Cystocele with prolapse 2021    Coronary artery disease 2021    Essential hypertension 2021    Acute ST elevation myocardial infarction (STEMI) due to occlusion of mid portion of right coronary artery ([facility]) 2021    Tobacco use disorder, continuous 2020    Type 2 diabetes mellitus without complication, without long-term current use of insulin ([facility]) 2017    Inflammatory bowel disease (Crohn's disease) ([facility]) 10/04/2016    Migraine without aura and without status migrainosus, not intractable 10/04/2016    GERD (gastroesophageal reflux disease) 2015    Hidradenitis suppurativa 04/10/2014    Dyslipidemia 2013    Mixed hyperlipidemia 2012    Fibromyalgia 2012    Peripheral neuropathy 2012    Class 3 severe obesity due to excess calories with serious comorbidity and body mass index (BMI) of 40.0 to 44.9 in adult ([facility]) 2012    B-complex deficiency 10/08/2007      LOS (days): 1  Geometric Mean LOS (GMLOS) (days): 4.2  Days to GMLOS:3.6      OBJECTIVE:    Risk of Unplanned Readmission Score: 14.87         Current admission status: Inpatient       Preferred Pharmacy:   [id number] [place] - [address]  [address] [place] [place] [address]  Phone: [phone] Fax: [phone]    [facility] - [place] - [address]  [address]  [address]  [place] [address]  Phone: [phone] Fax: [phone]    Primary Care Provider: Virgie Rose PA-C    Primary Insurance: BLUE CROSS  Secondary Insurance:     ASSESSMENT:  Active Health Care Proxies    There are no active Health Care Proxies on file.                 Readmission Root Cause  30 Day Readmission: No    Patient Information  Admitted from:: Home  Mental Status: Alert  During Assessment patient was accompanied by: Not accompanied during assessment  Assessment information provided by:: Patient  Primary Caregiver: Self  Support Systems: Spouse/significant other  County of Residence: [place]  What city do you live in?: [place]  Home entry access options. Select all that apply.: Stairs  Number of steps to enter home.: 5  Do the steps have railings?: Yes  Type of Current Residence: Bi-level  Upon entering residence, is there a bedroom on the main floor (no further steps)?: No  A bedroom is located on the following floor levels of residence (select all that apply):: 2nd Floor  Upon entering residence, is there a bathroom on the main floor (no further steps)?: Yes  Number of steps to 2nd floor from main floor: 8  Living Arrangements: Lives w/ Spouse/significant other  Is patient a ?: No    Activities of Daily Living Prior to Admission  Functional Status: Independent  Completes ADLs independently?: Yes  Ambulates independently?: Yes  Does patient use assisted devices?: No  Does patient currently own DME?: Yes  What DME does the patient currently own?: [employer]  Does patient have a history of Outpatient Therapy (PT/OT)?: Yes  Does the  patient have a history of Short-Term Rehab?: No  Does patient have a history of HHC?: No  Does patient currently have HHC?: No         Patient Information Continued  Income Source: Unemployed  Does patient have prescription coverage?: Yes  Does patient receive dialysis treatments?: No  Does patient have a history of substance abuse?: No  Does patient have a history of Mental Health Diagnosis?: No    PHQ 2/9 Screening   Reviewed PHQ 2/9 Depression Screening Score?: No    Means of Transportation  Means of Transport to Appts:: Drives Self      Social Determinants of Health (SDOH)      Flowsheet Row Most Recent Value   Housing Stability    In the last 12 months, was there a time when you were not able to pay the mortgage or rent on time? N   In the last 12 months, how many places have you lived? 1   In the last 12 months, was there a time when you did not have a steady place to sleep or slept in a shelter (including now)? N   Transportation Needs    In the past 12 months, has lack of transportation kept you from medical appointments or from getting medications? no   In the past 12 months, has lack of transportation kept you from meetings, work, or from getting things needed for daily living? No   Food Insecurity    Within the past 12 months, you worried that your food would run out before you got the money to buy more. Never true   Within the past 12 months, the food you bought just didn't last and you didn't have money to get more. Never true   Utilities    In the past 12 months has the electric, gas, oil, or water company threatened to shut off services in your home? No            DISCHARGE DETAILS:    Discharge planning discussed with:: pt at bedside  Porcupine of Choice: Yes  Comments - Freedom of Choice: No discharge recommendation discussed with pt at this time.  CM contacted family/caregiver?: Yes  Were Treatment Team discharge recommendations reviewed with patient/caregiver?: Yes  Did patient/caregiver verbalize  "understanding of patient care needs?: Yes  Were patient/caregiver advised of the risks associated with not following Treatment Team discharge recommendations?: Yes    Contacts  Patient Contacts: Damion Davis  Relationship to Patient:: Family  Contact Method: Phone  Phone Number: [phone]  Reason/Outcome: Continuity of Care, Emergency Contact, Discharge Planning    Requested Home Health Care         Is the patient interested in HHC at discharge?: No    DME Referral Provided  Referral made for DME?: No         Would you like to participate in our [facility] service program?  : No - Declined    Treatment Team Recommendation: Home  Discharge Destination Plan:: Home  Transport at Discharge : Family                                      Additional Comments: CM introduced herself and role to pt at bedside. Pt stated she lives in a bi-level home with spouse. Pt stated she has a chair-glide. Pt stated she has had PT in the past but stated \" it was not for me\". Pt has no tx history with D&A. Pt stated she takes medication for mental health. Pt stated she is independent at baseline with ADLS/IADLS. Pt has no history with VNA/SNF. Pt stated she drives and is unemployed. Pt stated once mediucally cleared and ready for discharge spouse will be able to provide transport. CM will continue to follow as needed.                    "

## 2024-03-15 NOTE — ASSESSMENT & PLAN NOTE
Reports dental pain and swelling for the past 3 days, unable to be seen by dentist due to viral gastroenteritis  Initiated on Augmentin in the ER will continue she states she is feeling much better, swelling improved  Continue to monitor tomorrow, consider CT if not improving

## 2024-03-15 NOTE — ASSESSMENT & PLAN NOTE
History of coronary artery disease  Status post PCI of RCA in 2021 and PCI of LAD in 2022  Continue home aspirin, Brilinta, beta-blocker, statin

## 2024-03-16 ENCOUNTER — APPOINTMENT (INPATIENT)
Dept: RADIOLOGY | Facility: HOSPITAL | Age: 64
DRG: 287 | End: 2024-03-16
Payer: COMMERCIAL

## 2024-03-16 VITALS
HEIGHT: 68 IN | BODY MASS INDEX: 37.79 KG/M2 | TEMPERATURE: 97.9 F | WEIGHT: 249.34 LBS | RESPIRATION RATE: 18 BRPM | SYSTOLIC BLOOD PRESSURE: 103 MMHG | HEART RATE: 82 BPM | OXYGEN SATURATION: 95 % | DIASTOLIC BLOOD PRESSURE: 52 MMHG

## 2024-03-16 PROBLEM — I42.9 CARDIOMYOPATHY (HCC): Status: ACTIVE | Noted: 2024-03-16

## 2024-03-16 LAB
ANION GAP SERPL CALCULATED.3IONS-SCNC: 7 MMOL/L (ref 4–13)
BUN SERPL-MCNC: 9 MG/DL (ref 5–25)
CALCIUM SERPL-MCNC: 8.1 MG/DL (ref 8.4–10.2)
CHLORIDE SERPL-SCNC: 107 MMOL/L (ref 96–108)
CO2 SERPL-SCNC: 22 MMOL/L (ref 21–32)
CREAT SERPL-MCNC: 0.71 MG/DL (ref 0.6–1.3)
ERYTHROCYTE [DISTWIDTH] IN BLOOD BY AUTOMATED COUNT: 14.2 % (ref 11.6–15.1)
GFR SERPL CREATININE-BSD FRML MDRD: 91 ML/MIN/1.73SQ M
GLUCOSE SERPL-MCNC: 135 MG/DL (ref 65–140)
GLUCOSE SERPL-MCNC: 151 MG/DL (ref 65–140)
GLUCOSE SERPL-MCNC: 221 MG/DL (ref 65–140)
HCT VFR BLD AUTO: 33.6 % (ref 34.8–46.1)
HGB BLD-MCNC: 10.5 G/DL (ref 11.5–15.4)
MCH RBC QN AUTO: 29.4 PG (ref 26.8–34.3)
MCHC RBC AUTO-ENTMCNC: 31.3 G/DL (ref 31.4–37.4)
MCV RBC AUTO: 94 FL (ref 82–98)
PLATELET # BLD AUTO: 157 THOUSANDS/UL (ref 149–390)
PMV BLD AUTO: 10.2 FL (ref 8.9–12.7)
POTASSIUM SERPL-SCNC: 4 MMOL/L (ref 3.5–5.3)
RBC # BLD AUTO: 3.57 MILLION/UL (ref 3.81–5.12)
SODIUM SERPL-SCNC: 136 MMOL/L (ref 135–147)
WBC # BLD AUTO: 5.72 THOUSAND/UL (ref 4.31–10.16)

## 2024-03-16 PROCEDURE — 99239 HOSP IP/OBS DSCHRG MGMT >30: CPT | Performed by: INTERNAL MEDICINE

## 2024-03-16 PROCEDURE — 82948 REAGENT STRIP/BLOOD GLUCOSE: CPT

## 2024-03-16 PROCEDURE — 99232 SBSQ HOSP IP/OBS MODERATE 35: CPT | Performed by: INTERNAL MEDICINE

## 2024-03-16 PROCEDURE — 85027 COMPLETE CBC AUTOMATED: CPT | Performed by: INTERNAL MEDICINE

## 2024-03-16 PROCEDURE — 80048 BASIC METABOLIC PNL TOTAL CA: CPT | Performed by: INTERNAL MEDICINE

## 2024-03-16 PROCEDURE — 70487 CT MAXILLOFACIAL W/DYE: CPT

## 2024-03-16 RX ORDER — ONDANSETRON 4 MG/1
4 TABLET, ORALLY DISINTEGRATING ORAL EVERY 8 HOURS PRN
Qty: 10 TABLET | Refills: 0 | Status: SHIPPED | OUTPATIENT
Start: 2024-03-16

## 2024-03-16 RX ORDER — AMOXICILLIN AND CLAVULANATE POTASSIUM 875; 125 MG/1; MG/1
1 TABLET, FILM COATED ORAL EVERY 12 HOURS SCHEDULED
Qty: 10 TABLET | Refills: 0 | Status: SHIPPED | OUTPATIENT
Start: 2024-03-16 | End: 2024-03-19 | Stop reason: SDUPTHER

## 2024-03-16 RX ADMIN — ASPIRIN 81 MG: 81 TABLET, COATED ORAL at 08:28

## 2024-03-16 RX ADMIN — HEPARIN SODIUM 5000 UNITS: 5000 INJECTION INTRAVENOUS; SUBCUTANEOUS at 05:07

## 2024-03-16 RX ADMIN — ACETAMINOPHEN 650 MG: 325 TABLET, FILM COATED ORAL at 05:07

## 2024-03-16 RX ADMIN — NICOTINE 1 PATCH: 14 PATCH, EXTENDED RELEASE TRANSDERMAL at 08:29

## 2024-03-16 RX ADMIN — PANTOPRAZOLE SODIUM 40 MG: 40 TABLET, DELAYED RELEASE ORAL at 05:07

## 2024-03-16 RX ADMIN — GABAPENTIN 400 MG: 400 CAPSULE ORAL at 08:28

## 2024-03-16 RX ADMIN — INSULIN LISPRO 2 UNITS: 100 INJECTION, SOLUTION INTRAVENOUS; SUBCUTANEOUS at 13:13

## 2024-03-16 RX ADMIN — IOHEXOL 100 ML: 350 INJECTION, SOLUTION INTRAVENOUS at 12:09

## 2024-03-16 RX ADMIN — HEPARIN SODIUM 5000 UNITS: 5000 INJECTION INTRAVENOUS; SUBCUTANEOUS at 13:13

## 2024-03-16 RX ADMIN — CARVEDILOL 6.25 MG: 6.25 TABLET, FILM COATED ORAL at 08:28

## 2024-03-16 RX ADMIN — LOSARTAN POTASSIUM 25 MG: 25 TABLET, FILM COATED ORAL at 08:28

## 2024-03-16 RX ADMIN — AMOXICILLIN AND CLAVULANATE POTASSIUM 1 TABLET: 875; 125 TABLET, FILM COATED ORAL at 08:28

## 2024-03-16 NOTE — DISCHARGE SUMMARY
[facility]  Discharge- Lashawn Davis 1960, 64 y.o. female MRN: [id number]  Unit/Bed#: [facility] 420-01 Encounter: [id number]  Primary Care Provider: Virgie Rose PA-C   Date and time admitted to hospital: 3/14/2024  3:55 PM      Addendum.  Correction to NSTEMI.  Patient underwent cardiac catheterization, has no evidence of NSTEMI type I.  Patient has elevated troponin in the setting of mild cardiomyopathy due to GI illness.    * NSTEMI (non-ST elevated myocardial infarction) (HCC)  Assessment & Plan  64-year-old female with history of coronary artery disease, hypertension, type 2 diabetes initially presented with chest pain, nausea vomiting and diarrhea.  Patient reports chest pain that began the day before that is worse with exertion  Elevated troponin with EKG changes  Echo showed ejection fraction 45% from 1 appreciated, pattern of wall motion abnormalities may suggest a stress cardiomyopathy, diastolic function is abnormal for days, mild to moderate MR.  Compared to prior study in 2021 reduced left ventricular systolic function with hypokinesis of the anterior septal lateral and apical walls  Underwent cardiac cath, unchanged CAD compared to 3/7/2022.  Mild diffuse nonobstructive CAD, patent stents LAD and RCA  Continue aspirin, statin, beta-blocker    Cardiomyopathy (HCC)  Assessment & Plan  Echo showed ejection fraction of 45%, suspect stress cardiomyopathy in the setting of GI illness.  Continue beta-blocker and losartan.  Outpatient follow-up with cardiology will need repeat echo in about 1 month    Coronary artery disease  Assessment & Plan  History of coronary artery disease  Status post PCI of RCA in 2021 and PCI of LAD in 2022  Continue home aspirin, beta-blocker, statin  Patient states she has been off Brilinta for a few months, last time refilled in August 2023 for a 90-day supply    Gastroenteritis  Assessment & Plan  Nausea with multiple episodes of  vomiting and diarrhea for 2 days, resolved 2 days ago  Suspect gastroenteritis  Supportive care    Dental infection  Assessment & Plan  Reports dental pain and swelling for 3 days, unable to be seen by dentist due to viral gastroenteritis  Initiated on Augmentin in the ER will continues to have pain, swelling slightly improved  CT showed no evidence of facial soft tissue abscess.  Mild periodontal disease, periapical abscess associated with the right upper first premolar dental stem and adjacent carious right second upper premolar tooth  Discussed with OMS on-call, patient can [facility].  Ambulatory referral placed.  Phone number of the office provided to the patient on discharge instruction and she will call on Monday to schedule an appointment      Metabolic acidosis  Assessment & Plan  Normal anion gap metabolic acidosis suspect starvation, diarrhea  Continue gentle hydration  Resolved    Essential hypertension  Assessment & Plan  BP currently controlled  Continue Coreg and losartan    Mixed hyperlipidemia  Assessment & Plan  Continue statin    Type 2 diabetes mellitus without complication, without long-term current use of insulin (HCC)  Assessment & Plan    Lab Results   Component Value Date    HGBA1C 11.5 (A) 01/03/2024   Continue home regimen outpatient follow-up with endocrinology    GERD (gastroesophageal reflux disease)  Assessment & Plan  Continue PPI      Medical Problems       Resolved Problems  Date Reviewed: 3/16/2024   None       Discharging Physician / Practitioner: La Nena Hamlin MD  PCP: Virgie Rose PA-C  Admission Date:   Admission Orders (From admission, onward)       Ordered        03/14/24 2121  INPATIENT ADMISSION  Once                          Discharge Date: 03/16/24    Consultations During Hospital Stay:  Cardiology    Procedures Performed:   Cardiac catheterization    Unchanged CAD compared to 3/7/2022. Mild diffuse nonobstructive CAD,  patent stents  LAD and RCA       Significant Findings / Test Results:   Echo    Interpretation Summary         Left Ventricle: Left ventricular cavity size is normal. Wall thickness is normal. There is mild concentric hypertrophy. There is concentric remodeling. The left ventricular ejection fraction is 45% by biplane measurement. Systolic function is mildly reduced. Pattern of wall motion abnormalities may suggest a stress cardiomyopathy. Diastolic function is abnormal.    The following segments are akinetic: apical anterior, apical septal, apical inferior, apical lateral and apex.    All other segments are normal.    Right Ventricle: Right ventricular cavity size is normal. Wall motion is abnormal. The apical diaphragmatic wall is akinetic.    Left Atrium: The atrium is mildly dilated.    Mitral Valve: There is mild to moderate regurgitation with a centrally directed jet.    Prior TTE study available for comparison. Prior study date: 2/26/2021. Changes noted when compared to prior study. Changes include: There is now reduced left ventricular systolic function with hypokinesis of the anterior, septal, lateral and apical walls.    CT PE  IMPRESSION:  No pulmonary embolus or other acute abnormality    CT facial bones with contrast  IMPRESSION:  No evidence of facial soft tissue abscess. Mild periodontal disease as described above. Periapical abscess associated with the right upper first premolar dental stem and adjacent carious right second upper premolar tooth.        Incidental Findings:   none       Test Results Pending at Discharge (will require follow up):   none     Outpatient Tests Requested:  none    Complications:  none    Reason for Admission: Suspect NSTEMI    Hospital Course:   Lashawn Davis is a 64 y.o. female patient who originally presented to the hospital on 3/14/2024 due to chest pain.  Few days prior to admission patient developed viral gastroenteritis, had nausea vomiting and diarrhea for 2 days.  " Also developed right sided facial swelling with tooth ache, and she was unable to be seen by her dentist due to viral gastroenteritis.  She presented to the hospital with chest pain, she had elevated troponin and EKG changes.  Echo showed ejection fraction 45% which is decreased from previous one.  Underwent cardiac catheterization, showed unchanged coronary artery disease compared to 3/7/2022.  Patient most likely has stress cardiomyopathy in the setting of GI illness.  Continue beta-blocker, losartan, aspirin, statin, outpatient follow-up with cardiology will repeat echo in 1 month.  For right facial swelling and tooth ache underwent CT which showed periapical abscess associated with the right upper first premolar dental stem and adjacent carious right second upper premolar tooth.  Patient was started on Augmentin.  Discussed with OMFS on-call, she should follow-up outpatient with them after discharge in the [facility].  Ambulatory referral was placed.  Phone number was provided on discharge instructions to call the office on Monday.  She spiked a low-grade fever earlier today, she is not sure if she ate or drank something prior to checking the temperature.  On recheck it resolved on its own.      Please see above list of diagnoses and related plan for additional information.     Condition at Discharge: good    Discharge Day Visit / Exam:   Subjective: Patient was seen and evaluated bedside.  Denies chest pain palpitation shortness of breath, complaining of toothache  Vitals: Blood Pressure: 103/52 (03/16/24 1128)  Pulse: 82 (03/16/24 1128)  Temperature: 97.9 °F (36.6 °C) (03/16/24 1501)  Temp Source: Oral (03/15/24 2208)  Respirations: 18 (03/15/24 2208)  Height: 5' 8\" (172.7 cm) (03/15/24 0824)  Weight - Scale: 113 kg (249 lb 5.4 oz) (03/16/24 0556)  SpO2: 95 % (03/16/24 1128)  Exam:   Physical Exam  Constitutional:       General: She is not in acute distress.  HENT:      Head: Atraumatic. "   Cardiovascular:      Rate and Rhythm: Normal rate and regular rhythm.      Heart sounds: No murmur heard.  Pulmonary:      Effort: Pulmonary effort is normal.      Breath sounds: Normal breath sounds. No stridor.   Abdominal:      General: Bowel sounds are normal. There is no distension.      Palpations: Abdomen is soft.      Tenderness: There is no abdominal tenderness.   Musculoskeletal:         General: No swelling.      Cervical back: Neck supple.   Skin:     General: Skin is warm and dry.   Neurological:      General: No focal deficit present.      Mental Status: She is alert.   Psychiatric:         Mood and Affect: Mood normal.          Discussion with Family:  Patient.     Discharge instructions/Information to patient and family:   See after visit summary for information provided to patient and family.      Provisions for Follow-Up Care:  See after visit summary for information related to follow-up care and any pertinent home health orders.      Mobility at time of Discharge:   Basic Mobility Inpatient Raw Score: 17  JH-HLM Goal: 5: Stand one or more mins  JH-HLM Achieved: 6: Walk 10 steps or more  HLM Goal achieved. Continue to encourage appropriate mobility.     Disposition:   Home    Planned Readmission: no     Discharge Statement:  I spent 40 minutes discharging the patient. This time was spent on the day of discharge. I had direct contact with the patient on the day of discharge. Greater than 50% of the total time was spent examining patient, answering all patient questions, arranging and discussing plan of care with patient as well as directly providing post-discharge instructions.  Additional time then spent on discharge activities.    Discharge Medications:  See after visit summary for reconciled discharge medications provided to patient and/or family.      **Please Note: This note may have been constructed using a voice recognition system**

## 2024-03-16 NOTE — PROGRESS NOTES
"Cardiology Progress Note - Lashawn Davis 64 y.o. female MRN: [id number]    Unit/Bed#: [facility] 420-01 Encounter: [id number]      Assessment/Recommendations:  1.  Chest pain with elevated troponin: Status post cardiac catheterization revealing no evidence of type I NSTEMI.  Elevated troponin is likely in the setting of mild cardiomyopathy as a result of stress cardiomyopathy in the setting of GI illness.  ST and T wave changes seen on EKG are also likely in the setting of her stress cardiomyopathy.  Continue medical management with beta-blocker and ARB..    2.  CAD with prior inferior wall MI, status post PCI of mid and distal RCA along with staged PCI of the mid LAD with further PCI of distal RCA a year later: Continued on aspirin, statin, beta-blocker.  Cardiac catheterization revealed unchanged coronary disease, which is nonobstructive, from prior catheterization.  3.  Hypertension: Well-controlled, continue home regiment.  4.  Hyperlipidemia: Reported statin and Repatha intolerance, however currently tolerating rosuvastatin as an outpatient.  5.  Tobacco abuse: Advise cessation.  6.  Type 2 diabetes mellitus: Management as per primary team.  7.  Stress cardiomyopathy: Likely in the setting of GI illness.  Now on beta-blocker and ARB.  Will need repeat limited echocardiogram in approximately 1 month to evaluate for recovery of ejection fraction.  8.  GI illness: Management as per primary team.  9.  Stable from cardiac standpoint, please call with questions.    Subjective:   Patient seen and examined.  No significant events overnight.  ; pertinent negatives - chest pain, chest pressure/discomfort, dyspnea, irregular heart beat, lower extremity edema, near-syncope, and palpitations.    Objective:     Vitals: Blood pressure 124/60, pulse 84, temperature 99.2 °F (37.3 °C), resp. rate 18, height 5' 8\" (1.727 m), weight 113 kg (249 lb 5.4 oz), SpO2 96%, not currently breastfeeding., Body mass index is 37.91 kg/m²., "   Orthostatic Blood Pressures      Flowsheet Row Most Recent Value   Blood Pressure 124/60 filed at 03/16/2024 0726   Patient Position - Orthostatic VS Lying filed at 03/15/2024 2208              Intake/Output Summary (Last 24 hours) at 3/16/2024 0836  Last data filed at 3/16/2024 0406  Gross per 24 hour   Intake 1429.17 ml   Output 825 ml   Net 604.17 ml       TELE: No significant arrhythmias seen.    Physical Exam:    GEN: Lashawn Davis appears well, alert and oriented x 3, pleasant and cooperative   HEENT: pupils equal, round, and reactive to light; extraocular muscles intact  NECK: supple, no carotid bruits   HEART: regular rhythm, normal S1 and S2, no murmurs, clicks, gallops or rubs   LUNGS: clear to auscultation bilaterally; no wheezes, rales, or rhonchi   ABDOMEN: normal bowel sounds, soft, no tenderness, no distention  EXTREMITIES: peripheral pulses normal; no clubbing, cyanosis, or edema  NEURO: no focal findings   SKIN: normal without suspicious lesions on exposed skin    Medications:      Current Facility-Administered Medications:     acetaminophen (TYLENOL) tablet 650 mg, 650 mg, Oral, Q6H PRN, Adele Grecsek, CRNP, 650 mg at 03/16/24 0507    albuterol (PROVENTIL HFA,VENTOLIN HFA) inhaler 2 puff, 2 puff, Inhalation, Q6H PRN, Adele Grecsek, CRNP    ALPRAZolam (XANAX) tablet 0.25 mg, 0.25 mg, Oral, 4x Daily PRN, Adele Grecsek, CRNP    amoxicillin-clavulanate (AUGMENTIN) 875-125 mg per tablet 1 tablet, 1 tablet, Oral, Q12H LOPEZ, Adele Grecsek, CRNP, 1 tablet at 03/16/24 0828    aspirin (ECOTRIN LOW STRENGTH) EC tablet 81 mg, 81 mg, Oral, Daily, Adele Grecsek, CRNP, 81 mg at 03/16/24 0828    carvedilol (COREG) tablet 6.25 mg, 6.25 mg, Oral, Q12H LOPEZ, Adele Grecsek, CRNP, 6.25 mg at 03/16/24 0828    gabapentin (NEURONTIN) capsule 400 mg, 400 mg, Oral, 4x Daily, MERI Anna, 400 mg at 03/16/24 0828    heparin (porcine) subcutaneous injection 5,000 Units, 5,000 Units, Subcutaneous, Q8H  [facility], La Nena Hamlin MD, 5,000 Units at 03/16/24 0507    insulin glargine (LANTUS) subcutaneous injection 22 Units 0.22 mL, 22 Units, Subcutaneous, HS, Adele Grecsek, CRNP, 22 Units at 03/15/24 2115    insulin lispro (HumALOG/ADMELOG) 100 units/mL subcutaneous injection 1-5 Units, 1-5 Units, Subcutaneous, HS, Adele Grecsek, CRNP, 2 Units at 03/15/24 2115    insulin lispro (HumALOG/ADMELOG) 100 units/mL subcutaneous injection 1-6 Units, 1-6 Units, Subcutaneous, TID AC, 2 Units at 03/15/24 0619 **AND** Fingerstick Glucose (POCT), , , TID AC, Adele Grecsek, CRNP    losartan (COZAAR) tablet 25 mg, 25 mg, Oral, Daily, Adele Grecsek, CRNP, 25 mg at 03/16/24 0828    nicotine (NICODERM CQ) 14 mg/24hr TD 24 hr patch 1 patch, 1 patch, Transdermal, Daily, Adele Grecsek, CRNP, 1 patch at 03/16/24 0829    ondansetron (ZOFRAN) injection 4 mg, 4 mg, Intravenous, Q6H PRN, Adele Grecsek, CRNP    pantoprazole (PROTONIX) EC tablet 40 mg, 40 mg, Oral, Early Morning, Adele Grecsek, CRNP, 40 mg at 03/16/24 0507    pravastatin (PRAVACHOL) tablet 80 mg, 80 mg, Oral, Daily With Dinner, Adele Grecsek, CRNP, 80 mg at 03/15/24 1809    sodium chloride (PF) 0.9 % injection 3 mL, 3 mL, Intravenous, Q1H PRN, Adele Grecsek, CRNP    sodium chloride 0.9 % infusion, 50 mL/hr, Intravenous, Continuous, Adele Grecsek, CRNP, Last Rate: 50 mL/hr at 03/15/24 1808, 50 mL/hr at 03/15/24 1808     Labs & Results:        Results from last 7 days   Lab Units 03/16/24  0455 03/15/24  0626 03/14/24  1635   WBC Thousand/uL 5.72 9.10 11.66*   HEMOGLOBIN g/dL 10.5* 13.3 16.2*   HEMATOCRIT % 33.6* 40.7 47.6*   PLATELETS Thousands/uL 157 224 306         Results from last 7 days   Lab Units 03/16/24  0600 03/15/24  0626 03/14/24  2125 03/14/24  1843 03/14/24  1635   POTASSIUM mmol/L 4.0 4.1 4.1   < > 3.9   CHLORIDE mmol/L 107 108 104   < > 100   CO2 mmol/L 22 18* 20*   < > 18*   BUN mg/dL 9 12 13   < > 16   CREATININE mg/dL 0.71 0.72 0.78   < >  0.84   CALCIUM mg/dL 8.1* 8.2* 8.5   < > 9.3   ALK PHOS U/L  --  76  --   --  99   ALT U/L  --  5*  --   --  9   AST U/L  --  15  --   --  15    < > = values in this interval not displayed.     Results from last 7 days   Lab Units 03/15/24  0626 03/15/24  0052 03/14/24  1819   INR  1.17  --  1.17   PTT seconds 62* 48* 32     Results from last 7 days   Lab Units 03/15/24  0626 03/14/24  2125 03/14/24  1635   MAGNESIUM mg/dL 1.7* 1.9 1.4*       Echo: personally reviewed -normal LV size and with mild concentric hypertrophy.  EF 45% with significant wall motion normalities in the apical distribution, suggestive of possible stress cardiomyopathy.  Also with RV apical akinesis.    EKG personally reviewed by Gilda Garcia MD.

## 2024-03-16 NOTE — ASSESSMENT & PLAN NOTE
64-year-old female with history of coronary artery disease, hypertension, type 2 diabetes initially presented with chest pain, nausea vomiting and diarrhea.  Patient reports chest pain that began the day before that is worse with exertion  Elevated troponin with EKG changes  Echo showed ejection fraction 45% from 1 appreciated, pattern of wall motion abnormalities may suggest a stress cardiomyopathy, diastolic function is abnormal for days, mild to moderate MR.  Compared to prior study in 2021 reduced left ventricular systolic function with hypokinesis of the anterior septal lateral and apical walls  Underwent cardiac cath, unchanged CAD compared to 3/7/2022.  Mild diffuse nonobstructive CAD, patent stents LAD and RCA  Continue aspirin, statin, beta-blocker

## 2024-03-16 NOTE — ASSESSMENT & PLAN NOTE
Reports dental pain and swelling for 3 days, unable to be seen by dentist due to viral gastroenteritis  Initiated on Augmentin in the ER will continues to have pain, swelling slightly improved  CT showed no evidence of facial soft tissue abscess.  Mild periodontal disease, periapical abscess associated with the right upper first premolar dental stem and adjacent carious right second upper premolar tooth  Discussed with [facility] on-call, patient can follow-up outpatient with [facility] [place] [facility].  Ambulatory referral placed.  Phone number of the office provided to the patient on discharge instruction and she will call on Monday to schedule an appointment

## 2024-03-16 NOTE — ASSESSMENT & PLAN NOTE
Normal anion gap metabolic acidosis suspect starvation, diarrhea  Continue gentle hydration  Resolved

## 2024-03-16 NOTE — UTILIZATION REVIEW
"NOTIFICATION OF INPATIENT ADMISSION   AUTHORIZATION REQUEST   SERVICING FACILITY:   [facility]  Address: [address] [place] [address]  Tax ID: [id number]  NPI: [id number] ATTENDING PROVIDER:  Attending Name and NPI#: La Nena Hamlin Md [[id number]]  Address: [address] [place] [address]  Phone: [phone]   ADMISSION INFORMATION:  Place of Service: Inpatient [facility]  Place of Service Code: 21  Inpatient Admission Date/Time: 3/14/24  9:21 PM  Discharge Date/Time: No discharge date for patient encounter.  Admitting Diagnosis Code/Description:  Dental abscess [K04.7]  Vomiting [R11.10]  Chest pain [R07.9]  Dyspnea [R06.00]  DKA (diabetic ketoacidosis) (HCC) [E11.10]  Hyperglycemia [R73.9]  Elevated troponin [R79.89]  Nausea vomiting and diarrhea [R11.2, R19.7]     UTILIZATION REVIEW CONTACT:  Rosalinda Conner"Farzana" Abhishek Utilization   Network Utilization Review Department  Phone: [phone]  Fax: [phone]  Email: [email or web address]  Contact for approvals/pending authorizations, clinical reviews, and discharge.     PHYSICIAN ADVISORY SERVICES:  Medical Necessity Denial & Zxwy-nd-Vxju Review  Phone: [phone]  Fax: [phone]  Email: [email or web address]     DISCHARGE SUPPORT TEAM:  For Patients Discharge Needs & Updates  Phone: [phone] opt. 2 Fax: [phone]  Email: [email or web address]      "

## 2024-03-16 NOTE — ASSESSMENT & PLAN NOTE
Lab Results   Component Value Date    HGBA1C 11.5 (A) 01/03/2024   Continue home regimen outpatient follow-up with endocrinology

## 2024-03-16 NOTE — ASSESSMENT & PLAN NOTE
Nausea with multiple episodes of vomiting and diarrhea for 2 days, resolved 2 days ago  Suspect gastroenteritis  Supportive care

## 2024-03-16 NOTE — ASSESSMENT & PLAN NOTE
Echo showed ejection fraction of 45%, suspect stress cardiomyopathy in the setting of GI illness.  Continue beta-blocker and losartan.  Outpatient follow-up with cardiology will need repeat echo in about 1 month

## 2024-03-18 ENCOUNTER — TRANSITIONAL CARE MANAGEMENT (OUTPATIENT)
Dept: FAMILY MEDICINE CLINIC | Facility: CLINIC | Age: 64
End: 2024-03-18

## 2024-03-19 ENCOUNTER — OFFICE VISIT (OUTPATIENT)
Dept: FAMILY MEDICINE CLINIC | Facility: CLINIC | Age: 64
End: 2024-03-19
Payer: COMMERCIAL

## 2024-03-19 VITALS
DIASTOLIC BLOOD PRESSURE: 68 MMHG | TEMPERATURE: 98.2 F | WEIGHT: 249 LBS | RESPIRATION RATE: 16 BRPM | SYSTOLIC BLOOD PRESSURE: 122 MMHG | BODY MASS INDEX: 37.74 KG/M2 | HEIGHT: 68 IN | HEART RATE: 75 BPM | OXYGEN SATURATION: 97 %

## 2024-03-19 DIAGNOSIS — I42.9 CARDIOMYOPATHY, UNSPECIFIED TYPE (HCC): ICD-10-CM

## 2024-03-19 DIAGNOSIS — K52.9 GASTROENTERITIS: ICD-10-CM

## 2024-03-19 DIAGNOSIS — E11.65 UNCONTROLLED TYPE 2 DIABETES MELLITUS WITH HYPERGLYCEMIA (HCC): ICD-10-CM

## 2024-03-19 DIAGNOSIS — E78.2 MIXED HYPERLIPIDEMIA: ICD-10-CM

## 2024-03-19 DIAGNOSIS — I21.11 ACUTE ST ELEVATION MYOCARDIAL INFARCTION (STEMI) DUE TO OCCLUSION OF MID PORTION OF RIGHT CORONARY ARTERY (HCC): ICD-10-CM

## 2024-03-19 DIAGNOSIS — K04.7 DENTAL INFECTION: ICD-10-CM

## 2024-03-19 DIAGNOSIS — K50.119 CROHN'S DISEASE OF COLON WITH COMPLICATION (HCC): ICD-10-CM

## 2024-03-19 DIAGNOSIS — K04.7 TOOTH ABSCESS: ICD-10-CM

## 2024-03-19 DIAGNOSIS — M06.09 RHEUMATOID ARTHRITIS OF MULTIPLE SITES WITH NEGATIVE RHEUMATOID FACTOR (HCC): Primary | ICD-10-CM

## 2024-03-19 PROCEDURE — 99496 TRANSJ CARE MGMT HIGH F2F 7D: CPT | Performed by: PHYSICIAN ASSISTANT

## 2024-03-19 RX ORDER — AMOXICILLIN AND CLAVULANATE POTASSIUM 875; 125 MG/1; MG/1
1 TABLET, FILM COATED ORAL EVERY 12 HOURS SCHEDULED
Qty: 20 TABLET | Refills: 0 | Status: SHIPPED | OUTPATIENT
Start: 2024-03-19 | End: 2024-03-29

## 2024-03-19 RX ORDER — METHOTREXATE 2.5 MG/1
5 TABLET ORAL WEEKLY
COMMUNITY

## 2024-03-19 NOTE — PROGRESS NOTES
Assessment & Plan      NSTEMI (non-ST elevated myocardial infarction) ([facility]) - Echo showed ejection fraction 45% from 1 appreciated, pattern of wall motion abnormalities may suggest a stress cardiomyopathy, diastolic function is abnormal for days, mild to moderate MR.  Compared to prior study in 2021 reduced left ventricular systolic function with hypokinesis of the anterior septal lateral and apical walls, Underwent cardiac cath, unchanged CAD compared to 3/7/2022.  Mild diffuse nonobstructive CAD, patent stents LAD, Continue aspirin, statin, beta-blocker. Will see Dr Cindi CHAMORRO 4/6/20     Cardiomyopathy ([facility]) - Echo showed ejection fraction of 45%, suspect stress cardiomyopathy in the setting of GI illness. Continue beta-blocker and losartan. Outpatient follow-up with cardiology will need repeat echo, scheduled 4/30     Coronary artery disease - History of coronary artery disease, Status post PCI of RCA in 2021 and PCI of LAD in 2022, Continue home aspirin, beta-blocker, statin       Gastroenteritis - Nausea with multiple episodes of vomiting and diarrhea for 2 days, resolved 2 days ago     Dental infection - continue augmentin bid, appt scheduled tomorrow 3/20/2024. [facility]      Metabolic acidosis - Normal anion gap metabolic acidosis suspect starvation, diarrhea, Continue gentle hydration, Resolved      Essential hypertension - BP currently controlled, Continue Coreg and losartan     Mixed hyperlipidemia - Continue statin     Type 2 diabetes mellitus without complication, without long-term current use of insulin ([facility]) -  11.5%, Continue home regimen outpatient follow-up with endocrinology     GERD (gastroesophageal reflux disease) - Continue PP    F/u as needed  F/u as scheduled     Subjective     Transitional Care Management Review:   Lashawn Davis is a 64 y.o. female here for TCM follow up.     During the TCM phone call patient stated:  TCM Call       Date and time call was made  3/18/2024  2:20 PM    [facility]  care reviewed  Records reviewed    Patient was hospitialized at  [facility]    Date of Admission  03/14/24    Date of discharge  03/16/24    Diagnosis  NSTEMI    Disposition  Home    Were the patients medications reviewed and updated  Yes    Current Symptoms  None          TCM Call       Post hospital issues  None    Should patient be enrolled in anticoag monitoring?  No    Scheduled for follow up?  Yes    Did you obtain your prescribed medications  Yes    Do you need help managing your prescriptions or medications  No    Is transportation to your appointment needed  No    I have advised the patient to call PCP with any new or worsening symptoms  Arabella CARABALLO    Living Arrangements  Family members           patient presented to the hospital on 3/14/2024 due to chest pain.  Few days prior to admission patient developed viral gastroenteritis, had nausea vomiting and diarrhea for 2 days.  Also developed right sided facial swelling with tooth ache, and she was unable to be seen by her dentist due to viral gastroenteritis.  She presented to the hospital with chest pain, she had elevated troponin and EKG changes.  Echo showed ejection fraction 45% which is decreased from previous one.  Underwent cardiac catheterization, showed unchanged coronary artery disease compared to 3/7/2022.  Patient most likely has stress cardiomyopathy in the setting of GI illness.  Continue beta-blocker, losartan, aspirin, statin, outpatient follow-up with cardiology will repeat echo in 1 month.  For right facial swelling and tooth ache underwent CT which showed periapical abscess associated with the right upper first premolar dental stem and adjacent carious right second upper premolar tooth.  Patient was started on Augmentin.  Discussed with [facility] on-call, she should follow-up outpatient with them after discharge in the [place] clinic.      Patient here for follow up, face continues to swell off and on from abscess, Augmentin  "causing diarrhea. Cannot get into [facility] until middle of April.     Also here to have cath site evaluated. No complaints.        Review of Systems    Objective     /68   Pulse 75   Temp 98.2 °F (36.8 °C) (Temporal)   Resp 16   Ht 5' 8\" (1.727 m)   Wt 113 kg (249 lb)   SpO2 97%   BMI 37.86 kg/m²      Physical Exam  Constitutional:       Appearance: Normal appearance. She is well-developed and normal weight.   HENT:      Head: Normocephalic and atraumatic.      Right Ear: Hearing normal.      Left Ear: Hearing normal.      Mouth/Throat:      Pharynx: Uvula midline.   Neck:      Thyroid: No thyromegaly.   Cardiovascular:      Rate and Rhythm: Normal rate and regular rhythm.      Pulses: Normal pulses.      Heart sounds: Normal heart sounds. No murmur heard.  Pulmonary:      Effort: Pulmonary effort is normal.      Breath sounds: Normal breath sounds.   Musculoskeletal:         General: Normal range of motion.      Cervical back: Normal range of motion and neck supple.   Lymphadenopathy:      Cervical: No cervical adenopathy.   Skin:     General: Skin is warm.      Comments: Right inguinal region with cath site healing nicely   Neurological:      General: No focal deficit present.      Mental Status: She is alert and oriented to person, place, and time.   Psychiatric:         Mood and Affect: Mood normal.         Behavior: Behavior normal.         Thought Content: Thought content normal.         Judgment: Judgment normal.       Medications have been reviewed by provider in current encounter    Virgie Rose PA-C         "

## 2024-03-19 NOTE — PATIENT INSTRUCTIONS

## 2024-05-01 PROBLEM — K52.9 GASTROENTERITIS: Status: RESOLVED | Noted: 2024-03-15 | Resolved: 2024-05-01

## 2024-05-09 ENCOUNTER — VBI (OUTPATIENT)
Dept: ADMINISTRATIVE | Facility: OTHER | Age: 64
End: 2024-05-09

## 2024-05-10 ENCOUNTER — DOCUMENTATION (OUTPATIENT)
Dept: ENDOCRINOLOGY | Facility: CLINIC | Age: 64
End: 2024-05-10

## 2024-05-10 ENCOUNTER — OFFICE VISIT (OUTPATIENT)
Dept: ENDOCRINOLOGY | Facility: CLINIC | Age: 64
End: 2024-05-10
Payer: COMMERCIAL

## 2024-05-10 VITALS
BODY MASS INDEX: 37.1 KG/M2 | SYSTOLIC BLOOD PRESSURE: 104 MMHG | HEIGHT: 68 IN | HEART RATE: 86 BPM | DIASTOLIC BLOOD PRESSURE: 72 MMHG | OXYGEN SATURATION: 96 % | WEIGHT: 244.8 LBS

## 2024-05-10 DIAGNOSIS — I10 ESSENTIAL HYPERTENSION: ICD-10-CM

## 2024-05-10 DIAGNOSIS — E11.65 UNCONTROLLED TYPE 2 DIABETES MELLITUS WITH HYPERGLYCEMIA (HCC): Primary | ICD-10-CM

## 2024-05-10 DIAGNOSIS — E78.2 MIXED HYPERLIPIDEMIA: ICD-10-CM

## 2024-05-10 LAB — SL AMB POCT HEMOGLOBIN AIC: 10 (ref ?–6.5)

## 2024-05-10 PROCEDURE — 99214 OFFICE O/P EST MOD 30 MIN: CPT

## 2024-05-10 PROCEDURE — 83036 HEMOGLOBIN GLYCOSYLATED A1C: CPT

## 2024-05-10 RX ORDER — ACYCLOVIR 400 MG/1
1 TABLET ORAL
Qty: 3 EACH | Refills: 2 | Status: SHIPPED | OUTPATIENT
Start: 2024-05-10

## 2024-05-10 RX ORDER — INSULIN DEGLUDEC 100 U/ML
INJECTION, SOLUTION SUBCUTANEOUS
Qty: 30 ML | Refills: 1 | Status: SHIPPED | OUTPATIENT
Start: 2024-05-10

## 2024-05-10 RX ORDER — INSULIN ASPART 100 [IU]/ML
INJECTION, SOLUTION INTRAVENOUS; SUBCUTANEOUS
Qty: 15 ML | Refills: 1 | Status: SHIPPED | OUTPATIENT
Start: 2024-05-10

## 2024-05-10 RX ORDER — ACYCLOVIR 400 MG/1
1 TABLET ORAL CONTINUOUS
Qty: 1 EACH | Refills: 0 | Status: SHIPPED | OUTPATIENT
Start: 2024-05-10 | End: 2024-05-15

## 2024-05-10 NOTE — PROGRESS NOTES
Established Patient Progress Note      Chief Complaint   Patient presents with    Diabetes Type 2        Impression & Plan:    Problem List Items Addressed This Visit          Cardiovascular and Mediastinum    Essential hypertension     BP at goal. Continue current medication regimen.              Endocrine    Uncontrolled type 2 diabetes mellitus with hyperglycemia (HCC) - Primary     HGA1C uncontrolled. No BGs to review. She reports she has been high in the morning when she wakes up. She tried 1 week of Ozempic and reports it improved BGs but was nervous to continue it because of her bowel issues.   Treatment regimen:   - She would like to try Ozempic again. She will start with 0.25 mg weekly for 1 month and if tolerating will increase to 0.5 mg weekly. Reviewed side effects with her again.  - Recommend she restart on CGM so she can monitor her BGs closely and insulin can be adjusted appropriately. Will upgrade her to Dexcom G7.   - Recommend she improve on her dietary habits.   Discussed risks/complications associated with uncontrolled diabetes.    Advised to adhere to diabetic diet, and recommended staying active/exercising routinely.  Keep carbohydrates consistent to limit blood glucose fluctuations.  Advised to call if blood sugars less than 70 mg/dl or over 300 mg/dl.   Discussed symptoms and treatment of hypoglycemia.    Recommended routine follow-up with podiatry and ophthalmology.   Ordered blood work to complete prior to next visit.   Lab Results   Component Value Date    HGBA1C 10.0 (A) 05/10/2024            Relevant Medications    Continuous Glucose Sensor (Dexcom G7 Sensor)    semaglutide, 0.25 or 0.5 mg/dose, (Ozempic, 0.25 or 0.5 MG/DOSE,) 2 mg/3 mL injection pen    insulin degludec (Tresiba FlexTouch) 100 units/mL injection pen    insulin aspart (NovoLOG FlexPen) 100 UNIT/ML injection pen    Continuous Glucose  (Dexcom G7 ) CRISTINA    Ostomy Supplies (Skin Tac Adhesive Barrier Wipe)  MISC    Other Relevant Orders    POCT hemoglobin A1c (Completed)    Hemoglobin A1C    Comprehensive metabolic panel    Albumin / creatinine urine ratio       Other    Mixed hyperlipidemia     Continue statin and check lipid panel prior to next follow up.             History of Present Illness:   Lashawn Davis is a 64 y.o. female with type 2 diabetes seen in follow up. Reports complications of neuropathy and CAD. Denies recent severe hypoglycemic or severe hyperglycemic episodes. Denies any issues with her current regimen. Last A1C was 10. Home glucose monitoring: are performed sporadically. She was using CGM but recently stopped using. Does not have her reader on her today.      She could not tolerate Metformin in the past due to GI side effects, had UTIs with SGLT2. Admits to unhealthy diet and not watching her carb intake in last few months.      Current regimen:   Tresiba 22 units daily   Novolog 10-8-10 before meals      Last Eye Exam: UTD  Last Foot Exam: UTD      Has hypertension: Taking carvedilol and losartan  Has hyperlipidemia: rosuvastatin  Vitamin D deficiency: Taking 2,000 IU daily    Patient Active Problem List   Diagnosis    B-complex deficiency    Fibromyalgia    GERD (gastroesophageal reflux disease)    Inflammatory bowel disease (Crohn's disease) ([facility])    Migraine without aura and without status migrainosus, not intractable    Peripheral neuropathy    Type 2 diabetes mellitus without complication, without long-term current use of insulin (HCC)    Tobacco use disorder, continuous    Mixed hyperlipidemia    Hidradenitis suppurativa    Acute ST elevation myocardial infarction (STEMI) due to occlusion of mid portion of right coronary artery ([facility])    Essential hypertension    Coronary artery disease    Cystocele with prolapse    Arthralgia    History of PTCA    Uncontrolled type 2 diabetes mellitus with hyperglycemia (HCC)    Generalized abdominal pain    Crohn's colitis ([facility])    Type 2 diabetes  mellitus with hyperosmolarity without coma, unspecified whether long term insulin use (HCC)    Dyslipidemia    DARYL (obstructive sleep apnea)    Statin intolerance    Tobacco abuse    Class 3 severe obesity due to excess calories with serious comorbidity and body mass index (BMI) of 40.0 to 44.9 in adult ([facility])    Chronic diastolic HF (heart failure) (HCC)    NSTEMI (non-ST elevated myocardial infarction) (HCC)    Dental infection    Metabolic acidosis    Cardiomyopathy (HCC)    Rheumatoid arthritis of multiple sites with negative rheumatoid factor ([facility])      Past Medical History:   Diagnosis Date    Coronary artery disease     Crohn's colitis ([facility])     Diabetes mellitus (HCC)     Fibromyalgia     Gastric ulcer     [facility] GI SURGEON    HTN (hypertension)     Hyperlipidemia     IBD (inflammatory bowel disease) 11/2015    [facility] GI SURGEON    Migraine     Myocardial infarction ([facility])       Past Surgical History:   Procedure Laterality Date    CARDIAC CATHETERIZATION      CARDIAC CATHETERIZATION Left 03/07/2022    Procedure: Cardiac catheterization;  Surgeon: Jasen Hayden MD;  Location: [facility];  Service: Cardiology    CARDIAC CATHETERIZATION N/A 03/07/2022    Procedure: Cardiac Coronary Angiogram;  Surgeon: Jasen Hayden MD;  Location: [facility];  Service: Cardiology    CARDIAC CATHETERIZATION N/A 03/07/2022    Procedure: Cardiac pci;  Surgeon: Jasen Hayden MD;  [facility]: [facility];  Service: Cardiology    CARDIAC CATHETERIZATION  3/15/2024    Procedure: Cardiac catheterization;  Surgeon: Marcus Rawls DO;  [facility]: [facility];  Service: Cardiology    CARDIAC CATHETERIZATION N/A 3/15/2024    Procedure: Cardiac Coronary Angiogram;  Surgeon: Marcus Rawls DO;  [facility];  Service: Cardiology    CARDIAC CATHETERIZATION Left 3/15/2024    Procedure: Cardiac Left Heart Cath;  Surgeon: Marcus Rawls DO;  [facility]: [facility];   Service: Cardiology    CHOLECYSTECTOMY      COLONOSCOPY  12/03/2018    internal hemorrhoids, 3mm tubular adenoma polyp transverse colon, bx negative for dysplasia    COLONOSCOPY  11/2015    COLONOSCOPY W/ BIOPSIES  2022    ileitis; dr rao c/w crohns    CORONARY STENT PLACEMENT      DENTAL SURGERY      [employer] teeth extraction    EGD  12/2018    2cm hiatal hernia, gastritis bx shows foci of intestinal metaplasia, negative Hpylori    EGD  2015    ESOPHAGITIS/MALLHIATUS HERNIA  FOUND    HYSTERECTOMY      MAMMO STEREOTACTIC BREAST BIOPSY LEFT (ALL INC) Left 10/27/2021    TUBAL LIGATION      Bilateral      Social History     Tobacco Use    Smoking status: Every Day     Current packs/day: 0.25     Average packs/day: 0.2 packs/day for 40.4 years (10.1 ttl pk-yrs)     Types: Cigarettes     Start date: 1984    Smokeless tobacco: Never    Tobacco comments:     3 cigarettes daily    Substance Use Topics    Alcohol use: Not Currently     Comment: Rarely     Allergies   Allergen Reactions    Rosuvastatin Myalgia and Arthralgia    Atorvastatin Myalgia    Cefuroxime     Metaxalone     Influenza Vaccines Rash    Keflet [Cephalexin] Rash    Lyrica [Pregabalin] Swelling     Feet swelling    Nuts - Food Allergy Itching    Sulfa Antibiotics Rash    Topiramate Rash         Current Outpatient Medications:     acetaminophen (TYLENOL) 500 mg tablet, Take 1,000 mg by mouth every 6 (six) hours as needed for mild pain, Disp: , Rfl:     albuterol (2.5 mg/3 mL) 0.083 % nebulizer solution, Take 3 mL (2.5 mg total) by nebulization every 6 (six) hours as needed for wheezing or shortness of breath, Disp: 180 mL, Rfl: 5    albuterol (Ventolin HFA) 90 mcg/act inhaler, Inhale 2 puffs every 6 (six) hours as needed for wheezing, Disp: 18 g, Rfl: 5    Alcohol Swabs 70 % PADS, Use 4 per day, Disp: 400 each, Rfl: 3    ALPRAZolam (XANAX) 0.25 mg tablet, Take 1 tablet (0.25 mg total) by mouth 4 (four) times a day as needed for anxiety, Disp: 30 tablet,  Rfl: 0    aspirin (ECOTRIN LOW STRENGTH) 81 mg EC tablet, Take 1 tablet (81 mg total) by mouth daily, Disp: 90 tablet, Rfl: 0    carvedilol (COREG) 6.25 mg tablet, Take 1 tablet (6.25 mg total) by mouth every 12 (twelve) hours, Disp: 180 tablet, Rfl: 3    cholecalciferol (VITAMIN D3) 1,000 units tablet, Take 1 tablet (1,000 Units total) by mouth daily, Disp: 90 tablet, Rfl: 3    Continuous Glucose  (Dexcom G7 ) CRISTINA, Use 1 each continuous, Disp: 1 each, Rfl: 0    Continuous Glucose Sensor (Dexcom G7 Sensor), Use 1 Device every 10 days, Disp: 3 each, Rfl: 2    folic acid (FOLVITE) 1 mg tablet, , Disp: , Rfl:     gabapentin (NEURONTIN) 400 mg capsule, Take 1 capsule (400 mg total) by mouth 4 (four) times a day, Disp: 120 capsule, Rfl: 6    Humira, 2 Pen, 40 MG/0.4ML PNKT, Inject 40 mg under the skin every 14 (fourteen) days, Disp: 2 each, Rfl: 10    hyoscyamine (LEVSIN/SL) 0.125 mg SL tablet, TAKE 1 TABLET (0.125 MG TOTAL) BY MOUTH EVERY 6 (SIX) HOURS AS NEEDED (ESOPHAGEAL SPASM), Disp: 360 tablet, Rfl: 2    insulin aspart (NovoLOG FlexPen) 100 UNIT/ML injection pen, 10 units before breakfast and dinner and 8 units before lunch., Disp: 15 mL, Rfl: 1    insulin degludec (Tresiba FlexTouch) 100 units/mL injection pen, Inject 24 units daily., Disp: 30 mL, Rfl: 1    Insulin Pen Needle (Pen Needles) 32G X 5 MM MISC, Use 4 per day, Disp: 360 each, Rfl: 1    losartan (COZAAR) 25 mg tablet, Take 1 tablet (25 mg total) by mouth daily, Disp: 90 tablet, Rfl: 3    methotrexate 2.5 MG tablet, Take 5 tablets by mouth once a week, Disp: , Rfl:     omeprazole (PriLOSEC) 40 MG capsule, Take 1 capsule (40 mg total) by mouth daily (Patient taking differently: Take 40 mg by mouth if needed), Disp: 30 capsule, Rfl: 0    ondansetron (ZOFRAN-ODT) 4 mg disintegrating tablet, Take 1 tablet (4 mg total) by mouth every 8 (eight) hours as needed for nausea or vomiting for up to 10 doses, Disp: 10 tablet, Rfl: 0    Ostomy Supplies  "(Skin Tac Adhesive Barrier Wipe) MISC, Wipe skin area before placing dexcom sensor., Disp: 100 each, Rfl: 0    predniSONE 5 mg tablet, Take 10 mg by mouth daily, Disp: , Rfl:     rosuvastatin (CRESTOR) 10 MG tablet, TAKE 1 TABLET BY MOUTH EVERY DAY AT NIGHT, Disp: , Rfl:     semaglutide, 0.25 or 0.5 mg/dose, (Ozempic, 0.25 or 0.5 MG/DOSE,) 2 mg/3 mL injection pen, Inject 0.5 mg weekly., Disp: 3 mL, Rfl: 2    SUMAtriptan (IMITREX) 50 mg tablet, Take 1 tablet (50 mg total) by mouth once as needed for migraine for up to 1 dose may repeat in 2 hours if necessary, Disp: 10 tablet, Rfl: 5    Review of Systems   Constitutional:  Negative for activity change, appetite change, chills, diaphoresis, fatigue, fever and unexpected weight change.   Eyes:  Negative for visual disturbance.   Respiratory:  Negative for chest tightness and shortness of breath.    Cardiovascular:  Negative for chest pain, palpitations and leg swelling.   Gastrointestinal:  Positive for constipation and diarrhea. Negative for abdominal pain, nausea and vomiting.   Endocrine: Negative for polydipsia, polyphagia and polyuria.   Musculoskeletal:  Positive for arthralgias.   Skin:  Negative for color change, pallor, rash and wound.   Neurological:  Positive for numbness (b/l feet). Negative for dizziness, tremors, weakness and light-headedness.   All other systems reviewed and are negative.      Physical Exam:  Body mass index is 37.22 kg/m².  /72   Pulse 86   Ht 5' 8\" (1.727 m)   Wt 111 kg (244 lb 12.8 oz)   SpO2 96%   BMI 37.22 kg/m²    Wt Readings from Last 3 Encounters:   05/10/24 111 kg (244 lb 12.8 oz)   03/19/24 113 kg (249 lb)   03/16/24 113 kg (249 lb 5.4 oz)       Physical Exam  Vitals reviewed.   Constitutional:       Appearance: Normal appearance. She is obese.   Cardiovascular:      Rate and Rhythm: Normal rate.   Pulmonary:      Effort: Pulmonary effort is normal.   Neurological:      Mental Status: She is alert and oriented to " person, place, and time.   Psychiatric:         Mood and Affect: Mood normal.         Thought Content: Thought content normal.       Labs:   Lab Results   Component Value Date    HGBA1C 10.0 (A) 05/10/2024    HGBA1C 11.5 (A) 01/03/2024    HGBA1C 10.1 (H) 09/06/2023     Lab Results   Component Value Date    CREATININE 0.71 03/16/2024    CREATININE 0.72 03/15/2024    CREATININE 0.78 03/14/2024    BUN 9 03/16/2024     01/22/2016    K 4.0 03/16/2024     03/16/2024    CO2 22 03/16/2024     eGFR   Date Value Ref Range Status   03/16/2024 91 ml/min/1.73sq m Final     Lab Results   Component Value Date    HDL 40 (L) 01/29/2024    TRIG 202 (H) 01/29/2024     Lab Results   Component Value Date    ALT 5 (L) 03/15/2024    AST 15 03/15/2024    ALKPHOS 76 03/15/2024    BILITOT 0.4 01/22/2016     Lab Results   Component Value Date    [id number] 2.100 08/11/2021    [id number] 1.620 11/13/2020       Patient Instructions   Increase Tresiba to 24 units once daily  Continue with Novolog at current dose.  Try Ozempic 0.25 mg weekly for 1 month - if tolerating will increase to 0.5 mg weekly     Start calcium supplement     Discussed with the patient and all questioned fully answered. She will call me if any problems arise.    MERI Hernandez

## 2024-05-10 NOTE — PROGRESS NOTES
Patient given sample at visit     Ozempic 2mg/ 3ml    NDC 2601-8059-34    LOT [id number]     EXP 10/31/2025

## 2024-05-10 NOTE — PATIENT INSTRUCTIONS
Increase Tresiba to 24 units once daily  Continue with Novolog at current dose.  Try Ozempic 0.25 mg weekly for 1 month - if tolerating will increase to 0.5 mg weekly     Start calcium supplement

## 2024-05-10 NOTE — ASSESSMENT & PLAN NOTE
HGA1C uncontrolled. No BGs to review. She reports she has been high in the morning when she wakes up. She tried 1 week of Ozempic and reports it improved BGs but was nervous to continue it because of her bowel issues.   Treatment regimen:   - She would like to try Ozempic again. She will start with 0.25 mg weekly for 1 month and if tolerating will increase to 0.5 mg weekly. Reviewed side effects with her again.  - Recommend she restart on CGM so she can monitor her BGs closely and insulin can be adjusted appropriately. Will upgrade her to Dexcom G7.   - Recommend she improve on her dietary habits.   Discussed risks/complications associated with uncontrolled diabetes.    Advised to adhere to diabetic diet, and recommended staying active/exercising routinely.  Keep carbohydrates consistent to limit blood glucose fluctuations.  Advised to call if blood sugars less than 70 mg/dl or over 300 mg/dl.   Discussed symptoms and treatment of hypoglycemia.    Recommended routine follow-up with podiatry and ophthalmology.   Ordered blood work to complete prior to next visit.   Lab Results   Component Value Date    HGBA1C 10.0 (A) 05/10/2024

## 2024-05-14 ENCOUNTER — TELEPHONE (OUTPATIENT)
Dept: ENDOCRINOLOGY | Facility: CLINIC | Age: 64
End: 2024-05-14

## 2024-05-14 DIAGNOSIS — E11.65 UNCONTROLLED TYPE 2 DIABETES MELLITUS WITH HYPERGLYCEMIA (HCC): ICD-10-CM

## 2024-05-15 DIAGNOSIS — E11.65 UNCONTROLLED TYPE 2 DIABETES MELLITUS WITH HYPERGLYCEMIA (HCC): ICD-10-CM

## 2024-05-15 DIAGNOSIS — F32.9 REACTIVE DEPRESSION (SITUATIONAL): ICD-10-CM

## 2024-05-15 RX ORDER — ACYCLOVIR 400 MG/1
1 TABLET ORAL CONTINUOUS
Qty: 1 EACH | Refills: 0 | Status: SHIPPED | OUTPATIENT
Start: 2024-05-15

## 2024-05-16 RX ORDER — ALPRAZOLAM 0.25 MG/1
0.25 TABLET ORAL 4 TIMES DAILY PRN
Qty: 30 TABLET | Refills: 0 | Status: SHIPPED | OUTPATIENT
Start: 2024-05-16

## 2024-05-16 RX ORDER — GABAPENTIN 400 MG/1
400 CAPSULE ORAL 4 TIMES DAILY
Qty: 120 CAPSULE | Refills: 5 | Status: SHIPPED | OUTPATIENT
Start: 2024-05-16

## 2024-07-08 ENCOUNTER — TELEPHONE (OUTPATIENT)
Age: 64
End: 2024-07-08

## 2024-07-08 NOTE — TELEPHONE ENCOUNTER
Pharmacy called- they need a new script for the patients Dexcom G6 transmitter. She was trying to get the G7 but she isn't going to bother with that anymore because she was having such a hard time getting it approved. Please advise.

## 2024-07-09 DIAGNOSIS — E11.65 UNCONTROLLED TYPE 2 DIABETES MELLITUS WITH HYPERGLYCEMIA (HCC): Primary | ICD-10-CM

## 2024-07-09 RX ORDER — PROCHLORPERAZINE 25 MG/1
SUPPOSITORY RECTAL
Qty: 3 EACH | Refills: 2 | Status: SHIPPED | OUTPATIENT
Start: 2024-07-09

## 2024-07-09 RX ORDER — PROCHLORPERAZINE 25 MG/1
SUPPOSITORY RECTAL
Qty: 1 EACH | Refills: 1 | Status: SHIPPED | OUTPATIENT
Start: 2024-07-09

## 2024-07-28 DIAGNOSIS — E11.65 UNCONTROLLED TYPE 2 DIABETES MELLITUS WITH HYPERGLYCEMIA (HCC): ICD-10-CM

## 2024-07-28 DIAGNOSIS — F32.9 REACTIVE DEPRESSION (SITUATIONAL): ICD-10-CM

## 2024-07-29 RX ORDER — GABAPENTIN 400 MG/1
400 CAPSULE ORAL 4 TIMES DAILY
Qty: 120 CAPSULE | Refills: 5 | Status: SHIPPED | OUTPATIENT
Start: 2024-07-29

## 2024-07-29 RX ORDER — ALPRAZOLAM 0.25 MG/1
0.25 TABLET ORAL 4 TIMES DAILY PRN
Qty: 30 TABLET | Refills: 0 | Status: SHIPPED | OUTPATIENT
Start: 2024-07-29

## 2024-08-21 ENCOUNTER — TELEPHONE (OUTPATIENT)
Age: 64
End: 2024-08-21

## 2024-08-21 NOTE — TELEPHONE ENCOUNTER
Patient called to request a urinalysis to be ordered. She has UTI. Wanted to be seen by Virgie. Scheduled for 08/29/24. Please advise if UA can be ordered.

## 2024-08-22 ENCOUNTER — APPOINTMENT (OUTPATIENT)
Dept: LAB | Facility: CLINIC | Age: 64
End: 2024-08-22
Payer: COMMERCIAL

## 2024-08-22 ENCOUNTER — OFFICE VISIT (OUTPATIENT)
Dept: FAMILY MEDICINE CLINIC | Facility: CLINIC | Age: 64
End: 2024-08-22
Payer: COMMERCIAL

## 2024-08-22 VITALS
DIASTOLIC BLOOD PRESSURE: 90 MMHG | SYSTOLIC BLOOD PRESSURE: 134 MMHG | RESPIRATION RATE: 16 BRPM | HEART RATE: 116 BPM | HEIGHT: 68 IN | BODY MASS INDEX: 36.07 KG/M2 | OXYGEN SATURATION: 97 % | TEMPERATURE: 97.5 F | WEIGHT: 238 LBS

## 2024-08-22 DIAGNOSIS — E11.65 UNCONTROLLED TYPE 2 DIABETES MELLITUS WITH HYPERGLYCEMIA (HCC): ICD-10-CM

## 2024-08-22 DIAGNOSIS — R39.9 URINARY SYMPTOM OR SIGN: Primary | ICD-10-CM

## 2024-08-22 DIAGNOSIS — K50.119 CROHN'S DISEASE OF COLON WITH COMPLICATION (HCC): ICD-10-CM

## 2024-08-22 DIAGNOSIS — Z79.899 ENCOUNTER FOR LONG-TERM (CURRENT) USE OF OTHER MEDICATIONS: ICD-10-CM

## 2024-08-22 DIAGNOSIS — R39.9 URINARY SYMPTOM OR SIGN: ICD-10-CM

## 2024-08-22 LAB
ALBUMIN SERPL BCG-MCNC: 3.9 G/DL (ref 3.5–5)
ALP SERPL-CCNC: 116 U/L (ref 34–104)
ALT SERPL W P-5'-P-CCNC: 7 U/L (ref 7–52)
ANION GAP SERPL CALCULATED.3IONS-SCNC: 9 MMOL/L (ref 4–13)
AST SERPL W P-5'-P-CCNC: 16 U/L (ref 13–39)
BACTERIA UR QL AUTO: ABNORMAL /HPF
BASOPHILS # BLD AUTO: 0.06 THOUSANDS/ÂΜL (ref 0–0.1)
BASOPHILS NFR BLD AUTO: 1 % (ref 0–1)
BILIRUB SERPL-MCNC: 0.4 MG/DL (ref 0.2–1)
BILIRUB UR QL STRIP: NEGATIVE
BUN SERPL-MCNC: 16 MG/DL (ref 5–25)
CALCIUM SERPL-MCNC: 9.3 MG/DL (ref 8.4–10.2)
CHLORIDE SERPL-SCNC: 99 MMOL/L (ref 96–108)
CLARITY UR: ABNORMAL
CO2 SERPL-SCNC: 23 MMOL/L (ref 21–32)
COLOR UR: YELLOW
CREAT SERPL-MCNC: 0.94 MG/DL (ref 0.6–1.3)
CREAT UR-MCNC: 139.5 MG/DL
CRP SERPL QL: 30.1 MG/L
EOSINOPHIL # BLD AUTO: 0.41 THOUSAND/ÂΜL (ref 0–0.61)
EOSINOPHIL NFR BLD AUTO: 4 % (ref 0–6)
ERYTHROCYTE [DISTWIDTH] IN BLOOD BY AUTOMATED COUNT: 13.4 % (ref 11.6–15.1)
EST. AVERAGE GLUCOSE BLD GHB EST-MCNC: 306 MG/DL
GFR SERPL CREATININE-BSD FRML MDRD: 65 ML/MIN/1.73SQ M
GLUCOSE SERPL-MCNC: 276 MG/DL (ref 65–140)
GLUCOSE UR STRIP-MCNC: ABNORMAL MG/DL
HBA1C MFR BLD: 12.3 %
HCT VFR BLD AUTO: 46.8 % (ref 34.8–46.1)
HGB BLD-MCNC: 15.2 G/DL (ref 11.5–15.4)
HGB UR QL STRIP.AUTO: ABNORMAL
HYALINE CASTS #/AREA URNS LPF: ABNORMAL /LPF
IMM GRANULOCYTES # BLD AUTO: 0.03 THOUSAND/UL (ref 0–0.2)
IMM GRANULOCYTES NFR BLD AUTO: 0 % (ref 0–2)
KETONES UR STRIP-MCNC: NEGATIVE MG/DL
LEUKOCYTE ESTERASE UR QL STRIP: ABNORMAL
LYMPHOCYTES # BLD AUTO: 2.57 THOUSANDS/ÂΜL (ref 0.6–4.47)
LYMPHOCYTES NFR BLD AUTO: 23 % (ref 14–44)
MCH RBC QN AUTO: 29.1 PG (ref 26.8–34.3)
MCHC RBC AUTO-ENTMCNC: 32.5 G/DL (ref 31.4–37.4)
MCV RBC AUTO: 90 FL (ref 82–98)
MICROALBUMIN UR-MCNC: 31.4 MG/L
MICROALBUMIN/CREAT 24H UR: 23 MG/G CREATININE (ref 0–30)
MONOCYTES # BLD AUTO: 0.61 THOUSAND/ÂΜL (ref 0.17–1.22)
MONOCYTES NFR BLD AUTO: 6 % (ref 4–12)
MUCOUS THREADS UR QL AUTO: ABNORMAL
NEUTROPHILS # BLD AUTO: 7.32 THOUSANDS/ÂΜL (ref 1.85–7.62)
NEUTS SEG NFR BLD AUTO: 66 % (ref 43–75)
NITRITE UR QL STRIP: NEGATIVE
NON-SQ EPI CELLS URNS QL MICRO: ABNORMAL /HPF
NRBC BLD AUTO-RTO: 0 /100 WBCS
PH UR STRIP.AUTO: 6 [PH]
PLATELET # BLD AUTO: 342 THOUSANDS/UL (ref 149–390)
PMV BLD AUTO: 11 FL (ref 8.9–12.7)
POTASSIUM SERPL-SCNC: 4 MMOL/L (ref 3.5–5.3)
PROT SERPL-MCNC: 7.7 G/DL (ref 6.4–8.4)
PROT UR STRIP-MCNC: ABNORMAL MG/DL
RBC # BLD AUTO: 5.22 MILLION/UL (ref 3.81–5.12)
RBC #/AREA URNS AUTO: ABNORMAL /HPF
SODIUM SERPL-SCNC: 131 MMOL/L (ref 135–147)
SP GR UR STRIP.AUTO: 1.01 (ref 1–1.03)
UROBILINOGEN UR STRIP-ACNC: <2 MG/DL
WBC # BLD AUTO: 11 THOUSAND/UL (ref 4.31–10.16)
WBC #/AREA URNS AUTO: ABNORMAL /HPF
WBC CLUMPS # UR AUTO: PRESENT /UL

## 2024-08-22 PROCEDURE — 3061F NEG MICROALBUMINURIA REV: CPT | Performed by: PHYSICIAN ASSISTANT

## 2024-08-22 PROCEDURE — 36415 COLL VENOUS BLD VENIPUNCTURE: CPT

## 2024-08-22 PROCEDURE — 99213 OFFICE O/P EST LOW 20 MIN: CPT | Performed by: NURSE PRACTITIONER

## 2024-08-22 PROCEDURE — 81001 URINALYSIS AUTO W/SCOPE: CPT

## 2024-08-22 PROCEDURE — 80053 COMPREHEN METABOLIC PANEL: CPT

## 2024-08-22 PROCEDURE — 86140 C-REACTIVE PROTEIN: CPT

## 2024-08-22 PROCEDURE — 86480 TB TEST CELL IMMUN MEASURE: CPT

## 2024-08-22 PROCEDURE — 87077 CULTURE AEROBIC IDENTIFY: CPT

## 2024-08-22 PROCEDURE — 82570 ASSAY OF URINE CREATININE: CPT

## 2024-08-22 PROCEDURE — 85025 COMPLETE CBC W/AUTO DIFF WBC: CPT

## 2024-08-22 PROCEDURE — 83036 HEMOGLOBIN GLYCOSYLATED A1C: CPT

## 2024-08-22 PROCEDURE — 82043 UR ALBUMIN QUANTITATIVE: CPT

## 2024-08-22 PROCEDURE — 87086 URINE CULTURE/COLONY COUNT: CPT

## 2024-08-22 PROCEDURE — 87186 SC STD MICRODIL/AGAR DIL: CPT

## 2024-08-22 NOTE — PROGRESS NOTES
"Ambulatory Visit  Name: Lashawn Davis      : 1960      MRN: [id number]  Encounter Provider: MERI Balderas  Encounter Date: 2024   Encounter department: [facility]    Assessment & Plan   1. Urinary symptom or sign  -     UA w Reflex to Microscopic w Reflex to Culture; Future    Pt unable to provide sample into urine container today. Check UA w/ reflex to culture at lab to evaluate if patient has UTI. Patient encouraged to increase oral fluid intake of water to help flush system. They may utilize OTC Azo or Uristat to help with urinary symptom relief. Incorporating cranberry supplementation can also help aid in treatment plan. Pt to call office if they develop any fevers, chills, CVA tenderness, or any worsening of symptoms. Urine culture sent. Pt notified that we will contact them if any alterations or changes need to be made to treatment plan based on urine culture final results.      Pt to f/u PRN.  F/u pending results.     History of Present Illness     Pt presents today for urinary urgency/frequency, dysuria, feelings of incomplete bladder emptying x 1 week.  Denies fevers, chills, body aches, N/V.  Pt believes she has a UTI.          Review of Systems  As noted per HPI.    Objective     /90   Pulse (!) 116   Temp 97.5 °F (36.4 °C)   Resp 16   Ht 5' 8\" (1.727 m)   Wt 108 kg (238 lb)   SpO2 97%   BMI 36.19 kg/m²     Physical Exam  Vitals reviewed.   Constitutional:       Appearance: Normal appearance.   Cardiovascular:      Rate and Rhythm: Regular rhythm. Tachycardia present.      Pulses: Normal pulses.      Heart sounds: Normal heart sounds.   Pulmonary:      Effort: Pulmonary effort is normal.      Breath sounds: Normal breath sounds.   Abdominal:      Palpations: Abdomen is soft.      Tenderness: There is no right CVA tenderness or left CVA tenderness.   Neurological:      Mental Status: She is alert and oriented to person, " place, and time. Mental status is at baseline.   Psychiatric:         Mood and Affect: Mood normal.         Behavior: Behavior normal.         Thought Content: Thought content normal.         Judgment: Judgment normal.

## 2024-08-23 LAB
GAMMA INTERFERON BACKGROUND BLD IA-ACNC: 0.02 IU/ML
M TB IFN-G BLD-IMP: NEGATIVE
M TB IFN-G CD4+ BCKGRND COR BLD-ACNC: -0.01 IU/ML
M TB IFN-G CD4+ BCKGRND COR BLD-ACNC: -0.01 IU/ML
MITOGEN IGNF BCKGRD COR BLD-ACNC: 5.8 IU/ML

## 2024-08-24 LAB — BACTERIA UR CULT: ABNORMAL

## 2024-08-26 ENCOUNTER — TELEPHONE (OUTPATIENT)
Dept: FAMILY MEDICINE CLINIC | Facility: CLINIC | Age: 64
End: 2024-08-26

## 2024-08-26 DIAGNOSIS — N30.91 CYSTITIS WITH HEMATURIA: ICD-10-CM

## 2024-08-26 DIAGNOSIS — R31.9 HEMATURIA, UNSPECIFIED TYPE: Primary | ICD-10-CM

## 2024-08-26 RX ORDER — NITROFURANTOIN 25; 75 MG/1; MG/1
100 CAPSULE ORAL 2 TIMES DAILY
Qty: 10 CAPSULE | Refills: 0 | Status: SHIPPED | OUTPATIENT
Start: 2024-08-26 | End: 2024-08-31

## 2024-08-26 NOTE — TELEPHONE ENCOUNTER
----- Message from MERI Raymond sent at 8/26/2024  9:20 AM EDT -----  Please call patient and let her know that her urine culture showed a UTI caused by E. Coli. We can treat her with Macrobid which is twice a day for 5 days. 2 days after she has completed her antibiotics, I would like her to go to the lab to repeat the urine sample. There was some blood noted in the urine that I want to make sure is gone after she has been fully treated for the UTI. Thanks!   Verified Results  CULTURE STREP GRP A [id number] 12:01AM JANICE GUERRA   [May 29, 2018 9:18AM JANICE GUERRA]  negative strep culture.     Test Name Result Flag Reference   CULTURE STREP GRP A  O    SPECIMEN DESCRIPTION (SDES): THROAT  CULTURE (CULT):        NO BETA HEMOLYTIC STREPTOCOCCI ISOLATED  REPORT STATUS (RPT):     FINAL 05/25/2018

## 2024-08-27 ENCOUNTER — OFFICE VISIT (OUTPATIENT)
Dept: ENDOCRINOLOGY | Facility: CLINIC | Age: 64
End: 2024-08-27
Payer: COMMERCIAL

## 2024-08-27 VITALS
WEIGHT: 237.8 LBS | BODY MASS INDEX: 36.04 KG/M2 | DIASTOLIC BLOOD PRESSURE: 88 MMHG | HEIGHT: 68 IN | SYSTOLIC BLOOD PRESSURE: 126 MMHG

## 2024-08-27 DIAGNOSIS — I10 ESSENTIAL HYPERTENSION: ICD-10-CM

## 2024-08-27 DIAGNOSIS — G47.33 OSA (OBSTRUCTIVE SLEEP APNEA): ICD-10-CM

## 2024-08-27 DIAGNOSIS — E66.01 CLASS 3 SEVERE OBESITY DUE TO EXCESS CALORIES WITH SERIOUS COMORBIDITY AND BODY MASS INDEX (BMI) OF 40.0 TO 44.9 IN ADULT (HCC): ICD-10-CM

## 2024-08-27 DIAGNOSIS — E11.65 UNCONTROLLED TYPE 2 DIABETES MELLITUS WITH HYPERGLYCEMIA (HCC): Primary | ICD-10-CM

## 2024-08-27 DIAGNOSIS — E78.2 MIXED HYPERLIPIDEMIA: ICD-10-CM

## 2024-08-27 PROCEDURE — 99214 OFFICE O/P EST MOD 30 MIN: CPT | Performed by: INTERNAL MEDICINE

## 2024-08-27 RX ORDER — INSULIN ASPART 100 [IU]/ML
10 INJECTION, SOLUTION INTRAVENOUS; SUBCUTANEOUS
Qty: 27 ML | Refills: 1 | Status: SHIPPED | OUTPATIENT
Start: 2024-08-27 | End: 2025-02-23

## 2024-08-27 RX ORDER — ACYCLOVIR 400 MG/1
1 TABLET ORAL
Qty: 9 EACH | Refills: 3 | Status: SHIPPED | OUTPATIENT
Start: 2024-08-27

## 2024-08-27 RX ORDER — INSULIN DEGLUDEC 100 U/ML
35 INJECTION, SOLUTION SUBCUTANEOUS DAILY
Qty: 33 ML | Refills: 1 | Status: SHIPPED | OUTPATIENT
Start: 2024-08-27 | End: 2025-02-23

## 2024-08-27 NOTE — ASSESSMENT & PLAN NOTE
Based on hemoglobin A1c, her diabetes has worsened.  We had a long discussion about lifestyle modification.  She was referred to the fitness center.  Her Tresiba was increased to 35 units at bedtime and the NovoLog was increased to 10 units with meals.  Her continuous glucose monitor was changed to Dexcom G7.  Additionally, gabapentin can certainly cause weight gain which in turn can result in worsening of diabetes.  Lab Results   Component Value Date    HGBA1C 12.3 (H) 08/22/2024

## 2024-08-27 NOTE — ASSESSMENT & PLAN NOTE
I encouraged her to see her sleep specialist to start her CPAP therapy.  This is likely playing a role in her poorly controlled diabetes.

## 2024-08-27 NOTE — PROGRESS NOTES
Ambulatory Visit  Name: Lashawn Davis      : 1960      MRN: [id number]  Encounter Provider: Ranjith Ibarra MD  Encounter Date: 2024   Encounter department: [facility] [place]    Assessment & Plan   1. Uncontrolled type 2 diabetes mellitus with hyperglycemia (HCC)  Assessment & Plan:  Based on hemoglobin A1c, her diabetes has worsened.  We had a long discussion about lifestyle modification.  She was referred to the fitness center.  Her Tresiba was increased to 35 units at bedtime and the NovoLog was increased to 10 units with meals.  Her continuous glucose monitor was changed to Dexcom G7.  Additionally, gabapentin can certainly cause weight gain which in turn can result in worsening of diabetes.  Lab Results   Component Value Date    HGBA1C 12.3 (H) 2024     Orders:  -     insulin degludec (Tresiba FlexTouch) 100 units/mL injection pen; Inject 35 Units under the skin daily Inject 24 units daily.  -     insulin aspart (NovoLOG FlexPen) 100 UNIT/ML injection pen; Inject 10 Units under the skin 3 (three) times a day with meals 10 units before breakfast and dinner and 8 units before lunch.  -     Continuous Glucose Sensor (Dexcom G7 Sensor); Use 1 Device every 10 days  -     Ambulatory Referral to Medical Fitness Exercise Specialist; Future  2. Essential hypertension  3. DARYL (obstructive sleep apnea)  Assessment & Plan:  I encouraged her to see her sleep specialist to start her CPAP therapy.  This is likely playing a role in her poorly controlled diabetes.  4. Class 3 severe obesity due to excess calories with serious comorbidity and body mass index (BMI) of 40.0 to 44.9 in adult (HCC)  Assessment & Plan:  We discussed lifestyle modifications to improve this.  5. Mixed hyperlipidemia  Assessment & Plan:  Continue current regimen.      History of Present Illness     Lashawn Davis is a 64 y.o. female who presents for follow-up of type 2 diabetes.  Her  "hemoglobin A1c has worsened which she attributes to eating junk food.  She does not exercise.  She is not regularly using her continuous glucose monitor.  Her diabetes appears to be complicated by neuropathy.    Review of Systems   Constitutional:  Negative for chills and fever.   Respiratory:  Negative for shortness of breath.    Cardiovascular:  Negative for chest pain.   Gastrointestinal:  Negative for constipation, diarrhea, nausea and vomiting.   All other systems reviewed and are negative.      Objective     /88 (BP Location: Left arm, Patient Position: Sitting, Cuff Size: Adult)   Ht 5' 8\" (1.727 m)   Wt 108 kg (237 lb 12.8 oz)   BMI 36.16 kg/m²     Physical Exam  Vitals and nursing note reviewed.   Constitutional:       Appearance: Normal appearance. She is obese.   HENT:      Head: Normocephalic and atraumatic.   Eyes:      General: No scleral icterus.        Right eye: No discharge.         Left eye: No discharge.   Pulmonary:      Effort: Pulmonary effort is normal.   Musculoskeletal:         General: Normal range of motion.      Cervical back: Normal range of motion.   Skin:     Coloration: Skin is not jaundiced.   Neurological:      General: No focal deficit present.      Mental Status: She is alert and oriented to person, place, and time.   Psychiatric:         Mood and Affect: Mood normal.         Behavior: Behavior normal.       Administrative Statements           "

## 2024-08-27 NOTE — TELEPHONE ENCOUNTER
I called and s/w the pt, she said that someone went over the results with her yesterday. She picked up the antibiotic and repeat the urine 2 days after she completed the antibiotic.

## 2024-09-03 ENCOUNTER — OFFICE VISIT (OUTPATIENT)
Dept: FAMILY MEDICINE CLINIC | Facility: CLINIC | Age: 64
End: 2024-09-03
Payer: COMMERCIAL

## 2024-09-03 VITALS
SYSTOLIC BLOOD PRESSURE: 112 MMHG | RESPIRATION RATE: 16 BRPM | HEIGHT: 68 IN | OXYGEN SATURATION: 98 % | DIASTOLIC BLOOD PRESSURE: 68 MMHG | WEIGHT: 239 LBS | BODY MASS INDEX: 36.22 KG/M2 | HEART RATE: 89 BPM | TEMPERATURE: 98.2 F

## 2024-09-03 DIAGNOSIS — L30.9 HAND ECZEMA: ICD-10-CM

## 2024-09-03 DIAGNOSIS — M77.12 LATERAL EPICONDYLITIS, LEFT ELBOW: ICD-10-CM

## 2024-09-03 DIAGNOSIS — M06.09 RHEUMATOID ARTHRITIS OF MULTIPLE SITES WITH NEGATIVE RHEUMATOID FACTOR (HCC): ICD-10-CM

## 2024-09-03 DIAGNOSIS — R25.1 TREMOR, UNSPECIFIED: Primary | ICD-10-CM

## 2024-09-03 PROCEDURE — 99214 OFFICE O/P EST MOD 30 MIN: CPT | Performed by: PHYSICIAN ASSISTANT

## 2024-09-03 PROCEDURE — 3078F DIAST BP <80 MM HG: CPT | Performed by: PHYSICIAN ASSISTANT

## 2024-09-03 PROCEDURE — 3074F SYST BP LT 130 MM HG: CPT | Performed by: PHYSICIAN ASSISTANT

## 2024-09-03 PROCEDURE — 3725F SCREEN DEPRESSION PERFORMED: CPT | Performed by: PHYSICIAN ASSISTANT

## 2024-09-03 RX ORDER — MOMETASONE FUROATE 1 MG/G
CREAM TOPICAL DAILY
Qty: 45 G | Refills: 0 | Status: SHIPPED | OUTPATIENT
Start: 2024-09-03

## 2024-09-03 NOTE — PROGRESS NOTES
"Assessment/Plan:    Left epicondylitis - tennis elbow strap, volatrent gel, look up exercises, refusing PT  Hand eczema - elocon once daily then good heavy moisturizer  Tremor - refer to neuro    F/u as needed and as scheduled    Subjective:   Chief Complaint   Patient presents with    Rash     Rash on hands for about a month  Comes and goes    Shaking     Pt reports shakiness ongoing for at least a year   Starting to worsen     Arm Pain     Right arm pain worse over the last 3 weeks       Patient ID: Lashawn Davis is a 64 y.o. female.    Patient here with \"shakes\" that she notices have been getting worse over the past year. Everyone starting to notice now. Stopped humira, methotrexate, prednisone. Some days goes without, other night she was sitting out at [employer], she was reaching to  a cup and everyone noticed. Can be rest at rest and with activity. Both hands.    Left elbow sore for 3 weeks, started randomly one day, not sure why. Constant pain, taking 4 advil with some relief. R hand dominant. No trauma. If patient eelvates it at chest level, holding feels better.     Also getting bubbles on hand, that pop and peel. Using neosporin on hands without relief. One month. No itching.        The following portions of the patient's history were reviewed and updated as appropriate: allergies, current medications, past family history, past medical history, past social history, past surgical history, and problem list.    Past Medical History:   Diagnosis Date    Coronary artery disease     Crohn's colitis (HCC)     Diabetes mellitus (HCC)     Fibromyalgia     Gastric ulcer     [facility] GI SURGEON    HTN (hypertension)     Hyperlipidemia     IBD (inflammatory bowel disease) 11/2015    [facility] SURGE[facility]    Migraine     Myocardial infarction (HCC)      Past Surgical History:   Procedure Laterality Date    CARDIAC CATHETERIZATION      CARDIAC CATHETERIZATION Left 03/07/2022    Procedure: Cardiac " catheterization;  Surgeon: Jasen Hayden MD;  [facility];  Service: Cardiology    CARDIAC CATHETERIZATION N/A 03/07/2022    Procedure: Cardiac Coronary Angiogram;  Surgeon: Jasen Hayden MD;  [facility];  Service: Cardiology    CARDIAC CATHETERIZATION N/A 03/07/2022    Procedure: Cardiac pci;  Surgeon: Jasen Hayden MD;  [facility];  Service: Cardiology    CARDIAC CATHETERIZATION  03/15/2024    Procedure: Cardiac catheterization;  Surgeon: Marcus Rawls DO;  [facility];  Service: Cardiology    CARDIAC CATHETERIZATION N/A 03/15/2024    Procedure: Cardiac Coronary Angiogram;  Surgeon: Marcus Rawls DO;  [facility];  Service: Cardiology    CARDIAC CATHETERIZATION Left 03/15/2024    Procedure: Cardiac Left Heart Cath;  Surgeon: Marcus Rawls DO;  [facility];  Service: Cardiology    CHOLECYSTECTOMY      COLONOSCOPY  12/03/2018    internal hemorrhoids, 3mm tubular adenoma polyp transverse colon, bx negative for dysplasia    COLONOSCOPY  11/2015    COLONOSCOPY W/ BIOPSIES  12/07/2022    ileitis; dr mondragon c/w crohns    CORONARY STENT PLACEMENT      DENTAL SURGERY      [place] teeth extraction    EGD  12/2018    2cm hiatal hernia, gastritis bx shows foci of intestinal metaplasia, negative Hpylori    EGD  11/2015    ESOPHAGITIS/MALLHIATUS HERNIA  FOUND    EGD  12/2021    [facility] Mondragon MD.- Small hiatal hernia.  Bx: No intestinal metaplasia, columnar epithelium, epithelial dysplasia, evidence of malignancy.    HYSTERECTOMY      MAMMO STEREOTACTIC BREAST BIOPSY LEFT (ALL INC) Left 10/27/2021    TUBAL LIGATION      Bilateral     Family History   Problem Relation Age of Onset    Coronary artery disease Mother     Dementia Mother     Stroke Mother     No Known Problems Father     Cervical cancer Sister 40    Heart disease Brother     No Known Problems Maternal Grandmother     No Known  Problems Maternal Grandfather     No Known Problems Paternal Grandmother     No Known Problems Paternal Grandfather     Alcohol abuse Neg Hx     Substance Abuse Neg Hx      Social History     Socioeconomic History    Marital status: /Civil Union     Spouse name: Not on file    Number of children: Not on file    Years of education: Not on file    Highest education level: Not on file   Occupational History    Occupation: retired   Tobacco Use    Smoking status: Every Day     Current packs/day: 0.25     Average packs/day: 0.2 packs/day for 40.7 years (10.2 ttl pk-yrs)     Types: Cigarettes     Start date: 1984    Smokeless tobacco: Never    Tobacco comments:     3 cigarettes daily    Vaping Use    Vaping status: Never Used   Substance and Sexual Activity    Alcohol use: Not Currently     Comment: Rarely    Drug use: Never    Sexual activity: Not Currently     Partners: Male   Other Topics Concern    Not on file   Social History Narrative    Not on file     Social Determinants of Health     Financial Resource Strain: Not on file   Food Insecurity: No Food Insecurity (3/15/2024)    Hunger Vital Sign     Worried About Running Out of Food in the Last Year: Never true     Ran Out of Food in the Last Year: Never true   Transportation Needs: No Transportation Needs (3/15/2024)    PRAPARE - Transportation     Lack of Transportation (Medical): No     Lack of Transportation (Non-Medical): No   Physical Activity: Not on file   Stress: Not on file   Social Connections: Not on file   Intimate Partner Violence: Not on file   Housing Stability: Low Risk  (3/15/2024)    Housing Stability Vital Sign     Unable to Pay for Housing in the Last Year: No     Number of Times Moved in the Last Year: 1     Homeless in the Last Year: No       Current Outpatient Medications:     acetaminophen (TYLENOL) 500 mg tablet, Take 1,000 mg by mouth every 6 (six) hours as needed for mild pain, Disp: , Rfl:     albuterol (Ventolin HFA) 90 mcg/act  inhaler, Inhale 2 puffs every 6 (six) hours as needed for wheezing, Disp: 18 g, Rfl: 5    Alcohol Swabs 70 % PADS, Use 4 per day, Disp: 400 each, Rfl: 3    ALPRAZolam (XANAX) 0.25 mg tablet, Take 1 tablet (0.25 mg total) by mouth 4 (four) times a day as needed for anxiety, Disp: 30 tablet, Rfl: 0    aspirin (ECOTRIN LOW STRENGTH) 81 mg EC tablet, Take 1 tablet (81 mg total) by mouth daily, Disp: 90 tablet, Rfl: 0    carvedilol (COREG) 6.25 mg tablet, Take 1 tablet (6.25 mg total) by mouth every 12 (twelve) hours, Disp: 180 tablet, Rfl: 3    cholecalciferol (VITAMIN D3) 1,000 units tablet, Take 1 tablet (1,000 Units total) by mouth daily, Disp: 90 tablet, Rfl: 3    Continuous Glucose Sensor (Dexcom G7 Sensor), Use 1 Device every 10 days, Disp: 9 each, Rfl: 3    folic acid (FOLVITE) 1 mg tablet, , Disp: , Rfl:     gabapentin (NEURONTIN) 400 mg capsule, Take 1 capsule (400 mg total) by mouth 4 (four) times a day, Disp: 120 capsule, Rfl: 5    hyoscyamine (LEVSIN/SL) 0.125 mg SL tablet, TAKE 1 TABLET (0.125 MG TOTAL) BY MOUTH EVERY 6 (SIX) HOURS AS NEEDED (ESOPHAGEAL SPASM), Disp: 360 tablet, Rfl: 2    insulin aspart (NovoLOG FlexPen) 100 UNIT/ML injection pen, Inject 10 Units under the skin 3 (three) times a day with meals 10 units before breakfast and dinner and 8 units before lunch., Disp: 27 mL, Rfl: 1    insulin degludec (Tresiba FlexTouch) 100 units/mL injection pen, Inject 35 Units under the skin daily Inject 24 units daily., Disp: 33 mL, Rfl: 1    Insulin Pen Needle (Pen Needles) 32G X 5 MM MISC, Use 4 per day, Disp: 360 each, Rfl: 1    losartan (COZAAR) 25 mg tablet, Take 1 tablet (25 mg total) by mouth daily, Disp: 90 tablet, Rfl: 3    omeprazole (PriLOSEC) 40 MG capsule, Take 1 capsule (40 mg total) by mouth daily (Patient taking differently: Take 40 mg by mouth if needed), Disp: 30 capsule, Rfl: 0    ondansetron (ZOFRAN-ODT) 4 mg disintegrating tablet, Take 1 tablet (4 mg total) by mouth every 8 (eight) hours  "as needed for nausea or vomiting for up to 10 doses, Disp: 10 tablet, Rfl: 0    rosuvastatin (CRESTOR) 10 MG tablet, TAKE 1 TABLET BY MOUTH EVERY DAY AT NIGHT, Disp: , Rfl:     SUMAtriptan (IMITREX) 50 mg tablet, Take 1 tablet (50 mg total) by mouth once as needed for migraine for up to 1 dose may repeat in 2 hours if necessary, Disp: 10 tablet, Rfl: 5    albuterol (2.5 mg/3 mL) 0.083 % nebulizer solution, Take 3 mL (2.5 mg total) by nebulization every 6 (six) hours as needed for wheezing or shortness of breath (Patient not taking: Reported on 6/17/2024), Disp: 180 mL, Rfl: 5    Humira, 2 Pen, 40 MG/0.4ML PNKT, Inject 40 mg under the skin every 14 (fourteen) days (Patient not taking: Reported on 6/17/2024), Disp: 2 each, Rfl: 10    methotrexate 2.5 MG tablet, Take 5 tablets by mouth once a week (Patient not taking: Reported on 8/22/2024), Disp: , Rfl:     Ostomy Supplies (Skin Tac Adhesive Barrier Wipe) MISC, Wipe skin area before placing dexcom sensor. (Patient not taking: Reported on 6/17/2024), Disp: 100 each, Rfl: 0    predniSONE 5 mg tablet, Take 10 mg by mouth daily (Patient not taking: Reported on 8/27/2024), Disp: , Rfl:     Review of Systems          Objective:    Vitals:    09/03/24 1357   BP: 112/68   Pulse: 89   Resp: 16   Temp: 98.2 °F (36.8 °C)   TempSrc: Temporal   SpO2: 98%   Weight: 108 kg (239 lb)   Height: 5' 8\" (1.727 m)        Physical Exam  Constitutional:       Appearance: Normal appearance.   HENT:      Head: Normocephalic and atraumatic.   Musculoskeletal:      Comments: Left lateral epicondyle tender to palpation and with ROM of wrist   Skin:     Comments: Dry peeling skin palm of hands   Neurological:      General: No focal deficit present.      Mental Status: She is alert and oriented to person, place, and time.   Psychiatric:         Mood and Affect: Mood normal.         Behavior: Behavior normal.         Thought Content: Thought content normal.         Judgment: Judgment normal.       " Fall Risk

## 2024-09-23 ENCOUNTER — TELEPHONE (OUTPATIENT)
Age: 64
End: 2024-09-23

## 2024-09-23 NOTE — TELEPHONE ENCOUNTER
Patient is calling she needs the Dexcom G7 reader sent to her pharmacy on file.   It is now covered under pt insurance.     [facility] on [address].

## 2024-09-24 DIAGNOSIS — E11.65 UNCONTROLLED TYPE 2 DIABETES MELLITUS WITH HYPERGLYCEMIA (HCC): Primary | ICD-10-CM

## 2024-09-24 RX ORDER — ACYCLOVIR 400 MG/1
1 TABLET ORAL CONTINUOUS
Qty: 1 EACH | Refills: 0 | Status: SHIPPED | OUTPATIENT
Start: 2024-09-24

## 2024-09-27 DIAGNOSIS — F32.9 REACTIVE DEPRESSION (SITUATIONAL): ICD-10-CM

## 2024-09-27 RX ORDER — ALPRAZOLAM 0.25 MG
0.25 TABLET ORAL 4 TIMES DAILY PRN
Qty: 30 TABLET | Refills: 0 | Status: SHIPPED | OUTPATIENT
Start: 2024-09-27

## 2024-10-07 ENCOUNTER — APPOINTMENT (OUTPATIENT)
Dept: LAB | Facility: CLINIC | Age: 64
End: 2024-10-07
Payer: COMMERCIAL

## 2024-10-07 DIAGNOSIS — R31.9 HEMATURIA, UNSPECIFIED TYPE: ICD-10-CM

## 2024-10-07 LAB
BACTERIA UR QL AUTO: ABNORMAL /HPF
BILIRUB UR QL STRIP: NEGATIVE
CLARITY UR: ABNORMAL
COLOR UR: ABNORMAL
GLUCOSE UR STRIP-MCNC: ABNORMAL MG/DL
HGB UR QL STRIP.AUTO: ABNORMAL
KETONES UR STRIP-MCNC: NEGATIVE MG/DL
LEUKOCYTE ESTERASE UR QL STRIP: ABNORMAL
NITRITE UR QL STRIP: POSITIVE
NON-SQ EPI CELLS URNS QL MICRO: ABNORMAL /HPF
PH UR STRIP.AUTO: 6 [PH]
PROT UR STRIP-MCNC: ABNORMAL MG/DL
RBC #/AREA URNS AUTO: ABNORMAL /HPF
SP GR UR STRIP.AUTO: 1.01 (ref 1–1.03)
UROBILINOGEN UR STRIP-ACNC: <2 MG/DL
WBC #/AREA URNS AUTO: ABNORMAL /HPF
WBC CLUMPS # UR AUTO: PRESENT /UL

## 2024-10-07 PROCEDURE — 87086 URINE CULTURE/COLONY COUNT: CPT

## 2024-10-07 PROCEDURE — 81001 URINALYSIS AUTO W/SCOPE: CPT

## 2024-10-07 PROCEDURE — 87186 SC STD MICRODIL/AGAR DIL: CPT

## 2024-10-07 PROCEDURE — 87077 CULTURE AEROBIC IDENTIFY: CPT

## 2024-10-08 ENCOUNTER — TELEPHONE (OUTPATIENT)
Age: 64
End: 2024-10-08

## 2024-10-08 DIAGNOSIS — R82.90 ABNORMAL URINALYSIS: Primary | ICD-10-CM

## 2024-10-08 RX ORDER — CIPROFLOXACIN 500 MG/1
500 TABLET, FILM COATED ORAL EVERY 12 HOURS SCHEDULED
Qty: 14 TABLET | Refills: 0 | Status: SHIPPED | OUTPATIENT
Start: 2024-10-08 | End: 2024-10-15

## 2024-10-08 NOTE — TELEPHONE ENCOUNTER
Please review the UA results for patients repeat testing, looks like we are still waiting on the sensitivity report to come back, however, patient really feels an alternative, stronger abx is needed as this seems to be recurring for her.

## 2024-10-09 LAB — BACTERIA UR CULT: ABNORMAL

## 2024-10-10 ENCOUNTER — TELEPHONE (OUTPATIENT)
Dept: FAMILY MEDICINE CLINIC | Facility: CLINIC | Age: 64
End: 2024-10-10

## 2024-10-10 DIAGNOSIS — R82.90 ABNORMAL URINALYSIS: Primary | ICD-10-CM

## 2024-10-10 NOTE — TELEPHONE ENCOUNTER
----- Message from MERI Raymond sent at 10/10/2024  1:28 PM EDT -----  Please let patient know that the antibiotic should clear her UTI. 2 days after her antibiotic has completed, I would like to recheck UA to ensure no blood is still in the urine. She can go to local lab. The order is in. Thanks!

## 2024-10-24 ENCOUNTER — APPOINTMENT (OUTPATIENT)
Dept: LAB | Facility: CLINIC | Age: 64
End: 2024-10-24
Payer: COMMERCIAL

## 2024-10-24 DIAGNOSIS — Z72.0 TOBACCO ABUSE: ICD-10-CM

## 2024-10-24 DIAGNOSIS — R82.90 ABNORMAL URINALYSIS: ICD-10-CM

## 2024-10-24 DIAGNOSIS — Z78.9 PERSON LIVING IN RESIDENTIAL INSTITUTION: ICD-10-CM

## 2024-10-24 DIAGNOSIS — I25.119 ATHEROSCLEROSIS OF NATIVE CORONARY ARTERY WITH ANGINA PECTORIS, UNSPECIFIED WHETHER NATIVE OR TRANSPLANTED HEART (HCC): ICD-10-CM

## 2024-10-24 DIAGNOSIS — I50.32 CHRONIC DIASTOLIC HEART FAILURE (HCC): ICD-10-CM

## 2024-10-24 DIAGNOSIS — I10 ESSENTIAL HYPERTENSION, MALIGNANT: ICD-10-CM

## 2024-10-24 DIAGNOSIS — E13.69 OTHER SPECIFIED DIABETES MELLITUS WITH OTHER SPECIFIED COMPLICATION, UNSPECIFIED WHETHER LONG TERM INSULIN USE (HCC): ICD-10-CM

## 2024-10-24 LAB
ALBUMIN SERPL BCG-MCNC: 3.8 G/DL (ref 3.5–5)
ALP SERPL-CCNC: 101 U/L (ref 34–104)
ALT SERPL W P-5'-P-CCNC: 5 U/L (ref 7–52)
ANION GAP SERPL CALCULATED.3IONS-SCNC: 9 MMOL/L (ref 4–13)
AST SERPL W P-5'-P-CCNC: 9 U/L (ref 13–39)
BILIRUB SERPL-MCNC: 0.36 MG/DL (ref 0.2–1)
BILIRUB UR QL STRIP: NEGATIVE
BUN SERPL-MCNC: 13 MG/DL (ref 5–25)
CALCIUM SERPL-MCNC: 9.3 MG/DL (ref 8.4–10.2)
CHLORIDE SERPL-SCNC: 103 MMOL/L (ref 96–108)
CHOLEST SERPL-MCNC: 189 MG/DL
CLARITY UR: CLEAR
CO2 SERPL-SCNC: 24 MMOL/L (ref 21–32)
COLOR UR: ABNORMAL
CREAT SERPL-MCNC: 0.7 MG/DL (ref 0.6–1.3)
GFR SERPL CREATININE-BSD FRML MDRD: 93 ML/MIN/1.73SQ M
GLUCOSE P FAST SERPL-MCNC: 135 MG/DL (ref 65–99)
GLUCOSE UR STRIP-MCNC: ABNORMAL MG/DL
HDLC SERPL-MCNC: 44 MG/DL
HGB UR QL STRIP.AUTO: NEGATIVE
KETONES UR STRIP-MCNC: NEGATIVE MG/DL
LDLC SERPL CALC-MCNC: 100 MG/DL (ref 0–100)
LEUKOCYTE ESTERASE UR QL STRIP: NEGATIVE
NITRITE UR QL STRIP: NEGATIVE
NONHDLC SERPL-MCNC: 145 MG/DL
PH UR STRIP.AUTO: 6.5 [PH]
POTASSIUM SERPL-SCNC: 3.9 MMOL/L (ref 3.5–5.3)
PROT SERPL-MCNC: 7.2 G/DL (ref 6.4–8.4)
PROT UR STRIP-MCNC: NEGATIVE MG/DL
SODIUM SERPL-SCNC: 136 MMOL/L (ref 135–147)
SP GR UR STRIP.AUTO: 1.01 (ref 1–1.03)
TRIGL SERPL-MCNC: 227 MG/DL
UROBILINOGEN UR QL STRIP.AUTO: 0.2 E.U./DL

## 2024-10-24 PROCEDURE — 36415 COLL VENOUS BLD VENIPUNCTURE: CPT

## 2024-10-24 PROCEDURE — 80061 LIPID PANEL: CPT

## 2024-10-24 PROCEDURE — 80053 COMPREHEN METABOLIC PANEL: CPT

## 2024-11-07 DIAGNOSIS — E11.65 UNCONTROLLED TYPE 2 DIABETES MELLITUS WITH HYPERGLYCEMIA (HCC): ICD-10-CM

## 2024-11-07 DIAGNOSIS — F32.9 REACTIVE DEPRESSION (SITUATIONAL): ICD-10-CM

## 2024-11-08 RX ORDER — GABAPENTIN 400 MG/1
400 CAPSULE ORAL 4 TIMES DAILY
Qty: 120 CAPSULE | Refills: 5 | Status: SHIPPED | OUTPATIENT
Start: 2024-11-08

## 2024-11-08 RX ORDER — ALPRAZOLAM 0.25 MG/1
0.25 TABLET ORAL 4 TIMES DAILY PRN
Qty: 30 TABLET | Refills: 0 | Status: SHIPPED | OUTPATIENT
Start: 2024-11-08

## 2024-12-03 ENCOUNTER — CONSULT (OUTPATIENT)
Dept: NEUROLOGY | Facility: CLINIC | Age: 64
End: 2024-12-03
Payer: COMMERCIAL

## 2024-12-03 VITALS
BODY MASS INDEX: 36.22 KG/M2 | DIASTOLIC BLOOD PRESSURE: 90 MMHG | HEIGHT: 68 IN | SYSTOLIC BLOOD PRESSURE: 132 MMHG | HEART RATE: 102 BPM | WEIGHT: 239 LBS | OXYGEN SATURATION: 97 %

## 2024-12-03 DIAGNOSIS — R25.1 TREMOR, UNSPECIFIED: ICD-10-CM

## 2024-12-03 PROCEDURE — 99205 OFFICE O/P NEW HI 60 MIN: CPT | Performed by: PSYCHIATRY & NEUROLOGY

## 2024-12-03 RX ORDER — PROPRANOLOL HYDROCHLORIDE 40 MG/1
40 TABLET ORAL 2 TIMES DAILY
COMMUNITY
Start: 2024-10-30 | End: 2025-10-30

## 2024-12-03 NOTE — PROGRESS NOTES
Name: Lashawn Davis      : 1960      MRN: [id number]  Encounter Provider: Naomy Agrawal MD  Encounter Date: 12/3/2024   Encounter department: [facility] [place]    :  Assessment & Plan  Tremor, unspecified  Patient is a 64-year-old female right handed smoker with history of STEMI, arthralgias, cardiomyopathy, obesity, chrons disease, cystocele with prolapse, dyslipidemia, hypertension, fibromyalgia, migraines, DARYL CPAP noncompliant, neuropathy, RA and type 2 diabetes who presents for evaluation of tremor.     Patient reports having 6 months of an action tremor with a resting component. Tremor varies in presence. Can go days without having it. She is unable to identify any triggers, aggravating nor alleviating factors. Stopped taking humira, methotrexate or prednisone a to see if tremor improved. No improvement. Physical exam today with functional components.     At this time, patient with a functional tremor. Less likely a physiologic tremor. Will continue to monitor progression. If tremor becomes persistent, can consider trying propranolol prescribed by her PCP. Follow up in 4 months.     Orders:    Ambulatory Referral to Neurology        History of Present Illness   HPI    Patient is a 64-year-old female right handed smoker with history of STEMI, arthralgias, cardiomyopathy, obesity, chrons disease, cystocele with prolapse, dyslipidemia, hypertension, fibromyalgia, migraines, DARYL CPAP noncompliant, neuropathy, RA and type 2 diabetes who presents for evaluation of tremor.     Patient reports having approximately 6 months of tremor with worsening over the past month. Tremornstarting in her left hand and then potentially progressed to her right hand.  Tremor currently worse on her left.  Tremor mostly with action but it has a component at rest. She is unable to identify any triggers, aggravating nor alleviating factors. Stopped taking humira, methotrexate or prednisone a to  "see if tremor improved. No improvement  She has not found any correlation with sugars. Recently tremor started impacting her ADLs. Has difficulty picking up a cup    Sometimes whole body can shake   Some times can go a couple of days and does not get it   Weakness in arms and legs.   Rarely drinks   Has not taken the propanolol     As per chart family history of essential tremor however patient does not recall.  Mother with dementia in her 70s.         Review of Systems   Constitutional:  Negative for appetite change, fatigue and fever.   HENT: Negative.  Negative for hearing loss, tinnitus, trouble swallowing and voice change.    Eyes: Negative.  Negative for photophobia, pain and visual disturbance.   Respiratory: Negative.  Negative for shortness of breath.    Cardiovascular: Negative.  Negative for palpitations.   Gastrointestinal: Negative.  Negative for nausea and vomiting.   Endocrine: Negative.  Negative for cold intolerance.   Genitourinary: Negative.  Negative for dysuria, frequency and urgency.   Musculoskeletal:  Negative for back pain, gait problem, myalgias, neck pain and neck stiffness.   Skin: Negative.  Negative for rash.   Allergic/Immunologic: Negative.    Neurological:  Positive for tremors. Negative for dizziness, seizures, syncope, facial asymmetry, speech difficulty, weakness, light-headedness, numbness and headaches.        Pt states she's had tremors for 6 months but the last month has been more frequent and \"disturbing\". In Hands/Arms and they come and go. Weakness with .   Hematological: Negative.  Does not bruise/bleed easily.   Psychiatric/Behavioral: Negative.  Negative for confusion, hallucinations and sleep disturbance.    All other systems reviewed and are negative.    I have personally reviewed the MA's review of systems and made changes as necessary.    Past Medical History   Past Medical History:   Diagnosis Date    Coronary artery disease     Crohn's colitis (HCC)     " Diabetes mellitus (HCC)     Fibromyalgia     Gastric ulcer     [facility] SURGEON    HTN (hypertension)     Hyperlipidemia     IBD (inflammatory bowel disease) 11/2015    [facility]    Migraine     Myocardial infarction (HCC)      Past Surgical History:   Procedure Laterality Date    CARDIAC CATHETERIZATION      CARDIAC CATHETERIZATION Left 03/07/2022    Procedure: Cardiac catheterization;  Surgeon: Jasen Hayden MD;  [facility];  Service: Cardiology    CARDIAC CATHETERIZATION N/A 03/07/2022    Procedure: Cardiac Coronary Angiogram;  Surgeon: Jasen Hayden MD;  [facility];  Service: Cardiology    CARDIAC CATHETERIZATION N/A 03/07/2022    Procedure: Cardiac pci;  Surgeon: Jaesn Hayden MD;  [facility];  Service: Cardiology    CARDIAC CATHETERIZATION  03/15/2024    Procedure: Cardiac catheterization;  Surgeon: Marcus Rawls DO;  [facility];  Service: Cardiology    CARDIAC CATHETERIZATION N/A 03/15/2024    Procedure: Cardiac Coronary Angiogram;  Surgeon: Marcus Rawls DO;  [facility];  Service: Cardiology    CARDIAC CATHETERIZATION Left 03/15/2024    Procedure: Cardiac Left Heart Cath;  Surgeon: Marcus Rawls DO;  [facility];  Service: Cardiology    CHOLECYSTECTOMY      COLONOSCOPY  12/03/2018    internal hemorrhoids, 3mm tubular adenoma polyp transverse colon, bx negative for dysplasia    COLONOSCOPY  11/2015    COLONOSCOPY W/ BIOPSIES  12/07/2022    ileitis; dr mondragon c/w crohns    CORONARY STENT PLACEMENT      DENTAL SURGERY      [place] teeth extraction    EGD  12/2018    2cm hiatal hernia, gastritis bx shows foci of intestinal metaplasia, negative Hpylori    EGD  11/2015    ESOPHAGITIS/MALLHIATUS HERNIA  FOUND    EGD  12/2021    [facility] Mondragon MD.- Small hiatal hernia.  Bx: No intestinal metaplasia, columnar epithelium, epithelial dysplasia, evidence of  malignancy.    HYSTERECTOMY      MAMMO STEREOTACTIC BREAST BIOPSY LEFT (ALL INC) Left 10/27/2021    TUBAL LIGATION      Bilateral     Family History   Problem Relation Age of Onset    Coronary artery disease Mother     Dementia Mother     Stroke Mother     No Known Problems Father     Cervical cancer Sister 40    Heart disease Brother     No Known Problems Maternal Grandmother     No Known Problems Maternal Grandfather     No Known Problems Paternal Grandmother     No Known Problems Paternal Grandfather     Alcohol abuse Neg Hx     Substance Abuse Neg Hx       reports that she has been smoking cigarettes. She started smoking about 40 years ago. She has a 10.5 pack-year smoking history. She has never used smokeless tobacco. She reports that she does not currently use alcohol. She reports that she does not use drugs.  Current Outpatient Medications on File Prior to Visit   Medication Sig Dispense Refill    acetaminophen (TYLENOL) 500 mg tablet Take 1,000 mg by mouth every 6 (six) hours as needed for mild pain      albuterol (2.5 mg/3 mL) 0.083 % nebulizer solution Take 3 mL (2.5 mg total) by nebulization every 6 (six) hours as needed for wheezing or shortness of breath 180 mL 5    albuterol (Ventolin HFA) 90 mcg/act inhaler Inhale 2 puffs every 6 (six) hours as needed for wheezing 18 g 5    Alcohol Swabs 70 % PADS Use 4 per day 400 each 3    ALPRAZolam (XANAX) 0.25 mg tablet Take 1 tablet (0.25 mg total) by mouth 4 (four) times a day as needed for anxiety 30 tablet 0    aspirin (ECOTRIN LOW STRENGTH) 81 mg EC tablet Take 1 tablet (81 mg total) by mouth daily 90 tablet 0    carvedilol (COREG) 6.25 mg tablet Take 1 tablet (6.25 mg total) by mouth every 12 (twelve) hours 180 tablet 3    cholecalciferol (VITAMIN D3) 1,000 units tablet Take 1 tablet (1,000 Units total) by mouth daily 90 tablet 3    Continuous Glucose  (Dexcom G7 ) CRISTINA Use 1 each continuous 1 each 0    Continuous Glucose Sensor (Dexcom G7  Sensor) Use 1 Device every 10 days 9 each 3    gabapentin (NEURONTIN) 400 mg capsule Take 1 capsule (400 mg total) by mouth 4 (four) times a day 120 capsule 5    hyoscyamine (LEVSIN/SL) 0.125 mg SL tablet TAKE 1 TABLET (0.125 MG TOTAL) BY MOUTH EVERY 6 (SIX) HOURS AS NEEDED (ESOPHAGEAL SPASM) 360 tablet 2    insulin aspart (NovoLOG FlexPen) 100 UNIT/ML injection pen Inject 10 Units under the skin 3 (three) times a day with meals 10 units before breakfast and dinner and 8 units before lunch. 27 mL 1    insulin degludec (Tresiba FlexTouch) 100 units/mL injection pen Inject 35 Units under the skin daily Inject 24 units daily. 33 mL 1    Insulin Pen Needle (Pen Needles) 32G X 5 MM MISC Use 4 per day 360 each 1    losartan (COZAAR) 25 mg tablet Take 1 tablet (25 mg total) by mouth daily 90 tablet 3    mometasone (ELOCON) 0.1 % cream Apply topically daily 45 g 0    omeprazole (PriLOSEC) 40 MG capsule Take 1 capsule (40 mg total) by mouth daily 30 capsule 0    ondansetron (ZOFRAN-ODT) 4 mg disintegrating tablet Take 1 tablet (4 mg total) by mouth every 8 (eight) hours as needed for nausea or vomiting for up to 10 doses 10 tablet 0    propranolol (INDERAL) 40 mg tablet Take 40 mg by mouth 2 (two) times a day      SUMAtriptan (IMITREX) 50 mg tablet Take 1 tablet (50 mg total) by mouth once as needed for migraine for up to 1 dose may repeat in 2 hours if necessary 10 tablet 5    folic acid (FOLVITE) 1 mg tablet  (Patient not taking: Reported on 12/3/2024)      Humira, 2 Pen, 40 MG/0.4ML PNKT Inject 40 mg under the skin every 14 (fourteen) days (Patient not taking: Reported on 6/17/2024) 2 each 10    methotrexate 2.5 MG tablet Take 5 tablets by mouth once a week (Patient not taking: Reported on 8/22/2024)      Ostomy Supplies (Skin Tac Adhesive Barrier Wipe) MISC Wipe skin area before placing dexcom sensor. (Patient not taking: Reported on 6/17/2024) 100 each 0    predniSONE 5 mg tablet Take 10 mg by mouth daily (Patient not  "taking: Reported on 8/27/2024)      rosuvastatin (CRESTOR) 10 MG tablet TAKE 1 TABLET BY MOUTH EVERY DAY AT NIGHT (Patient not taking: Reported on 12/3/2024)       No current facility-administered medications on file prior to visit.     Allergies   Allergen Reactions    Rosuvastatin Myalgia and Arthralgia    Atorvastatin Myalgia    Cefuroxime     Metaxalone     Influenza Vaccines Rash    Keflet [Cephalexin] Rash    Lyrica [Pregabalin] Swelling     Feet swelling    Nuts - Food Allergy Itching    Sulfa Antibiotics Rash    Topiramate Rash           Objective   /90 (BP Location: Left arm, Patient Position: Sitting, Cuff Size: Standard)   Pulse 102   Ht 5' 8\" (1.727 m)   Wt 108 kg (239 lb)   SpO2 97%   BMI 36.34 kg/m²     Physical Exam  Neurological:      Mental Status: She is oriented to person, place, and time.      Deep Tendon Reflexes:      Reflex Scores:       Bicep reflexes are 2+ on the right side.       Brachioradialis reflexes are 2+ on the right side and 2+ on the left side.       Patellar reflexes are 1+ on the right side and 1+ on the left side.  Psychiatric:         Speech: Speech normal.       Neurologic Exam     Mental Status   Oriented to person, place, and time.   Attention: normal. Concentration: normal.   Speech: speech is normal     Cranial Nerves   No head tremor appreciated     Motor Exam Giveaway weakness in the upper and lower extremities.  No for asymmetry appreciated.  Normal tone     Gait, Coordination, and Reflexes     Tremor   Resting tremor: absent  Intention tremor: present    Reflexes   Right brachioradialis: 2+  Left brachioradialis: 2+  Right biceps: 2+  Right patellar: 1+  Left patellar: 1+  Right Estevez: absent  Left Estevez: absentPatient with varying frequency and amplitude of tremor.  Slowed finger-to-nose  Normal spiral draw test  No micrographia appreciated             "

## 2024-12-08 PROBLEM — R25.1 TREMOR, UNSPECIFIED: Status: ACTIVE | Noted: 2024-12-08

## 2024-12-08 NOTE — ASSESSMENT & PLAN NOTE
Patient is a 64-year-old female right handed smoker with history of STEMI, arthralgias, cardiomyopathy, obesity, chrons disease, cystocele with prolapse, dyslipidemia, hypertension, fibromyalgia, migraines, DARYL CPAP noncompliant, neuropathy, RA and type 2 diabetes who presents for evaluation of tremor.     Patient reports having 6 months of an action tremor with a resting component. Tremor varies in presence. Can go days without having it. She is unable to identify any triggers, aggravating nor alleviating factors. Stopped taking humira, methotrexate or prednisone a to see if tremor improved. No improvement. Physical exam today with functional components.     At this time, patient with a functional tremor. Less likely a physiologic tremor. Will continue to monitor progression. If tremor becomes persistent, can consider trying propranolol prescribed by her PCP. Follow up in 4 months.     Orders:    Ambulatory Referral to Neurology

## 2024-12-15 DIAGNOSIS — E11.65 UNCONTROLLED TYPE 2 DIABETES MELLITUS WITH HYPERGLYCEMIA (HCC): ICD-10-CM

## 2024-12-16 DIAGNOSIS — E11.65 UNCONTROLLED TYPE 2 DIABETES MELLITUS WITH HYPERGLYCEMIA (HCC): ICD-10-CM

## 2024-12-16 RX ORDER — INSULIN DEGLUDEC 100 U/ML
35 INJECTION, SOLUTION SUBCUTANEOUS DAILY
Qty: 15 ML | Refills: 1 | Status: SHIPPED | OUTPATIENT
Start: 2024-12-16 | End: 2024-12-17

## 2024-12-17 RX ORDER — INSULIN DEGLUDEC 100 U/ML
35 INJECTION, SOLUTION SUBCUTANEOUS DAILY
Qty: 30 ML | Refills: 1 | Status: SHIPPED | OUTPATIENT
Start: 2024-12-17 | End: 2025-06-15

## 2025-01-15 DIAGNOSIS — F32.9 REACTIVE DEPRESSION (SITUATIONAL): ICD-10-CM

## 2025-01-15 DIAGNOSIS — E11.65 UNCONTROLLED TYPE 2 DIABETES MELLITUS WITH HYPERGLYCEMIA (HCC): ICD-10-CM

## 2025-01-15 RX ORDER — GABAPENTIN 400 MG/1
400 CAPSULE ORAL 4 TIMES DAILY
Qty: 120 CAPSULE | Refills: 5 | Status: SHIPPED | OUTPATIENT
Start: 2025-01-15

## 2025-01-16 RX ORDER — ALPRAZOLAM 0.25 MG/1
0.25 TABLET ORAL 4 TIMES DAILY PRN
Qty: 30 TABLET | Refills: 0 | Status: SHIPPED | OUTPATIENT
Start: 2025-01-16

## 2025-02-06 ENCOUNTER — TELEPHONE (OUTPATIENT)
Dept: ENDOCRINOLOGY | Facility: CLINIC | Age: 65
End: 2025-02-06

## 2025-02-06 ENCOUNTER — OFFICE VISIT (OUTPATIENT)
Dept: ENDOCRINOLOGY | Facility: CLINIC | Age: 65
End: 2025-02-06
Payer: COMMERCIAL

## 2025-02-06 VITALS
WEIGHT: 244.8 LBS | HEIGHT: 68 IN | BODY MASS INDEX: 37.1 KG/M2 | SYSTOLIC BLOOD PRESSURE: 128 MMHG | DIASTOLIC BLOOD PRESSURE: 88 MMHG

## 2025-02-06 DIAGNOSIS — E11.10 DIABETIC KETOACIDOSIS WITHOUT COMA ASSOCIATED WITH TYPE 2 DIABETES MELLITUS (HCC): ICD-10-CM

## 2025-02-06 DIAGNOSIS — I50.32 CHRONIC DIASTOLIC (CONGESTIVE) HEART FAILURE (HCC): ICD-10-CM

## 2025-02-06 DIAGNOSIS — E11.65 UNCONTROLLED TYPE 2 DIABETES MELLITUS WITH HYPERGLYCEMIA (HCC): ICD-10-CM

## 2025-02-06 DIAGNOSIS — E11.9 TYPE 2 DIABETES MELLITUS WITHOUT COMPLICATION, WITHOUT LONG-TERM CURRENT USE OF INSULIN (HCC): Primary | ICD-10-CM

## 2025-02-06 DIAGNOSIS — E78.2 MIXED HYPERLIPIDEMIA: ICD-10-CM

## 2025-02-06 PROCEDURE — 99214 OFFICE O/P EST MOD 30 MIN: CPT

## 2025-02-06 RX ORDER — ACYCLOVIR 400 MG/1
1 TABLET ORAL
Qty: 9 EACH | Refills: 3 | Status: SHIPPED | OUTPATIENT
Start: 2025-02-06

## 2025-02-06 RX ORDER — CLOPIDOGREL BISULFATE 75 MG/1
75 TABLET ORAL DAILY
COMMUNITY
Start: 2025-01-08 | End: 2026-01-08

## 2025-02-06 NOTE — ASSESSMENT & PLAN NOTE
Poorly controlled but is improving  Advise she continue current regimen and watch diet, exercise as tolerated  Will review CGM tomorrow and make recommendations if necessary    Referral to podiatry provided  For neuropathy, recommend she discuss increasing gabapentin with PCP  Discussed used of TENs stimulator which may also offer relief.      Lab Results   Component Value Date    HGBA1C 9.0 (H) 01/08/2025       Orders:    Ambulatory Referral to Podiatry; Future    Hemoglobin A1C; Future    Comprehensive metabolic panel; Future

## 2025-02-06 NOTE — PROGRESS NOTES
Established Patient Progress Note    Chief Complaint   Patient presents with    Diabetes Type 2       Impression & Plan:    Assessment & Plan  Type 2 diabetes mellitus without complication, without long-term current use of insulin (HCC)  Poorly controlled but is improving  Advise she continue current regimen and watch diet, exercise as tolerated  Will review CGM tomorrow and make recommendations if necessary    Referral to podiatry provided  For neuropathy, recommend she discuss increasing gabapentin with PCP  Discussed used of TENs stimulator which may also offer relief.      Lab Results   Component Value Date    HGBA1C 9.0 (H) 01/08/2025       Orders:    Ambulatory Referral to Podiatry; Future    Hemoglobin A1C; Future    Comprehensive metabolic panel; Future    Mixed hyperlipidemia  Cardiology managing  Compliant with statin  Continue statin        Chronic diastolic (congestive) heart failure (HCC)  Wt Readings from Last 3 Encounters:   02/06/25 111 kg (244 lb 12.8 oz)   12/03/24 108 kg (239 lb)   09/03/24 108 kg (239 lb)   Continue with cardiology                   Orders Placed This Encounter   Procedures    Hemoglobin A1C     Standing Status:   Future     Expected Date:   5/6/2025     Expiration Date:   2/6/2026    Comprehensive metabolic panel     This is a patient instruction: Patient fasting for 8 hours or longer recommended.     Standing Status:   Future     Expected Date:   5/6/2025     Expiration Date:   2/6/2026    Ambulatory Referral to Podiatry     Standing Status:   Future     Expiration Date:   2/6/2026     Referral Priority:   Routine     Referral Type:   Consult - AMB     Referral Reason:   Specialty Services Required     Referred to Provider:   Taj Martinez DPM     Requested Specialty:   Podiatry     Number of Visits Requested:   1     Expiration Date:   2/6/2026       History of Present Illness:   Lashawn Davis is a 65 y.o. female presents to the office today for routine follow up of  type 2 diabetes. Complications: severe neuropathy, CAD. Last A1C was 9.0. Significant cardiac history including STEMI, cardiomyopathy, CHF, CAD, hypertension. History +DARYL, GERD    Hypoglycemia: no  Recent hospitalizations or illnesses: no  Problems with current regimen: has been better with taking medication. Reports severe neuropathy affecting sleep. Prescribed gabapentin by PCP    2 week GMI 7.1%  TIR 72%     Unable to download CGM report today. She just connected with our office. Can take up to 24 hours.  She reports BG highest overnight.    Current regimen:   Novolog 10 units TID  Tresiba 35 units daily    Diabetic Foot Exam    Patient's shoes and socks removed.    Right Foot/Ankle   Right Foot Inspection  Skin Exam: skin intact, callus and callus.     Toe Exam: ROM and strength within normal limits.     Sensory   Vibration: absent  Monofilament testing: diminished    Vascular  The right DP pulse is 2+.     Left Foot/Ankle  Left Foot Inspection  Skin Exam: skin intact and callus.     Toe Exam: ROM and strength within normal limits.     Sensory   Vibration: absent  Monofilament testing: diminished    Vascular  The left DP pulse is 2+.     Assign Risk Category  No deformity present  No loss of protective sensation  Weak pulses  Risk: 1      Patient Active Problem List   Diagnosis    B-complex deficiency    Fibromyalgia    GERD (gastroesophageal reflux disease)    Inflammatory bowel disease (Crohn's disease) ([facility])    Migraine without aura and without status migrainosus, not intractable    Peripheral neuropathy    Type 2 diabetes mellitus without complication, without long-term current use of insulin ([facility])    Tobacco use disorder, continuous    Mixed hyperlipidemia    Hidradenitis suppurativa    Acute ST elevation myocardial infarction (STEMI) due to occlusion of mid portion of right coronary artery ([facility])    Essential hypertension    Coronary artery disease    Cystocele with prolapse    Arthralgia    History of  PTCA    Uncontrolled type 2 diabetes mellitus with hyperglycemia (HCC)    Generalized abdominal pain    Crohn's colitis (HCC)    Type 2 diabetes mellitus with hyperosmolarity without coma, unspecified whether long term insulin use (HCC)    Dyslipidemia    DARYL (obstructive sleep apnea)    Statin intolerance    Tobacco abuse    Class 3 severe obesity due to excess calories with serious comorbidity and body mass index (BMI) of 40.0 to 44.9 in adult (HCC)    Chronic diastolic HF (heart failure) (HCC)    NSTEMI (non-ST elevated myocardial infarction) (HCC)    Dental infection    Metabolic acidosis    Cardiomyopathy (HCC)    Rheumatoid arthritis of multiple sites with negative rheumatoid factor (HCC)    Tremor, unspecified      Past Medical History:   Diagnosis Date    Coronary artery disease     Crohn's colitis (HCC)     Diabetes mellitus (HCC)     Fibromyalgia     Gastric ulcer     [facility] SUR[facility]    HTN (hypertension)     Hyperlipidemia     IBD (inflammatory bowel disease) 11/2015    [facility]    Migraine     Myocardial infarction (HCC)       Past Surgical History:   Procedure Laterality Date    CARDIAC CATHETERIZATION      CARDIAC CATHETERIZATION Left 03/07/2022    Procedure: Cardiac catheterization;  Surgeon: Jasen Hayden MD;  [facility];  Service: Cardiology    CARDIAC CATHETERIZATION N/A 03/07/2022    Procedure: Cardiac Coronary Angiogram;  Surgeon: Jasen Hayden MD;  [facility];  Service: Cardiology    CARDIAC CATHETERIZATION N/A 03/07/2022    Procedure: Cardiac pci;  Surgeon: Jasen Hayden MD;  [facility];  Service: Cardiology    CARDIAC CATHETERIZATION  03/15/2024    Procedure: Cardiac catheterization;  Surgeon: Marcus Rawls DO;  [facility];  Service: Cardiology    CARDIAC CATHETERIZATION N/A 03/15/2024    Procedure: Cardiac Coronary Angiogram;  Surgeon: Marcus Rawls DO;  [facility];   Service: Cardiology    CARDIAC CATHETERIZATION Left 03/15/2024    Procedure: Cardiac Left Heart Cath;  Surgeon: Marcus Rawls DO;  [facility];  Service: Cardiology    CHOLECYSTECTOMY      COLONOSCOPY  12/03/2018    internal hemorrhoids, 3mm tubular adenoma polyp transverse colon, bx negative for dysplasia    COLONOSCOPY  11/24/2015    COLONOSCOPY W/ BIOPSIES  12/07/2022    ileitis; dr mondragon c/w crohns    CORONARY STENT PLACEMENT      DENTAL SURGERY      [place] teeth extraction    EGD  12/03/2018    2cm hiatal hernia, gastritis bx shows foci of intestinal metaplasia, negative Hpylori    EGD  11/24/2015    ESOPHAGITIS/MALLHIATUS HERNIA  FOUND    EGD  12/14/2021    [facility] Mondragon MD.- Small hiatal hernia.  Bx: No intestinal metaplasia, columnar epithelium, epithelial dysplasia, evidence of malignancy.    HYSTERECTOMY      MAMMO STEREOTACTIC BREAST BIOPSY LEFT (ALL INC) Left 10/27/2021    TUBAL LIGATION      Bilateral      Family History   Problem Relation Age of Onset    Coronary artery disease Mother     Dementia Mother     Stroke Mother     No Known Problems Father     Cervical cancer Sister 40    Heart disease Brother     No Known Problems Maternal Grandmother     No Known Problems Maternal Grandfather     No Known Problems Paternal Grandmother     No Known Problems Paternal Grandfather     Alcohol abuse Neg Hx     Substance Abuse Neg Hx      Social History     Tobacco Use    Smoking status: Every Day     Current packs/day: 0.50     Average packs/day: 0.3 packs/day for 41.1 years (10.5 ttl pk-yrs)     Types: Cigarettes     Start date: 1984    Smokeless tobacco: Never    Tobacco comments:     3 cigarettes daily    Substance Use Topics    Alcohol use: Not Currently     Comment: Rarely     Allergies   Allergen Reactions    Rosuvastatin Myalgia and Arthralgia    Atorvastatin Myalgia    Cefuroxime     Metaxalone     Influenza Vaccines Rash    Keflet [Cephalexin] Rash    Lyrica  [Pregabalin] Swelling     Feet swelling    Nuts - Food Allergy Itching    Sulfa Antibiotics Rash    Topiramate Rash         Current Outpatient Medications:     acetaminophen (TYLENOL) 500 mg tablet, Take 1,000 mg by mouth every 6 (six) hours as needed for mild pain, Disp: , Rfl:     albuterol (2.5 mg/3 mL) 0.083 % nebulizer solution, Take 3 mL (2.5 mg total) by nebulization every 6 (six) hours as needed for wheezing or shortness of breath, Disp: 180 mL, Rfl: 5    albuterol (Ventolin HFA) 90 mcg/act inhaler, Inhale 2 puffs every 6 (six) hours as needed for wheezing, Disp: 18 g, Rfl: 5    Alcohol Swabs 70 % PADS, Use 4 per day, Disp: 400 each, Rfl: 3    ALPRAZolam (XANAX) 0.25 mg tablet, Take 1 tablet (0.25 mg total) by mouth 4 (four) times a day as needed for anxiety, Disp: 30 tablet, Rfl: 0    aspirin (ECOTRIN LOW STRENGTH) 81 mg EC tablet, Take 1 tablet (81 mg total) by mouth daily, Disp: 90 tablet, Rfl: 0    carvedilol (COREG) 6.25 mg tablet, Take 1 tablet (6.25 mg total) by mouth every 12 (twelve) hours, Disp: 180 tablet, Rfl: 3    cholecalciferol (VITAMIN D3) 1,000 units tablet, Take 1 tablet (1,000 Units total) by mouth daily, Disp: 90 tablet, Rfl: 3    clopidogrel (PLAVIX) 75 mg tablet, Take 75 mg by mouth daily, Disp: , Rfl:     Continuous Glucose  (Dexcom G7 ) CRISTINA, Use 1 each continuous, Disp: 1 each, Rfl: 0    Continuous Glucose Sensor (Dexcom G7 Sensor), Use 1 Device every 10 days, Disp: 9 each, Rfl: 3    gabapentin (NEURONTIN) 400 mg capsule, Take 1 capsule (400 mg total) by mouth 4 (four) times a day, Disp: 120 capsule, Rfl: 5    hyoscyamine (LEVSIN/SL) 0.125 mg SL tablet, TAKE 1 TABLET (0.125 MG TOTAL) BY MOUTH EVERY 6 (SIX) HOURS AS NEEDED (ESOPHAGEAL SPASM), Disp: 360 tablet, Rfl: 2    insulin aspart (NovoLOG FlexPen) 100 UNIT/ML injection pen, Inject 10 Units under the skin 3 (three) times a day with meals 10 units before breakfast and dinner and 8 units before lunch., Disp: 27 mL,  Rfl: 1    insulin degludec (Tresiba FlexTouch) 100 units/mL injection pen, Inject 35 Units under the skin daily, Disp: 30 mL, Rfl: 1    Insulin Pen Needle (Pen Needles) 32G X 5 MM MISC, Use 4 per day, Disp: 360 each, Rfl: 1    losartan (COZAAR) 25 mg tablet, Take 1 tablet (25 mg total) by mouth daily, Disp: 90 tablet, Rfl: 3    mometasone (ELOCON) 0.1 % cream, Apply topically daily, Disp: 45 g, Rfl: 0    omeprazole (PriLOSEC) 40 MG capsule, Take 1 capsule (40 mg total) by mouth daily, Disp: 30 capsule, Rfl: 0    ondansetron (ZOFRAN-ODT) 4 mg disintegrating tablet, Take 1 tablet (4 mg total) by mouth every 8 (eight) hours as needed for nausea or vomiting for up to 10 doses, Disp: 10 tablet, Rfl: 0    propranolol (INDERAL) 40 mg tablet, Take 40 mg by mouth 2 (two) times a day, Disp: , Rfl:     SUMAtriptan (IMITREX) 50 mg tablet, Take 1 tablet (50 mg total) by mouth once as needed for migraine for up to 1 dose may repeat in 2 hours if necessary, Disp: 10 tablet, Rfl: 5    folic acid (FOLVITE) 1 mg tablet, , Disp: , Rfl:     Humira, 2 Pen, 40 MG/0.4ML PNKT, Inject 40 mg under the skin every 14 (fourteen) days (Patient not taking: Reported on 6/17/2024), Disp: 2 each, Rfl: 10    methotrexate 2.5 MG tablet, Take 5 tablets by mouth once a week (Patient not taking: Reported on 8/22/2024), Disp: , Rfl:     Ostomy Supplies (Skin Tac Adhesive Barrier Wipe) MISC, Wipe skin area before placing dexcom sensor. (Patient not taking: Reported on 6/17/2024), Disp: 100 each, Rfl: 0    predniSONE 5 mg tablet, Take 10 mg by mouth daily (Patient not taking: Reported on 8/27/2024), Disp: , Rfl:     rosuvastatin (CRESTOR) 10 MG tablet, TAKE 1 TABLET BY MOUTH EVERY DAY AT NIGHT (Patient not taking: Reported on 12/3/2024), Disp: , Rfl:     Review of Systems   Constitutional:  Negative for chills and fever.   HENT:  Negative for ear pain and sore throat.    Eyes:  Negative for pain and visual disturbance.   Respiratory:  Negative for cough and  "shortness of breath.    Cardiovascular:  Negative for chest pain and palpitations.   Gastrointestinal:  Negative for abdominal pain and vomiting.   Genitourinary:  Negative for dysuria and hematuria.   Musculoskeletal:  Negative for arthralgias and back pain.   Skin:  Negative for color change and rash.   Neurological:  Negative for seizures and syncope.   All other systems reviewed and are negative.      Physical Exam:  Body mass index is 37.22 kg/m².  /88 (BP Location: Left arm, Patient Position: Sitting, Cuff Size: Adult)   Ht 5' 8\" (1.727 m)   Wt 111 kg (244 lb 12.8 oz)   BMI 37.22 kg/m²    Wt Readings from Last 3 Encounters:   02/06/25 111 kg (244 lb 12.8 oz)   12/03/24 108 kg (239 lb)   09/03/24 108 kg (239 lb)       Physical Exam  Vitals reviewed.   Constitutional:       Appearance: Normal appearance.   HENT:      Head: Normocephalic and atraumatic.   Cardiovascular:      Rate and Rhythm: Normal rate.      Pulses: Pulses are weak.           Dorsalis pedis pulses are 2+ on the right side and 2+ on the left side.   Pulmonary:      Effort: Pulmonary effort is normal.   Feet:      Right foot:      Skin integrity: Callus present.      Left foot:      Skin integrity: Callus present.   Neurological:      Mental Status: She is alert and oriented to person, place, and time.   Psychiatric:         Mood and Affect: Mood normal.         Behavior: Behavior normal.         Labs:   Lab Results   Component Value Date    HGBA1C 9.0 (H) 01/08/2025    HGBA1C 12.3 (H) 08/22/2024    HGBA1C 10.0 (A) 05/10/2024     Lab Results   Component Value Date    CREATININE 0.77 01/08/2025    CREATININE 0.77 01/06/2025    CREATININE 0.70 10/24/2024    BUN 12 01/08/2025     01/22/2016    K 3.8 01/08/2025     01/08/2025    CO2 23 01/08/2025     eGFRcr   Date Value Ref Range Status   01/08/2025 87 >59 Final     Lab Results   Component Value Date    HDL 44 (L) 10/24/2024    TRIG 227 (H) 10/24/2024     Lab Results   Component " "Value Date    ALT 5 (L) 10/24/2024    AST 9 (L) 10/24/2024    ALKPHOS 101 10/24/2024    BILITOT 0.4 01/22/2016     Lab Results   Component Value Date    KEK5WKNQXSMR 2.100 08/11/2021    KVW9HXSAGAQA 1.620 11/13/2020     No results found for: \"FREET4\", \"TSI\"      There are no Patient Instructions on file for this visit.      Discussed with the patient and all questioned fully answered. She will call me if any problems arise.        "

## 2025-02-10 ENCOUNTER — TELEPHONE (OUTPATIENT)
Age: 65
End: 2025-02-10

## 2025-03-03 ENCOUNTER — OFFICE VISIT (OUTPATIENT)
Dept: URGENT CARE | Facility: CLINIC | Age: 65
End: 2025-03-03
Payer: COMMERCIAL

## 2025-03-03 VITALS
OXYGEN SATURATION: 96 % | WEIGHT: 245.2 LBS | HEIGHT: 68 IN | DIASTOLIC BLOOD PRESSURE: 76 MMHG | BODY MASS INDEX: 37.16 KG/M2 | RESPIRATION RATE: 16 BRPM | HEART RATE: 86 BPM | SYSTOLIC BLOOD PRESSURE: 134 MMHG | TEMPERATURE: 97.7 F

## 2025-03-03 DIAGNOSIS — S81.802A WOUND OF LEFT LOWER EXTREMITY, INITIAL ENCOUNTER: Primary | ICD-10-CM

## 2025-03-03 PROCEDURE — 99213 OFFICE O/P EST LOW 20 MIN: CPT | Performed by: NURSE PRACTITIONER

## 2025-03-03 RX ORDER — DOXYCYCLINE 100 MG/1
100 CAPSULE ORAL 2 TIMES DAILY
Qty: 20 CAPSULE | Refills: 0 | Status: SHIPPED | OUTPATIENT
Start: 2025-03-03 | End: 2025-03-13

## 2025-03-03 NOTE — PROGRESS NOTES
[facility]        NAME: Lashawn Davis is a 65 y.o. female  : 1960    MRN: [id number]  DATE: March 3, 2025  TIME: 2:19 PM    Assessment and Plan   Wound of left lower extremity, initial encounter [S81.802A]  1. Wound of left lower extremity, initial encounter  doxycycline hyclate (VIBRAMYCIN) 100 mg capsule            Patient Instructions     Take medication as prescribed  Wound care instructions  Follow-up with podiatry for lesion on the lateral aspect of the foot  Follow up with PCP in 3-5 days.  Proceed to  ER if symptoms worsen.    If tests have been performed at [facility], our office will contact you with results if changes need to be made to the care plan discussed with you at the visit.  You can review your full results on [facility].    Chief Complaint     Chief Complaint   Patient presents with    Sore     Patient has a sore on her left leg, she noticed it two and a half weeks ago, reports red, drainage, open area, she states the sore is spreading on her foot, and she's been applying ointment for relief           History of Present Illness       HPI  Presents to clinic with complaint of sores on the leg, left.  Duration 2.5 weeks.  States one of her friends will use the walking infectious disease advised her that it might be MRSA and that she should get evaluated.  Denies fever    Review of Systems   Review of Systems   Constitutional:  Negative for fever.   Skin:  Positive for color change and wound (left leg).   Neurological:  Negative for numbness.         Current Medications       Current Outpatient Medications:     doxycycline hyclate (VIBRAMYCIN) 100 mg capsule, Take 1 capsule (100 mg total) by mouth 2 (two) times a day for 10 days, Disp: 20 capsule, Rfl: 0    acetaminophen (TYLENOL) 500 mg tablet, Take 1,000 mg by mouth every 6 (six) hours as needed for mild pain, Disp: , Rfl:     albuterol (2.5 mg/3 mL) 0.083 % nebulizer solution, Take 3 mL (2.5 mg total) by nebulization  every 6 (six) hours as needed for wheezing or shortness of breath, Disp: 180 mL, Rfl: 5    albuterol (Ventolin HFA) 90 mcg/act inhaler, Inhale 2 puffs every 6 (six) hours as needed for wheezing, Disp: 18 g, Rfl: 5    Alcohol Swabs 70 % PADS, Use 4 per day, Disp: 400 each, Rfl: 3    ALPRAZolam (XANAX) 0.25 mg tablet, Take 1 tablet (0.25 mg total) by mouth 4 (four) times a day as needed for anxiety, Disp: 30 tablet, Rfl: 0    aspirin (ECOTRIN LOW STRENGTH) 81 mg EC tablet, Take 1 tablet (81 mg total) by mouth daily, Disp: 90 tablet, Rfl: 0    carvedilol (COREG) 6.25 mg tablet, Take 1 tablet (6.25 mg total) by mouth every 12 (twelve) hours, Disp: 180 tablet, Rfl: 3    cholecalciferol (VITAMIN D3) 1,000 units tablet, Take 1 tablet (1,000 Units total) by mouth daily, Disp: 90 tablet, Rfl: 3    clopidogrel (PLAVIX) 75 mg tablet, Take 75 mg by mouth daily, Disp: , Rfl:     Continuous Glucose  (Dexcom G7 ) CRISTINA, Use 1 each continuous, Disp: 1 each, Rfl: 0    Continuous Glucose Sensor (Dexcom G7 Sensor), Use 1 Device every 10 days, Disp: 9 each, Rfl: 3    folic acid (FOLVITE) 1 mg tablet, , Disp: , Rfl:     gabapentin (NEURONTIN) 400 mg capsule, Take 1 capsule (400 mg total) by mouth 4 (four) times a day, Disp: 120 capsule, Rfl: 5    Humira, 2 Pen, 40 MG/0.4ML PNKT, Inject 40 mg under the skin every 14 (fourteen) days (Patient not taking: Reported on 6/17/2024), Disp: 2 each, Rfl: 10    hyoscyamine (LEVSIN/SL) 0.125 mg SL tablet, TAKE 1 TABLET (0.125 MG TOTAL) BY MOUTH EVERY 6 (SIX) HOURS AS NEEDED (ESOPHAGEAL SPASM), Disp: 360 tablet, Rfl: 2    insulin aspart (NovoLOG FlexPen) 100 UNIT/ML injection pen, Inject 10 Units under the skin 3 (three) times a day with meals 10 units before breakfast and dinner and 8 units before lunch., Disp: 27 mL, Rfl: 1    insulin degludec (Tresiba FlexTouch) 100 units/mL injection pen, Inject 35 Units under the skin daily, Disp: 30 mL, Rfl: 1    Insulin Pen Needle (Pen Needles)  32G X 5 MM MISC, Use 4 per day, Disp: 360 each, Rfl: 1    losartan (COZAAR) 25 mg tablet, Take 1 tablet (25 mg total) by mouth daily, Disp: 90 tablet, Rfl: 3    methotrexate 2.5 MG tablet, Take 5 tablets by mouth once a week (Patient not taking: Reported on 8/22/2024), Disp: , Rfl:     mometasone (ELOCON) 0.1 % cream, Apply topically daily, Disp: 45 g, Rfl: 0    omeprazole (PriLOSEC) 40 MG capsule, Take 1 capsule (40 mg total) by mouth daily, Disp: 30 capsule, Rfl: 0    ondansetron (ZOFRAN-ODT) 4 mg disintegrating tablet, Take 1 tablet (4 mg total) by mouth every 8 (eight) hours as needed for nausea or vomiting for up to 10 doses, Disp: 10 tablet, Rfl: 0    Ostomy Supplies (Skin Tac Adhesive Barrier Wipe) MISC, Wipe skin area before placing dexcom sensor. (Patient not taking: Reported on 6/17/2024), Disp: 100 each, Rfl: 0    predniSONE 5 mg tablet, Take 10 mg by mouth daily (Patient not taking: Reported on 8/27/2024), Disp: , Rfl:     propranolol (INDERAL) 40 mg tablet, Take 40 mg by mouth 2 (two) times a day, Disp: , Rfl:     rosuvastatin (CRESTOR) 10 MG tablet, TAKE 1 TABLET BY MOUTH EVERY DAY AT NIGHT (Patient not taking: Reported on 12/3/2024), Disp: , Rfl:     SUMAtriptan (IMITREX) 50 mg tablet, Take 1 tablet (50 mg total) by mouth once as needed for migraine for up to 1 dose may repeat in 2 hours if necessary, Disp: 10 tablet, Rfl: 5    Current Allergies     Allergies as of 03/03/2025 - Reviewed 03/03/2025   Allergen Reaction Noted    Rosuvastatin Myalgia and Arthralgia 03/16/2021    Atorvastatin Myalgia 02/28/2021    Cefuroxime  11/03/2020    Metaxalone  11/03/2020    Influenza vaccines Rash 11/06/2020    Keflet [cephalexin] Rash 06/04/2016    Lyrica [pregabalin] Swelling 06/16/2021    Nuts - food allergy Itching 11/03/2020    Sulfa antibiotics Rash 01/02/2020    Topiramate Rash 11/03/2020            The following portions of the patient's history were reviewed and updated as appropriate: allergies, current  medications, past family history, past medical history, past social history, past surgical history and problem list.     Past Medical History:   Diagnosis Date    Coronary artery disease     Crohn's colitis (HCC)     Diabetes mellitus (HCC)     Fibromyalgia     Gastric ulcer     [facility] GI SURGEON    HTN (hypertension)     Hyperlipidemia     IBD (inflammatory bowel disease) 11/2015    [facility] SURGE[facility]    Migraine     Myocardial infarction (HCC)        Past Surgical History:   Procedure Laterality Date    CARDIAC CATHETERIZATION      CARDIAC CATHETERIZATION Left 03/07/2022    Procedure: Cardiac catheterization;  Surgeon: Jasen Hayden MD;  [facility];  Service: Cardiology    CARDIAC CATHETERIZATION N/A 03/07/2022    Procedure: Cardiac Coronary Angiogram;  Surgeon: Jasen Hayden MD;  [facility];  Service: Cardiology    CARDIAC CATHETERIZATION N/A 03/07/2022    Procedure: Cardiac pci;  Surgeon: Jasen Hayden MD;  [facility];  Service: Cardiology    CARDIAC CATHETERIZATION  03/15/2024    Procedure: Cardiac catheterization;  Surgeon: Marcus Rawls DO;  [facility];  Service: Cardiology    CARDIAC CATHETERIZATION N/A 03/15/2024    Procedure: Cardiac Coronary Angiogram;  Surgeon: Marcus Rawls DO;  [facility];  Service: Cardiology    CARDIAC CATHETERIZATION Left 03/15/2024    Procedure: Cardiac Left Heart Cath;  Surgeon: Marcus Rawls DO;  [facility];  Service: Cardiology    CHOLECYSTECTOMY      COLONOSCOPY  12/03/2018    internal hemorrhoids, 3mm tubular adenoma polyp transverse colon, bx negative for dysplasia    COLONOSCOPY  11/24/2015    COLONOSCOPY W/ BIOPSIES  12/07/2022    ileitis; dr rao c/w crohns    CORONARY STENT PLACEMENT      DENTAL SURGERY      [place] teeth extraction    EGD  12/03/2018    2cm hiatal hernia, gastritis bx shows foci of intestinal metaplasia, negative  "Hpylori    EGD  11/24/2015    ESOPHAGITIS/MALLHIATUS HERNIA  FOUND    EGD  12/14/2021    ST. DE[facility] Mondragon MD.- Small hiatal hernia.  Bx: No intestinal metaplasia, columnar epithelium, epithelial dysplasia, evidence of malignancy.    HYSTERECTOMY      MAMMO STEREOTACTIC BREAST BIOPSY LEFT (ALL INC) Left 10/27/2021    TUBAL LIGATION      Bilateral       Family History   Problem Relation Age of Onset    Coronary artery disease Mother     Dementia Mother     Stroke Mother     No Known Problems Father     Cervical cancer Sister 40    Heart disease Brother     No Known Problems Maternal Grandmother     No Known Problems Maternal Grandfather     No Known Problems Paternal Grandmother     No Known Problems Paternal Grandfather     Alcohol abuse Neg Hx     Substance Abuse Neg Hx          Medications have been verified.        Objective   /76 (BP Location: Left arm, Patient Position: Sitting, Cuff Size: Large)   Pulse 86   Temp 97.7 °F (36.5 °C) (Tympanic)   Resp 16   Ht 5' 8\" (1.727 m)   Wt 111 kg (245 lb 3.2 oz)   SpO2 96%   BMI 37.28 kg/m²   No LMP recorded. Patient has had a hysterectomy.       Physical Exam     Physical Exam  Skin:     Findings: Lesion present.      Comments: There are 2 lesions on the left lower extremity.  OPne is found on the distal aspect of the tibia laterally, and the other on the left foot.  There is a brownish hard lesion resembling a callus on the lateral aspect of the left foot.  Mild tender to palpation.  No drainage on any of the soles.  Surrounding skin is erythematous.  Minimal swelling                   "

## 2025-03-13 DIAGNOSIS — E11.65 UNCONTROLLED TYPE 2 DIABETES MELLITUS WITH HYPERGLYCEMIA (HCC): ICD-10-CM

## 2025-03-13 DIAGNOSIS — F32.9 REACTIVE DEPRESSION (SITUATIONAL): ICD-10-CM

## 2025-03-14 RX ORDER — ALPRAZOLAM 0.25 MG
0.25 TABLET ORAL 4 TIMES DAILY PRN
Qty: 30 TABLET | Refills: 0 | Status: SHIPPED | OUTPATIENT
Start: 2025-03-14

## 2025-03-14 RX ORDER — GABAPENTIN 400 MG/1
400 CAPSULE ORAL 4 TIMES DAILY
Qty: 120 CAPSULE | Refills: 0 | OUTPATIENT
Start: 2025-03-14

## 2025-03-17 ENCOUNTER — OFFICE VISIT (OUTPATIENT)
Dept: PODIATRY | Facility: CLINIC | Age: 65
End: 2025-03-17
Payer: COMMERCIAL

## 2025-03-17 VITALS — BODY MASS INDEX: 37.98 KG/M2 | HEIGHT: 67 IN | WEIGHT: 242 LBS

## 2025-03-17 DIAGNOSIS — Z77.22 SMOKER IN HOME: ICD-10-CM

## 2025-03-17 DIAGNOSIS — L85.3 XEROSIS CUTIS: ICD-10-CM

## 2025-03-17 DIAGNOSIS — I73.9 PERIPHERAL VASCULAR DISEASE, UNSPECIFIED (HCC): ICD-10-CM

## 2025-03-17 DIAGNOSIS — E11.42 DIABETIC POLYNEUROPATHY ASSOCIATED WITH TYPE 2 DIABETES MELLITUS (HCC): Primary | ICD-10-CM

## 2025-03-17 PROCEDURE — 99214 OFFICE O/P EST MOD 30 MIN: CPT | Performed by: PODIATRIST

## 2025-03-17 RX ORDER — AMMONIUM LACTATE 12 G/100G
CREAM TOPICAL AS NEEDED
Qty: 385 G | Refills: 4 | Status: SHIPPED | OUTPATIENT
Start: 2025-03-17

## 2025-03-17 NOTE — PROGRESS NOTES
Name: Lashawn Davsi      : 1960      MRN: [id number]  Encounter Provider: Taj Martinez DPM  Encounter Date: 3/17/2025   Encounter department: [facility]OD[facility] [place]  :  Assessment & Plan  Diabetic polyneuropathy associated with type 2 diabetes mellitus (HCC)  Diagnosis and options discussed with patient  Patient agreeable to the plan as stated below    -DM foot risk is high. Recommend at risk foot care  -Discussed DM risk to lower extremities, proper shoe gear, and daily monitoring of feet.   -Discussed weight loss and suitable exercise regiment.   -Reviewed most recent PCP visit on 3/3/2025  -Educated on A1C and the risks of poorly controlled Diabetes. Reviewed recent A1C:  Lab Results   Component Value Date    HGBA1C 9 (H) 2025   .     Lab Results   Component Value Date    HGBA1C 9 (H) 2025     She is in wound care for her legs. No acute infection today. I don't see any need to change what she is currently doing for wound care. Overall, she needs much better BG control, weight loss and smoking cessation.        Peripheral vascular disease, unspecified (HCC)  Arterial doppler reviewed through [facility] 2025.   Reviewed wound care visit 3/12/2025  She must stop smoking immediately. We discussed the relationship between cigarette smoking, atherosclerotic disease, and its effects on the lower extremity. I emphasized the importance of smoking cessation            Xerosis cutis    Orders:  •  ammonium lactate (LAC-HYDRIN) 12 % cream; Apply topically as needed for dry skin    Smoker in home  We discussed the relationship between cigarette smoking, atherosclerotic disease, and its effects on the lower extremity. I emphasized the importance of smoking cessation              History of Present Illness   HPI  Lashawn Davis is a 65 y.o. female who presents for DM foot check. She has neuropathy. She gets sores on her legs. She recently finished antibiotics      Review of Systems  As  "stated in HPI, otherwise normal    Medical History Reviewed by provider this encounter:  Tobacco  Allergies  Meds  Problems  Med Hx  Surg Hx  Fam Hx           Objective   Ht 5' 7\" (1.702 m)   Wt 110 kg (242 lb)   BMI 37.90 kg/m²      Physical Exam  Vitals reviewed.   Constitutional:       Appearance: She is obese. She is not ill-appearing.   Cardiovascular:      Pulses: Normal pulses. no weak pulses.           Dorsalis pedis pulses are 2+ on the right side and 2+ on the left side.        Posterior tibial pulses are 2+ on the right side and 2+ on the left side.   Pulmonary:      Effort: No respiratory distress.   Musculoskeletal:         General: No deformity.      Right lower leg: Edema present.      Left lower leg: Edema present.      Right foot: Normal range of motion. No deformity.      Left foot: Normal range of motion. No deformity.   Feet:      Right foot:      Protective Sensation: 10 sites tested.  0 sites sensed.      Skin integrity: No ulcer.      Toenail Condition: Right toenails are normal.      Left foot:      Protective Sensation: 10 sites tested.  0 sites sensed.      Skin integrity: Ulcer and skin breakdown present.      Toenail Condition: Left toenails are normal.   Skin:     Capillary Refill: Capillary refill takes less than 2 seconds.      Findings: No erythema or rash.   Neurological:      Mental Status: She is alert and oriented to person, place, and time.      Sensory: Sensory deficit present.       Diabetic Foot Exam    Patient's shoes and socks removed.    Right Foot/Ankle   Right Foot Inspection  Skin Exam: skin intact. No abnormal color and no ulcer.     Toe Exam:  no right toe deformity    Sensory   Vibration: absent  Monofilament testing: absent    Vascular  Capillary refills: < 3 seconds  The right DP pulse is 2+. The right PT pulse is 2+.     Left Foot/Ankle  Left Foot Inspection  Skin Exam: maceration and ulcer. Skin not intact.     Toe Exam: No left toe deformity.     Sensory "   Vibration: absent  Monofilament testing: absent    Vascular  Capillary refills: < 3 seconds  The left DP pulse is 2+. The left PT pulse is 2+.     Assign Risk Category  No deformity present  Loss of protective sensation  No weak pulses  Risk: 1

## 2025-04-09 ENCOUNTER — TELEPHONE (OUTPATIENT)
Age: 65
End: 2025-04-09

## 2025-04-09 NOTE — TELEPHONE ENCOUNTER
Pt is sick and will call back to r/s when she is feeling better.   Appt has been canceled 4/10/25 at 1:00 pm with Dr. Nghia Roa.     RC

## 2025-04-25 DIAGNOSIS — E11.65 UNCONTROLLED TYPE 2 DIABETES MELLITUS WITH HYPERGLYCEMIA (HCC): ICD-10-CM

## 2025-04-28 RX ORDER — INSULIN ASPART 100 [IU]/ML
10 INJECTION, SOLUTION INTRAVENOUS; SUBCUTANEOUS
Qty: 27 ML | Refills: 1 | Status: SHIPPED | OUTPATIENT
Start: 2025-04-28 | End: 2025-10-25

## 2025-04-28 RX ORDER — INSULIN DEGLUDEC 100 U/ML
35 INJECTION, SOLUTION SUBCUTANEOUS DAILY
Qty: 30 ML | Refills: 1 | Status: SHIPPED | OUTPATIENT
Start: 2025-04-28 | End: 2025-10-25

## 2025-06-10 ENCOUNTER — OFFICE VISIT (OUTPATIENT)
Dept: ENDOCRINOLOGY | Facility: CLINIC | Age: 65
End: 2025-06-10
Payer: COMMERCIAL

## 2025-06-10 VITALS
DIASTOLIC BLOOD PRESSURE: 76 MMHG | BODY MASS INDEX: 38.8 KG/M2 | OXYGEN SATURATION: 97 % | HEIGHT: 67 IN | WEIGHT: 247.2 LBS | HEART RATE: 93 BPM | SYSTOLIC BLOOD PRESSURE: 106 MMHG

## 2025-06-10 DIAGNOSIS — E11.65 UNCONTROLLED TYPE 2 DIABETES MELLITUS WITH HYPERGLYCEMIA (HCC): ICD-10-CM

## 2025-06-10 DIAGNOSIS — E78.2 MIXED HYPERLIPIDEMIA: Primary | ICD-10-CM

## 2025-06-10 PROCEDURE — 99214 OFFICE O/P EST MOD 30 MIN: CPT

## 2025-06-10 RX ORDER — PREDNISONE 20 MG/1
20 TABLET ORAL DAILY
COMMUNITY

## 2025-06-10 RX ORDER — INSULIN ASPART 100 [IU]/ML
INJECTION, SOLUTION INTRAVENOUS; SUBCUTANEOUS
Qty: 45 ML | Refills: 1 | Status: SHIPPED | OUTPATIENT
Start: 2025-06-10 | End: 2025-06-10 | Stop reason: SDUPTHER

## 2025-06-10 RX ORDER — INSULIN ASPART 100 [IU]/ML
INJECTION, SOLUTION INTRAVENOUS; SUBCUTANEOUS
Qty: 45 ML | Refills: 1 | Status: SHIPPED | OUTPATIENT
Start: 2025-06-10

## 2025-06-10 RX ORDER — ROSUVASTATIN CALCIUM 20 MG/1
20 TABLET, COATED ORAL DAILY
Qty: 90 TABLET | Refills: 5 | Status: SHIPPED | OUTPATIENT
Start: 2025-06-10

## 2025-06-10 NOTE — PATIENT INSTRUCTIONS
Goal blood sugar be less than 180 after a meal   Increase to 16-20 units prior to meal   Increase by 2-3 units every few days to keep post-meal blood sugar less than 180 after 2 hours    Drinks lots of water and exercise to help bring your blood sugar down

## 2025-06-10 NOTE — PROGRESS NOTES
Name: Lashawn Davis      : 1960      MRN: [id number]  Encounter Provider: MERI Shetty  Encounter Date: 6/10/2025   Encounter department: [facility] [place]    Chief Complaint   Patient presents with    Diabetes Type 2     Assessment & Plan  1. Diabetes mellitus: Improved A1c levels, but potentially will increase due to prednisone. Fasting blood glucose normal, Tresiba effective. Significant postprandial fluctuations, especially after taking prednisone.   - Goal postprandial blood glucose <180. Blood glucose >300 suggests inadequate insulin. Discussed insulin pump for automatic basal rate adjustment and direct bolus administration.  - Advised healthy diet: salads, lean meats, home-cooked meals; avoid fried, processed, restaurant food. Limit carbohydrates, avoid sugary drinks. Recommended Dexcom for real-time monitoring. Will review data in 2 weeks after she connects to our office. Consult American Diabetes Association website for dietary advice. Suggested Glucerna as dietary supplement.   - Increase NovoLog to 16-20 units before meals. Increase NovoLog by 2-3 units every few days to maintain postprandial blood glucose <180 2 hours after meal. Provided prescription refill for NovoLog. Reduce Tresiba to 35 units for low blood sugar. She will reach out with any concerns in the interim. She will send blood sugar data every 2-3 weeks for dose adjustments.    2. Hyperlipidemia: Elevated cholesterol  - Increase rosuvastatin from 10 mg to 20 mg.  - Repeat lab test in 3 months to monitor response.  - Consider ezetimibe for further cholesterol management.    3. Knee pain: Managed with prednisone.  - Pain contributing to overall discomfort and diabetes management difficulty.  - Continue current pain management, monitor symptom changes.  - Further evaluation and potential adjustments based on response.    4. Hypertension- controlled in office. Continue  regimen    Follow-up: 6 to 8 weeks.    History of Present Illness  Lashawn Davis is a 65 y.o. female presents to the office today for routine follow up of type 2 diabetes. Complications: severe neuropathy, CAD. Last A1C was 7.5. Significant cardiac history including STEMI, cardiomyopathy, CHF, CAD, hypertension. History +DARYL, GERD     Hypoglycemia: no  Recent hospitalizations or illnesses: no  Problems with current regimen: BG worse since starting prednisone. Reports in 300 range.     TIR- 37% for past 2 weeks     Takes 14 units prior to meal when BG is 300. BG remains high (>300).     Unable to download CGM report today.      Current regimen:   Novolog 10 units TID  Tresiba 40 units daily      The patient presents for evaluation of diabetes and hyperlipidemia.    Diabetes  - Improved A1c levels but anticipates a decline due to resumption of prednisone therapy.  - Current blood glucose is 116, attributed to fasting   - Prednisone is taken around 2:00 or 3:00 PM, occasionally later.  - Last month, she was on 40 mg twice daily, now reduced to 20 mg.  - Suspects dosage may increase due to elevated blood glucose.  - Despite advice to split the dose, found no benefit.  - Consumes oranges, correlating with elevated blood glucose.  - Currently on 40 units of long-acting insulin and up to 14 units of short-acting insulin.  - notes occasional low blood sugar  - Blood glucose exceeds 300 with 14 units of insulin.  - Uncertain whether to take higher insulin dose before prednisone or eating, or wait until blood sugar rises.  - Due for an eye exam for diabetes but delaying until leg issues are resolved.  - Has cataracts.    Hyperlipidemia  - Prescribed rosuvastatin but has not started.  - Previously allergic to Repatha.    Knee Pain  - Experiencing knee pain, attributed to medication regimen.  - Prednisone taken since 02/2025, scheduled to start biologic therapy next month.  - Working with three doctors on this  "issue.    Supplemental information: Due to staying up all night, they eat at 1:00 or 2:00 PM. Prednisone is taken with other medications; if they do not eat, they get sick.  Blood sugar was 93 before prednisone and eating, having only had coffee. After prednisone and coffee, blood sugar bev to 352 within hours. Did not take pre-meal insulin. Medications are taken with breakfast; blood sugar remains high throughout the day. Bedtime is around 3:00 or 4:00 AM, last meal around 1:00 or 2:00 AM. Does not go more than 4 hours without eating unless sleeping.    Last Eye Exam: needs to schedule  Last Foot Exam: 03/17/2025    Health Maintenance   Topic Date Due    Diabetic Eye Exam  06/14/2025    Diabetic Foot Exam  03/17/2026           Review of Systems   Constitutional:  Negative for chills and fever.   HENT:  Negative for ear pain and sore throat.    Eyes:  Negative for pain and visual disturbance.   Respiratory:  Negative for cough and shortness of breath.    Cardiovascular:  Negative for chest pain and palpitations.   Gastrointestinal:  Negative for abdominal pain and vomiting.   Genitourinary:  Negative for dysuria and hematuria.   Musculoskeletal:  Negative for arthralgias and back pain.   Skin:  Negative for color change and rash.   Neurological:  Negative for seizures and syncope.   All other systems reviewed and are negative.   as per HPI       Medical History Reviewed by provider this encounter:     .    Objective   /76   Pulse 93   Ht 5' 7\" (1.702 m)   Wt 112 kg (247 lb 3.2 oz)   SpO2 97%   BMI 38.72 kg/m²      Body mass index is 38.72 kg/m².  Wt Readings from Last 3 Encounters:   06/10/25 112 kg (247 lb 3.2 oz)   05/19/25 111 kg (244 lb)   03/17/25 110 kg (242 lb)     Physical Exam  - General: Tired, mild distress  - Skin: Pustular lesions on legs, healing slowly  - Extremities: Swelling in one leg, lesions on back of legs and bottom of feet  Physical Exam  Vitals reviewed.   HENT:      Head: " Normocephalic and atraumatic.     Cardiovascular:      Rate and Rhythm: Normal rate.   Pulmonary:      Effort: Pulmonary effort is normal.     Neurological:      Mental Status: She is alert.             Results  - CGM:    - Post-meal spikes: Blood sugar >300 after prednisone and eating  Labs:   Lab Results   Component Value Date    HGBA1C 7.5 (H) 04/10/2025    HGBA1C 9 (H) 01/08/2025    HGBA1C 12.3 (H) 08/22/2024     Lab Results   Component Value Date    CREATININE 0.93 04/10/2025    CREATININE 0.7 02/12/2025    CREATININE 0.77 01/08/2025    BUN 17 04/10/2025     01/22/2016    K 4.5 04/10/2025     04/10/2025    CO2 23 04/10/2025     eGFRcr   Date Value Ref Range Status   04/10/2025 69 >59 Final     Lab Results   Component Value Date    HDL 44 (L) 10/24/2024    TRIG 227 (H) 10/24/2024     Lab Results   Component Value Date    ALT 6 04/10/2025    AST 13 04/10/2025    ALKPHOS 96 04/10/2025    BILITOT 0.4 01/22/2016     Lab Results   Component Value Date    QPF3THYDVRMD 2.100 08/11/2021    [id number] 1.620 11/13/2020       Patient Instructions   Goal blood sugar be less than 180 after a meal   Increase to 16-20 units prior to meal   Increase by 2-3 units every few days to keep post-meal blood sugar less than 180 after 2 hours    Drinks lots of water and exercise to help bring your blood sugar down            Discussed with the patient and all questioned fully answered. She will call me if any problems arise.

## 2025-07-08 ENCOUNTER — APPOINTMENT (OUTPATIENT)
Dept: LAB | Facility: CLINIC | Age: 65
End: 2025-07-08
Payer: MEDICARE

## 2025-07-08 DIAGNOSIS — G71.038 LIMB-GIRDLE MUSCULAR DYSTROPHY R21 ASSOCIATED WITH MUTATION IN POGLUT1 GENE (HCC): ICD-10-CM

## 2025-07-08 DIAGNOSIS — K50.119 CROHN'S DISEASE OF COLON WITH COMPLICATION (HCC): ICD-10-CM

## 2025-07-08 DIAGNOSIS — E11.65 UNCONTROLLED TYPE 2 DIABETES MELLITUS WITH HYPERGLYCEMIA (HCC): ICD-10-CM

## 2025-07-08 LAB
EST. AVERAGE GLUCOSE BLD GHB EST-MCNC: 249 MG/DL
HBA1C MFR BLD: 10.3 %

## 2025-07-08 PROCEDURE — 84165 PROTEIN E-PHORESIS SERUM: CPT

## 2025-07-08 PROCEDURE — 86334 IMMUNOFIX E-PHORESIS SERUM: CPT

## 2025-07-08 PROCEDURE — 87340 HEPATITIS B SURFACE AG IA: CPT

## 2025-07-08 PROCEDURE — 86480 TB TEST CELL IMMUN MEASURE: CPT

## 2025-07-08 PROCEDURE — 36415 COLL VENOUS BLD VENIPUNCTURE: CPT

## 2025-07-08 PROCEDURE — 83036 HEMOGLOBIN GLYCOSYLATED A1C: CPT

## 2025-07-09 LAB
ALBUMIN SERPL ELPH-MCNC: 3.72 G/DL (ref 3.2–5.1)
ALBUMIN SERPL ELPH-MCNC: 56.3 % (ref 48–70)
ALPHA1 GLOB SERPL ELPH-MCNC: 0.31 G/DL (ref 0.15–0.47)
ALPHA1 GLOB SERPL ELPH-MCNC: 4.7 % (ref 1.8–7)
ALPHA2 GLOB SERPL ELPH-MCNC: 0.86 G/DL (ref 0.42–1.04)
ALPHA2 GLOB SERPL ELPH-MCNC: 13 % (ref 5.9–14.9)
BETA GLOB ABNORMAL SERPL ELPH-MCNC: 0.42 G/DL (ref 0.31–0.57)
BETA1 GLOB SERPL ELPH-MCNC: 6.3 % (ref 4.7–7.7)
BETA2 GLOB SERPL ELPH-MCNC: 5.8 % (ref 3.1–7.9)
BETA2+GAMMA GLOB SERPL ELPH-MCNC: 0.38 G/DL (ref 0.2–0.58)
GAMMA GLOB ABNORMAL SERPL ELPH-MCNC: 0.92 G/DL (ref 0.4–1.66)
GAMMA GLOB SERPL ELPH-MCNC: 13.9 % (ref 6.9–22.3)
GAMMA INTERFERON BACKGROUND BLD IA-ACNC: 0.02 IU/ML
HBV SURFACE AG SER QL: NORMAL
IGG/ALB SER: 1.29 {RATIO} (ref 1.1–1.8)
M TB IFN-G BLD-IMP: NEGATIVE
M TB IFN-G CD4+ BCKGRND COR BLD-ACNC: 0.01 IU/ML
M TB IFN-G CD4+ BCKGRND COR BLD-ACNC: 0.02 IU/ML
MITOGEN IGNF BCKGRD COR BLD-ACNC: 6.4 IU/ML
PROT SERPL-MCNC: 6.6 G/DL (ref 6.4–8.4)

## 2025-07-09 PROCEDURE — 84165 PROTEIN E-PHORESIS SERUM: CPT | Performed by: STUDENT IN AN ORGANIZED HEALTH CARE EDUCATION/TRAINING PROGRAM

## 2025-07-09 PROCEDURE — 86334 IMMUNOFIX E-PHORESIS SERUM: CPT | Performed by: STUDENT IN AN ORGANIZED HEALTH CARE EDUCATION/TRAINING PROGRAM

## 2025-07-18 ENCOUNTER — TELEPHONE (OUTPATIENT)
Age: 65
End: 2025-07-18

## 2025-07-24 DIAGNOSIS — L30.9 HAND ECZEMA: ICD-10-CM

## 2025-07-24 RX ORDER — MOMETASONE FUROATE 1 MG/G
CREAM TOPICAL
Qty: 45 G | Refills: 0 | OUTPATIENT
Start: 2025-07-24

## (undated) DEVICE — CATH DIAG 5FR IMPULSE 100CM FL4

## (undated) DEVICE — PINNACLE INTRODUCER SHEATH: Brand: PINNACLE

## (undated) DEVICE — GUIDEWIRE AMPLATZ SS 260CM .035

## (undated) DEVICE — CATH DIAG 5FR IMPULSE 100CM FR4

## (undated) DEVICE — GUIDELINER CATHETERS ARE INTENDED TO BE USED IN CONJUNCTION WITH GUIDE CATHETERS TO ACCESS DISCRETE REGIONS OF THE CORONARY AND/OR PERIPHERAL VASCULATURE, AND TO FACILITATE PLACEMENT OF INTERVENTIONAL DEVICES.: Brand: GUIDELINER® V3 CATHETER

## (undated) DEVICE — TREK CORONARY DILATATION CATHETER 2.50 MM X 15 MM / RAPID-EXCHANGE: Brand: TREK

## (undated) DEVICE — MICROPUNCTURE SET 4FR 10CM .018 NITINOL TRANSITIONLESS TIP

## (undated) DEVICE — DGW .035 FC J3MM 150CM TEF: Brand: EMERALD

## (undated) DEVICE — CATH GUIDE LAUNCHER 6FR JR 4.0

## (undated) DEVICE — RUNTHROUGH NS EXTRA FLOPPY PTCA GUIDEWIRE: Brand: RUNTHROUGH